# Patient Record
Sex: MALE | Race: WHITE | NOT HISPANIC OR LATINO
[De-identification: names, ages, dates, MRNs, and addresses within clinical notes are randomized per-mention and may not be internally consistent; named-entity substitution may affect disease eponyms.]

---

## 2017-01-12 ENCOUNTER — CHART COPY (OUTPATIENT)
Age: 82
End: 2017-01-12

## 2017-01-13 ENCOUNTER — APPOINTMENT (OUTPATIENT)
Dept: FAMILY MEDICINE | Facility: CLINIC | Age: 82
End: 2017-01-13

## 2017-01-13 VITALS — DIASTOLIC BLOOD PRESSURE: 70 MMHG | SYSTOLIC BLOOD PRESSURE: 112 MMHG

## 2017-01-14 LAB
ALBUMIN SERPL ELPH-MCNC: 4.6 G/DL
ALP BLD-CCNC: 69 U/L
ALT SERPL-CCNC: 20 U/L
ANION GAP SERPL CALC-SCNC: 14 MMOL/L
AST SERPL-CCNC: 22 U/L
BASOPHILS # BLD AUTO: 0.02 K/UL
BASOPHILS NFR BLD AUTO: 0.4 %
BILIRUB SERPL-MCNC: 0.7 MG/DL
BUN SERPL-MCNC: 22 MG/DL
CALCIUM SERPL-MCNC: 9.4 MG/DL
CHLORIDE SERPL-SCNC: 107 MMOL/L
CHOLEST SERPL-MCNC: 193 MG/DL
CHOLEST/HDLC SERPL: 2.8 RATIO
CO2 SERPL-SCNC: 22 MMOL/L
CREAT SERPL-MCNC: 1.05 MG/DL
EOSINOPHIL # BLD AUTO: 0.15 K/UL
EOSINOPHIL NFR BLD AUTO: 2.7 %
GLUCOSE SERPL-MCNC: 113 MG/DL
HBA1C MFR BLD HPLC: 6.2 %
HCT VFR BLD CALC: 39.3 %
HDLC SERPL-MCNC: 69 MG/DL
HGB BLD-MCNC: 12.6 G/DL
IMM GRANULOCYTES NFR BLD AUTO: 0.2 %
LDLC SERPL CALC-MCNC: 111 MG/DL
LYMPHOCYTES # BLD AUTO: 1.56 K/UL
LYMPHOCYTES NFR BLD AUTO: 28.4 %
MAN DIFF?: NORMAL
MCHC RBC-ENTMCNC: 29.4 PG
MCHC RBC-ENTMCNC: 32.1 GM/DL
MCV RBC AUTO: 91.6 FL
MONOCYTES # BLD AUTO: 0.48 K/UL
MONOCYTES NFR BLD AUTO: 8.7 %
NEUTROPHILS # BLD AUTO: 3.28 K/UL
NEUTROPHILS NFR BLD AUTO: 59.6 %
PLATELET # BLD AUTO: 234 K/UL
POTASSIUM SERPL-SCNC: 4.6 MMOL/L
PROT SERPL-MCNC: 7.6 G/DL
PSA SERPL-MCNC: 2.7 NG/ML
RBC # BLD: 4.29 M/UL
RBC # FLD: 14.5 %
SODIUM SERPL-SCNC: 143 MMOL/L
TRIGL SERPL-MCNC: 65 MG/DL
TSH SERPL-ACNC: 1.99 UIU/ML
WBC # FLD AUTO: 5.5 K/UL

## 2017-01-26 ENCOUNTER — MEDICATION RENEWAL (OUTPATIENT)
Age: 82
End: 2017-01-26

## 2017-03-07 ENCOUNTER — APPOINTMENT (OUTPATIENT)
Dept: FAMILY MEDICINE | Facility: CLINIC | Age: 82
End: 2017-03-07

## 2017-03-07 VITALS — SYSTOLIC BLOOD PRESSURE: 118 MMHG | DIASTOLIC BLOOD PRESSURE: 72 MMHG

## 2017-04-12 ENCOUNTER — MEDICATION RENEWAL (OUTPATIENT)
Age: 82
End: 2017-04-12

## 2017-05-18 ENCOUNTER — MEDICATION RENEWAL (OUTPATIENT)
Age: 82
End: 2017-05-18

## 2017-06-21 ENCOUNTER — MEDICATION RENEWAL (OUTPATIENT)
Age: 82
End: 2017-06-21

## 2017-06-22 ENCOUNTER — APPOINTMENT (OUTPATIENT)
Dept: FAMILY MEDICINE | Facility: CLINIC | Age: 82
End: 2017-06-22

## 2017-06-22 ENCOUNTER — RX RENEWAL (OUTPATIENT)
Age: 82
End: 2017-06-22

## 2017-06-22 VITALS
BODY MASS INDEX: 22.88 KG/M2 | HEIGHT: 69.5 IN | WEIGHT: 158 LBS | DIASTOLIC BLOOD PRESSURE: 66 MMHG | SYSTOLIC BLOOD PRESSURE: 110 MMHG

## 2017-06-23 LAB
ALBUMIN SERPL ELPH-MCNC: 4.5 G/DL
ALP BLD-CCNC: 65 U/L
ALT SERPL-CCNC: 14 U/L
ANION GAP SERPL CALC-SCNC: 11 MMOL/L
AST SERPL-CCNC: 17 U/L
BILIRUB SERPL-MCNC: 0.5 MG/DL
BUN SERPL-MCNC: 26 MG/DL
CALCIUM SERPL-MCNC: 9.3 MG/DL
CHLORIDE SERPL-SCNC: 104 MMOL/L
CO2 SERPL-SCNC: 26 MMOL/L
CREAT SERPL-MCNC: 1.13 MG/DL
GLUCOSE SERPL-MCNC: 141 MG/DL
POTASSIUM SERPL-SCNC: 4.4 MMOL/L
PROT SERPL-MCNC: 7.6 G/DL
SODIUM SERPL-SCNC: 141 MMOL/L

## 2017-08-12 ENCOUNTER — RX RENEWAL (OUTPATIENT)
Age: 82
End: 2017-08-12

## 2017-10-09 ENCOUNTER — RX RENEWAL (OUTPATIENT)
Age: 82
End: 2017-10-09

## 2017-11-18 ENCOUNTER — RX RENEWAL (OUTPATIENT)
Age: 82
End: 2017-11-18

## 2017-12-17 ENCOUNTER — RX RENEWAL (OUTPATIENT)
Age: 82
End: 2017-12-17

## 2018-01-16 ENCOUNTER — RX RENEWAL (OUTPATIENT)
Age: 83
End: 2018-01-16

## 2018-02-11 ENCOUNTER — RX RENEWAL (OUTPATIENT)
Age: 83
End: 2018-02-11

## 2018-02-13 ENCOUNTER — RX RENEWAL (OUTPATIENT)
Age: 83
End: 2018-02-13

## 2018-10-30 ENCOUNTER — APPOINTMENT (OUTPATIENT)
Dept: FAMILY MEDICINE | Facility: CLINIC | Age: 83
End: 2018-10-30
Payer: MEDICARE

## 2018-10-30 VITALS
WEIGHT: 180 LBS | SYSTOLIC BLOOD PRESSURE: 110 MMHG | DIASTOLIC BLOOD PRESSURE: 70 MMHG | BODY MASS INDEX: 25.77 KG/M2 | HEIGHT: 70 IN

## 2018-10-30 DIAGNOSIS — R53.81 OTHER MALAISE: ICD-10-CM

## 2018-10-30 PROCEDURE — G0008: CPT

## 2018-10-30 PROCEDURE — 99214 OFFICE O/P EST MOD 30 MIN: CPT | Mod: 25

## 2018-10-30 PROCEDURE — 90686 IIV4 VACC NO PRSV 0.5 ML IM: CPT

## 2018-11-20 ENCOUNTER — APPOINTMENT (OUTPATIENT)
Dept: FAMILY MEDICINE | Facility: CLINIC | Age: 83
End: 2018-11-20
Payer: MEDICARE

## 2018-11-20 VITALS
BODY MASS INDEX: 28.06 KG/M2 | WEIGHT: 196 LBS | DIASTOLIC BLOOD PRESSURE: 66 MMHG | SYSTOLIC BLOOD PRESSURE: 118 MMHG | HEIGHT: 70 IN

## 2018-11-20 PROCEDURE — 36415 COLL VENOUS BLD VENIPUNCTURE: CPT

## 2018-11-20 PROCEDURE — 99213 OFFICE O/P EST LOW 20 MIN: CPT | Mod: 25

## 2018-11-21 LAB
ALBUMIN SERPL ELPH-MCNC: 4.4 G/DL
ALP BLD-CCNC: 58 U/L
ALT SERPL-CCNC: 16 U/L
ANION GAP SERPL CALC-SCNC: 13 MMOL/L
AST SERPL-CCNC: 16 U/L
BASOPHILS # BLD AUTO: 0.02 K/UL
BASOPHILS NFR BLD AUTO: 0.4 %
BILIRUB SERPL-MCNC: 0.4 MG/DL
BUN SERPL-MCNC: 23 MG/DL
CALCIUM SERPL-MCNC: 9.4 MG/DL
CHLORIDE SERPL-SCNC: 104 MMOL/L
CHOLEST SERPL-MCNC: 169 MG/DL
CHOLEST/HDLC SERPL: 3.3 RATIO
CO2 SERPL-SCNC: 25 MMOL/L
CREAT SERPL-MCNC: 1.14 MG/DL
EOSINOPHIL # BLD AUTO: 0.21 K/UL
EOSINOPHIL NFR BLD AUTO: 4.3 %
GLUCOSE SERPL-MCNC: 150 MG/DL
HBA1C MFR BLD HPLC: 6.8 %
HCT VFR BLD CALC: 37.4 %
HDLC SERPL-MCNC: 51 MG/DL
HGB BLD-MCNC: 12.4 G/DL
IMM GRANULOCYTES NFR BLD AUTO: 0.2 %
LDLC SERPL CALC-MCNC: 99 MG/DL
LYMPHOCYTES # BLD AUTO: 1.68 K/UL
LYMPHOCYTES NFR BLD AUTO: 34.8 %
MAN DIFF?: NORMAL
MCHC RBC-ENTMCNC: 29.6 PG
MCHC RBC-ENTMCNC: 33.2 GM/DL
MCV RBC AUTO: 89.3 FL
MONOCYTES # BLD AUTO: 0.48 K/UL
MONOCYTES NFR BLD AUTO: 9.9 %
NEUTROPHILS # BLD AUTO: 2.43 K/UL
NEUTROPHILS NFR BLD AUTO: 50.4 %
PLATELET # BLD AUTO: 193 K/UL
POTASSIUM SERPL-SCNC: 4.4 MMOL/L
PROT SERPL-MCNC: 7 G/DL
RBC # BLD: 4.19 M/UL
RBC # FLD: 14.1 %
SODIUM SERPL-SCNC: 142 MMOL/L
TRIGL SERPL-MCNC: 97 MG/DL
TSH SERPL-ACNC: 2.19 UIU/ML
WBC # FLD AUTO: 4.83 K/UL

## 2018-11-27 ENCOUNTER — MEDICATION RENEWAL (OUTPATIENT)
Age: 83
End: 2018-11-27

## 2018-11-28 ENCOUNTER — MEDICATION RENEWAL (OUTPATIENT)
Age: 83
End: 2018-11-28

## 2018-11-29 RX ORDER — OMEGA-3-ACID ETHYL ESTERS CAPSULES 1 G/1
1 CAPSULE, LIQUID FILLED ORAL TWICE DAILY
Qty: 360 | Refills: 1 | Status: ACTIVE | COMMUNITY
Start: 2018-11-28

## 2018-12-12 ENCOUNTER — MEDICATION RENEWAL (OUTPATIENT)
Age: 83
End: 2018-12-12

## 2018-12-20 ENCOUNTER — RX RENEWAL (OUTPATIENT)
Age: 83
End: 2018-12-20

## 2019-03-06 ENCOUNTER — RX RENEWAL (OUTPATIENT)
Age: 84
End: 2019-03-06

## 2019-03-07 ENCOUNTER — RX RENEWAL (OUTPATIENT)
Age: 84
End: 2019-03-07

## 2019-03-19 ENCOUNTER — RX RENEWAL (OUTPATIENT)
Age: 84
End: 2019-03-19

## 2019-03-26 ENCOUNTER — LABORATORY RESULT (OUTPATIENT)
Age: 84
End: 2019-03-26

## 2019-03-26 ENCOUNTER — APPOINTMENT (OUTPATIENT)
Dept: FAMILY MEDICINE | Facility: CLINIC | Age: 84
End: 2019-03-26
Payer: MEDICARE

## 2019-03-26 VITALS
HEART RATE: 86 BPM | WEIGHT: 185 LBS | OXYGEN SATURATION: 98 % | HEIGHT: 70 IN | BODY MASS INDEX: 26.48 KG/M2 | SYSTOLIC BLOOD PRESSURE: 130 MMHG | DIASTOLIC BLOOD PRESSURE: 60 MMHG | RESPIRATION RATE: 16 BRPM

## 2019-03-26 DIAGNOSIS — R73.03 PREDIABETES.: ICD-10-CM

## 2019-03-26 PROCEDURE — 99214 OFFICE O/P EST MOD 30 MIN: CPT | Mod: 25

## 2019-03-26 PROCEDURE — 36415 COLL VENOUS BLD VENIPUNCTURE: CPT

## 2019-03-27 LAB
ALBUMIN SERPL ELPH-MCNC: 4.3 G/DL
ALP BLD-CCNC: 68 U/L
ALT SERPL-CCNC: 20 U/L
ANION GAP SERPL CALC-SCNC: 14 MMOL/L
AST SERPL-CCNC: 21 U/L
BASOPHILS # BLD AUTO: 0.03 K/UL
BASOPHILS NFR BLD AUTO: 0.5 %
BILIRUB SERPL-MCNC: 0.3 MG/DL
BUN SERPL-MCNC: 25 MG/DL
CALCIUM SERPL-MCNC: 9.3 MG/DL
CHLORIDE SERPL-SCNC: 101 MMOL/L
CO2 SERPL-SCNC: 24 MMOL/L
CREAT SERPL-MCNC: 1 MG/DL
EOSINOPHIL # BLD AUTO: 0.22 K/UL
EOSINOPHIL NFR BLD AUTO: 3.6 %
GLUCOSE SERPL-MCNC: 233 MG/DL
HCT VFR BLD CALC: 39.9 %
HGB BLD-MCNC: 12.9 G/DL
IMM GRANULOCYTES NFR BLD AUTO: 0.5 %
LYMPHOCYTES # BLD AUTO: 2.01 K/UL
LYMPHOCYTES NFR BLD AUTO: 32.5 %
MAN DIFF?: NORMAL
MCHC RBC-ENTMCNC: 29.3 PG
MCHC RBC-ENTMCNC: 32.3 GM/DL
MCV RBC AUTO: 90.5 FL
MONOCYTES # BLD AUTO: 0.53 K/UL
MONOCYTES NFR BLD AUTO: 8.6 %
NEUTROPHILS # BLD AUTO: 3.36 K/UL
NEUTROPHILS NFR BLD AUTO: 54.3 %
PLATELET # BLD AUTO: 206 K/UL
POTASSIUM SERPL-SCNC: 4.1 MMOL/L
PROT SERPL-MCNC: 7.4 G/DL
RBC # BLD: 4.41 M/UL
RBC # FLD: 13.5 %
SODIUM SERPL-SCNC: 139 MMOL/L
TSH SERPL-ACNC: 2.36 UIU/ML
WBC # FLD AUTO: 6.18 K/UL

## 2019-04-12 ENCOUNTER — RX RENEWAL (OUTPATIENT)
Age: 84
End: 2019-04-12

## 2019-05-28 ENCOUNTER — APPOINTMENT (OUTPATIENT)
Dept: FAMILY MEDICINE | Facility: CLINIC | Age: 84
End: 2019-05-28
Payer: MEDICARE

## 2019-05-28 VITALS
BODY MASS INDEX: 27.4 KG/M2 | OXYGEN SATURATION: 95 % | HEART RATE: 67 BPM | WEIGHT: 185 LBS | SYSTOLIC BLOOD PRESSURE: 100 MMHG | DIASTOLIC BLOOD PRESSURE: 60 MMHG | HEIGHT: 69 IN | RESPIRATION RATE: 15 BRPM

## 2019-05-28 PROCEDURE — 99214 OFFICE O/P EST MOD 30 MIN: CPT | Mod: 25

## 2019-05-28 PROCEDURE — 36415 COLL VENOUS BLD VENIPUNCTURE: CPT

## 2019-05-29 LAB
ESTIMATED AVERAGE GLUCOSE: 226 MG/DL
HBA1C MFR BLD HPLC: 9.5 %
TESTOST SERPL-MCNC: 186 NG/DL

## 2019-06-03 ENCOUNTER — RX RENEWAL (OUTPATIENT)
Age: 84
End: 2019-06-03

## 2019-07-11 ENCOUNTER — RX RENEWAL (OUTPATIENT)
Age: 84
End: 2019-07-11

## 2019-07-11 ENCOUNTER — MEDICATION RENEWAL (OUTPATIENT)
Age: 84
End: 2019-07-11

## 2019-08-08 ENCOUNTER — RX RENEWAL (OUTPATIENT)
Age: 84
End: 2019-08-08

## 2019-08-15 ENCOUNTER — RX RENEWAL (OUTPATIENT)
Age: 84
End: 2019-08-15

## 2019-09-24 ENCOUNTER — RX RENEWAL (OUTPATIENT)
Age: 84
End: 2019-09-24

## 2019-10-24 ENCOUNTER — APPOINTMENT (OUTPATIENT)
Dept: FAMILY MEDICINE | Facility: CLINIC | Age: 84
End: 2019-10-24

## 2019-10-28 ENCOUNTER — MEDICATION RENEWAL (OUTPATIENT)
Age: 84
End: 2019-10-28

## 2019-10-28 ENCOUNTER — APPOINTMENT (OUTPATIENT)
Dept: FAMILY MEDICINE | Facility: CLINIC | Age: 84
End: 2019-10-28

## 2019-10-29 ENCOUNTER — APPOINTMENT (OUTPATIENT)
Dept: FAMILY MEDICINE | Facility: CLINIC | Age: 84
End: 2019-10-29
Payer: MEDICARE

## 2019-10-29 PROCEDURE — 99214 OFFICE O/P EST MOD 30 MIN: CPT

## 2019-11-21 ENCOUNTER — RX RENEWAL (OUTPATIENT)
Age: 84
End: 2019-11-21

## 2020-01-27 ENCOUNTER — APPOINTMENT (OUTPATIENT)
Dept: FAMILY MEDICINE | Facility: CLINIC | Age: 85
End: 2020-01-27
Payer: MEDICARE

## 2020-01-27 VITALS
HEART RATE: 64 BPM | HEIGHT: 69 IN | DIASTOLIC BLOOD PRESSURE: 50 MMHG | SYSTOLIC BLOOD PRESSURE: 100 MMHG | TEMPERATURE: 97.4 F | OXYGEN SATURATION: 98 %

## 2020-01-27 PROCEDURE — 99214 OFFICE O/P EST MOD 30 MIN: CPT

## 2020-01-31 ENCOUNTER — RX RENEWAL (OUTPATIENT)
Age: 85
End: 2020-01-31

## 2020-02-23 ENCOUNTER — APPOINTMENT (OUTPATIENT)
Dept: FAMILY MEDICINE | Facility: CLINIC | Age: 85
End: 2020-02-23
Payer: MEDICARE

## 2020-02-23 VITALS
HEIGHT: 69 IN | BODY MASS INDEX: 26.66 KG/M2 | DIASTOLIC BLOOD PRESSURE: 64 MMHG | SYSTOLIC BLOOD PRESSURE: 110 MMHG | WEIGHT: 180 LBS

## 2020-02-23 PROCEDURE — 99213 OFFICE O/P EST LOW 20 MIN: CPT | Mod: 25

## 2020-02-23 PROCEDURE — 36415 COLL VENOUS BLD VENIPUNCTURE: CPT

## 2020-02-24 LAB
ALBUMIN SERPL ELPH-MCNC: 4.6 G/DL
ALP BLD-CCNC: 69 U/L
ALT SERPL-CCNC: 23 U/L
ANION GAP SERPL CALC-SCNC: 14 MMOL/L
AST SERPL-CCNC: 16 U/L
BASOPHILS # BLD AUTO: 0.03 K/UL
BASOPHILS NFR BLD AUTO: 0.4 %
BILIRUB SERPL-MCNC: 0.5 MG/DL
BUN SERPL-MCNC: 30 MG/DL
CALCIUM SERPL-MCNC: 10.1 MG/DL
CHLORIDE SERPL-SCNC: 100 MMOL/L
CHOLEST SERPL-MCNC: 188 MG/DL
CHOLEST/HDLC SERPL: 3.4 RATIO
CO2 SERPL-SCNC: 26 MMOL/L
CREAT SERPL-MCNC: 1.08 MG/DL
EOSINOPHIL # BLD AUTO: 0.14 K/UL
EOSINOPHIL NFR BLD AUTO: 1.8 %
ESTIMATED AVERAGE GLUCOSE: 171 MG/DL
GLUCOSE SERPL-MCNC: 206 MG/DL
HBA1C MFR BLD HPLC: 7.6 %
HCT VFR BLD CALC: 40.9 %
HDLC SERPL-MCNC: 55 MG/DL
HGB BLD-MCNC: 13.2 G/DL
IMM GRANULOCYTES NFR BLD AUTO: 0.4 %
LDLC SERPL CALC-MCNC: 114 MG/DL
LYMPHOCYTES # BLD AUTO: 2.22 K/UL
LYMPHOCYTES NFR BLD AUTO: 29 %
MAN DIFF?: NORMAL
MCHC RBC-ENTMCNC: 29.7 PG
MCHC RBC-ENTMCNC: 32.3 GM/DL
MCV RBC AUTO: 91.9 FL
MONOCYTES # BLD AUTO: 0.6 K/UL
MONOCYTES NFR BLD AUTO: 7.8 %
NEUTROPHILS # BLD AUTO: 4.63 K/UL
NEUTROPHILS NFR BLD AUTO: 60.6 %
PLATELET # BLD AUTO: 250 K/UL
POTASSIUM SERPL-SCNC: 4.3 MMOL/L
PROT SERPL-MCNC: 7.3 G/DL
RBC # BLD: 4.45 M/UL
RBC # FLD: 13.2 %
SODIUM SERPL-SCNC: 140 MMOL/L
TRIGL SERPL-MCNC: 89 MG/DL
TSH SERPL-ACNC: 1.19 UIU/ML
WBC # FLD AUTO: 7.65 K/UL

## 2020-03-16 ENCOUNTER — RX RENEWAL (OUTPATIENT)
Age: 85
End: 2020-03-16

## 2020-04-20 ENCOUNTER — RX RENEWAL (OUTPATIENT)
Age: 85
End: 2020-04-20

## 2020-04-24 ENCOUNTER — RX RENEWAL (OUTPATIENT)
Age: 85
End: 2020-04-24

## 2020-05-04 ENCOUNTER — RX RENEWAL (OUTPATIENT)
Age: 85
End: 2020-05-04

## 2020-06-04 ENCOUNTER — RX RENEWAL (OUTPATIENT)
Age: 85
End: 2020-06-04

## 2020-06-28 ENCOUNTER — APPOINTMENT (OUTPATIENT)
Dept: FAMILY MEDICINE | Facility: CLINIC | Age: 85
End: 2020-06-28

## 2020-07-20 ENCOUNTER — APPOINTMENT (OUTPATIENT)
Dept: FAMILY MEDICINE | Facility: CLINIC | Age: 85
End: 2020-07-20
Payer: MEDICARE

## 2020-07-20 VITALS
DIASTOLIC BLOOD PRESSURE: 80 MMHG | OXYGEN SATURATION: 98 % | RESPIRATION RATE: 15 BRPM | TEMPERATURE: 98.2 F | SYSTOLIC BLOOD PRESSURE: 100 MMHG | HEART RATE: 82 BPM | BODY MASS INDEX: 27.4 KG/M2 | WEIGHT: 185 LBS | HEIGHT: 69 IN

## 2020-07-20 DIAGNOSIS — Z00.00 ENCOUNTER FOR GENERAL ADULT MEDICAL EXAMINATION W/OUT ABNORMAL FINDINGS: ICD-10-CM

## 2020-07-20 LAB
25(OH)D3 SERPL-MCNC: 8.5 NG/ML
ALBUMIN SERPL ELPH-MCNC: 4.1 G/DL
ALP BLD-CCNC: 77 U/L
ALT SERPL-CCNC: 15 U/L
ANION GAP SERPL CALC-SCNC: 12 MMOL/L
APPEARANCE: CLEAR
AST SERPL-CCNC: 16 U/L
BACTERIA: NEGATIVE
BASOPHILS # BLD AUTO: 0.05 K/UL
BASOPHILS NFR BLD AUTO: 0.9 %
BILIRUB SERPL-MCNC: 0.4 MG/DL
BILIRUBIN URINE: NEGATIVE
BLOOD URINE: NEGATIVE
BUN SERPL-MCNC: 20 MG/DL
CALCIUM SERPL-MCNC: 9.4 MG/DL
CHLORIDE SERPL-SCNC: 104 MMOL/L
CHOLEST SERPL-MCNC: 157 MG/DL
CHOLEST/HDLC SERPL: 3.2 RATIO
CO2 SERPL-SCNC: 24 MMOL/L
COLOR: NORMAL
CREAT SERPL-MCNC: 1.07 MG/DL
EOSINOPHIL # BLD AUTO: 0.28 K/UL
EOSINOPHIL NFR BLD AUTO: 5 %
GLUCOSE QUALITATIVE U: NEGATIVE
GLUCOSE SERPL-MCNC: 162 MG/DL
HCT VFR BLD CALC: 38 %
HDLC SERPL-MCNC: 50 MG/DL
HGB BLD-MCNC: 12 G/DL
HYALINE CASTS: 3 /LPF
IMM GRANULOCYTES NFR BLD AUTO: 0.2 %
KETONES URINE: NORMAL
LDLC SERPL CALC-MCNC: 88 MG/DL
LEUKOCYTE ESTERASE URINE: NEGATIVE
LYMPHOCYTES # BLD AUTO: 1.94 K/UL
LYMPHOCYTES NFR BLD AUTO: 34.4 %
MAN DIFF?: NORMAL
MCHC RBC-ENTMCNC: 29.3 PG
MCHC RBC-ENTMCNC: 31.6 GM/DL
MCV RBC AUTO: 92.7 FL
MICROSCOPIC-UA: NORMAL
MONOCYTES # BLD AUTO: 0.44 K/UL
MONOCYTES NFR BLD AUTO: 7.8 %
NEUTROPHILS # BLD AUTO: 2.92 K/UL
NEUTROPHILS NFR BLD AUTO: 51.7 %
NITRITE URINE: NEGATIVE
PH URINE: 5
PLATELET # BLD AUTO: 243 K/UL
POTASSIUM SERPL-SCNC: 4.1 MMOL/L
PROT SERPL-MCNC: 7 G/DL
PROTEIN URINE: NORMAL
PSA SERPL-MCNC: 0.97 NG/ML
RBC # BLD: 4.1 M/UL
RBC # FLD: 13.5 %
RED BLOOD CELLS URINE: 0 /HPF
SODIUM SERPL-SCNC: 140 MMOL/L
SPECIFIC GRAVITY URINE: 1.02
SQUAMOUS EPITHELIAL CELLS: 1 /HPF
TRIGL SERPL-MCNC: 97 MG/DL
TSH SERPL-ACNC: 2.47 UIU/ML
UROBILINOGEN URINE: NORMAL
WBC # FLD AUTO: 5.64 K/UL
WHITE BLOOD CELLS URINE: 1 /HPF

## 2020-07-20 PROCEDURE — 36415 COLL VENOUS BLD VENIPUNCTURE: CPT

## 2020-07-20 PROCEDURE — G0439: CPT

## 2020-07-21 LAB
SARS-COV-2 IGG SERPL IA-ACNC: 3.97 AU/ML
SARS-COV-2 IGG SERPL QL IA: NEGATIVE

## 2020-07-22 LAB
ESTIMATED AVERAGE GLUCOSE: 177 MG/DL
HBA1C MFR BLD HPLC: 7.8 %

## 2020-09-14 RX ORDER — BLOOD-GLUCOSE METER
W/DEVICE EACH MISCELLANEOUS
Qty: 1 | Refills: 0 | Status: ACTIVE | COMMUNITY
Start: 2020-01-02 | End: 1900-01-01

## 2020-09-14 RX ORDER — LANCETS 33 GAUGE
EACH MISCELLANEOUS DAILY
Qty: 2 | Refills: 0 | Status: ACTIVE | COMMUNITY
Start: 2020-01-02 | End: 1900-01-01

## 2020-09-14 RX ORDER — BLOOD SUGAR DIAGNOSTIC
STRIP MISCELLANEOUS
Qty: 2 | Refills: 1 | Status: ACTIVE | COMMUNITY
Start: 2020-01-02 | End: 1900-01-01

## 2020-10-11 ENCOUNTER — RX RENEWAL (OUTPATIENT)
Age: 85
End: 2020-10-11

## 2020-11-12 ENCOUNTER — APPOINTMENT (OUTPATIENT)
Dept: FAMILY MEDICINE | Facility: CLINIC | Age: 85
End: 2020-11-12
Payer: MEDICARE

## 2020-11-12 VITALS
BODY MASS INDEX: 27.4 KG/M2 | SYSTOLIC BLOOD PRESSURE: 106 MMHG | HEIGHT: 69 IN | WEIGHT: 185 LBS | DIASTOLIC BLOOD PRESSURE: 64 MMHG

## 2020-11-12 DIAGNOSIS — M19.90 UNSPECIFIED OSTEOARTHRITIS, UNSPECIFIED SITE: ICD-10-CM

## 2020-11-12 DIAGNOSIS — Z23 ENCOUNTER FOR IMMUNIZATION: ICD-10-CM

## 2020-11-12 LAB
ALBUMIN SERPL ELPH-MCNC: 4.2 G/DL
ALP BLD-CCNC: 64 U/L
ALT SERPL-CCNC: 15 U/L
ANION GAP SERPL CALC-SCNC: 15 MMOL/L
AST SERPL-CCNC: 15 U/L
BASOPHILS # BLD AUTO: 0.04 K/UL
BASOPHILS NFR BLD AUTO: 0.6 %
BILIRUB SERPL-MCNC: 0.4 MG/DL
BUN SERPL-MCNC: 27 MG/DL
CALCIUM SERPL-MCNC: 9.7 MG/DL
CHLORIDE SERPL-SCNC: 105 MMOL/L
CHOLEST SERPL-MCNC: 134 MG/DL
CO2 SERPL-SCNC: 22 MMOL/L
CREAT SERPL-MCNC: 1.15 MG/DL
CREAT SPEC-SCNC: 111 MG/DL
EOSINOPHIL # BLD AUTO: 0.22 K/UL
EOSINOPHIL NFR BLD AUTO: 3.1 %
ESTIMATED AVERAGE GLUCOSE: 166 MG/DL
GLUCOSE SERPL-MCNC: 131 MG/DL
HBA1C MFR BLD HPLC: 7.4 %
HCT VFR BLD CALC: 37.8 %
HDLC SERPL-MCNC: 50 MG/DL
HGB BLD-MCNC: 11.9 G/DL
IMM GRANULOCYTES NFR BLD AUTO: 0.3 %
LDLC SERPL CALC-MCNC: 67 MG/DL
LYMPHOCYTES # BLD AUTO: 1.93 K/UL
LYMPHOCYTES NFR BLD AUTO: 27.5 %
MAN DIFF?: NORMAL
MCHC RBC-ENTMCNC: 28.4 PG
MCHC RBC-ENTMCNC: 31.5 GM/DL
MCV RBC AUTO: 90.2 FL
MICROALBUMIN 24H UR DL<=1MG/L-MCNC: 1.6 MG/DL
MICROALBUMIN/CREAT 24H UR-RTO: 14 MG/G
MONOCYTES # BLD AUTO: 0.67 K/UL
MONOCYTES NFR BLD AUTO: 9.5 %
NEUTROPHILS # BLD AUTO: 4.15 K/UL
NEUTROPHILS NFR BLD AUTO: 59 %
NONHDLC SERPL-MCNC: 84 MG/DL
PLATELET # BLD AUTO: 240 K/UL
POTASSIUM SERPL-SCNC: 4.4 MMOL/L
PROT SERPL-MCNC: 6.7 G/DL
RBC # BLD: 4.19 M/UL
RBC # FLD: 13.9 %
SODIUM SERPL-SCNC: 141 MMOL/L
TRIGL SERPL-MCNC: 82 MG/DL
TSH SERPL-ACNC: 1.07 UIU/ML
WBC # FLD AUTO: 7.03 K/UL

## 2020-11-12 PROCEDURE — 99072 ADDL SUPL MATRL&STAF TM PHE: CPT

## 2020-11-12 PROCEDURE — G0008: CPT

## 2020-11-12 PROCEDURE — 36415 COLL VENOUS BLD VENIPUNCTURE: CPT

## 2020-11-12 PROCEDURE — 99214 OFFICE O/P EST MOD 30 MIN: CPT | Mod: 25

## 2020-11-12 PROCEDURE — 90686 IIV4 VACC NO PRSV 0.5 ML IM: CPT

## 2021-03-08 ENCOUNTER — APPOINTMENT (OUTPATIENT)
Dept: FAMILY MEDICINE | Facility: CLINIC | Age: 86
End: 2021-03-08
Payer: MEDICARE

## 2021-03-08 VITALS
TEMPERATURE: 97.8 F | WEIGHT: 186 LBS | HEART RATE: 57 BPM | DIASTOLIC BLOOD PRESSURE: 70 MMHG | SYSTOLIC BLOOD PRESSURE: 110 MMHG | BODY MASS INDEX: 27.55 KG/M2 | HEIGHT: 69 IN | OXYGEN SATURATION: 99 %

## 2021-03-08 PROCEDURE — 99214 OFFICE O/P EST MOD 30 MIN: CPT | Mod: 25

## 2021-03-08 PROCEDURE — 99072 ADDL SUPL MATRL&STAF TM PHE: CPT

## 2021-03-08 PROCEDURE — 36415 COLL VENOUS BLD VENIPUNCTURE: CPT

## 2021-03-09 LAB
ALBUMIN SERPL ELPH-MCNC: 4.2 G/DL
ALP BLD-CCNC: 74 U/L
ALT SERPL-CCNC: 12 U/L
ANION GAP SERPL CALC-SCNC: 10 MMOL/L
AST SERPL-CCNC: 15 U/L
BASOPHILS # BLD AUTO: 0.04 K/UL
BASOPHILS NFR BLD AUTO: 0.6 %
BILIRUB SERPL-MCNC: 0.4 MG/DL
BUN SERPL-MCNC: 23 MG/DL
CALCIUM SERPL-MCNC: 9.9 MG/DL
CHLORIDE SERPL-SCNC: 104 MMOL/L
CHOLEST SERPL-MCNC: 142 MG/DL
CO2 SERPL-SCNC: 26 MMOL/L
CREAT SERPL-MCNC: 1.37 MG/DL
EOSINOPHIL # BLD AUTO: 0.25 K/UL
EOSINOPHIL NFR BLD AUTO: 4 %
ESTIMATED AVERAGE GLUCOSE: 140 MG/DL
GLUCOSE SERPL-MCNC: 161 MG/DL
HBA1C MFR BLD HPLC: 6.5 %
HCT VFR BLD CALC: 37.9 %
HDLC SERPL-MCNC: 41 MG/DL
HGB BLD-MCNC: 11.7 G/DL
IMM GRANULOCYTES NFR BLD AUTO: 0.3 %
LDLC SERPL CALC-MCNC: 72 MG/DL
LYMPHOCYTES # BLD AUTO: 1.72 K/UL
LYMPHOCYTES NFR BLD AUTO: 27.4 %
MAN DIFF?: NORMAL
MCHC RBC-ENTMCNC: 29.4 PG
MCHC RBC-ENTMCNC: 30.9 GM/DL
MCV RBC AUTO: 95.2 FL
MONOCYTES # BLD AUTO: 0.5 K/UL
MONOCYTES NFR BLD AUTO: 8 %
NEUTROPHILS # BLD AUTO: 3.75 K/UL
NEUTROPHILS NFR BLD AUTO: 59.7 %
NONHDLC SERPL-MCNC: 101 MG/DL
PLATELET # BLD AUTO: 235 K/UL
POTASSIUM SERPL-SCNC: 4.7 MMOL/L
PROT SERPL-MCNC: 7 G/DL
RBC # BLD: 3.98 M/UL
RBC # FLD: 14.1 %
SODIUM SERPL-SCNC: 141 MMOL/L
TRIGL SERPL-MCNC: 143 MG/DL
TSH SERPL-ACNC: 2.08 UIU/ML
WBC # FLD AUTO: 6.28 K/UL

## 2021-04-05 ENCOUNTER — RX RENEWAL (OUTPATIENT)
Age: 86
End: 2021-04-05

## 2021-05-03 ENCOUNTER — RX RENEWAL (OUTPATIENT)
Age: 86
End: 2021-05-03

## 2021-06-02 ENCOUNTER — RX RENEWAL (OUTPATIENT)
Age: 86
End: 2021-06-02

## 2021-07-01 ENCOUNTER — RX RENEWAL (OUTPATIENT)
Age: 86
End: 2021-07-01

## 2021-07-24 ENCOUNTER — NON-APPOINTMENT (OUTPATIENT)
Age: 86
End: 2021-07-24

## 2021-07-26 ENCOUNTER — APPOINTMENT (OUTPATIENT)
Dept: FAMILY MEDICINE | Facility: CLINIC | Age: 86
End: 2021-07-26
Payer: MEDICARE

## 2021-07-26 VITALS
BODY MASS INDEX: 27.55 KG/M2 | SYSTOLIC BLOOD PRESSURE: 115 MMHG | OXYGEN SATURATION: 97 % | WEIGHT: 186 LBS | HEART RATE: 74 BPM | DIASTOLIC BLOOD PRESSURE: 70 MMHG | HEIGHT: 69 IN | TEMPERATURE: 97.6 F

## 2021-07-26 DIAGNOSIS — N40.0 BENIGN PROSTATIC HYPERPLASIA WITHOUT LOWER URINARY TRACT SYMPMS: ICD-10-CM

## 2021-07-26 LAB
ALBUMIN SERPL ELPH-MCNC: 4.1 G/DL
ALP BLD-CCNC: 64 U/L
ALT SERPL-CCNC: 18 U/L
ANION GAP SERPL CALC-SCNC: 14 MMOL/L
AST SERPL-CCNC: 18 U/L
BASOPHILS # BLD AUTO: 0.04 K/UL
BASOPHILS NFR BLD AUTO: 0.7 %
BILIRUB SERPL-MCNC: 0.2 MG/DL
BUN SERPL-MCNC: 28 MG/DL
CALCIUM SERPL-MCNC: 9.2 MG/DL
CHLORIDE SERPL-SCNC: 106 MMOL/L
CHOLEST SERPL-MCNC: 145 MG/DL
CO2 SERPL-SCNC: 21 MMOL/L
CREAT SERPL-MCNC: 1.48 MG/DL
EOSINOPHIL # BLD AUTO: 0.34 K/UL
EOSINOPHIL NFR BLD AUTO: 5.6 %
ESTIMATED AVERAGE GLUCOSE: 143 MG/DL
GLUCOSE SERPL-MCNC: 104 MG/DL
HBA1C MFR BLD HPLC: 6.6 %
HCT VFR BLD CALC: 34.6 %
HDLC SERPL-MCNC: 43 MG/DL
HGB BLD-MCNC: 11.4 G/DL
IMM GRANULOCYTES NFR BLD AUTO: 0.5 %
LDLC SERPL CALC-MCNC: 83 MG/DL
LYMPHOCYTES # BLD AUTO: 2.09 K/UL
LYMPHOCYTES NFR BLD AUTO: 34.3 %
MAN DIFF?: NORMAL
MCHC RBC-ENTMCNC: 30.2 PG
MCHC RBC-ENTMCNC: 32.9 GM/DL
MCV RBC AUTO: 91.5 FL
MONOCYTES # BLD AUTO: 0.55 K/UL
MONOCYTES NFR BLD AUTO: 9 %
NEUTROPHILS # BLD AUTO: 3.05 K/UL
NEUTROPHILS NFR BLD AUTO: 49.9 %
NONHDLC SERPL-MCNC: 102 MG/DL
PLATELET # BLD AUTO: 250 K/UL
POTASSIUM SERPL-SCNC: 4.6 MMOL/L
PROT SERPL-MCNC: 7 G/DL
RBC # BLD: 3.78 M/UL
RBC # FLD: 14.4 %
SODIUM SERPL-SCNC: 141 MMOL/L
TRIGL SERPL-MCNC: 99 MG/DL
TSH SERPL-ACNC: 2.4 UIU/ML
WBC # FLD AUTO: 6.1 K/UL

## 2021-07-26 PROCEDURE — 99214 OFFICE O/P EST MOD 30 MIN: CPT | Mod: 25

## 2021-07-26 PROCEDURE — 36415 COLL VENOUS BLD VENIPUNCTURE: CPT

## 2021-07-26 PROCEDURE — 99072 ADDL SUPL MATRL&STAF TM PHE: CPT

## 2021-10-26 ENCOUNTER — RX RENEWAL (OUTPATIENT)
Age: 86
End: 2021-10-26

## 2021-11-04 ENCOUNTER — INPATIENT (INPATIENT)
Facility: HOSPITAL | Age: 86
LOS: 4 days | Discharge: HOME HEALTH | End: 2021-11-09
Attending: INTERNAL MEDICINE | Admitting: INTERNAL MEDICINE
Payer: MEDICARE

## 2021-11-04 VITALS
OXYGEN SATURATION: 100 % | RESPIRATION RATE: 18 BRPM | TEMPERATURE: 97 F | DIASTOLIC BLOOD PRESSURE: 65 MMHG | SYSTOLIC BLOOD PRESSURE: 157 MMHG | HEART RATE: 66 BPM

## 2021-11-04 PROCEDURE — 93010 ELECTROCARDIOGRAM REPORT: CPT

## 2021-11-04 PROCEDURE — 99285 EMERGENCY DEPT VISIT HI MDM: CPT | Mod: 25

## 2021-11-04 NOTE — ED ADULT TRIAGE NOTE - CHIEF COMPLAINT QUOTE
Downtime recovery: Patient c/o HA since yesterday, daughter states pt. had difficulty speaking earlier which has since resolved. Equal strength/sensation b/l. No arm drift/facial droop. KERRI regina in progress. No code stroke called. .

## 2021-11-04 NOTE — ED ADULT NURSE NOTE - NSIMPLEMENTINTERV_GEN_ALL_ED
Implemented All Fall with Harm Risk Interventions:  [place] to call system. Call bell, personal items and telephone within reach. Instruct patient to call for assistance. Room bathroom lighting operational. Non-slip footwear when patient is off stretcher. Physically safe environment: no spills, clutter or unnecessary equipment. Stretcher in lowest position, wheels locked, appropriate side rails in place. Provide visual cue, wrist band, yellow gown, etc. Monitor gait and stability. Monitor for mental status changes and reorient to person, place, and time. Review medications for side effects contributing to fall risk. Reinforce activity limits and safety measures with patient and family. Provide visual clues: red socks.

## 2021-11-04 NOTE — ED ADULT NURSE NOTE - OBJECTIVE STATEMENT
Pt received to room 24, per pt daughter pt had episode of expressive aphasia at 7pm followed by episode of vomiting. Code Stroke called, 18g placed in R arm, labs drawn. Pt received to room 24, per pt daughter pt had episode of expressive aphasia at 7pm followed by episode of vomiting.  Pt A&Ox3, hard of hearing, ambulatory with cane. Pt has no complaints at this time. Code Stroke called, 18g placed in R arm, labs drawn, sent. Ct scan in progress. Report given to JUHI Codrero and JUHI Jon. Pt safety maintained.

## 2021-11-05 DIAGNOSIS — Z98.890 OTHER SPECIFIED POSTPROCEDURAL STATES: Chronic | ICD-10-CM

## 2021-11-05 DIAGNOSIS — Z29.9 ENCOUNTER FOR PROPHYLACTIC MEASURES, UNSPECIFIED: ICD-10-CM

## 2021-11-05 DIAGNOSIS — E11.40 TYPE 2 DIABETES MELLITUS WITH DIABETIC NEUROPATHY, UNSPECIFIED: ICD-10-CM

## 2021-11-05 DIAGNOSIS — R47.01 APHASIA: ICD-10-CM

## 2021-11-05 DIAGNOSIS — N17.9 ACUTE KIDNEY FAILURE, UNSPECIFIED: ICD-10-CM

## 2021-11-05 DIAGNOSIS — D64.9 ANEMIA, UNSPECIFIED: ICD-10-CM

## 2021-11-05 DIAGNOSIS — H35.30 UNSPECIFIED MACULAR DEGENERATION: ICD-10-CM

## 2021-11-05 DIAGNOSIS — N40.0 BENIGN PROSTATIC HYPERPLASIA WITHOUT LOWER URINARY TRACT SYMPTOMS: ICD-10-CM

## 2021-11-05 DIAGNOSIS — E11.9 TYPE 2 DIABETES MELLITUS WITHOUT COMPLICATIONS: ICD-10-CM

## 2021-11-05 LAB
ALBUMIN SERPL ELPH-MCNC: 4.4 G/DL — SIGNIFICANT CHANGE UP (ref 3.3–5)
ALP SERPL-CCNC: 72 U/L — SIGNIFICANT CHANGE UP (ref 40–120)
ALT FLD-CCNC: 19 U/L — SIGNIFICANT CHANGE UP (ref 4–41)
ANION GAP SERPL CALC-SCNC: 12 MMOL/L — SIGNIFICANT CHANGE UP (ref 7–14)
APPEARANCE UR: CLEAR — SIGNIFICANT CHANGE UP
APTT BLD: 30.3 SEC — SIGNIFICANT CHANGE UP (ref 27–36.3)
AST SERPL-CCNC: 23 U/L — SIGNIFICANT CHANGE UP (ref 4–40)
BACTERIA # UR AUTO: NEGATIVE — SIGNIFICANT CHANGE UP
BASOPHILS # BLD AUTO: 0.03 K/UL — SIGNIFICANT CHANGE UP (ref 0–0.2)
BASOPHILS NFR BLD AUTO: 0.3 % — SIGNIFICANT CHANGE UP (ref 0–2)
BILIRUB SERPL-MCNC: 0.4 MG/DL — SIGNIFICANT CHANGE UP (ref 0.2–1.2)
BILIRUB UR-MCNC: NEGATIVE — SIGNIFICANT CHANGE UP
BLOOD GAS VENOUS COMPREHENSIVE RESULT: SIGNIFICANT CHANGE UP
BUN SERPL-MCNC: 29 MG/DL — HIGH (ref 7–23)
CALCIUM SERPL-MCNC: 9.9 MG/DL — SIGNIFICANT CHANGE UP (ref 8.4–10.5)
CHLORIDE SERPL-SCNC: 103 MMOL/L — SIGNIFICANT CHANGE UP (ref 98–107)
CO2 SERPL-SCNC: 24 MMOL/L — SIGNIFICANT CHANGE UP (ref 22–31)
COLOR SPEC: YELLOW — SIGNIFICANT CHANGE UP
CREAT SERPL-MCNC: 1.5 MG/DL — HIGH (ref 0.5–1.3)
DIFF PNL FLD: NEGATIVE — SIGNIFICANT CHANGE UP
EOSINOPHIL # BLD AUTO: 0.14 K/UL — SIGNIFICANT CHANGE UP (ref 0–0.5)
EOSINOPHIL NFR BLD AUTO: 1.5 % — SIGNIFICANT CHANGE UP (ref 0–6)
EPI CELLS # UR: 1 /HPF — SIGNIFICANT CHANGE UP (ref 0–5)
GLUCOSE BLDC GLUCOMTR-MCNC: 139 MG/DL — HIGH (ref 70–99)
GLUCOSE BLDC GLUCOMTR-MCNC: 146 MG/DL — HIGH (ref 70–99)
GLUCOSE BLDC GLUCOMTR-MCNC: 160 MG/DL — HIGH (ref 70–99)
GLUCOSE SERPL-MCNC: 192 MG/DL — HIGH (ref 70–99)
GLUCOSE UR QL: NEGATIVE — SIGNIFICANT CHANGE UP
HCT VFR BLD CALC: 36.1 % — LOW (ref 39–50)
HGB BLD-MCNC: 11.9 G/DL — LOW (ref 13–17)
HYALINE CASTS # UR AUTO: 0 /LPF — SIGNIFICANT CHANGE UP (ref 0–7)
IANC: 6.71 K/UL — SIGNIFICANT CHANGE UP (ref 1.5–8.5)
IMM GRANULOCYTES NFR BLD AUTO: 0.3 % — SIGNIFICANT CHANGE UP (ref 0–1.5)
INR BLD: 1.09 RATIO — SIGNIFICANT CHANGE UP (ref 0.88–1.16)
KETONES UR-MCNC: NEGATIVE — SIGNIFICANT CHANGE UP
LEUKOCYTE ESTERASE UR-ACNC: NEGATIVE — SIGNIFICANT CHANGE UP
LYMPHOCYTES # BLD AUTO: 1.86 K/UL — SIGNIFICANT CHANGE UP (ref 1–3.3)
LYMPHOCYTES # BLD AUTO: 19.6 % — SIGNIFICANT CHANGE UP (ref 13–44)
MCHC RBC-ENTMCNC: 29.1 PG — SIGNIFICANT CHANGE UP (ref 27–34)
MCHC RBC-ENTMCNC: 33 GM/DL — SIGNIFICANT CHANGE UP (ref 32–36)
MCV RBC AUTO: 88.3 FL — SIGNIFICANT CHANGE UP (ref 80–100)
MONOCYTES # BLD AUTO: 0.7 K/UL — SIGNIFICANT CHANGE UP (ref 0–0.9)
MONOCYTES NFR BLD AUTO: 7.4 % — SIGNIFICANT CHANGE UP (ref 2–14)
NEUTROPHILS # BLD AUTO: 6.71 K/UL — SIGNIFICANT CHANGE UP (ref 1.8–7.4)
NEUTROPHILS NFR BLD AUTO: 70.9 % — SIGNIFICANT CHANGE UP (ref 43–77)
NITRITE UR-MCNC: NEGATIVE — SIGNIFICANT CHANGE UP
NRBC # BLD: 0 /100 WBCS — SIGNIFICANT CHANGE UP
NRBC # FLD: 0 K/UL — SIGNIFICANT CHANGE UP
PH UR: 6 — SIGNIFICANT CHANGE UP (ref 5–8)
PLATELET # BLD AUTO: 257 K/UL — SIGNIFICANT CHANGE UP (ref 150–400)
POTASSIUM SERPL-MCNC: 5 MMOL/L — SIGNIFICANT CHANGE UP (ref 3.5–5.3)
POTASSIUM SERPL-SCNC: 5 MMOL/L — SIGNIFICANT CHANGE UP (ref 3.5–5.3)
PROT SERPL-MCNC: 7.7 G/DL — SIGNIFICANT CHANGE UP (ref 6–8.3)
PROT UR-MCNC: ABNORMAL
PROTHROM AB SERPL-ACNC: 12.4 SEC — SIGNIFICANT CHANGE UP (ref 10.6–13.6)
RBC # BLD: 4.09 M/UL — LOW (ref 4.2–5.8)
RBC # FLD: 13.5 % — SIGNIFICANT CHANGE UP (ref 10.3–14.5)
RBC CASTS # UR COMP ASSIST: 1 /HPF — SIGNIFICANT CHANGE UP (ref 0–4)
SARS-COV-2 RNA SPEC QL NAA+PROBE: SIGNIFICANT CHANGE UP
SODIUM SERPL-SCNC: 139 MMOL/L — SIGNIFICANT CHANGE UP (ref 135–145)
SP GR SPEC: >1.05 (ref 1–1.05)
TROPONIN T, HIGH SENSITIVITY RESULT: 35 NG/L — SIGNIFICANT CHANGE UP
UROBILINOGEN FLD QL: SIGNIFICANT CHANGE UP
WBC # BLD: 9.47 K/UL — SIGNIFICANT CHANGE UP (ref 3.8–10.5)
WBC # FLD AUTO: 9.47 K/UL — SIGNIFICANT CHANGE UP (ref 3.8–10.5)
WBC UR QL: 1 /HPF — SIGNIFICANT CHANGE UP (ref 0–5)

## 2021-11-05 PROCEDURE — 70450 CT HEAD/BRAIN W/O DYE: CPT | Mod: 26,59,MA

## 2021-11-05 PROCEDURE — 0042T: CPT

## 2021-11-05 PROCEDURE — 71045 X-RAY EXAM CHEST 1 VIEW: CPT | Mod: 26

## 2021-11-05 PROCEDURE — 99222 1ST HOSP IP/OBS MODERATE 55: CPT

## 2021-11-05 PROCEDURE — 70498 CT ANGIOGRAPHY NECK: CPT | Mod: 26,MA

## 2021-11-05 PROCEDURE — 70496 CT ANGIOGRAPHY HEAD: CPT | Mod: 26,MA

## 2021-11-05 PROCEDURE — 99223 1ST HOSP IP/OBS HIGH 75: CPT

## 2021-11-05 RX ORDER — GLUCAGON INJECTION, SOLUTION 0.5 MG/.1ML
1 INJECTION, SOLUTION SUBCUTANEOUS ONCE
Refills: 0 | Status: DISCONTINUED | OUTPATIENT
Start: 2021-11-05 | End: 2021-11-09

## 2021-11-05 RX ORDER — DEXTROSE 50 % IN WATER 50 %
25 SYRINGE (ML) INTRAVENOUS ONCE
Refills: 0 | Status: DISCONTINUED | OUTPATIENT
Start: 2021-11-05 | End: 2021-11-09

## 2021-11-05 RX ORDER — INFLUENZA VIRUS VACCINE 15; 15; 15; 15 UG/.5ML; UG/.5ML; UG/.5ML; UG/.5ML
0.7 SUSPENSION INTRAMUSCULAR ONCE
Refills: 0 | Status: DISCONTINUED | OUTPATIENT
Start: 2021-11-05 | End: 2021-11-09

## 2021-11-05 RX ORDER — DEXTROSE 50 % IN WATER 50 %
15 SYRINGE (ML) INTRAVENOUS ONCE
Refills: 0 | Status: DISCONTINUED | OUTPATIENT
Start: 2021-11-05 | End: 2021-11-09

## 2021-11-05 RX ORDER — GABAPENTIN 400 MG/1
600 CAPSULE ORAL
Refills: 0 | Status: DISCONTINUED | OUTPATIENT
Start: 2021-11-05 | End: 2021-11-05

## 2021-11-05 RX ORDER — CLOPIDOGREL BISULFATE 75 MG/1
75 TABLET, FILM COATED ORAL DAILY
Refills: 0 | Status: DISCONTINUED | OUTPATIENT
Start: 2021-11-06 | End: 2021-11-09

## 2021-11-05 RX ORDER — SODIUM CHLORIDE 9 MG/ML
1000 INJECTION, SOLUTION INTRAVENOUS ONCE
Refills: 0 | Status: COMPLETED | OUTPATIENT
Start: 2021-11-05 | End: 2021-11-05

## 2021-11-05 RX ORDER — LATANOPROST 0.05 MG/ML
1 SOLUTION/ DROPS OPHTHALMIC; TOPICAL
Qty: 0 | Refills: 0 | DISCHARGE

## 2021-11-05 RX ORDER — GABAPENTIN 400 MG/1
300 CAPSULE ORAL THREE TIMES A DAY
Refills: 0 | Status: DISCONTINUED | OUTPATIENT
Start: 2021-11-05 | End: 2021-11-09

## 2021-11-05 RX ORDER — METFORMIN HYDROCHLORIDE 850 MG/1
0 TABLET ORAL
Qty: 0 | Refills: 0 | DISCHARGE

## 2021-11-05 RX ORDER — ASPIRIN/CALCIUM CARB/MAGNESIUM 324 MG
324 TABLET ORAL ONCE
Refills: 0 | Status: COMPLETED | OUTPATIENT
Start: 2021-11-05 | End: 2021-11-05

## 2021-11-05 RX ORDER — ASPIRIN/CALCIUM CARB/MAGNESIUM 324 MG
81 TABLET ORAL DAILY
Refills: 0 | Status: DISCONTINUED | OUTPATIENT
Start: 2021-11-06 | End: 2021-11-09

## 2021-11-05 RX ORDER — DULOXETINE HYDROCHLORIDE 30 MG/1
30 CAPSULE, DELAYED RELEASE ORAL DAILY
Refills: 0 | Status: DISCONTINUED | OUTPATIENT
Start: 2021-11-05 | End: 2021-11-09

## 2021-11-05 RX ORDER — INSULIN LISPRO 100/ML
VIAL (ML) SUBCUTANEOUS AT BEDTIME
Refills: 0 | Status: DISCONTINUED | OUTPATIENT
Start: 2021-11-05 | End: 2021-11-09

## 2021-11-05 RX ORDER — INSULIN LISPRO 100/ML
VIAL (ML) SUBCUTANEOUS
Refills: 0 | Status: DISCONTINUED | OUTPATIENT
Start: 2021-11-05 | End: 2021-11-09

## 2021-11-05 RX ORDER — TAMSULOSIN HYDROCHLORIDE 0.4 MG/1
0 CAPSULE ORAL
Qty: 0 | Refills: 0 | DISCHARGE

## 2021-11-05 RX ORDER — DULOXETINE HYDROCHLORIDE 30 MG/1
0 CAPSULE, DELAYED RELEASE ORAL
Qty: 0 | Refills: 0 | DISCHARGE

## 2021-11-05 RX ORDER — DEXTROSE 50 % IN WATER 50 %
12.5 SYRINGE (ML) INTRAVENOUS ONCE
Refills: 0 | Status: DISCONTINUED | OUTPATIENT
Start: 2021-11-05 | End: 2021-11-09

## 2021-11-05 RX ORDER — TAMSULOSIN HYDROCHLORIDE 0.4 MG/1
0.4 CAPSULE ORAL AT BEDTIME
Refills: 0 | Status: DISCONTINUED | OUTPATIENT
Start: 2021-11-05 | End: 2021-11-09

## 2021-11-05 RX ORDER — FINASTERIDE 5 MG/1
0 TABLET, FILM COATED ORAL
Qty: 0 | Refills: 0 | DISCHARGE

## 2021-11-05 RX ORDER — LATANOPROST 0.05 MG/ML
0 SOLUTION/ DROPS OPHTHALMIC; TOPICAL
Qty: 0 | Refills: 0 | DISCHARGE

## 2021-11-05 RX ORDER — HEPARIN SODIUM 5000 [USP'U]/ML
5000 INJECTION INTRAVENOUS; SUBCUTANEOUS EVERY 12 HOURS
Refills: 0 | Status: DISCONTINUED | OUTPATIENT
Start: 2021-11-05 | End: 2021-11-09

## 2021-11-05 RX ORDER — SODIUM CHLORIDE 9 MG/ML
1000 INJECTION, SOLUTION INTRAVENOUS
Refills: 0 | Status: DISCONTINUED | OUTPATIENT
Start: 2021-11-05 | End: 2021-11-09

## 2021-11-05 RX ORDER — ATORVASTATIN CALCIUM 80 MG/1
80 TABLET, FILM COATED ORAL AT BEDTIME
Refills: 0 | Status: DISCONTINUED | OUTPATIENT
Start: 2021-11-05 | End: 2021-11-09

## 2021-11-05 RX ORDER — CLOPIDOGREL BISULFATE 75 MG/1
300 TABLET, FILM COATED ORAL ONCE
Refills: 0 | Status: COMPLETED | OUTPATIENT
Start: 2021-11-05 | End: 2021-11-05

## 2021-11-05 RX ORDER — LATANOPROST 0.05 MG/ML
1 SOLUTION/ DROPS OPHTHALMIC; TOPICAL AT BEDTIME
Refills: 0 | Status: DISCONTINUED | OUTPATIENT
Start: 2021-11-05 | End: 2021-11-09

## 2021-11-05 RX ORDER — FINASTERIDE 5 MG/1
5 TABLET, FILM COATED ORAL DAILY
Refills: 0 | Status: DISCONTINUED | OUTPATIENT
Start: 2021-11-05 | End: 2021-11-09

## 2021-11-05 RX ORDER — GABAPENTIN 400 MG/1
0 CAPSULE ORAL
Qty: 0 | Refills: 0 | DISCHARGE

## 2021-11-05 RX ORDER — TAMSULOSIN HYDROCHLORIDE 0.4 MG/1
1 CAPSULE ORAL
Qty: 0 | Refills: 0 | DISCHARGE

## 2021-11-05 RX ADMIN — GABAPENTIN 300 MILLIGRAM(S): 400 CAPSULE ORAL at 14:54

## 2021-11-05 RX ADMIN — DULOXETINE HYDROCHLORIDE 30 MILLIGRAM(S): 30 CAPSULE, DELAYED RELEASE ORAL at 12:42

## 2021-11-05 RX ADMIN — Medication 324 MILLIGRAM(S): at 04:16

## 2021-11-05 RX ADMIN — SODIUM CHLORIDE 1000 MILLILITER(S): 9 INJECTION, SOLUTION INTRAVENOUS at 01:00

## 2021-11-05 RX ADMIN — FINASTERIDE 5 MILLIGRAM(S): 5 TABLET, FILM COATED ORAL at 12:42

## 2021-11-05 RX ADMIN — HEPARIN SODIUM 5000 UNIT(S): 5000 INJECTION INTRAVENOUS; SUBCUTANEOUS at 18:02

## 2021-11-05 RX ADMIN — CLOPIDOGREL BISULFATE 300 MILLIGRAM(S): 75 TABLET, FILM COATED ORAL at 12:43

## 2021-11-05 NOTE — H&P ADULT - PROBLEM SELECTOR PLAN 8
DVT ppx: LMWH   Diet: DM pending dysphagia screen/ SLP eval   Code: Full code - d/w HCP (daughter) DVT ppx: HSQ  Diet: DM pending dysphagia screen/ SLP eval   Code: Full code - d/w HCP (daughter)

## 2021-11-05 NOTE — H&P ADULT - PROBLEM SELECTOR PLAN 1
Stroke Code called in the ED, neurology following   CT head: small vessel ischemic disease of the frontotemporal lobes with no territorial infarct.   CTA H/N: mild-moderate mid-basilar stenosis, no LVO  [ ] MRI brain pending   [ ] TTE with bubble   EP consulted for ILR  monitor on tele, TSH, A1C  started on ASA 81  Plavix 300 mg load, then 75 mg daily for 3 weeks per [facility] protocol  started on atorvastatin 80 mg Stroke Code called in the ED, neurology following   CT head: small vessel ischemic disease of the frontotemporal lobes with no territorial infarct.   CTA H/N: mild-moderate mid-basilar stenosis, no LVO  [ ] MRI brain pending   [ ] TTE with bubble   EP consulted for ILR  SLP/PT/OT consulted   monitor on tele, TSH, A1C  started on ASA 81  Plavix 300 mg load, then 75 mg daily for 3 weeks per [facility] protocol  started on atorvastatin 80 mg

## 2021-11-05 NOTE — ED PROVIDER NOTE - OBJECTIVE STATEMENT
88M w/ PMHx of HTN p/w expressive aphasia at 5PM.  Pt.'s son went to visit him and found that his speech was garbled.  He called his sister, who's an RN.  Daughter arrived at 5:30 and states pt. had expressive aphasia.  No motor deficits.  Daughter thought this was because he hadn't taken his neurontin so gave it to him and went home.  Son called back 30 mins later stating he was vomiting, after which pt. was taken to the ED.  Denies any f/c, sick contacts.  PT. is fully vaccinated for covid.  Sxs improved by the time pt. arrived to ED.  denies hx of MI, CVA, cardiac stents, stroke.

## 2021-11-05 NOTE — CONSULT NOTE ADULT - ASSESSMENT
Patient is a 88y old  Male former smoker, diabetes T2 with peripheral neuropathy and BPH who presented on 11/4 with expressive aphasia, nausea and vomiting and headache. He had similar symptoms 2 weeks earlier.  Patient has no complaints of chest pain, shortness of breath, palpitations or lightheadedness. No syncope. No history of a cardiac arrhythmia. Does not take anticoagulation.   Head CT: No acute intracranial hemorrhage, mass effect or evidence of an acute infarct. Extensive chronic small vessel ischemic changes in the frontoparietal white matter.  MRI pending.   EKG: Normal sinus rhythm 72 bpm. 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    Telemetry: Sinus rhythm. No evidence of AFib.     EP consulted for ILR  Spoke with the patient and his son about an ILR for prolonged monitoring for occult AFib as a cause of cardioembolic stroke.  Although they agree to the ILR if necessary, prefer to make the final decision after the MRI.   Will continue to follow and if agreeable, will likely be scheduled early next week

## 2021-11-05 NOTE — OCCUPATIONAL THERAPY INITIAL EVALUATION ADULT - DIAGNOSIS, OT EVAL
s/p nonsensical/garbled speech, HA, and vomiting; s/p small vessel ischemic disease of frontotemporal lobes and mild-moderate mid-basilar stenosis; decreased functional mobility, decreased ADL performance

## 2021-11-05 NOTE — ED PROVIDER NOTE - NS ED ROS FT
General: denies fever, chills, weight loss/weight gain.  HENT: denies nasal congestion, sore throat, rhinorrhea, ear pain.  Eyes: denies visual changes, blurred vision, eye discharge, eye redness.  Neck: denies neck pain, neck swelling.  CV: denies chest pain, palpitations.  Resp: denies difficulty breathing, cough.  Abdominal: denies nausea, vomiting, diarrhea, abdominal pain, blood in stool, dark stool.  MSK: denies muscle aches, bony pain, leg pain, leg swelling.  Neuro: expressive aphasia.  Skin: denies rashes, cuts, bruises.  Hematologic: denies unexplained bruises.

## 2021-11-05 NOTE — CONSULT NOTE ADULT - SUBJECTIVE AND OBJECTIVE BOX
Patient is a 88y old  Male former smoker, diabetes T2 with peripheral neuropathy and BPH who presented on 11/4 with expressive aphasia, nausea and vomiting and headache. He had similar symptoms 2 weeks earlier and his son noticed a change in his speech. His daughter, who is a nurse, was speaking with him and noted that the patient was using the wrong words to name things and not making sense. Patient cannot recollect having any change in his speech. Patient has no complaints of chest pain, shortness of breath, palpitations or lightheadedness. No syncope. No history of a cardiac arrhythmia. Does not take anticoagulation.   Head CT: No acute intracranial hemorrhage, mass effect or evidence of an acute infarct. Extensive chronic small vessel ischemic changes in the frontoparietal white matter.  MRI pending.   EKG: Normal sinus rhythm 72 bpm. 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    Telemetry: Sinus rhythm. No evidence of AFib.     EP consulted for ILR          PAST MEDICAL & SURGICAL HISTORY:  DM (diabetes mellitus)  Diabetic neuropathy  BPH (benign prostatic hyperplasia)  Macular degeneration  H/O laminectomy in his 60s        MEDICATIONS  (STANDING):  atorvastatin 80 milliGRAM(s) Oral at bedtime  dextrose 40% Gel 15 Gram(s) Oral once  dextrose 5%. 1000 milliLiter(s) (50 mL/Hr) IV Continuous <Continuous>  dextrose 5%. 1000 milliLiter(s) (100 mL/Hr) IV Continuous <Continuous>  dextrose 50% Injectable 25 Gram(s) IV Push once  dextrose 50% Injectable 12.5 Gram(s) IV Push once  dextrose 50% Injectable 25 Gram(s) IV Push once  DULoxetine 30 milliGRAM(s) Oral daily  finasteride 5 milliGRAM(s) Oral daily  gabapentin 300 milliGRAM(s) Oral three times a day  glucagon  Injectable 1 milliGRAM(s) IntraMuscular once  heparin   Injectable 5000 Unit(s) SubCutaneous every 12 hours  influenza  Vaccine (HIGH DOSE) 0.7 milliLiter(s) IntraMuscular once  insulin lispro (ADMELOG) corrective regimen sliding scale   SubCutaneous three times a day before meals  insulin lispro (ADMELOG) corrective regimen sliding scale   SubCutaneous at bedtime  latanoprost 0.005% Ophthalmic Solution 1 Drop(s) Both EYES at bedtime  tamsulosin 0.4 milliGRAM(s) Oral at bedtime    MEDICATIONS  (PRN):      FAMILY HISTORY:  FH: type 2 diabetes        SOCIAL HISTORY: Lives with his son    CIGARETTES: former smoker  DRUGS:  no  ALCOHOL: no    REVIEW OF SYSTEMS:    CONSTITUTIONAL: No fever, weight loss, chills, shakes, or fatigue  EYES: No eye pain or discharge chronic change in visual acuity  ENMT:  [place].  No tinnitus, vertigo; No sinus or throat pain  NECK: No pain or stiffness  BREASTS: No pain, masses, or nipple discharge  RESPIRATORY: No cough, wheezing, hemoptysis, or shortness of breath  CARDIOVASCULAR: No chest pain, dyspnea, palpitations, dizziness, syncope, paroxysmal nocturnal dyspnea, orthopnea, or arm or leg swelling  GASTROINTESTINAL: No abdominal  or epigastric pain,  hematemesis, diarrhea, constipation, melena or bright red blood.  Nausea and vomiting  GENITOURINARY: No dysuria, nocturia, hematuria, or urinary incontinence  NEUROLOGICAL: + headaches and slurred speech No limb weakness, loss of strength, numbness, or tremors  SKIN: No itching, burning, rashes, or lesions   LYMPH NODES: No enlarged glands  ENDOCRINE: No heat or cold intolerance, or hair loss  MUSCULOSKELETAL: No joint pain or swelling, muscle, back, or extremity pain  PSYCHIATRIC: No depression, anxiety, or difficulty sleeping  HEME/LYMPH: No easy bruising or bleeding gums  ALLERGY AND IMMUNOLOGIC: No hives or rash.      Vital Signs Last 24 Hrs  T(C): 36.7 (05 Nov 2021 16:03), Max: 37 (05 Nov 2021 01:21)  T(F): 98 (05 Nov 2021 16:03), Max: 98.6 (05 Nov 2021 01:21)  HR: 60 (05 Nov 2021 16:03) (57 - 68)  BP: 103/83 (05 Nov 2021 16:03) (103/83 - 157/65)  BP(mean): --  RR: 18 (05 Nov 2021 16:03) (17 - 18)  SpO2: 99% (05 Nov 2021 16:03) (99% - 100%)    PHYSICAL EXAM:    GENERAL: In no apparent distress, well nourished, and hydrated.  HEAD:  Atraumatic, Normocephalic  EYES: EOMI, PERRLA, conjunctiva and sclera clear  ENMT: No tonsillar erythema, exudates, or enlargement; Moist mucous membranes, Good dentition, No lesions  NECK: Supple and normal thyroid.  No JVD or carotid bruit.  Carotid pulse is 2+ bilaterally.  HEART: Regular rate and rhythm;  Low pitched systolic murmur without rub or gallop.  PULMONARY: Clear to auscultation and perfusion.  No rales, wheezing, or rhonchi bilaterally.  ABDOMEN: Soft, Nontender, Nondistended; Bowel sounds present  EXTREMITIES:  2+ Peripheral Pulses, No clubbing, cyanosis, or edema  LYMPH: No lymphadenopathy noted  NEUROLOGICAL: expressive aphasia No weakness          INTERPRETATION OF TELEMETRY: Normal sinus rhythm 60-70's    ECG: Sinus rhythm 72 bpm CHRISTOPHER 162ms  qrsd 134ms  QTc 450ms  RBBB/LAFB            LABS:                        11.9   9.47  )-----------( 257      ( 05 Nov 2021 00:17 )             36.1     11-05    139  |  103  |  29<H>  ----------------------------<  192<H>  5.0   |  24  |  1.50<H>    Ca    9.9      05 Nov 2021 00:17    TPro  7.7  /  Alb  4.4  /  TBili  0.4  /  DBili  x   /  AST  23  /  ALT  19  /  AlkPhos  72  11-05        PT/INR - ( 05 Nov 2021 00:17 )   PT: 12.4 sec;   INR: 1.09 ratio         PTT - ( 05 Nov 2021 00:17 )  PTT:30.3 sec  Urinalysis Basic - ( 05 Nov 2021 03:14 )    Color: Yellow / Appearance: Clear / SG: >1.050 / pH: x  Gluc: x / Ketone: Negative  / Bili: Negative / Urobili: <2 mg/dL   Blood: x / Protein: 30 mg/dL / Nitrite: Negative   Leuk Esterase: Negative / RBC: 1 /HPF / WBC 1 /HPF   Sq Epi: x / Non Sq Epi: 1 /HPF / Bacteria: Negative        RADIOLOGY & ADDITIONAL STUDIES:  PREVIOUS DIAGNOSTIC TESTING:    < from: CT Angio Head w/ IV Cont (11.05.21 @ 00:46) >  EXAM:  CT PERFUSION W MAPS IC      EXAM:  CT ANGIO NECK (W)AW IC      EXAM:  CT ANGIO BRAIN (W)AW IC        PROCEDURE DATE:  Nov 5 2021         INTERPRETATION:  CLINICAL INFORMATION:  Stroke Code , aphasia      TECHNIQUE:  During initial IV contrast administration, serial thin section CT images of the brain were obtained for CT perfusion. The raw data was sent to rapid ischemia view for post processing. Thereafter, a second bolus of IV contrast is administered to acquire a CT angiogram of the vertebral and carotid arterial vasculature from the aortic arch to the skull vertex. Images are reformatted in sagittal and coronal planes. Maximum intensity projection images are submitted. 150 cc of Omnipaque 350 are administered without event.    The study is correlated with the noncontrast CT of the head performed on the same day.    FINDINGS:    CT ANGIOGRAM:    There is a three-vessel aortic arch. There are atherosclerotic calcifications at the aortic arch and involving the proximal great vessels. The common carotid arteries are patent from the origins to the bifurcations. There is mild atherosclerotic disease at the carotid bifurcations. There is no stenosis of the cervical internal carotid arteries based on NASCET criteria. The cervical vertebral arteries are patent from the origins to the skull base. The vertebral arteries are codominant. There is no evidence of cervical carotid or vertebral artery dissection.    The skull base and intracranial carotid arteries are patent without high-grade stenosis. There is mild luminal narrowing of the cavernous and supraclinoid segments secondary to atherosclerotic calcification. The proximal MCAs and ACAs are patent without significant stenosis. The anterior communicating artery is visualized. Distal ESPERANZA and MCA branches are grossly symmetric.    The skull base and intradural vertebral arteries and basilar artery are patent without high-grade stenosis, however, there is luminal irregularity short segment mild to moderate stenosis of the mid basilar artery. There is a right-sided fetal PCA. The proximal PCAs are patent without significant stenosis. The superior cerebellar artery origins, bilateral AICAs, and bilateral PICA are identified.    There is no evidence of an intracranial arterial aneurysm or arteriovenous malformation on CTA.    Intracranial superficial and deep venous sinus system are grossly unremarkable.    The regional soft tissues of the neck are unremarkable. There is right apical pleural parenchymal scarring. There are multilevel degenerative changes of the spine.    CT PERFUSION:    Perfusion parameters are reported as follows:    CBF < 30%: 0 mL  TMax > 6s: 0 mL  Mismatch volume: 0 mL    < from: CT Angio Head w/ IV Cont (11.05.21 @ 00:46) >  Mismatch ratio: None.      IMPRESSION:    CTA NECK: No significant stenosis of the cervical carotid arteries based on NASCET criteria. Patent cervical vertebral arteries. No evidence of cervical carotid or vertebral artery dissection.  CTA HEAD: No high-grade stenosis or occlusion of the major proximal arterial branches.  Mild luminal irregularity with short segment mild to moderate stenosis of the mid basilar artery.  CT PERFUSION:  Perfusion imaging shows no evidence of a core infarct or tissue at risk.      OLI BOYKIN DO; Attending Radiologist  This document has been electronically signed. Nov 5 2021  1:01AM          ECHO  FINDINGS: pending                  
HPI: 88 year old right-handed M with a PMH of former tobacco use, T2DM (complicated by peripheral neuropathy), and BPH who presented on 11/4 due to expressive aphasia. LKN during the AM of 11/4. Patient was at home when his son noticed a change in his speech. His daughter, who is a nurse, was speaking with him and noted that the patient was using the wrong words to name things. His words were not making sense. Patient cannot recollect having any change in his speech. He denies having vertigo. Patient had a several episodes of emesis around 18:00. He denied having diplopia. Patient's daughter notes that the patient's speech gradually improved over the course of several hours and is now nearly normal. Patient's daughter states that no weakness or dysarthria was noted. Patient has an aide. Patient able to complete ADL's and uses walker to ambulate. No history of stroke. Does not take any blood thinners.    NIHSS: 0  MRS: 3    REVIEW OF SYSTEMS  A 10-system ROS was performed and is negative except for those items noted above and/or in the HPI.    PAST MEDICAL & SURGICAL HISTORY:    FAMILY HISTORY:    SOCIAL HISTORY:   T/E/D: former tobacco use, social alcohol use  Occupation:   Lives with: son    MEDICATIONS (HOME):  Home Medications:    MEDICATIONS  (STANDING):    MEDICATIONS  (PRN):    ALLERGIES/INTOLERANCES:  Allergies  No Known Allergies    Intolerances    VITALS & EXAMINATION:  Vital Signs Last 24 Hrs  T(C): 36.3 (04 Nov 2021 23:36), Max: 36.3 (04 Nov 2021 23:36)  T(F): 97.3 (04 Nov 2021 23:36), Max: 97.3 (04 Nov 2021 23:36)  HR: 66 (04 Nov 2021 23:36) (66 - 66)  BP: 157/65 (04 Nov 2021 23:36) (157/65 - 157/65)  BP(mean): --  RR: 18 (04 Nov 2021 23:36) (18 - 18)  SpO2: 100% (04 Nov 2021 23:36) (100% - 100%)    General: Obese, elderly Male, appears stated age, in no apparent distress including pain  Respiratory: breathing comfortably on room air    Neurological (>12):  MS: Awake, alert, oriented to person, month, age. Normal affect. Follows simple commands, has difficulty with crossed commands.    Language: Speech is clear, fluent with good repetition & comprehension (able to name objects: pinky, blue glove, hand). Rare possible semantic paraphasic errors.    CNs: CVF full. EOMI. V1-3 intact to LT b/l. No facial asymmetry b/l, Symmetric palate elevation in midline. Decreased hearing (to voice). Gag reflex deferred. Head turning & shoulder shrug intact b/l. Tongue midline, normal movements, no atrophy.    Fundoscopic: deferred    Motor: Normal muscle bulk, facilitatory paratonia. Possible subtle left pronator drift.  No motor drift in the extremities.     Sensation: Intact to LT b/l throughout.     Cortical: Extinction on DSS (neglect): deferred    Reflexes: deferred    Coordination: No dysmetria to FTN/HTS b/l.     Gait: deferred    LABORATORY:  CBC                       11.9   9.47  )-----------( 257      ( 05 Nov 2021 00:17 )             36.1     Chem 11-05    139  |  103  |  29<H>  ----------------------------<  192<H>  5.0   |  24  |  1.50<H>    Ca    9.9      05 Nov 2021 00:17    TPro  7.7  /  Alb  4.4  /  TBili  0.4  /  DBili  x   /  AST  23  /  ALT  19  /  AlkPhos  72  11-05    LFTs LIVER FUNCTIONS - ( 05 Nov 2021 00:17 )  Alb: 4.4 g/dL / Pro: 7.7 g/dL / ALK PHOS: 72 U/L / ALT: 19 U/L / AST: 23 U/L / GGT: x           Coagulopathy PT/INR - ( 05 Nov 2021 00:17 )   PT: 12.4 sec;   INR: 1.09 ratio         PTT - ( 05 Nov 2021 00:17 )  PTT:30.3 sec  Lipid Panel   A1c   Cardiac enzymes     U/A   CSF  Immunological  Other    STUDIES & IMAGING:  Studies (EKG, EEG, EMG, etc):     Radiology (XR, CT, MR, U/S, TTE/CECILIA):  < from: CT Perfusion w/ Maps w/ IV Cont (11.05.21 @ 00:46) >  IMPRESSION:    CTA NECK: No significant stenosis of the cervical carotid arteries based on NASCET criteria. Patent cervical vertebral arteries. No evidence of cervical carotid or vertebral artery dissection.    CTA HEAD: No high-grade stenosis or occlusion of the major proximal arterial branches.    Mild luminal irregularity with short segment mild to moderate stenosis of the mid basilar artery.    CT PERFUSION:  Perfusion imaging shows no evidence of a core infarct or tissue at risk.    < end of copied text >

## 2021-11-05 NOTE — H&P ADULT - HISTORY OF PRESENT ILLNESS
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 History obtained from daughter at bedside she states that patient was in his usual state of health yesterday and around 5PM last night had garbled speech and was complaining of headache so decided to take a nap. Patient's son woke the patient up and he was forming words however speech was nonsensical. Patient then had episode of vomiting around 1800 and they decided to come to the ED for further evaluation.     Stroke code was called in the ED. Initial CT showed only small vessel ischemic disease of the frontotemporal lobes with no territorial infact. CTA H/N w/ showed mild-moderate mid-basilar stenosis, no LVO. CTP negative. TPA was not given as patient was outside window.  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 History obtained from daughter at bedside she states that patient was in his usual state of health yesterday and around 5PM last night had garbled speech and was complaining of headache so decided to take a nap. Patient's son woke the patient up and he was forming words however speech was nonsensical. Patient then had episode of vomiting around 1800 and they decided to come to the ED for further evaluation.     Stroke code was called in the ED. Initial CT head showed only small vessel ischemic disease of the frontotemporal lobes with no territorial infarct. CTA H/N w/ showed mild-moderate mid-basilar stenosis, no LVO. CTP negative. TPA was not given as patient was outside window.

## 2021-11-05 NOTE — OCCUPATIONAL THERAPY INITIAL EVALUATION ADULT - PERTINENT HX OF CURRENT PROBLEM, REHAB EVAL
Patient is an 88 year old male with history of DM2 and neuropathy and BPH. Presents with nonsensical/garbled speech, HA, and vomitting. CT of head showed small vessel ischemic disease of the frontotemporal lobes and mild-moderate mid-basilar stenosis.

## 2021-11-05 NOTE — ED PROVIDER NOTE - CLINICAL SUMMARY MEDICAL DECISION MAKING FREE TEXT BOX
88M p/w expressive aphasia.  Exam notable for left pronator drift.  Code stroke initiated.  Will pursue infectious w/u.  Will obtain labs, viral panel, ua, CT/CTA head/neck, reassess, and dispo pending results.

## 2021-11-05 NOTE — ED ADULT NURSE REASSESSMENT NOTE - NS ED NURSE REASSESS COMMENT FT1
Pt received from CT, A&Ox2-3, respirations are even and unlabored, daughter at bedside, b/l upper and lower extremity strength noted, vitals as per flow sheet.

## 2021-11-05 NOTE — ED PROVIDER NOTE - ATTENDING CONTRIBUTION TO CARE
I performed a face-to-face evaluation of the patient and performed a history and physical examination along with the resident or ACP, and/or medical student above.  I agree with the history and physical examination as documented by the resident or ACP, and/or medical student above.  Blane:   87yo M w/ pmh as above, bib daughter for headache since ystdy and expressive aphasia since approx 5pm, code stroke called after pt evaluated in Room 24 and pt immediately rolled to CT scanner where case was discussed w/ neuro resident, ? L pronator drift and abnormal finger to nose test on initial exam. Labs, imaging, meds, TBA.

## 2021-11-05 NOTE — H&P ADULT - PROBLEM SELECTOR PROBLEM 5
Marsha states she has been having a lot of back pain. She stated the pain has gotten worse. She wanted to know if she could see Keyanna sometime soon.   BPH (benign prostatic hyperplasia)

## 2021-11-05 NOTE — H&P ADULT - PROBLEM SELECTOR PLAN 2
Unclear if ALISON vs CKD  Per HIE, SCr was 1.5 7/2021 and 1.1 last year   will continue to monitor and renally dose medications

## 2021-11-05 NOTE — OCCUPATIONAL THERAPY INITIAL EVALUATION ADULT - GENERAL OBSERVATIONS, REHAB EVAL
Patient received semisupine in bed in NAD, + tele, and was agreeable to participate in OT evaluation.

## 2021-11-05 NOTE — CONSULT NOTE ADULT - ATTENDING COMMENTS
Patient is a 88y old  Male former smoker, diabetes T2 with peripheral neuropathy and BPH who presented on 11/4 with expressive aphasia, nausea and vomiting and headache. He had similar symptoms 2 weeks earlier.  Patient has no complaints of chest pain, shortness of breath, palpitations or lightheadedness. No syncope. No history of a cardiac arrhythmia. Does not take anticoagulation.   Head CT: No acute intracranial hemorrhage, mass effect or evidence of an acute infarct. Extensive chronic small vessel ischemic changes in the frontoparietal white matter.  MRI pending.   EKG: Normal sinus rhythm 72 bpm. 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    Telemetry: Sinus rhythm. No evidence of AFib.     EP consulted for ILR  Spoke with the patient and his son about an ILR for prolonged monitoring for occult AFib as a cause of cardioembolic stroke.  Although they agree to the ILR if necessary, prefer to make the final decision after the MRI.   Will continue to follow and if agreeable, will likely be scheduled early next week
code stroke called in ED and neurology emergently assessed patient.   Briefly  88 year old right-handed M with former tobacco use, DM, and BPH who presented on 11/4 due to expressive aphasia. LKN during the AM of 11/4. Initial vital signs significant for BP of 157/65. Labs significant for hemoglobin of 11.9 and BUN/Cr of 29/1.50. Physical exam significant for possibly rare semantic paraphasic errors. CTH w/o showed no acute pathology. CTA H/N w/ showed mild-moderate mid-basilar stenosis, no LVO. CTP negative. Patient not a tPA candidate as he presented outside of the window. Patient not an endovascular candidate due to no LVO.     Impression: Transient expressive aphasia secondary to left hemispheric dysfunction possibly from minor acute ischemic stroke versus TIA  o/e ? paraphasic errors and ?R NLF   Plan:  Imaging:  MRI brain w/o contrast  TTE    Medications:  Aspirin 81 mg daily indefinitely  Plavix 300 mg load, then 75 mg daily for 3 weeks per [facility] protocol  Atorvastatin 80 mg QHS  IV hydration to maintain euvolemia    Labs:  Hemoglobin A1c, lipid panel    Other:  Permissive hypertension: BP not to exceed 220/120 for 24 hours, then gradual normotension  Prolonged cardiac monitoring with ILR or Zio patch  Telemetry monitoring  Neurochecks  Blood glucose not to exceed 180, avoid hypoglycemia  LDL goal<70  Speech therapy  PT/OT  Dysphagia screen  Outpatient stroke neurology follow up with Dr. Watson, [phone], [address]  spoke with Daughter RN at bedside in ED   Albert Watson MD  Vascular Neurology  Office: [phone]

## 2021-11-05 NOTE — STROKE CODE NOTE - MRS SCORE
(2) Slight disablitiy.  Able to look after own affairs without assistance, but unable to carry out all previous activities. (3) Moderate disability. Requires some help, but able to walk unassisted.

## 2021-11-05 NOTE — OCCUPATIONAL THERAPY INITIAL EVALUATION ADULT - ADDITIONAL COMMENTS
Patient has HHA available for assistance 7 days/week, 11 hours/day. Patient's son is available for assistance when HHA is not available.

## 2021-11-05 NOTE — H&P ADULT - NSHPLABSRESULTS_GEN_ALL_CORE
LABS:                        11.9   9.47  )-----------( 257      ( 05 Nov 2021 00:17 )             36.1     05 Nov 2021 00:17    139    |  103    |  29     ----------------------------<  192    5.0     |  24     |  1.50     Ca    9.9        05 Nov 2021 00:17    TPro  7.7    /  Alb  4.4    /  TBili  0.4    /  DBili  x      /  AST  23     /  ALT  19     /  AlkPhos  72     05 Nov 2021 00:17    PT/INR - ( 05 Nov 2021 00:17 )   PT: 12.4 sec;   INR: 1.09 ratio         PTT - ( 05 Nov 2021 00:17 )  PTT:30.3 sec    I reviewed the labs and imaging. I independently reviewed the EKG which showed no JULIO CÉSAR

## 2021-11-05 NOTE — OCCUPATIONAL THERAPY INITIAL EVALUATION ADULT - LIVES WITH, PROFILE
Patient lives in a private home with his son and grandchildren. House has 1 step to enter and patient remains on first floor./other relative

## 2021-11-05 NOTE — H&P ADULT - PROBLEM SELECTOR PLAN 3
Hgb is 11.9, unknown baseline   will send iron panel and vitamin levels   f/u outpatient for age appropriate cancer screening

## 2021-11-05 NOTE — STROKE CODE NOTE - NIH STROKE SCALE: 6A. MOTOR LEG, LEFT, QM
(0) No drift; leg holds 30 degree position for full 5 secs <<----- Click to add NO significant Past Surgical History

## 2021-11-05 NOTE — H&P ADULT - NSHPSOCIALHISTORY_GEN_ALL_CORE
Patient is a former smoker. Very occasional ETOH, no drug use. Lives in home with [facility].

## 2021-11-05 NOTE — H&P ADULT - PROBLEM SELECTOR PLAN 4
with macular degeneration and peripheral neuropathy   A1c pending   only on metformin at home   low dose ISS, FS ACHS   DM diet when clears dysphagia screening

## 2021-11-05 NOTE — CONSULT NOTE ADULT - ASSESSMENT
Assessment: 88 year old right-handed M with a PMH of former tobacco use, DM, and BPH who presented on 11/4 due to expressive aphasia. LKN during the AM of 11/4. Initial vital signs significant for BP of 157/65. Labs significant for hemoglobin of 11.9 and BUN/Cr of 29/1.50. Physical exam significant for possibly rare semantic paraphasic errors. CTH w/o showed no acute pathology. CTA H/N w/ showed mild-moderate mid-basilar stenosis, no LVO. CTP negative. Patient not a tPA candidate as he presented outside of the window. Patient not an endovascular candidate due to no LVO.     Impression: Transient expressive aphasia secondary to left hemispheric dysfunction possibly from minor acute ischemic stroke versus TIA    Plan:  Imaging:  MRI brain w/o contrast  TTE    Medications:  Aspirin 81 mg daily indefinitely  Plavix 300 mg load, then 75 mg daily for 3 weeks per [facility] protocol  Atorvastatin 80 mg QHS    Labs:  Hemoglobin A1c, lipid panel    Other:  Permissive hypertension: BP not to exceed 220/120 for 24 hours, then gradual normotension  Prolonged cardiac monitoring with ILR or Zio patch  Telemetry monitoring  Neurochecks  Blood glucose not to exceed 180, avoid hypoglycemia  Speech therapy  Dysphagia screen  Outpatient stroke neurology follow up with Dr. Watson, [phone], [address] [facility]    Case to be discussed with Dr. Watson  Case discussed with telestroke attending, Dr. Asif   Assessment: 88 year old right-handed M with a PMH of former tobacco use, DM, and BPH who presented on 11/4 due to expressive aphasia. LKN during the AM of 11/4. Initial vital signs significant for BP of 157/65. Labs significant for hemoglobin of 11.9 and BUN/Cr of 29/1.50. Physical exam significant for possibly rare semantic paraphasic errors. CTH w/o showed no acute pathology. CTA H/N w/ showed mild-moderate mid-basilar stenosis, no LVO. CTP negative. Patient not a tPA candidate as he presented outside of the window. Patient not an endovascular candidate due to no LVO.     Impression: Transient expressive aphasia secondary to left hemispheric dysfunction possibly from minor acute ischemic stroke versus TIA    Plan:  Imaging:  MRI brain w/o contrast  TTE    Medications:  Aspirin 81 mg daily indefinitely  Plavix 300 mg load, then 75 mg daily for 3 weeks per [facility] protocol  Atorvastatin 80 mg QHS  IV hydration to maintain euvolemia    Labs:  Hemoglobin A1c, lipid panel    Other:  Permissive hypertension: BP not to exceed 220/120 for 24 hours, then gradual normotension  Prolonged cardiac monitoring with ILR or Zio patch  Telemetry monitoring  Neurochecks  Blood glucose not to exceed 180, avoid hypoglycemia  LDL goal<70  Speech therapy  PT/OT  Dysphagia screen  Outpatient stroke neurology follow up with Dr. Watson, [phone], [address]    Case to be discussed with Dr. Watson  Case discussed with telestroke attending, Dr. Asif   Assessment: 88 year old right-handed M with a PMH of former tobacco use, DM, and BPH who presented on 11/4 due to expressive aphasia. LKN during the AM of 11/4. Initial vital signs significant for BP of 157/65. Labs significant for hemoglobin of 11.9 and BUN/Cr of 29/1.50. Physical exam significant for possibly rare semantic paraphasic errors. CTH w/o showed no acute pathology. CTA H/N w/ showed mild-moderate mid-basilar stenosis, no LVO. CTP negative. Patient not a tPA candidate as he presented outside of the window. Patient not an endovascular candidate due to no LVO.     Impression: Transient expressive aphasia secondary to left hemispheric dysfunction possibly from minor acute ischemic stroke versus TIA    Plan:  Imaging:  MRI brain w/o contrast  TTE    Medications:  Aspirin 81 mg daily indefinitely  Plavix 300 mg load, then 75 mg daily for 3 weeks per [facility] protocol  Atorvastatin 80 mg QHS  IV hydration to maintain euvolemia    Labs:  Hemoglobin A1c, lipid panel  r/o infection     Other:  Permissive hypertension: BP not to exceed 220/120 for 24 hours, then gradual normotension  Prolonged cardiac monitoring with ILR or Zio patch  Telemetry monitoring  Neurochecks  Blood glucose not to exceed 180, avoid hypoglycemia  LDL goal<70  Speech therapy  PT/OT  Dysphagia screen  Outpatient stroke neurology follow up with Dr. Watson, [phone], [address]    Case to be discussed with Dr. Watson  Case discussed with telestroke attending, Dr. Asif

## 2021-11-05 NOTE — H&P ADULT - NSICDXPASTMEDICALHX_GEN_ALL_CORE_FT
PAST MEDICAL HISTORY:  BPH (benign prostatic hyperplasia)     Diabetic neuropathy     DM (diabetes mellitus)     Macular degeneration

## 2021-11-05 NOTE — H&P ADULT - NSHPPHYSICALEXAM_GEN_ALL_CORE
PHYSICAL EXAM:  Vital Signs Last 24 Hrs  T(C): 36.9 (11-05-21 @ 07:46)  T(F): 98.4 (11-05-21 @ 07:46), Max: 98.6 (11-05-21 @ 01:21)  HR: 57 (11-05-21 @ 07:46) (57 - 68)  BP: 120/83 (11-05-21 @ 07:46)  BP(mean): --  RR: 17 (11-05-21 @ 07:46) (17 - 18)  SpO2: 100% (11-05-21 @ 07:46) (100% - 100%)  Wt(kg): --    Constitutional: elderly man in bed in NAD, awake and alert  EYES: PERRLA, normal sclera, EOMI  HEENT:  NCAT, nares clear, moist oral mucosa, normal dentition   Neck: Soft and supple, no thyromegaly   Respiratory: no respiratory distress, Breath sounds are clear bilaterally. No wheezing, rales or rhonchi  Cardiovascular: S1 and S2, regular rate and rhythm, no murmurs. peripheral pulses palpable 2+ x4  Gastrointestinal: Soft, nontender, nondistended. +BS  Extremities: No lower extremity edema, no calf tenderness, warm to touch  Neurological: 5/5 strength b/l upper and lower extremities.   Musculoskeletal: No c/c, no joint swelling.  Skin: No rashes, No erythema   Psych: Alert and Oriented x3, normal mood, normal affect

## 2021-11-05 NOTE — H&P ADULT - NSHPREVIEWOFSYSTEMS_GEN_ALL_CORE
CONSTITUTIONAL: No fevers or chills  EYES/ENT: No vision changes or eye pain  NECK: No neck pain or stiffness.  RESPIRATORY: No shortness of breath. No cough, wheezing.  CARDIOVASCULAR: No chest pain or palpitations  GASTROINTESTINAL: No abdominal pain. No nausea; + vomiting; No diarrhea or constipation.  GENITOURINARY: No dysuria or hematuria  NEUROLOGICAL: HPI; No numbness or weakness  ENDO: No polyuria or polydipsia.   SKIN: No itching, rashes.    All other review of systems is negative unless indicated above.

## 2021-11-05 NOTE — ED PROVIDER NOTE - PHYSICAL EXAMINATION
GENERAL: Patient awake alert NAD.  HEENT: NC/AT, Moist mucous membranes, PERRL, EOMI, no nystagmus.  LUNGS: CTAB, no wheezes or crackles.  CARDIAC: RRR, no m/r/g.  ABDOMEN: Soft, NT, ND, No rebound, guarding.  EXT: No edema. No calf tenderness..  MSK: No pain with movement, no deformities.  NEURO: A&Ox3. Moving all extremities. Motor strength 5/5 in all four extremities, sensation intact. Finger-to-nose wnl.  LEFT UE PRONATOR DRIFT present.  SKIN: Warm and dry. No rash.  PSYCH: Normal affect.

## 2021-11-05 NOTE — H&P ADULT - ASSESSMENT
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

## 2021-11-06 LAB
A1C WITH ESTIMATED AVERAGE GLUCOSE RESULT: 6.4 % — HIGH (ref 4–5.6)
ANION GAP SERPL CALC-SCNC: 12 MMOL/L — SIGNIFICANT CHANGE UP (ref 7–14)
BASOPHILS # BLD AUTO: 0.03 K/UL — SIGNIFICANT CHANGE UP (ref 0–0.2)
BASOPHILS NFR BLD AUTO: 0.5 % — SIGNIFICANT CHANGE UP (ref 0–2)
BUN SERPL-MCNC: 31 MG/DL — HIGH (ref 7–23)
CALCIUM SERPL-MCNC: 9.1 MG/DL — SIGNIFICANT CHANGE UP (ref 8.4–10.5)
CHLORIDE SERPL-SCNC: 103 MMOL/L — SIGNIFICANT CHANGE UP (ref 98–107)
CO2 SERPL-SCNC: 22 MMOL/L — SIGNIFICANT CHANGE UP (ref 22–31)
COVID-19 NUCLEOCAPSID GAM AB INTERP: NEGATIVE — SIGNIFICANT CHANGE UP
COVID-19 NUCLEOCAPSID TOTAL GAM ANTIBODY RESULT: 0.09 INDEX — SIGNIFICANT CHANGE UP
COVID-19 SPIKE DOMAIN AB INTERP: POSITIVE
COVID-19 SPIKE DOMAIN ANTIBODY RESULT: >250 U/ML — HIGH
CREAT SERPL-MCNC: 1.58 MG/DL — HIGH (ref 0.5–1.3)
CULTURE RESULTS: SIGNIFICANT CHANGE UP
EOSINOPHIL # BLD AUTO: 0.32 K/UL — SIGNIFICANT CHANGE UP (ref 0–0.5)
EOSINOPHIL NFR BLD AUTO: 5.1 % — SIGNIFICANT CHANGE UP (ref 0–6)
ESTIMATED AVERAGE GLUCOSE: 137 — SIGNIFICANT CHANGE UP
FERRITIN SERPL-MCNC: 389 NG/ML — SIGNIFICANT CHANGE UP (ref 30–400)
FOLATE SERPL-MCNC: 7.7 NG/ML — SIGNIFICANT CHANGE UP (ref 3.1–17.5)
GLUCOSE BLDC GLUCOMTR-MCNC: 119 MG/DL — HIGH (ref 70–99)
GLUCOSE BLDC GLUCOMTR-MCNC: 145 MG/DL — HIGH (ref 70–99)
GLUCOSE BLDC GLUCOMTR-MCNC: 199 MG/DL — HIGH (ref 70–99)
GLUCOSE BLDC GLUCOMTR-MCNC: 220 MG/DL — HIGH (ref 70–99)
GLUCOSE SERPL-MCNC: 143 MG/DL — HIGH (ref 70–99)
HCT VFR BLD CALC: 33.2 % — LOW (ref 39–50)
HGB BLD-MCNC: 10.9 G/DL — LOW (ref 13–17)
IANC: 3.17 K/UL — SIGNIFICANT CHANGE UP (ref 1.5–8.5)
IMM GRANULOCYTES NFR BLD AUTO: 0.3 % — SIGNIFICANT CHANGE UP (ref 0–1.5)
IRON SATN MFR SERPL: 22 % — SIGNIFICANT CHANGE UP (ref 14–50)
IRON SATN MFR SERPL: 54 UG/DL — SIGNIFICANT CHANGE UP (ref 45–165)
LYMPHOCYTES # BLD AUTO: 2.15 K/UL — SIGNIFICANT CHANGE UP (ref 1–3.3)
LYMPHOCYTES # BLD AUTO: 34.5 % — SIGNIFICANT CHANGE UP (ref 13–44)
MCHC RBC-ENTMCNC: 29 PG — SIGNIFICANT CHANGE UP (ref 27–34)
MCHC RBC-ENTMCNC: 32.8 GM/DL — SIGNIFICANT CHANGE UP (ref 32–36)
MCV RBC AUTO: 88.3 FL — SIGNIFICANT CHANGE UP (ref 80–100)
MONOCYTES # BLD AUTO: 0.55 K/UL — SIGNIFICANT CHANGE UP (ref 0–0.9)
MONOCYTES NFR BLD AUTO: 8.8 % — SIGNIFICANT CHANGE UP (ref 2–14)
NEUTROPHILS # BLD AUTO: 3.17 K/UL — SIGNIFICANT CHANGE UP (ref 1.8–7.4)
NEUTROPHILS NFR BLD AUTO: 50.8 % — SIGNIFICANT CHANGE UP (ref 43–77)
NRBC # BLD: 0 /100 WBCS — SIGNIFICANT CHANGE UP
NRBC # FLD: 0 K/UL — SIGNIFICANT CHANGE UP
PLATELET # BLD AUTO: 207 K/UL — SIGNIFICANT CHANGE UP (ref 150–400)
POTASSIUM SERPL-MCNC: 3.9 MMOL/L — SIGNIFICANT CHANGE UP (ref 3.5–5.3)
POTASSIUM SERPL-SCNC: 3.9 MMOL/L — SIGNIFICANT CHANGE UP (ref 3.5–5.3)
RBC # BLD: 3.76 M/UL — LOW (ref 4.2–5.8)
RBC # BLD: 3.76 M/UL — LOW (ref 4.2–5.8)
RBC # FLD: 13.4 % — SIGNIFICANT CHANGE UP (ref 10.3–14.5)
RETICS #: 42.1 K/UL — SIGNIFICANT CHANGE UP (ref 25–125)
RETICS/RBC NFR: 1.1 % — SIGNIFICANT CHANGE UP (ref 0.5–2.5)
SARS-COV-2 IGG+IGM SERPL QL IA: 0.09 INDEX — SIGNIFICANT CHANGE UP
SARS-COV-2 IGG+IGM SERPL QL IA: >250 U/ML — HIGH
SARS-COV-2 IGG+IGM SERPL QL IA: NEGATIVE — SIGNIFICANT CHANGE UP
SARS-COV-2 IGG+IGM SERPL QL IA: POSITIVE
SODIUM SERPL-SCNC: 137 MMOL/L — SIGNIFICANT CHANGE UP (ref 135–145)
SPECIMEN SOURCE: SIGNIFICANT CHANGE UP
TIBC SERPL-MCNC: 249 UG/DL — SIGNIFICANT CHANGE UP (ref 220–430)
TSH SERPL-MCNC: 1.37 UIU/ML — SIGNIFICANT CHANGE UP (ref 0.27–4.2)
UIBC SERPL-MCNC: 195 UG/DL — SIGNIFICANT CHANGE UP (ref 110–370)
VIT B12 SERPL-MCNC: 278 PG/ML — SIGNIFICANT CHANGE UP (ref 200–900)
WBC # BLD: 6.24 K/UL — SIGNIFICANT CHANGE UP (ref 3.8–10.5)
WBC # FLD AUTO: 6.24 K/UL — SIGNIFICANT CHANGE UP (ref 3.8–10.5)

## 2021-11-06 PROCEDURE — 99232 SBSQ HOSP IP/OBS MODERATE 35: CPT

## 2021-11-06 RX ADMIN — Medication 81 MILLIGRAM(S): at 12:13

## 2021-11-06 RX ADMIN — GABAPENTIN 300 MILLIGRAM(S): 400 CAPSULE ORAL at 01:27

## 2021-11-06 RX ADMIN — ATORVASTATIN CALCIUM 80 MILLIGRAM(S): 80 TABLET, FILM COATED ORAL at 01:27

## 2021-11-06 RX ADMIN — HEPARIN SODIUM 5000 UNIT(S): 5000 INJECTION INTRAVENOUS; SUBCUTANEOUS at 17:21

## 2021-11-06 RX ADMIN — FINASTERIDE 5 MILLIGRAM(S): 5 TABLET, FILM COATED ORAL at 12:13

## 2021-11-06 RX ADMIN — TAMSULOSIN HYDROCHLORIDE 0.4 MILLIGRAM(S): 0.4 CAPSULE ORAL at 21:29

## 2021-11-06 RX ADMIN — TAMSULOSIN HYDROCHLORIDE 0.4 MILLIGRAM(S): 0.4 CAPSULE ORAL at 01:26

## 2021-11-06 RX ADMIN — GABAPENTIN 300 MILLIGRAM(S): 400 CAPSULE ORAL at 13:49

## 2021-11-06 RX ADMIN — ATORVASTATIN CALCIUM 80 MILLIGRAM(S): 80 TABLET, FILM COATED ORAL at 21:29

## 2021-11-06 RX ADMIN — LATANOPROST 1 DROP(S): 0.05 SOLUTION/ DROPS OPHTHALMIC; TOPICAL at 21:29

## 2021-11-06 RX ADMIN — HEPARIN SODIUM 5000 UNIT(S): 5000 INJECTION INTRAVENOUS; SUBCUTANEOUS at 06:13

## 2021-11-06 RX ADMIN — GABAPENTIN 300 MILLIGRAM(S): 400 CAPSULE ORAL at 06:13

## 2021-11-06 RX ADMIN — CLOPIDOGREL BISULFATE 75 MILLIGRAM(S): 75 TABLET, FILM COATED ORAL at 12:13

## 2021-11-06 RX ADMIN — DULOXETINE HYDROCHLORIDE 30 MILLIGRAM(S): 30 CAPSULE, DELAYED RELEASE ORAL at 12:13

## 2021-11-06 RX ADMIN — Medication 2: at 12:13

## 2021-11-06 RX ADMIN — GABAPENTIN 300 MILLIGRAM(S): 400 CAPSULE ORAL at 21:29

## 2021-11-06 NOTE — PHYSICAL THERAPY INITIAL EVALUATION ADULT - PATIENT PROFILE REVIEW, REHAB EVAL
PT orders received: ambulate with assistance. Consult with RN Karime BLUNT, pt may participate in PT evaluation./yes

## 2021-11-06 NOTE — SWALLOW BEDSIDE ASSESSMENT ADULT - SWALLOW EVAL: DIAGNOSIS
1) Functional oral stage of swallow for regular solids, puree,  mildly thick fluids, and thin fluids marked by adequate acceptance, bolus manipulation, mastication, and coordination. Adequate bolus collection and timely anterior to posterior oral transit/transfer noted. Adequate oral clearance observed. 2) Functional pharyngeal stage of swallow for regular solids, puree, mildly thick fluids, and thin fluids marked by initiation of pharyngeal swallow trigger and hyolaryngeal excursion noted upon digital palpation. No overt signs/symptoms of penetration/aspiration noted.

## 2021-11-06 NOTE — PHYSICAL THERAPY INITIAL EVALUATION ADULT - ASSISTIVE DEVICE FOR TRANSFER: GAIT, REHAB EVAL
Pt ambulated 50 feet with single axis cane however unsteady. pt ambulated remaining 50 feet with rolling walker./rolling walker

## 2021-11-06 NOTE — PHYSICAL THERAPY INITIAL EVALUATION ADULT - PERTINENT HX OF CURRENT PROBLEM, REHAB EVAL
Patient is an 88 year old male admitted to [facility] with nonsensical speech.  Initial CT head showed only small vessel ischemic disease of the frontotemporal lobes with no territorial infarct. CTA H/N w/ showed mild-moderate mid-basilar stenosis. PMH significant for but not limited to DM2 with neuropathy on metformin, BPH.

## 2021-11-06 NOTE — PROGRESS NOTE ADULT - PROBLEM SELECTOR PLAN 1
Stroke Code called in the ED, neurology following   CT head: small vessel ischemic disease of the frontotemporal lobes with no territorial infarct.   CTA H/N: mild-moderate mid-basilar stenosis, no LVO  [ ] MRI brain pending   [ ] TTE with bubble   EP consulted for ILR  SLP/PT/OT consulted   monitor on tele, TSH, A1C  started on ASA 81  Plavix 300 mg load, then 75 mg daily for 3 weeks per [facility] protocol  started on atorvastatin 80 mg

## 2021-11-07 DIAGNOSIS — F03.90 UNSPECIFIED DEMENTIA, UNSPECIFIED SEVERITY, WITHOUT BEHAVIORAL DISTURBANCE, PSYCHOTIC DISTURBANCE, MOOD DISTURBANCE, AND ANXIETY: ICD-10-CM

## 2021-11-07 LAB
ANION GAP SERPL CALC-SCNC: 13 MMOL/L — SIGNIFICANT CHANGE UP (ref 7–14)
BASOPHILS # BLD AUTO: 0.03 K/UL — SIGNIFICANT CHANGE UP (ref 0–0.2)
BASOPHILS NFR BLD AUTO: 0.4 % — SIGNIFICANT CHANGE UP (ref 0–2)
BUN SERPL-MCNC: 26 MG/DL — HIGH (ref 7–23)
CALCIUM SERPL-MCNC: 9.2 MG/DL — SIGNIFICANT CHANGE UP (ref 8.4–10.5)
CHLORIDE SERPL-SCNC: 103 MMOL/L — SIGNIFICANT CHANGE UP (ref 98–107)
CO2 SERPL-SCNC: 21 MMOL/L — LOW (ref 22–31)
CREAT SERPL-MCNC: 1.66 MG/DL — HIGH (ref 0.5–1.3)
EOSINOPHIL # BLD AUTO: 0.35 K/UL — SIGNIFICANT CHANGE UP (ref 0–0.5)
EOSINOPHIL NFR BLD AUTO: 4.8 % — SIGNIFICANT CHANGE UP (ref 0–6)
ERYTHROCYTE [SEDIMENTATION RATE] IN BLOOD: 27 MM/HR — HIGH (ref 1–15)
GLUCOSE BLDC GLUCOMTR-MCNC: 150 MG/DL — HIGH (ref 70–99)
GLUCOSE BLDC GLUCOMTR-MCNC: 187 MG/DL — HIGH (ref 70–99)
GLUCOSE BLDC GLUCOMTR-MCNC: 233 MG/DL — HIGH (ref 70–99)
GLUCOSE BLDC GLUCOMTR-MCNC: 235 MG/DL — HIGH (ref 70–99)
GLUCOSE SERPL-MCNC: 147 MG/DL — HIGH (ref 70–99)
HCT VFR BLD CALC: 36 % — LOW (ref 39–50)
HGB BLD-MCNC: 11.5 G/DL — LOW (ref 13–17)
IANC: 3.91 K/UL — SIGNIFICANT CHANGE UP (ref 1.5–8.5)
IMM GRANULOCYTES NFR BLD AUTO: 0.4 % — SIGNIFICANT CHANGE UP (ref 0–1.5)
LYMPHOCYTES # BLD AUTO: 2.3 K/UL — SIGNIFICANT CHANGE UP (ref 1–3.3)
LYMPHOCYTES # BLD AUTO: 31.5 % — SIGNIFICANT CHANGE UP (ref 13–44)
MCHC RBC-ENTMCNC: 28.8 PG — SIGNIFICANT CHANGE UP (ref 27–34)
MCHC RBC-ENTMCNC: 31.9 GM/DL — LOW (ref 32–36)
MCV RBC AUTO: 90 FL — SIGNIFICANT CHANGE UP (ref 80–100)
MONOCYTES # BLD AUTO: 0.69 K/UL — SIGNIFICANT CHANGE UP (ref 0–0.9)
MONOCYTES NFR BLD AUTO: 9.4 % — SIGNIFICANT CHANGE UP (ref 2–14)
NEUTROPHILS # BLD AUTO: 3.91 K/UL — SIGNIFICANT CHANGE UP (ref 1.8–7.4)
NEUTROPHILS NFR BLD AUTO: 53.5 % — SIGNIFICANT CHANGE UP (ref 43–77)
NRBC # BLD: 0 /100 WBCS — SIGNIFICANT CHANGE UP
NRBC # FLD: 0 K/UL — SIGNIFICANT CHANGE UP
PLATELET # BLD AUTO: 233 K/UL — SIGNIFICANT CHANGE UP (ref 150–400)
POTASSIUM SERPL-MCNC: 3.8 MMOL/L — SIGNIFICANT CHANGE UP (ref 3.5–5.3)
POTASSIUM SERPL-SCNC: 3.8 MMOL/L — SIGNIFICANT CHANGE UP (ref 3.5–5.3)
RBC # BLD: 4 M/UL — LOW (ref 4.2–5.8)
RBC # FLD: 13.4 % — SIGNIFICANT CHANGE UP (ref 10.3–14.5)
SODIUM SERPL-SCNC: 137 MMOL/L — SIGNIFICANT CHANGE UP (ref 135–145)
TSH SERPL-MCNC: 0.98 UIU/ML — SIGNIFICANT CHANGE UP (ref 0.27–4.2)
VIT B12 SERPL-MCNC: 395 PG/ML — SIGNIFICANT CHANGE UP (ref 200–900)
WBC # BLD: 7.31 K/UL — SIGNIFICANT CHANGE UP (ref 3.8–10.5)
WBC # FLD AUTO: 7.31 K/UL — SIGNIFICANT CHANGE UP (ref 3.8–10.5)

## 2021-11-07 PROCEDURE — 99233 SBSQ HOSP IP/OBS HIGH 50: CPT

## 2021-11-07 RX ORDER — SODIUM CHLORIDE 9 MG/ML
1000 INJECTION, SOLUTION INTRAVENOUS
Refills: 0 | Status: DISCONTINUED | OUTPATIENT
Start: 2021-11-07 | End: 2021-11-09

## 2021-11-07 RX ADMIN — SODIUM CHLORIDE 75 MILLILITER(S): 9 INJECTION, SOLUTION INTRAVENOUS at 21:06

## 2021-11-07 RX ADMIN — GABAPENTIN 300 MILLIGRAM(S): 400 CAPSULE ORAL at 05:50

## 2021-11-07 RX ADMIN — HEPARIN SODIUM 5000 UNIT(S): 5000 INJECTION INTRAVENOUS; SUBCUTANEOUS at 17:54

## 2021-11-07 RX ADMIN — TAMSULOSIN HYDROCHLORIDE 0.4 MILLIGRAM(S): 0.4 CAPSULE ORAL at 21:06

## 2021-11-07 RX ADMIN — FINASTERIDE 5 MILLIGRAM(S): 5 TABLET, FILM COATED ORAL at 14:01

## 2021-11-07 RX ADMIN — ATORVASTATIN CALCIUM 80 MILLIGRAM(S): 80 TABLET, FILM COATED ORAL at 21:07

## 2021-11-07 RX ADMIN — SODIUM CHLORIDE 75 MILLILITER(S): 9 INJECTION, SOLUTION INTRAVENOUS at 12:05

## 2021-11-07 RX ADMIN — LATANOPROST 1 DROP(S): 0.05 SOLUTION/ DROPS OPHTHALMIC; TOPICAL at 21:07

## 2021-11-07 RX ADMIN — HEPARIN SODIUM 5000 UNIT(S): 5000 INJECTION INTRAVENOUS; SUBCUTANEOUS at 05:48

## 2021-11-07 RX ADMIN — CLOPIDOGREL BISULFATE 75 MILLIGRAM(S): 75 TABLET, FILM COATED ORAL at 14:02

## 2021-11-07 RX ADMIN — Medication 1: at 17:55

## 2021-11-07 RX ADMIN — Medication 81 MILLIGRAM(S): at 17:53

## 2021-11-07 RX ADMIN — GABAPENTIN 300 MILLIGRAM(S): 400 CAPSULE ORAL at 21:07

## 2021-11-07 RX ADMIN — GABAPENTIN 300 MILLIGRAM(S): 400 CAPSULE ORAL at 14:01

## 2021-11-07 RX ADMIN — Medication 2: at 12:06

## 2021-11-07 RX ADMIN — DULOXETINE HYDROCHLORIDE 30 MILLIGRAM(S): 30 CAPSULE, DELAYED RELEASE ORAL at 14:02

## 2021-11-07 NOTE — PROGRESS NOTE ADULT - PROBLEM SELECTOR PLAN 2
Unclear if ALISON vs CKD  Per HIE, SCr was 1.5 7/2021 and 1.1 last year   will continue to monitor and renally dose medications  worsening today - will give 1L LR

## 2021-11-08 LAB
ANION GAP SERPL CALC-SCNC: 11 MMOL/L — SIGNIFICANT CHANGE UP (ref 7–14)
BASOPHILS # BLD AUTO: 0.02 K/UL — SIGNIFICANT CHANGE UP (ref 0–0.2)
BASOPHILS NFR BLD AUTO: 0.3 % — SIGNIFICANT CHANGE UP (ref 0–2)
BUN SERPL-MCNC: 27 MG/DL — HIGH (ref 7–23)
CALCIUM SERPL-MCNC: 9 MG/DL — SIGNIFICANT CHANGE UP (ref 8.4–10.5)
CHLORIDE SERPL-SCNC: 107 MMOL/L — SIGNIFICANT CHANGE UP (ref 98–107)
CO2 SERPL-SCNC: 21 MMOL/L — LOW (ref 22–31)
CREAT SERPL-MCNC: 1.58 MG/DL — HIGH (ref 0.5–1.3)
EOSINOPHIL # BLD AUTO: 0.32 K/UL — SIGNIFICANT CHANGE UP (ref 0–0.5)
EOSINOPHIL NFR BLD AUTO: 5.4 % — SIGNIFICANT CHANGE UP (ref 0–6)
GLUCOSE BLDC GLUCOMTR-MCNC: 157 MG/DL — HIGH (ref 70–99)
GLUCOSE BLDC GLUCOMTR-MCNC: 162 MG/DL — HIGH (ref 70–99)
GLUCOSE BLDC GLUCOMTR-MCNC: 164 MG/DL — HIGH (ref 70–99)
GLUCOSE BLDC GLUCOMTR-MCNC: 201 MG/DL — HIGH (ref 70–99)
GLUCOSE BLDC GLUCOMTR-MCNC: 211 MG/DL — HIGH (ref 70–99)
GLUCOSE SERPL-MCNC: 180 MG/DL — HIGH (ref 70–99)
HCT VFR BLD CALC: 33.1 % — LOW (ref 39–50)
HGB BLD-MCNC: 10.8 G/DL — LOW (ref 13–17)
IANC: 2.96 K/UL — SIGNIFICANT CHANGE UP (ref 1.5–8.5)
IMM GRANULOCYTES NFR BLD AUTO: 0.3 % — SIGNIFICANT CHANGE UP (ref 0–1.5)
LYMPHOCYTES # BLD AUTO: 2.01 K/UL — SIGNIFICANT CHANGE UP (ref 1–3.3)
LYMPHOCYTES # BLD AUTO: 33.7 % — SIGNIFICANT CHANGE UP (ref 13–44)
MCHC RBC-ENTMCNC: 29.3 PG — SIGNIFICANT CHANGE UP (ref 27–34)
MCHC RBC-ENTMCNC: 32.6 GM/DL — SIGNIFICANT CHANGE UP (ref 32–36)
MCV RBC AUTO: 89.9 FL — SIGNIFICANT CHANGE UP (ref 80–100)
MONOCYTES # BLD AUTO: 0.63 K/UL — SIGNIFICANT CHANGE UP (ref 0–0.9)
MONOCYTES NFR BLD AUTO: 10.6 % — SIGNIFICANT CHANGE UP (ref 2–14)
NEUTROPHILS # BLD AUTO: 2.96 K/UL — SIGNIFICANT CHANGE UP (ref 1.8–7.4)
NEUTROPHILS NFR BLD AUTO: 49.7 % — SIGNIFICANT CHANGE UP (ref 43–77)
NRBC # BLD: 0 /100 WBCS — SIGNIFICANT CHANGE UP
NRBC # FLD: 0 K/UL — SIGNIFICANT CHANGE UP
PLATELET # BLD AUTO: 208 K/UL — SIGNIFICANT CHANGE UP (ref 150–400)
POTASSIUM SERPL-MCNC: 4.1 MMOL/L — SIGNIFICANT CHANGE UP (ref 3.5–5.3)
POTASSIUM SERPL-SCNC: 4.1 MMOL/L — SIGNIFICANT CHANGE UP (ref 3.5–5.3)
RBC # BLD: 3.68 M/UL — LOW (ref 4.2–5.8)
RBC # FLD: 13.2 % — SIGNIFICANT CHANGE UP (ref 10.3–14.5)
SODIUM SERPL-SCNC: 139 MMOL/L — SIGNIFICANT CHANGE UP (ref 135–145)
T PALLIDUM AB TITR SER: NEGATIVE — SIGNIFICANT CHANGE UP
WBC # BLD: 5.96 K/UL — SIGNIFICANT CHANGE UP (ref 3.8–10.5)
WBC # FLD AUTO: 5.96 K/UL — SIGNIFICANT CHANGE UP (ref 3.8–10.5)

## 2021-11-08 PROCEDURE — 99232 SBSQ HOSP IP/OBS MODERATE 35: CPT

## 2021-11-08 RX ADMIN — HEPARIN SODIUM 5000 UNIT(S): 5000 INJECTION INTRAVENOUS; SUBCUTANEOUS at 05:03

## 2021-11-08 RX ADMIN — Medication 1: at 12:43

## 2021-11-08 RX ADMIN — DULOXETINE HYDROCHLORIDE 30 MILLIGRAM(S): 30 CAPSULE, DELAYED RELEASE ORAL at 13:23

## 2021-11-08 RX ADMIN — FINASTERIDE 5 MILLIGRAM(S): 5 TABLET, FILM COATED ORAL at 13:24

## 2021-11-08 RX ADMIN — ATORVASTATIN CALCIUM 80 MILLIGRAM(S): 80 TABLET, FILM COATED ORAL at 21:37

## 2021-11-08 RX ADMIN — GABAPENTIN 300 MILLIGRAM(S): 400 CAPSULE ORAL at 05:03

## 2021-11-08 RX ADMIN — TAMSULOSIN HYDROCHLORIDE 0.4 MILLIGRAM(S): 0.4 CAPSULE ORAL at 21:37

## 2021-11-08 RX ADMIN — GABAPENTIN 300 MILLIGRAM(S): 400 CAPSULE ORAL at 21:37

## 2021-11-08 RX ADMIN — LATANOPROST 1 DROP(S): 0.05 SOLUTION/ DROPS OPHTHALMIC; TOPICAL at 21:37

## 2021-11-08 RX ADMIN — CLOPIDOGREL BISULFATE 75 MILLIGRAM(S): 75 TABLET, FILM COATED ORAL at 13:23

## 2021-11-08 RX ADMIN — GABAPENTIN 300 MILLIGRAM(S): 400 CAPSULE ORAL at 13:23

## 2021-11-08 RX ADMIN — HEPARIN SODIUM 5000 UNIT(S): 5000 INJECTION INTRAVENOUS; SUBCUTANEOUS at 18:02

## 2021-11-08 RX ADMIN — Medication 81 MILLIGRAM(S): at 13:23

## 2021-11-08 RX ADMIN — Medication 1: at 08:00

## 2021-11-08 RX ADMIN — Medication 1: at 18:02

## 2021-11-08 NOTE — PROGRESS NOTE ADULT - PROBLEM SELECTOR PLAN 8
DVT ppx: HSQ    Code: Full code - d/w HCP (daughter)    Communication  -Spoke w/ dtr. Updated on plan of care. All questions/concerns addressed. Pt has old nerve stimulator implant. It is not active. Pt has underwent MRI previously with device in place.

## 2021-11-08 NOTE — PROGRESS NOTE ADULT - PROBLEM SELECTOR PLAN 2
Reviewed Allscripts - July 2021 was elevated to 1.4-1.5 range. Reviewed PMD note -- no comment re: CKD.  -Based on stability of labs since then - would likely indicate CKD stage 3.  -Avoid nephrotoxins, Monitor labs. Renally dose meds. Reviewed Allscripts - July 2021 was elevated to 1.4-1.5 range. Reviewed PMD note -- no comment re: CKD.  -Based on stability of labs since then - would likely indicate CKD stage 3.  -Would check PVR to r/o retention issues.  -Avoid nephrotoxins, Monitor labs. Renally dose meds.

## 2021-11-08 NOTE — PROGRESS NOTE ADULT - PROBLEM SELECTOR PLAN 1
Stroke Code called in the ED, neurology following   CT head: small vessel ischemic disease of the frontotemporal lobes with no territorial infarct.   CTA H/N: mild-moderate mid-basilar stenosis, no LVO  [ ] MRI brain pending   [ ] TTE with bubble   EP consulted for ILR - decision to be made after MRI is done.   -Cont ASA  -Plavix 300 mg loaded, continue plavix 75 mg daily for 3 weeks per [facility] protocol  -Cont atorvastatin 80 mg

## 2021-11-09 ENCOUNTER — TRANSCRIPTION ENCOUNTER (OUTPATIENT)
Age: 86
End: 2021-11-09

## 2021-11-09 VITALS
RESPIRATION RATE: 18 BRPM | HEART RATE: 82 BPM | OXYGEN SATURATION: 98 % | SYSTOLIC BLOOD PRESSURE: 122 MMHG | TEMPERATURE: 98 F | DIASTOLIC BLOOD PRESSURE: 61 MMHG

## 2021-11-09 PROBLEM — E11.9 TYPE 2 DIABETES MELLITUS WITHOUT COMPLICATIONS: Chronic | Status: ACTIVE | Noted: 2021-11-05

## 2021-11-09 PROBLEM — E11.40 TYPE 2 DIABETES MELLITUS WITH DIABETIC NEUROPATHY, UNSPECIFIED: Chronic | Status: ACTIVE | Noted: 2021-11-05

## 2021-11-09 PROBLEM — H35.30 UNSPECIFIED MACULAR DEGENERATION: Chronic | Status: ACTIVE | Noted: 2021-11-05

## 2021-11-09 PROBLEM — N40.0 BENIGN PROSTATIC HYPERPLASIA WITHOUT LOWER URINARY TRACT SYMPTOMS: Chronic | Status: ACTIVE | Noted: 2021-11-05

## 2021-11-09 LAB
ANION GAP SERPL CALC-SCNC: 11 MMOL/L — SIGNIFICANT CHANGE UP (ref 7–14)
BUN SERPL-MCNC: 28 MG/DL — HIGH (ref 7–23)
CALCIUM SERPL-MCNC: 9 MG/DL — SIGNIFICANT CHANGE UP (ref 8.4–10.5)
CHLORIDE SERPL-SCNC: 106 MMOL/L — SIGNIFICANT CHANGE UP (ref 98–107)
CO2 SERPL-SCNC: 20 MMOL/L — LOW (ref 22–31)
CREAT SERPL-MCNC: 1.55 MG/DL — HIGH (ref 0.5–1.3)
GLUCOSE BLDC GLUCOMTR-MCNC: 195 MG/DL — HIGH (ref 70–99)
GLUCOSE BLDC GLUCOMTR-MCNC: 198 MG/DL — HIGH (ref 70–99)
GLUCOSE SERPL-MCNC: 188 MG/DL — HIGH (ref 70–99)
HCT VFR BLD CALC: 35.2 % — LOW (ref 39–50)
HGB BLD-MCNC: 11.3 G/DL — LOW (ref 13–17)
MAGNESIUM SERPL-MCNC: 1.7 MG/DL — SIGNIFICANT CHANGE UP (ref 1.6–2.6)
MCHC RBC-ENTMCNC: 28.8 PG — SIGNIFICANT CHANGE UP (ref 27–34)
MCHC RBC-ENTMCNC: 32.1 GM/DL — SIGNIFICANT CHANGE UP (ref 32–36)
MCV RBC AUTO: 89.8 FL — SIGNIFICANT CHANGE UP (ref 80–100)
NRBC # BLD: 0 /100 WBCS — SIGNIFICANT CHANGE UP
NRBC # FLD: 0 K/UL — SIGNIFICANT CHANGE UP
PHOSPHATE SERPL-MCNC: 3 MG/DL — SIGNIFICANT CHANGE UP (ref 2.5–4.5)
PLATELET # BLD AUTO: 206 K/UL — SIGNIFICANT CHANGE UP (ref 150–400)
POTASSIUM SERPL-MCNC: 4.3 MMOL/L — SIGNIFICANT CHANGE UP (ref 3.5–5.3)
POTASSIUM SERPL-SCNC: 4.3 MMOL/L — SIGNIFICANT CHANGE UP (ref 3.5–5.3)
RBC # BLD: 3.92 M/UL — LOW (ref 4.2–5.8)
RBC # FLD: 13.4 % — SIGNIFICANT CHANGE UP (ref 10.3–14.5)
SODIUM SERPL-SCNC: 137 MMOL/L — SIGNIFICANT CHANGE UP (ref 135–145)
WBC # BLD: 6.42 K/UL — SIGNIFICANT CHANGE UP (ref 3.8–10.5)
WBC # FLD AUTO: 6.42 K/UL — SIGNIFICANT CHANGE UP (ref 3.8–10.5)

## 2021-11-09 PROCEDURE — 99239 HOSP IP/OBS DSCHRG MGMT >30: CPT

## 2021-11-09 PROCEDURE — 99233 SBSQ HOSP IP/OBS HIGH 50: CPT

## 2021-11-09 PROCEDURE — 93306 TTE W/DOPPLER COMPLETE: CPT | Mod: 26

## 2021-11-09 PROCEDURE — 33285 INSJ SUBQ CAR RHYTHM MNTR: CPT

## 2021-11-09 PROCEDURE — 70450 CT HEAD/BRAIN W/O DYE: CPT | Mod: 26

## 2021-11-09 RX ORDER — CLOPIDOGREL BISULFATE 75 MG/1
1 TABLET, FILM COATED ORAL
Qty: 30 | Refills: 0
Start: 2021-11-09 | End: 2021-12-08

## 2021-11-09 RX ORDER — ATORVASTATIN CALCIUM 80 MG/1
1 TABLET, FILM COATED ORAL
Qty: 30 | Refills: 0
Start: 2021-11-09 | End: 2021-12-08

## 2021-11-09 RX ORDER — ASPIRIN/CALCIUM CARB/MAGNESIUM 324 MG
1 TABLET ORAL
Qty: 30 | Refills: 0
Start: 2021-11-09 | End: 2021-12-08

## 2021-11-09 RX ORDER — CLOPIDOGREL BISULFATE 75 MG/1
1 TABLET, FILM COATED ORAL
Qty: 17 | Refills: 0
Start: 2021-11-09 | End: 2021-11-25

## 2021-11-09 RX ADMIN — DULOXETINE HYDROCHLORIDE 30 MILLIGRAM(S): 30 CAPSULE, DELAYED RELEASE ORAL at 14:58

## 2021-11-09 RX ADMIN — GABAPENTIN 300 MILLIGRAM(S): 400 CAPSULE ORAL at 14:57

## 2021-11-09 RX ADMIN — Medication 81 MILLIGRAM(S): at 14:57

## 2021-11-09 RX ADMIN — GABAPENTIN 300 MILLIGRAM(S): 400 CAPSULE ORAL at 06:32

## 2021-11-09 RX ADMIN — FINASTERIDE 5 MILLIGRAM(S): 5 TABLET, FILM COATED ORAL at 15:06

## 2021-11-09 RX ADMIN — Medication 1: at 12:16

## 2021-11-09 RX ADMIN — Medication 1: at 08:04

## 2021-11-09 RX ADMIN — CLOPIDOGREL BISULFATE 75 MILLIGRAM(S): 75 TABLET, FILM COATED ORAL at 14:58

## 2021-11-09 NOTE — PROGRESS NOTE ADULT - PROBLEM SELECTOR PLAN 2
Reviewed Allscripts - July 2021 was elevated to 1.4-1.5 range. Reviewed PMD note -- no comment re: CKD.  -Based on stability of labs since then - would likely indicate CKD stage 3.  -PVR 95cc - not in retention.  -Avoid nephrotoxins, Monitor labs. Renally dose meds.  -Further follow up as OP.

## 2021-11-09 NOTE — PROGRESS NOTE ADULT - PROBLEM SELECTOR PLAN 1
Stroke Code called in the ED, neurology following   CT head: small vessel ischemic disease of the frontotemporal lobes with no territorial infarct.   CTA H/N: mild-moderate mid-basilar stenosis, no LVO  MRI unable to be done due to concern w/ compatibility with spine stimulator. Per dtr - pt had MRIs before w/ device in place. As discussed w/ neuro - can repeat CTH. MRI brain can be pursued as OP basis.  [ ] TTE with bubble   -ILR placement 11/9  -Cont ASA  -Plavix 300 mg loaded, continue plavix 75 mg daily for 3 weeks per [facility] protocol  -Cont atorvastatin 80 mg Stroke Code called in the ED, neurology following   CT head: small vessel ischemic disease of the frontotemporal lobes with no territorial infarct.   CTA H/N: mild-moderate mid-basilar stenosis, no LVO  MRI unable to be done due to concern w/ compatibility with spine stimulator. Per dtr - pt had MRIs before w/ device in place. As discussed w/ neuro - can repeat CTH (results reviewed - no acute findings). MRI brain can be pursued as OP basis.  [ ] TTE with bubble   -ILR placement 11/9  -Cont ASA  -Plavix 300 mg loaded, continue plavix 75 mg daily for 3 weeks per [facility] protocol  -Cont atorvastatin 80 mg

## 2021-11-09 NOTE — PROGRESS NOTE ADULT - PROBLEM SELECTOR PLAN 3
Hb is stable and within his baseline based on Allscripts trend.   -Likely anemia of chronic disease.  -F/u outpatient for age appropriate cancer screening
Hgb is 11.9, unknown baseline   will send iron panel and vitamin levels - ACD  f/u outpatient for age appropriate cancer screening
Hgb is 11.9, unknown baseline   will send iron panel and vitamin levels   f/u outpatient for age appropriate cancer screening
Hb is stable and within his baseline based on Allscripts trend.   -Likely anemia of chronic disease.  -F/u outpatient for age appropriate cancer screening

## 2021-11-09 NOTE — DISCHARGE NOTE PROVIDER - HOSPITAL COURSE
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 Concerning for acute CVA. Admitted for further evaluation.     Aphasia.   Stroke Code called in the ED, neurology following   CT head: small vessel ischemic disease of the frontotemporal lobes with no territorial infarct.   CTA H/N: mild-moderate mid-basilar stenosis, no LVO  MRI unable to be done due to concern w/ compatibility with spine stimulator. Per dtr - pt had MRIs before w/ device in place. As discussed w/ neuro - can repeat CTH. MRI brain can be pursued as OP basis.  ILR placement 11/9  Continue ASA  Plavix 300 mg loaded, continue plavix 75 mg daily for 3 weeks per CHANCE protocol  Continue atorvastatin 80 mg.  11/9: EP: loop placement      ALISON (acute kidney injury).   Continue blood pressure, cholesterol and diabetic medications. Goal of hemoglobin A1C (HgbA1C) < 7%.  Avoid nephrotoxic drugs such as nonsteroidal anti-inflammatory agents (NSAIDs).   Please follow up with your nephrologist to monitor your kidney function, continue with low protein and potassium diet.     Normocytic anemia:   Hemoglobin is stable and within his baseline  Likely anemia of chronic disease  Follow up outpatient for age appropriate cancer screening.    DM (diabetes mellitus).   with macular degeneration and peripheral neuropathy   A1c 6.4  Only on metformin at home   Monitor finger sticks pre-meal and bedtime, low salt, fat and carbohydrate diet, minimize glucose intake.  Exercise daily for at least 30 minutes and weight loss.  Follow up with primary care physician and endocrinologist for routine Hemoglobin A1C checks and management.  Follow up with your ophthalmologist for routine yearly vision exams.    BPH (benign prostatic hyperplasia).   Continue with home tamsulosin and finasteride.    Diabetic neuropathy:   Continue with home duloxetine  Dose reduced gabapentin to 300 TID per SCr.    Macular degeneration:   Family to bring in eye drops, unsure which ones he takes   Continue with latanoprost.    Other dementia:   As per discussion with daughter, patient increased forgetfulness in the last 3 months.   TSH/RPR negative. No reversible causes identified currently.   Requesting Rx for MRI in hand before DC. Reached out to neuro office to make request.   Neurology Appointiment: 11/15  at 4:15PM  [address] [facility] [address].    11/9: Case discussed with Dr. Mendoza. Patient is stable for discharge. Medications reviewed and send to agreed upon pharmacy. 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 Concerning for acute CVA. Admitted for further evaluation.     Aphasia.   Stroke Code called in the ED, neurology following   CT head: small vessel ischemic disease of the frontotemporal lobes with no territorial infarct.   CTA H/N: mild-moderate mid-basilar stenosis, no LVO  MRI unable to be done due to concern w/ compatibility with spine stimulator. Per dtr - pt had MRIs before w/ device in place. As discussed w/ neuro - can repeat CTH. MRI brain can be pursued as OP basis.  ILR placement 11/9  Continue ASA  Plavix 300 mg loaded, continue plavix 75 mg daily for 3 weeks per CHANCE protocol  Continue atorvastatin 80 mg.  11/9: EP: Loop placement    CT HEAD: ________________________  TTE: _____________    ALISON (acute kidney injury).   Continue blood pressure, cholesterol and diabetic medications. Goal of hemoglobin A1C (HgbA1C) < 7%.  Avoid nephrotoxic drugs such as nonsteroidal anti-inflammatory agents (NSAIDs).   Please follow up with your nephrologist to monitor your kidney function, continue with low protein and potassium diet.     Normocytic anemia:   Hemoglobin is stable and within his baseline  Likely anemia of chronic disease  Follow up outpatient for age appropriate cancer screening.    DM (diabetes mellitus).   with macular degeneration and peripheral neuropathy   A1c 6.4  Only on metformin at home   Monitor finger sticks pre-meal and bedtime, low salt, fat and carbohydrate diet, minimize glucose intake.  Exercise daily for at least 30 minutes and weight loss.  Follow up with primary care physician and endocrinologist for routine Hemoglobin A1C checks and management.  Follow up with your ophthalmologist for routine yearly vision exams.    BPH (benign prostatic hyperplasia).   Continue with home tamsulosin and finasteride.    Diabetic neuropathy:   Continue with home duloxetine  Dose reduced gabapentin to 300 TID per SCr.    Macular degeneration:   Family to bring in eye drops, unsure which ones he takes   Continue with latanoprost.    Other dementia:   As per discussion with daughter, patient increased forgetfulness in the last 3 months.   TSH/RPR negative. No reversible causes identified currently.   Requesting Rx for MRI in hand before DC. Reached out to neuro office to make request.   Neurology Appointiment: 11/15  at 4:15PM  [address] [facility] [place] [place] [address].    11/9: Case discussed with Dr. Mnedoza. Patient is stable for discharge. Medications reviewed and send to agreed upon pharmacy. 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 Concerning for acute CVA. Admitted for further evaluation.     Aphasia.   Stroke Code called in the ED, neurology following   CT head: small vessel ischemic disease of the frontotemporal lobes with no territorial infarct.   CTA H/N: mild-moderate mid-basilar stenosis, no LVO  MRI unable to be done due to concern w/ compatibility with spine stimulator. Per dtr - pt had MRIs before w/ device in place. As discussed w/ neuro - can repeat CTH. MRI brain can be pursued as OP basis.  ILR placement 11/9  Continue ASA  Plavix 300 mg loaded, continue plavix 75 mg daily for 3 weeks per CHANCE protocol  Continue atorvastatin 80 mg.  11/9: EP: Loop placement    CT HEAD:   1. No evidence of acute intracranial abnormality. No evidence of acute  infarction, hemorrhage, or mass.  2. Atrophy and microvascular disease.    ALISON (acute kidney injury).   Continue blood pressure, cholesterol and diabetic medications. Goal of hemoglobin A1C (HgbA1C) < 7%.  Avoid nephrotoxic drugs such as nonsteroidal anti-inflammatory agents (NSAIDs).   Please follow up with your nephrologist to monitor your kidney function, continue with low protein and potassium diet.     Normocytic anemia:   Hemoglobin is stable and within his baseline  Likely anemia of chronic disease  Follow up outpatient for age appropriate cancer screening.    DM (diabetes mellitus).   with macular degeneration and peripheral neuropathy   A1c 6.4  Only on metformin at home   Monitor finger sticks pre-meal and bedtime, low salt, fat and carbohydrate diet, minimize glucose intake.  Exercise daily for at least 30 minutes and weight loss.  Follow up with primary care physician and endocrinologist for routine Hemoglobin A1C checks and management.  Follow up with your ophthalmologist for routine yearly vision exams.    BPH (benign prostatic hyperplasia).   Continue with home tamsulosin and finasteride.    Diabetic neuropathy:   Continue with home duloxetine  Dose reduced gabapentin to 300 TID per SCr.    Macular degeneration:   Family to bring in eye drops, unsure which ones he takes   Continue with latanoprost.    Other dementia:   As per discussion with daughter, patient increased forgetfulness in the last 3 months.   TSH/RPR negative. No reversible causes identified currently.   Requesting Rx for MRI in hand before DC. Reached out to neuro office to make request.   Neurology Appointiment: 11/15  at 4:15PM  [address] [facility] [place] [address].    11/9: Case discussed with Dr. Mendoza. Patient is stable for discharge. Medications reviewed and send to agreed upon pharmacy.

## 2021-11-09 NOTE — DISCHARGE NOTE PROVIDER - CARE PROVIDER_API CALL
Albert Watson)  Neurology; Vascular Neurology  [address] [facility] [address]  [place] [address]  Phone: [phone]  Fax: [phone]  Scheduled Appointment: 11/15/2021    Dank Everett)  Cardiac Electrophysiology; Cardiovascular Disease; Internal Medicine  [address] [place] [address], [address]  [place] [address]  Phone: [phone]  Fax: [phone]  Follow Up Time:     ADAMARIS LEVI  Internal Medicine  [address] [place] [address]  Phone: ([phone]  Fax: ()-  Follow Up Time:

## 2021-11-09 NOTE — DISCHARGE NOTE PROVIDER - NSDCMRMEDTOKEN_GEN_ALL_CORE_FT
DULOXETINE HCL DR 30 MG CAP: 1 each orally once a day  FINASTERIDE 5 MG TABLET: 1 tab(s) orally once a day  GABAPENTIN 300 MG CAPSULE: 2 cap(s) orally 2 times a day  LATANOPROST 0.005% EYE DROPS: 1  to each affected eye once a day (at bedtime)  METFORMIN HCL 1,000 MG TABLET: 1 tab(s) orally 2 times a day  PREDNISOLONE AC 1% EYE DROP:   TAMSULOSIN HCL 0.4 MG CAPSULE: 1 cap(s) orally once a day (at bedtime)   aspirin 81 mg oral tablet, chewable: 1 tab(s) orally once a day  atorvastatin 80 mg oral tablet: 1 tab(s) orally once a day (at bedtime)  clopidogrel 75 mg oral tablet: 1 tab(s) orally once a day  DULOXETINE HCL DR 30 MG CAP: 1 each orally once a day  FINASTERIDE 5 MG TABLET: 1 tab(s) orally once a day  GABAPENTIN 300 MG CAPSULE: 2 cap(s) orally 2 times a day  LATANOPROST 0.005% EYE DROPS: 1  to each affected eye once a day (at bedtime)  METFORMIN HCL 1,000 MG TABLET: 1 tab(s) orally 2 times a day  PREDNISOLONE AC 1% EYE DROP:   TAMSULOSIN HCL 0.4 MG CAPSULE: 1 cap(s) orally once a day (at bedtime)

## 2021-11-09 NOTE — DISCHARGE NOTE NURSING/CASE MANAGEMENT/SOCIAL WORK - NSSCNAMETXT_GEN_ALL_CORE
1. VNSNY HA for soc as scheduled by agency for RN and PT; 2. VNS CHOICE for reinstatement of aide as per prior to admission, as scheduled by agency

## 2021-11-09 NOTE — DISCHARGE NOTE PROVIDER - NSFOLLOWUPCLINICS_GEN_ALL_ED_FT
[facility] - Primary Care  Primary Care  [address] [place] [address]  Phone: [phone]  Fax:     [facility]  Nephrology  [address] [place] [address], [address] [place] [address]  Phone: [phone]  Fax:

## 2021-11-09 NOTE — DISCHARGE NOTE NURSING/CASE MANAGEMENT/SOCIAL WORK - PATIENT PORTAL LINK FT
You can access the FollowMyHealth Patient Portal offered by [facility] by registering at the following website: [email or web address]. By joining [employer]’s FollowMyHealth portal, you will also be able to view your health information using other applications (apps) compatible with our system.

## 2021-11-09 NOTE — PROGRESS NOTE ADULT - PROBLEM SELECTOR PLAN 6
c/w home duloxetine  dose reduced gabapentin to 300 TID per SCr

## 2021-11-09 NOTE — CHART NOTE - NSCHARTNOTEFT_GEN_A_CORE
Telemetry reviewed:  SR with SB in 50's at times.  Denies palpitations.  Patient is awaiting for MRI of brain.  Consented for implantable loop recorder for PAF detection.
ELECTROPHYSIOLOGY      Patient seen earlier today. He is now s/p placement of an ILR. Tolerated the procedure well. No complications.   Vital signs stable.  Telemetry: NSR.  NO evidence of atrial fibrillation.   Device site without evidence of bleeding, ecchymosis or hematoma. .   Post procedure teaching done with the patient and his son who is at the bedside.  I also called patient's daughter Regina and went over the post-op instructions with her.  Written instructions and contact information provided.   Patient given a home monitor with verbal and written instructions.   He has a follow-up appointment in the device clinic on Monday 11/29/21 at 1:00pm  4th floor Oncology Building  [phone].   Patient going for echocardiogram, then to be discharged to home.

## 2021-11-09 NOTE — PROGRESS NOTE ADULT - ASSESSMENT
88 year old right-handed M with former tobacco use, DM, and BPH who presented on 11/4 due to expressive aphasia. LKN during the AM of 11/4. Initial vital signs significant for BP of 157/65. Labs significant for hemoglobin of 11.9 and BUN/Cr of 29/1.50. Physical exam significant for possibly rare semantic paraphasic errors.   CTH w/o showed no acute pathology.   CTA H/N w/ showed mild-moderate mid-basilar stenosis, no LVO.   CTP negative.   B12 395 low normal   A1c 6.4    Impression: Transient expressive aphasia secondary to left hemispheric dysfunction possibly from minor acute ischemic stroke versus TIA  o/e ? paraphasic errors and ?R NLF   Plan:  Imaging:  MRI brain w/o contrast cancelled. has stimulator. daughter wants outpatient. consider repeat CTH as alternative   TTE    Medications:  Aspirin 81 mg daily indefinitely  plavix 75 mg daily for 3 weeks per CHANCE protocol  Atorvastatin 80 mg QHS  IV hydration to maintain euvolemia  b12 supplement     Labs:  lipid panel    Other:  Prolonged cardiac monitoring with ILR or Zio patch  Telemetry monitoring  Neurochecks  Blood glucose not to exceed 180, avoid hypoglycemia  LDL goal<70  Speech therapy  PT/OT  Dysphagia screen  Outpatient stroke neurology follow up with Dr. Watson, [phone], [address] Watson MD  Vascular Neurology  
88 year old right-handed M with former tobacco use, DM, and BPH who presented on 11/4 due to expressive aphasia. LKN during the AM of 11/4. Initial vital signs significant for BP of 157/65. Labs significant for hemoglobin of 11.9 and BUN/Cr of 29/1.50. Physical exam significant for possibly rare semantic paraphasic errors.   CTH w/o showed no acute pathology.   CTA H/N w/ showed mild-moderate mid-basilar stenosis, no LVO.   CTP negative.   B12 395 low normal   A1c 6.4    Impression: Transient expressive aphasia secondary to left hemispheric dysfunction possibly from minor acute ischemic stroke versus TIA  o/e ? paraphasic errors and ?R NLF   Plan:  Imaging:  MRI brain w/o contrast pendign   TTE    Medications:  Aspirin 81 mg daily indefinitely  plavix 75 mg daily for 3 weeks per CHANCE protocol  Atorvastatin 80 mg QHS  IV hydration to maintain euvolemia    Labs:  lipid panel    Other:  Prolonged cardiac monitoring with ILR or Zio patch  Telemetry monitoring  Neurochecks  Blood glucose not to exceed 180, avoid hypoglycemia  LDL goal<70  Speech therapy  PT/OT  Dysphagia screen  Outpatient stroke neurology follow up with Dr. Watson, [phone], [address] Watson MD  Vascular Neurology  
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 Concerning for acute CVA. Admitted for further evaluation. 
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 Concerning for acute CVA. Admitted for further evaluation.

## 2021-11-09 NOTE — PROGRESS NOTE ADULT - PROBLEM SELECTOR PLAN 8
DVT ppx: HSQ    Code: Full code - d/w HCP (daughter)    Communication  -Spoke w/ dtr. Updated on plan of care. Requesting Rx for MRI in hand before DC. Reached out to neuro office to make request. Otherwise, appt for neurology follow made - 11/15 ---4:15PM  [address] [facility] [place] [address] DVT ppx: HSQ    Code: Full code - d/w HCP (daughter)    Communication  -Spoke w/ dtr. Updated on plan of care. Requesting Rx for MRI in hand before DC. Reached out to neuro office to make request. Otherwise, appt for neurology follow made - 11/15 ---4:15PM  [address] [facility] [address].    -40 min spent preparing DC, counseling pt/daughter, and coordination of care.

## 2021-11-09 NOTE — PROGRESS NOTE ADULT - PROBLEM SELECTOR PROBLEM 2
ALISON (acute kidney injury)

## 2021-11-09 NOTE — PROGRESS NOTE ADULT - PROBLEM SELECTOR PLAN 7
family to bring in eye drops, unsure which ones he takes   c/w latanoprost

## 2021-11-09 NOTE — SWALLOW BEDSIDE ASSESSMENT ADULT - COMMENTS
Per progress note 11/9/21, patient is a "88 year old right-handed M with former tobacco use, DM, and BPH who presented on 11/4 due to expressive aphasia. LKN during the AM of 11/4. Initial vital signs significant for BP of 157/65. Labs significant for hemoglobin of 11.9 and BUN/Cr of 29/1.50. Physical exam significant for possibly rare semantic paraphasic errors. "    Patient is known to this department as patient was seen for an initial assessment on 11/6/21 (please see full report for details), at which time regular solids with thin liquids was recommended.    Consult received and chart reviewed. Patient attempted to be seen this afternoon for a reassessment of the swallow function, however, per RN patient is off unit for a CT scan. This department to follow up as schedule permits.
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 History obtained from daughter at bedside she states that patient was in his usual state of health yesterday and around 5PM last night had garbled speech and was complaining of headache so decided to take a nap. Patient's son woke the patient up and he was forming words however speech was nonsensical. Patient then had episode of vomiting around 1800 and they decided to come to the ED for further evaluation. Stroke code was called in the ED. Initial CT head showed only small vessel ischemic disease of the frontotemporal lobes with no territorial infarct. CTA H/N w/ showed mild-moderate mid-basilar stenosis, no LVO. CTP negative."    WBC is WFL. CXR 11/5/21 revealed "Clear lungs."    Pt seen at bedside, awake/alert and oriented, during clinical swallow evaluation this AM. Pt able to identify name and location, however pt noted with confusion and some difficulty following directions. Pt verbalized responses to questions and spoke in complete sentences. Pt denied feeding/swallowing concerns, and was cooperative and accepted all PO trials.

## 2021-11-09 NOTE — DISCHARGE NOTE PROVIDER - PROVIDER TOKENS
PROVIDER:[TOKEN:[[id number]],SCHEDULEDAPPT:[11/15/2021]],PROVIDER:[TOKEN:[[id number]]],PROVIDER:[TOKEN:[[id number]]]

## 2021-11-09 NOTE — PROGRESS NOTE ADULT - PROBLEM SELECTOR PLAN 4
with macular degeneration and peripheral neuropathy   A1c 6.4  only on metformin at home   low dose ISS, FS ACHS   DM diet when clears dysphagia screening  daughter brought food , patient eating no clinical signs of aspiration
with macular degeneration and peripheral neuropathy   A1c pending   only on metformin at home   low dose ISS, FS ACHS   DM diet when clears dysphagia screening
with macular degeneration and peripheral neuropathy   A1c 6.4  only on metformin at home   low dose ISS, FS ACHS
with macular degeneration and peripheral neuropathy   A1c 6.4  only on metformin at home   low dose ISS, FS ACHS  -Resume home meds on DC.

## 2021-11-09 NOTE — PROGRESS NOTE ADULT - PROBLEM SELECTOR PLAN 9
d/w daughter in detail , patient increased forgetfulness in the last 3 brenda r/o MCI/Dementia   Will check for reversible causes od dementia  MME if daugher agrees , she is in denial as witnessed mother having Dementia
d/w daughter in detail , patient increased forgetfulness in the last 3 brenda r/o MCI/Dementia   TSH/RPR neg. B12/folate ok. No reversible causes identified currently.   Further follow up with PMD as OP.
d/w daughter in detail , patient increased forgetfulness in the last 3 brenda r/o MCI/Dementia   TSH/RPR neg. B12/folate ok. No reversible causes identified currently.   Further follow up with PMD as OP.

## 2021-11-09 NOTE — DISCHARGE NOTE PROVIDER - NSDCCPCAREPLAN_GEN_ALL_CORE_FT
PRINCIPAL DISCHARGE DIAGNOSIS  Diagnosis: Expressive aphasia  Assessment and Plan of Treatment: Neurology Appointiment: 11/15  at 4:15PM  [address] [facility] [address].      SECONDARY DISCHARGE DIAGNOSES  Diagnosis: Diabetic neuropathy  Assessment and Plan of Treatment: Continue with home duloxetine  Dose reduced gabapentin to 300 TID    Diagnosis: Normocytic anemia  Assessment and Plan of Treatment: Follow up outpatient for age appropriate cancer screening    Diagnosis: ALISON (acute kidney injury)  Assessment and Plan of Treatment: Continue blood pressure, cholesterol and diabetic medications. Goal of hemoglobin A1C (HgbA1C) < 7%.  Avoid nephrotoxic drugs such as nonsteroidal anti-inflammatory agents (NSAIDs).   Please follow up with your nephrologist to monitor your kidney function, continue with low protein and potassium diet.      Diagnosis: BPH (benign prostatic hyperplasia)  Assessment and Plan of Treatment: Continue with home tamsulosin and finasteride.    Diagnosis: Macular degeneration  Assessment and Plan of Treatment: Continue with latanoprost    Diagnosis: DM (diabetes mellitus)  Assessment and Plan of Treatment: Monitor finger sticks pre-meal and bedtime, low salt, fat and carbohydrate diet, minimize glucose intake.  Exercise daily for at least 30 minutes and weight loss.  Follow up with primary care physician and endocrinologist for routine Hemoglobin A1C checks and management.  Follow up with your ophthalmologist for routine yearly vision exams.

## 2021-11-09 NOTE — PROGRESS NOTE ADULT - PROBLEM SELECTOR PLAN 5
c/w home tamsulosin and finasteride

## 2021-11-09 NOTE — PROGRESS NOTE ADULT - SUBJECTIVE AND OBJECTIVE BOX
Neurology Progress Note    S: Patient seen and examined. No new events overnight. patient denied CP, SOB, HA or pain. awaiting MRI     Medication:  MEDICATIONS  (STANDING):  aspirin  chewable 81 milliGRAM(s) Oral daily  atorvastatin 80 milliGRAM(s) Oral at bedtime  clopidogrel Tablet 75 milliGRAM(s) Oral daily  dextrose 40% Gel 15 Gram(s) Oral once  dextrose 5%. 1000 milliLiter(s) (50 mL/Hr) IV Continuous <Continuous>  dextrose 5%. 1000 milliLiter(s) (100 mL/Hr) IV Continuous <Continuous>  dextrose 50% Injectable 25 Gram(s) IV Push once  dextrose 50% Injectable 12.5 Gram(s) IV Push once  dextrose 50% Injectable 25 Gram(s) IV Push once  DULoxetine 30 milliGRAM(s) Oral daily  finasteride 5 milliGRAM(s) Oral daily  gabapentin 300 milliGRAM(s) Oral three times a day  glucagon  Injectable 1 milliGRAM(s) IntraMuscular once  heparin   Injectable 5000 Unit(s) SubCutaneous every 12 hours  influenza  Vaccine (HIGH DOSE) 0.7 milliLiter(s) IntraMuscular once  insulin lispro (ADMELOG) corrective regimen sliding scale   SubCutaneous three times a day before meals  insulin lispro (ADMELOG) corrective regimen sliding scale   SubCutaneous at bedtime  lactated ringers. 1000 milliLiter(s) (75 mL/Hr) IV Continuous <Continuous>  latanoprost 0.005% Ophthalmic Solution 1 Drop(s) Both EYES at bedtime  tamsulosin 0.4 milliGRAM(s) Oral at bedtime    MEDICATIONS  (PRN):        Vitals:  Vital Signs Last 24 Hrs  T(C): 36.3 (11-09-21 @ 06:35), Max: 36.8 (11-08-21 @ 21:31)  T(F): 97.4 (11-09-21 @ 06:35), Max: 98.2 (11-08-21 @ 21:31)  HR: 64 (11-09-21 @ 06:35) (58 - 70)  BP: 131/65 (11-09-21 @ 06:35) (104/53 - 138/51)  BP(mean): --  RR: 17 (11-09-21 @ 06:35) (17 - 18)  SpO2: 99% (11-09-21 @ 06:35) (99% - 100%)      General Exam:   General Appearance: Appropriately dressed and in no acute distress       Head: Normocephalic, atraumatic and no dysmorphic features  Ear, Nose, and Throat: Moist mucous membranes  CVS: S1S2+  Resp: No SOB, no wheeze or rhonchi  Abd: soft NTND  Extremities: No edema, no cyanosis  Skin: No bruises, no rashes     Neurological Exam:  MS: Awake, alert, oriented to person, month, age. Normal affect. Follows simple commands, has difficulty with crossed commands.    Language: Speech is clear, fluent with good repetition & comprehension (able to name objects: pinky, blue glove, hand). Rare possible semantic paraphasic errors.    CNs: CVF full. EOMI. V1-3 intact to LT b/l. No facial asymmetry b/l, Symmetric palate elevation in midline. Decreased hearing (to voice). Gag reflex deferred. Head turning & shoulder shrug intact b/l. Tongue midline, normal movements, no atrophy.    Fundoscopic: deferred    Motor: Normal muscle bulk, facilitatory paratonia. Possible subtle left pronator drift.  No motor drift in the extremities.     Sensation: Intact to LT b/l throughout.     Cortical: Extinction on DSS (neglect): deferred    Reflexes: deferred    Coordination: No dysmetria to FTN/HTS b/l.     Gait: deferred    I personally reviewed the below data/images/labs:    CBC Full  -  ( 09 Nov 2021 07:33 )  WBC Count : 6.42 K/uL  RBC Count : 3.92 M/uL  Hemoglobin : 11.3 g/dL  Hematocrit : 35.2 %  Platelet Count - Automated : 206 K/uL  Mean Cell Volume : 89.8 fL  Mean Cell Hemoglobin : 28.8 pg  Mean Cell Hemoglobin Concentration : 32.1 gm/dL  Auto Neutrophil # : x  Auto Lymphocyte # : x  Auto Monocyte # : x  Auto Eosinophil # : x  Auto Basophil # : x  Auto Neutrophil % : x  Auto Lymphocyte % : x  Auto Monocyte % : x  Auto Eosinophil % : x  Auto Basophil % : x      11-09    137  |  106  |  28<H>  ----------------------------<  188<H>  4.3   |  20<L>  |  1.55<H>    Ca    9.0      09 Nov 2021 07:33  Phos  3.0     11-09  Mg     1.70     11-09      CT Perfusion w/ Maps w/ IV Cont (11.05.21 @ 00:46) >  IMPRESSION:    CTA NECK: No significant stenosis of the cervical carotid arteries based on NASCET criteria. Patent cervical vertebral arteries. No evidence of cervical carotid or vertebral artery dissection.    CTA HEAD: No high-grade stenosis or occlusion of the major proximal arterial branches.    Mild luminal irregularity with short segment mild to moderate stenosis of the mid basilar artery.    CT PERFUSION:  Perfusion imaging shows no evidence of a core infarct or tissue at risk.    < end of copied text >          
Neurology Progress Note    S: Patient seen and examined. No new events overnight. patient denied CP, SOB, HA or pain. peed in bed overnight     Medication:  aspirin  chewable 81 milliGRAM(s) Oral daily  atorvastatin 80 milliGRAM(s) Oral at bedtime  clopidogrel Tablet 75 milliGRAM(s) Oral daily  dextrose 40% Gel 15 Gram(s) Oral once  dextrose 5%. 1000 milliLiter(s) IV Continuous <Continuous>  dextrose 5%. 1000 milliLiter(s) IV Continuous <Continuous>  dextrose 50% Injectable 25 Gram(s) IV Push once  dextrose 50% Injectable 12.5 Gram(s) IV Push once  dextrose 50% Injectable 25 Gram(s) IV Push once  DULoxetine 30 milliGRAM(s) Oral daily  finasteride 5 milliGRAM(s) Oral daily  gabapentin 300 milliGRAM(s) Oral three times a day  glucagon  Injectable 1 milliGRAM(s) IntraMuscular once  heparin   Injectable 5000 Unit(s) SubCutaneous every 12 hours  influenza  Vaccine (HIGH DOSE) 0.7 milliLiter(s) IntraMuscular once  insulin lispro (ADMELOG) corrective regimen sliding scale   SubCutaneous three times a day before meals  insulin lispro (ADMELOG) corrective regimen sliding scale   SubCutaneous at bedtime  lactated ringers. 1000 milliLiter(s) IV Continuous <Continuous>  latanoprost 0.005% Ophthalmic Solution 1 Drop(s) Both EYES at bedtime  tamsulosin 0.4 milliGRAM(s) Oral at bedtime      Vitals:  Vital Signs Last 24 Hrs  T(C): 36.3 (08 Nov 2021 05:03), Max: 36.8 (07 Nov 2021 21:03)  T(F): 97.3 (08 Nov 2021 05:03), Max: 98.2 (07 Nov 2021 21:03)  HR: 60 (08 Nov 2021 05:03) (60 - 64)  BP: 116/60 (08 Nov 2021 05:03) (116/60 - 152/86)  BP(mean): --  RR: 18 (08 Nov 2021 05:03) (17 - 18)  SpO2: 100% (08 Nov 2021 05:03) (97% - 100%)    General Exam:   General Appearance: Appropriately dressed and in no acute distress       Head: Normocephalic, atraumatic and no dysmorphic features  Ear, Nose, and Throat: Moist mucous membranes  CVS: S1S2+  Resp: No SOB, no wheeze or rhonchi  Abd: soft NTND  Extremities: No edema, no cyanosis  Skin: No bruises, no rashes     Neurological Exam:  MS: Awake, alert, oriented to person, month, age. Normal affect. Follows simple commands, has difficulty with crossed commands.    Language: Speech is clear, fluent with good repetition & comprehension (able to name objects: pinky, blue glove, hand). Rare possible semantic paraphasic errors.    CNs: CVF full. EOMI. V1-3 intact to LT b/l. No facial asymmetry b/l, Symmetric palate elevation in midline. Decreased hearing (to voice). Gag reflex deferred. Head turning & shoulder shrug intact b/l. Tongue midline, normal movements, no atrophy.    Fundoscopic: deferred    Motor: Normal muscle bulk, facilitatory paratonia. Possible subtle left pronator drift.  No motor drift in the extremities.     Sensation: Intact to LT b/l throughout.     Cortical: Extinction on DSS (neglect): deferred    Reflexes: deferred    Coordination: No dysmetria to FTN/HTS b/l.     Gait: deferred    I personally reviewed the below data/images/labs:      CBC Full  -  ( 08 Nov 2021 06:01 )  WBC Count : 5.96 K/uL  RBC Count : 3.68 M/uL  Hemoglobin : 10.8 g/dL  Hematocrit : 33.1 %  Platelet Count - Automated : 208 K/uL  Mean Cell Volume : 89.9 fL  Mean Cell Hemoglobin : 29.3 pg  Mean Cell Hemoglobin Concentration : 32.6 gm/dL  Auto Neutrophil # : 2.96 K/uL  Auto Lymphocyte # : 2.01 K/uL  Auto Monocyte # : 0.63 K/uL  Auto Eosinophil # : 0.32 K/uL  Auto Basophil # : 0.02 K/uL  Auto Neutrophil % : 49.7 %  Auto Lymphocyte % : 33.7 %  Auto Monocyte % : 10.6 %  Auto Eosinophil % : 5.4 %  Auto Basophil % : 0.3 %    11-08    139  |  107  |  27<H>  ----------------------------<  180<H>  4.1   |  21<L>  |  1.58<H>    Ca    9.0      08 Nov 2021 06:01      CT Perfusion w/ Maps w/ IV Cont (11.05.21 @ 00:46) >  IMPRESSION:    CTA NECK: No significant stenosis of the cervical carotid arteries based on NASCET criteria. Patent cervical vertebral arteries. No evidence of cervical carotid or vertebral artery dissection.    CTA HEAD: No high-grade stenosis or occlusion of the major proximal arterial branches.    Mild luminal irregularity with short segment mild to moderate stenosis of the mid basilar artery.    CT PERFUSION:  Perfusion imaging shows no evidence of a core infarct or tissue at risk.    < end of copied text >          
[facility] Division of Hospital Medicine  Rishi WHITTAKER (Kent) MD Reji  Pager [phone]    SUBJECTIVE:  Chief complaint: Aphasia.    The patient was seen and evaluated at bedside. States he feels fine. Speech has improved, though he seems to still have some word difficulty finding. Denies any f/c, NV, SOb, CP. Tolerating PO intake.       ROS: All systems negative except as noted.      Vital Signs Last 24 Hrs  T(C): 36.3 (08 Nov 2021 12:03), Max: 36.8 (07 Nov 2021 21:03)  T(F): 97.4 (08 Nov 2021 12:03), Max: 98.2 (07 Nov 2021 21:03)  HR: 58 (08 Nov 2021 12:03) (58 - 64)  BP: 104/53 (08 Nov 2021 12:03) (104/53 - 152/86)  BP(mean): --  RR: 18 (08 Nov 2021 12:03) (17 - 18)  SpO2: 100% (08 Nov 2021 12:03) (97% - 100%)      PHYSICAL EXAM:  Gen- In bed, comfortable, NAD  Eyes- EOMI, PERRLA, nonicteric.  EMNT- Fair dentition. MMM. No tonsilar exudates. No posterior pharynx erythema.  Neck- Supple. No masses. No tracheal deviation.  Resp- CTAB, good effort. No r/r/w. No accessory muscle use.  CVS- RRR, S1S2, no g/r/m. No LE edema.  GI- Soft abd, NT, ND, +BSx4. No HSM.  MSK- No C/C. ROM intact. No crepitus.  Neuro- CN II-XII intact. Speech fluent/face symmetric. Sensation intact.  Skin- No rashes/ulcers. Warm/moist.  Psych- AAOx3. Appropriate mood/affect.      MEDICATION:  MEDICATIONS  (STANDING):  aspirin  chewable 81 milliGRAM(s) Oral daily  atorvastatin 80 milliGRAM(s) Oral at bedtime  clopidogrel Tablet 75 milliGRAM(s) Oral daily  dextrose 40% Gel 15 Gram(s) Oral once  dextrose 5%. 1000 milliLiter(s) (50 mL/Hr) IV Continuous <Continuous>  dextrose 5%. 1000 milliLiter(s) (100 mL/Hr) IV Continuous <Continuous>  dextrose 50% Injectable 25 Gram(s) IV Push once  dextrose 50% Injectable 12.5 Gram(s) IV Push once  dextrose 50% Injectable 25 Gram(s) IV Push once  DULoxetine 30 milliGRAM(s) Oral daily  finasteride 5 milliGRAM(s) Oral daily  gabapentin 300 milliGRAM(s) Oral three times a day  glucagon  Injectable 1 milliGRAM(s) IntraMuscular once  heparin   Injectable 5000 Unit(s) SubCutaneous every 12 hours  influenza  Vaccine (HIGH DOSE) 0.7 milliLiter(s) IntraMuscular once  insulin lispro (ADMELOG) corrective regimen sliding scale   SubCutaneous three times a day before meals  insulin lispro (ADMELOG) corrective regimen sliding scale   SubCutaneous at bedtime  lactated ringers. 1000 milliLiter(s) (75 mL/Hr) IV Continuous <Continuous>  latanoprost 0.005% Ophthalmic Solution 1 Drop(s) Both EYES at bedtime  tamsulosin 0.4 milliGRAM(s) Oral at bedtime    MEDICATIONS  (PRN):            LABORATORY:                          10.8   5.96  )-----------( 208      ( 08 Nov 2021 06:01 )             33.1     11-08    139  |  107  |  27<H>  ----------------------------<  180<H>  4.1   |  21<L>  |  1.58<H>    Ca    9.0      08 Nov 2021 06:01                COVID-19 PCR: Stacey (05 Nov 2021 03:17)              
[facility] Division of Hospital Medicine  Pat Diehl MD  Pager (M-F, 8A-5P): [phone]      Patient is a 88y old  Male who presents with a chief complaint of aphasia (05 Nov 2021 21:10)      SUBJECTIVE / OVERNIGHT EVENTS: pending MRI /ILR   ADDITIONAL REVIEW OF SYSTEMS:    MEDICATIONS  (STANDING):  aspirin  chewable 81 milliGRAM(s) Oral daily  atorvastatin 80 milliGRAM(s) Oral at bedtime  clopidogrel Tablet 75 milliGRAM(s) Oral daily  dextrose 40% Gel 15 Gram(s) Oral once  dextrose 5%. 1000 milliLiter(s) (50 mL/Hr) IV Continuous <Continuous>  dextrose 5%. 1000 milliLiter(s) (100 mL/Hr) IV Continuous <Continuous>  dextrose 50% Injectable 25 Gram(s) IV Push once  dextrose 50% Injectable 12.5 Gram(s) IV Push once  dextrose 50% Injectable 25 Gram(s) IV Push once  DULoxetine 30 milliGRAM(s) Oral daily  finasteride 5 milliGRAM(s) Oral daily  gabapentin 300 milliGRAM(s) Oral three times a day  glucagon  Injectable 1 milliGRAM(s) IntraMuscular once  heparin   Injectable 5000 Unit(s) SubCutaneous every 12 hours  influenza  Vaccine (HIGH DOSE) 0.7 milliLiter(s) IntraMuscular once  insulin lispro (ADMELOG) corrective regimen sliding scale   SubCutaneous three times a day before meals  insulin lispro (ADMELOG) corrective regimen sliding scale   SubCutaneous at bedtime  latanoprost 0.005% Ophthalmic Solution 1 Drop(s) Both EYES at bedtime  tamsulosin 0.4 milliGRAM(s) Oral at bedtime    MEDICATIONS  (PRN):      CAPILLARY BLOOD GLUCOSE      POCT Blood Glucose.: 119 mg/dL (06 Nov 2021 17:06)  POCT Blood Glucose.: 220 mg/dL (06 Nov 2021 12:10)  POCT Blood Glucose.: 145 mg/dL (06 Nov 2021 08:21)  POCT Blood Glucose.: 160 mg/dL (05 Nov 2021 21:39)    I&O's Summary    06 Nov 2021 08:01  -  06 Nov 2021 18:26  --------------------------------------------------------  IN: 480 mL / OUT: 0 mL / NET: 480 mL        PHYSICAL EXAM:  Vital Signs Last 24 Hrs  T(C): 36.2 (06 Nov 2021 13:19), Max: 36.7 (06 Nov 2021 06:12)  T(F): 97.1 (06 Nov 2021 13:19), Max: 98 (06 Nov 2021 06:12)  HR: 56 (06 Nov 2021 13:19) (55 - 60)  BP: 100/60 (06 Nov 2021 13:19) (100/60 - 114/66)  BP(mean): --  RR: 18 (06 Nov 2021 13:19) (17 - 18)  SpO2: 100% (06 Nov 2021 13:19) (99% - 100%)  CONSTITUTIONAL: NAD, well-developed, well-groomed  EYES: EOMI; conjunctiva and sclera clear  NECK: Supple, no palpable masses; no thyromegaly  RESPIRATORY: Normal respiratory effort; lungs are clear to auscultation bilaterally  CARDIOVASCULAR: Regular rate and rhythm, normal S1 and S2,  No lower extremity edema; Peripheral pulses are 2+ bilaterally  ABDOMEN: Nontender to palpation, normoactive bowel sounds, no rebound/guarding;   MUSKULOSKELETAL:  no clubbing or cyanosis of digits; no joint swelling or tenderness to palpation  PSYCH: A+O to person, place, and time; affect appropriate  NEUROLOGY: aphasia ; no gross sensory deficits, moves all extremities , walks with cane   SKIN: No rashes; no palpable lesions    LABS:                        10.9   6.24  )-----------( 207      ( 06 Nov 2021 08:09 )             33.2     11-06    137  |  103  |  31<H>  ----------------------------<  143<H>  3.9   |  22  |  1.58<H>    Ca    9.1      06 Nov 2021 08:09    TPro  7.7  /  Alb  4.4  /  TBili  0.4  /  DBili  x   /  AST  23  /  ALT  19  /  AlkPhos  72  11-05    PT/INR - ( 05 Nov 2021 00:17 )   PT: 12.4 sec;   INR: 1.09 ratio         PTT - ( 05 Nov 2021 00:17 )  PTT:30.3 sec      Urinalysis Basic - ( 05 Nov 2021 03:14 )    Color: Yellow / Appearance: Clear / SG: >1.050 / pH: x  Gluc: x / Ketone: Negative  / Bili: Negative / Urobili: <2 mg/dL   Blood: x / Protein: 30 mg/dL / Nitrite: Negative   Leuk Esterase: Negative / RBC: 1 /HPF / WBC 1 /HPF   Sq Epi: x / Non Sq Epi: 1 /HPF / Bacteria: Negative        Culture - Urine (collected 05 Nov 2021 02:40)  Source: Clean Catch Clean Catch (Midstream)  Final Report (06 Nov 2021 09:53):    <10,000 CFU/mL Normal Urogenital Nini        RADIOLOGY & ADDITIONAL TESTS:  Results Reviewed.  Imaging Personally Reviewed.    COORDINATION OF CARE:  Care Discussed with ACP  
[facility] Division of Hospital Medicine  Pat Diehl MD  Pager (M-F, 0A-5P): [phone]      Patient is a 88y old  Male who presents with a chief complaint of aphasia (06 Nov 2021 18:26)      SUBJECTIVE / OVERNIGHT EVENTS: patient is speaking in full sentences   no weakness on exam  per daughter more forgetful in the last 3 months   ADDITIONAL REVIEW OF SYSTEMS:    MEDICATIONS  (STANDING):  aspirin  chewable 81 milliGRAM(s) Oral daily  atorvastatin 80 milliGRAM(s) Oral at bedtime  clopidogrel Tablet 75 milliGRAM(s) Oral daily  dextrose 40% Gel 15 Gram(s) Oral once  dextrose 5%. 1000 milliLiter(s) (50 mL/Hr) IV Continuous <Continuous>  dextrose 5%. 1000 milliLiter(s) (100 mL/Hr) IV Continuous <Continuous>  dextrose 50% Injectable 25 Gram(s) IV Push once  dextrose 50% Injectable 12.5 Gram(s) IV Push once  dextrose 50% Injectable 25 Gram(s) IV Push once  DULoxetine 30 milliGRAM(s) Oral daily  finasteride 5 milliGRAM(s) Oral daily  gabapentin 300 milliGRAM(s) Oral three times a day  glucagon  Injectable 1 milliGRAM(s) IntraMuscular once  heparin   Injectable 5000 Unit(s) SubCutaneous every 12 hours  influenza  Vaccine (HIGH DOSE) 0.7 milliLiter(s) IntraMuscular once  insulin lispro (ADMELOG) corrective regimen sliding scale   SubCutaneous three times a day before meals  insulin lispro (ADMELOG) corrective regimen sliding scale   SubCutaneous at bedtime  lactated ringers. 1000 milliLiter(s) (75 mL/Hr) IV Continuous <Continuous>  latanoprost 0.005% Ophthalmic Solution 1 Drop(s) Both EYES at bedtime  tamsulosin 0.4 milliGRAM(s) Oral at bedtime    MEDICATIONS  (PRN):      CAPILLARY BLOOD GLUCOSE      POCT Blood Glucose.: 235 mg/dL (07 Nov 2021 11:33)  POCT Blood Glucose.: 150 mg/dL (07 Nov 2021 07:31)  POCT Blood Glucose.: 199 mg/dL (06 Nov 2021 20:54)  POCT Blood Glucose.: 119 mg/dL (06 Nov 2021 17:06)    I&O's Summary    06 Nov 2021 08:01  -  07 Nov 2021 07:00  --------------------------------------------------------  IN: 1220 mL / OUT: 900 mL / NET: 320 mL    07 Nov 2021 07:01  -  07 Nov 2021 16:56  --------------------------------------------------------  IN: 600 mL / OUT: 800 mL / NET: -200 mL        PHYSICAL EXAM:  Vital Signs Last 24 Hrs  T(C): 36.3 (07 Nov 2021 11:58), Max: 36.7 (06 Nov 2021 21:21)  T(F): 97.3 (07 Nov 2021 11:58), Max: 98.1 (06 Nov 2021 21:21)  HR: 61 (07 Nov 2021 11:58) (59 - 61)  BP: 136/57 (07 Nov 2021 11:58) (100/61 - 136/57)  BP(mean): --  RR: 18 (07 Nov 2021 11:58) (17 - 18)  SpO2: 100% (07 Nov 2021 05:47) (100% - 100%)  CONSTITUTIONAL: NAD, well-developed, well-groomed  EYES: EOMI; conjunctiva and sclera clear  ENMT: Moist oral mucosa, no pharyngeal injection or exudates; normal dentition  NECK: Supple, no palpable masses; no thyromegaly  RESPIRATORY: Normal respiratory effort; lungs are clear to auscultation bilaterally  CARDIOVASCULAR: Regular rate and rhythm, normal S1 and S2, No lower extremity edema;   ABDOMEN: Nontender to palpation, normoactive bowel sounds, no rebound/guarding;   MUSKULOSKELETAL:  no clubbing or cyanosis of digits; no joint swelling or tenderness to palpation  PSYCH: A+O to person, place, and time; affect appropriate  NEUROLOGY: CN 2-12 are intact and symmetric; no gross sensory deficits;   SKIN: No rashes; no palpable lesions    LABS:                        11.5   7.31  )-----------( 233      ( 07 Nov 2021 07:14 )             36.0     11-07    137  |  103  |  26<H>  ----------------------------<  147<H>  3.8   |  21<L>  |  1.66<H>    Ca    9.2      07 Nov 2021 07:14                Culture - Urine (collected 05 Nov 2021 02:40)  Source: Clean Catch Clean Catch (Midstream)  Final Report (06 Nov 2021 09:53):    <10,000 CFU/mL Normal Urogenital Nini        RADIOLOGY & ADDITIONAL TESTS:  Results Reviewed.   Imaging Personally Reviewed.  Telemetry-SR ,    COORDINATION OF CARE:  Care Discussed with ACP, daughter at bedside 32 min 
[facility] Division of Hospital Medicine  Rishi WHITTAKER (Kent) MD Reji  Pager [phone]    SUBJECTIVE:  Chief complaint: Aphasia.    The patient was seen and evaluated at bedside. No complaints. Denies any SOB/CP/NV. Speech improved.       ROS: All systems negative except as noted.    Vital Signs Last 24 Hrs  T(C): 36.4 (09 Nov 2021 12:03), Max: 36.8 (08 Nov 2021 21:31)  T(F): 97.5 (09 Nov 2021 12:03), Max: 98.2 (08 Nov 2021 21:31)  HR: 64 (09 Nov 2021 12:03) (64 - 70)  BP: 125/60 (09 Nov 2021 12:03) (125/60 - 138/51)  BP(mean): --  RR: 18 (09 Nov 2021 12:03) (17 - 18)  SpO2: 100% (09 Nov 2021 12:03) (99% - 100%)      PHYSICAL EXAM:  Gen- In bed, comfortable, NAD  Eyes- EOMI, PERRLA, nonicteric.  EMNT- Fair dentition. MMM. No tonsilar exudates. No posterior pharynx erythema.  Neck- Supple. No masses. No tracheal deviation.  Resp- CTAB, good effort. No r/r/w. No accessory muscle use.  CVS- RRR, S1S2, no g/r/m. No LE edema.  GI- Soft abd, NT, ND, +BSx4. No HSM.  MSK- No C/C. ROM intact. No crepitus.  Neuro- CN II-XII intact. Speech fluent/face symmetric. Sensation intact.  Skin- No rashes/ulcers. Warm/moist.  Psych- AAOx3. Appropriate mood/affect.      MEDICATION:  MEDICATIONS  (STANDING):  aspirin  chewable 81 milliGRAM(s) Oral daily  atorvastatin 80 milliGRAM(s) Oral at bedtime  clopidogrel Tablet 75 milliGRAM(s) Oral daily  dextrose 40% Gel 15 Gram(s) Oral once  dextrose 5%. 1000 milliLiter(s) (50 mL/Hr) IV Continuous <Continuous>  dextrose 5%. 1000 milliLiter(s) (100 mL/Hr) IV Continuous <Continuous>  dextrose 50% Injectable 25 Gram(s) IV Push once  dextrose 50% Injectable 12.5 Gram(s) IV Push once  dextrose 50% Injectable 25 Gram(s) IV Push once  DULoxetine 30 milliGRAM(s) Oral daily  finasteride 5 milliGRAM(s) Oral daily  gabapentin 300 milliGRAM(s) Oral three times a day  glucagon  Injectable 1 milliGRAM(s) IntraMuscular once  heparin   Injectable 5000 Unit(s) SubCutaneous every 12 hours  influenza  Vaccine (HIGH DOSE) 0.7 milliLiter(s) IntraMuscular once  insulin lispro (ADMELOG) corrective regimen sliding scale   SubCutaneous three times a day before meals  insulin lispro (ADMELOG) corrective regimen sliding scale   SubCutaneous at bedtime  lactated ringers. 1000 milliLiter(s) (75 mL/Hr) IV Continuous <Continuous>  latanoprost 0.005% Ophthalmic Solution 1 Drop(s) Both EYES at bedtime  tamsulosin 0.4 milliGRAM(s) Oral at bedtime    MEDICATIONS  (PRN):            LABORATORY:                          10.8   5.96  )-----------( 208      ( 08 Nov 2021 06:01 )             33.1     11-08    139  |  107  |  27<H>  ----------------------------<  180<H>  4.1   |  21<L>  |  1.58<H>    Ca    9.0      08 Nov 2021 06:01                COVID-19 PCR: Stacey (05 Nov 2021 03:17)

## 2021-11-10 ENCOUNTER — RX RENEWAL (OUTPATIENT)
Age: 86
End: 2021-11-10

## 2021-11-15 ENCOUNTER — NON-APPOINTMENT (OUTPATIENT)
Age: 86
End: 2021-11-15

## 2021-11-29 ENCOUNTER — APPOINTMENT (OUTPATIENT)
Dept: ELECTROPHYSIOLOGY | Facility: CLINIC | Age: 86
End: 2021-11-29

## 2021-12-14 ENCOUNTER — INPATIENT (INPATIENT)
Facility: HOSPITAL | Age: 86
LOS: 1 days | Discharge: HOME | End: 2021-12-16
Attending: STUDENT IN AN ORGANIZED HEALTH CARE EDUCATION/TRAINING PROGRAM | Admitting: STUDENT IN AN ORGANIZED HEALTH CARE EDUCATION/TRAINING PROGRAM
Payer: MEDICARE

## 2021-12-14 VITALS
OXYGEN SATURATION: 100 % | RESPIRATION RATE: 16 BRPM | TEMPERATURE: 98 F | HEIGHT: 69 IN | SYSTOLIC BLOOD PRESSURE: 116 MMHG | HEART RATE: 55 BPM | DIASTOLIC BLOOD PRESSURE: 51 MMHG | WEIGHT: 164.91 LBS

## 2021-12-14 DIAGNOSIS — R77.8 OTHER SPECIFIED ABNORMALITIES OF PLASMA PROTEINS: ICD-10-CM

## 2021-12-14 DIAGNOSIS — N40.0 BENIGN PROSTATIC HYPERPLASIA WITHOUT LOWER URINARY TRACT SYMPTOMS: ICD-10-CM

## 2021-12-14 DIAGNOSIS — E78.5 HYPERLIPIDEMIA, UNSPECIFIED: ICD-10-CM

## 2021-12-14 DIAGNOSIS — Z86.73 PERSONAL HISTORY OF TRANSIENT ISCHEMIC ATTACK (TIA), AND CEREBRAL INFARCTION WITHOUT RESIDUAL DEFICITS: ICD-10-CM

## 2021-12-14 DIAGNOSIS — E11.42 TYPE 2 DIABETES MELLITUS WITH DIABETIC POLYNEUROPATHY: ICD-10-CM

## 2021-12-14 DIAGNOSIS — N18.30 CHRONIC KIDNEY DISEASE, STAGE 3 UNSPECIFIED: ICD-10-CM

## 2021-12-14 DIAGNOSIS — R79.89 OTHER SPECIFIED ABNORMAL FINDINGS OF BLOOD CHEMISTRY: ICD-10-CM

## 2021-12-14 DIAGNOSIS — R09.89 OTHER SPECIFIED SYMPTOMS AND SIGNS INVOLVING THE CIRCULATORY AND RESPIRATORY SYSTEMS: ICD-10-CM

## 2021-12-14 DIAGNOSIS — R07.9 CHEST PAIN, UNSPECIFIED: ICD-10-CM

## 2021-12-14 DIAGNOSIS — Z98.890 OTHER SPECIFIED POSTPROCEDURAL STATES: Chronic | ICD-10-CM

## 2021-12-14 LAB
ALBUMIN SERPL ELPH-MCNC: 3 G/DL — LOW (ref 3.3–5)
ALP SERPL-CCNC: 77 U/L — SIGNIFICANT CHANGE UP (ref 40–120)
ALT FLD-CCNC: 25 U/L — SIGNIFICANT CHANGE UP (ref 12–78)
ANION GAP SERPL CALC-SCNC: 5 MMOL/L — SIGNIFICANT CHANGE UP (ref 5–17)
APTT BLD: 28.8 SEC — SIGNIFICANT CHANGE UP (ref 27.5–35.5)
AST SERPL-CCNC: 13 U/L — LOW (ref 15–37)
BASOPHILS # BLD AUTO: 0.03 K/UL — SIGNIFICANT CHANGE UP (ref 0–0.2)
BASOPHILS NFR BLD AUTO: 0.4 % — SIGNIFICANT CHANGE UP (ref 0–2)
BILIRUB SERPL-MCNC: 0.3 MG/DL — SIGNIFICANT CHANGE UP (ref 0.2–1.2)
BUN SERPL-MCNC: 34 MG/DL — HIGH (ref 7–23)
CALCIUM SERPL-MCNC: 8.9 MG/DL — SIGNIFICANT CHANGE UP (ref 8.5–10.1)
CHLORIDE SERPL-SCNC: 114 MMOL/L — HIGH (ref 96–108)
CK MB BLD-MCNC: 3.7 % — HIGH (ref 0–3.5)
CK MB CFR SERPL CALC: 1.9 NG/ML — SIGNIFICANT CHANGE UP (ref 0.5–3.6)
CK MB CFR SERPL CALC: 2.5 NG/ML — SIGNIFICANT CHANGE UP (ref 0.5–3.6)
CK SERPL-CCNC: 52 U/L — SIGNIFICANT CHANGE UP (ref 26–308)
CO2 SERPL-SCNC: 23 MMOL/L — SIGNIFICANT CHANGE UP (ref 22–31)
CREAT SERPL-MCNC: 1.63 MG/DL — HIGH (ref 0.5–1.3)
D DIMER BLD IA.RAPID-MCNC: 758 NG/ML DDU — HIGH
EOSINOPHIL # BLD AUTO: 0.3 K/UL — SIGNIFICANT CHANGE UP (ref 0–0.5)
EOSINOPHIL NFR BLD AUTO: 4.4 % — SIGNIFICANT CHANGE UP (ref 0–6)
FLUAV AG NPH QL: SIGNIFICANT CHANGE UP
FLUBV AG NPH QL: SIGNIFICANT CHANGE UP
GLUCOSE BLDC GLUCOMTR-MCNC: 151 MG/DL — HIGH (ref 70–99)
GLUCOSE BLDC GLUCOMTR-MCNC: 97 MG/DL — SIGNIFICANT CHANGE UP (ref 70–99)
GLUCOSE SERPL-MCNC: 145 MG/DL — HIGH (ref 70–99)
HCT VFR BLD CALC: 31 % — LOW (ref 39–50)
HGB BLD-MCNC: 10 G/DL — LOW (ref 13–17)
IMM GRANULOCYTES NFR BLD AUTO: 0.3 % — SIGNIFICANT CHANGE UP (ref 0–1.5)
INR BLD: 1.09 RATIO — SIGNIFICANT CHANGE UP (ref 0.88–1.16)
LYMPHOCYTES # BLD AUTO: 1.58 K/UL — SIGNIFICANT CHANGE UP (ref 1–3.3)
LYMPHOCYTES # BLD AUTO: 23.2 % — SIGNIFICANT CHANGE UP (ref 13–44)
MCHC RBC-ENTMCNC: 29 PG — SIGNIFICANT CHANGE UP (ref 27–34)
MCHC RBC-ENTMCNC: 32.3 GM/DL — SIGNIFICANT CHANGE UP (ref 32–36)
MCV RBC AUTO: 89.9 FL — SIGNIFICANT CHANGE UP (ref 80–100)
MONOCYTES # BLD AUTO: 0.59 K/UL — SIGNIFICANT CHANGE UP (ref 0–0.9)
MONOCYTES NFR BLD AUTO: 8.7 % — SIGNIFICANT CHANGE UP (ref 2–14)
NEUTROPHILS # BLD AUTO: 4.3 K/UL — SIGNIFICANT CHANGE UP (ref 1.8–7.4)
NEUTROPHILS NFR BLD AUTO: 63 % — SIGNIFICANT CHANGE UP (ref 43–77)
NRBC # BLD: 0 /100 WBCS — SIGNIFICANT CHANGE UP (ref 0–0)
PLATELET # BLD AUTO: 217 K/UL — SIGNIFICANT CHANGE UP (ref 150–400)
POTASSIUM SERPL-MCNC: 5 MMOL/L — SIGNIFICANT CHANGE UP (ref 3.5–5.3)
POTASSIUM SERPL-SCNC: 5 MMOL/L — SIGNIFICANT CHANGE UP (ref 3.5–5.3)
PROT SERPL-MCNC: 7.1 GM/DL — SIGNIFICANT CHANGE UP (ref 6–8.3)
PROTHROM AB SERPL-ACNC: 12.6 SEC — SIGNIFICANT CHANGE UP (ref 10.6–13.6)
RBC # BLD: 3.45 M/UL — LOW (ref 4.2–5.8)
RBC # FLD: 14.1 % — SIGNIFICANT CHANGE UP (ref 10.3–14.5)
SARS-COV-2 RNA SPEC QL NAA+PROBE: SIGNIFICANT CHANGE UP
SODIUM SERPL-SCNC: 142 MMOL/L — SIGNIFICANT CHANGE UP (ref 135–145)
TROPONIN I, HIGH SENSITIVITY RESULT: 9 NG/L — SIGNIFICANT CHANGE UP
TROPONIN I, HIGH SENSITIVITY RESULT: 9.3 NG/L — SIGNIFICANT CHANGE UP
TROPONIN I, HIGH SENSITIVITY RESULT: 9.3 NG/L — SIGNIFICANT CHANGE UP
WBC # BLD: 6.82 K/UL — SIGNIFICANT CHANGE UP (ref 3.8–10.5)
WBC # FLD AUTO: 6.82 K/UL — SIGNIFICANT CHANGE UP (ref 3.8–10.5)

## 2021-12-14 PROCEDURE — 93010 ELECTROCARDIOGRAM REPORT: CPT

## 2021-12-14 PROCEDURE — 99285 EMERGENCY DEPT VISIT HI MDM: CPT

## 2021-12-14 PROCEDURE — 99222 1ST HOSP IP/OBS MODERATE 55: CPT

## 2021-12-14 PROCEDURE — 78582 LUNG VENTILAT&PERFUS IMAGING: CPT | Mod: 26

## 2021-12-14 RX ORDER — DORZOLAMIDE HYDROCHLORIDE TIMOLOL MALEATE 20; 5 MG/ML; MG/ML
0 SOLUTION/ DROPS OPHTHALMIC
Qty: 0 | Refills: 0 | DISCHARGE

## 2021-12-14 RX ORDER — LANOLIN ALCOHOL/MO/W.PET/CERES
3 CREAM (GRAM) TOPICAL AT BEDTIME
Refills: 0 | Status: DISCONTINUED | OUTPATIENT
Start: 2021-12-14 | End: 2021-12-16

## 2021-12-14 RX ORDER — DEXTROSE 50 % IN WATER 50 %
12.5 SYRINGE (ML) INTRAVENOUS ONCE
Refills: 0 | Status: DISCONTINUED | OUTPATIENT
Start: 2021-12-14 | End: 2021-12-16

## 2021-12-14 RX ORDER — PREDNISOLONE SODIUM PHOSPHATE 1 %
0 DROPS OPHTHALMIC (EYE)
Qty: 0 | Refills: 0 | DISCHARGE

## 2021-12-14 RX ORDER — ATORVASTATIN CALCIUM 80 MG/1
80 TABLET, FILM COATED ORAL AT BEDTIME
Refills: 0 | Status: DISCONTINUED | OUTPATIENT
Start: 2021-12-14 | End: 2021-12-16

## 2021-12-14 RX ORDER — INSULIN LISPRO 100/ML
VIAL (ML) SUBCUTANEOUS AT BEDTIME
Refills: 0 | Status: DISCONTINUED | OUTPATIENT
Start: 2021-12-14 | End: 2021-12-16

## 2021-12-14 RX ORDER — OXYCODONE HYDROCHLORIDE 5 MG/1
5 TABLET ORAL EVERY 8 HOURS
Refills: 0 | Status: DISCONTINUED | OUTPATIENT
Start: 2021-12-14 | End: 2021-12-16

## 2021-12-14 RX ORDER — DULOXETINE HYDROCHLORIDE 30 MG/1
30 CAPSULE, DELAYED RELEASE ORAL DAILY
Refills: 0 | Status: DISCONTINUED | OUTPATIENT
Start: 2021-12-14 | End: 2021-12-16

## 2021-12-14 RX ORDER — ASPIRIN/CALCIUM CARB/MAGNESIUM 324 MG
81 TABLET ORAL DAILY
Refills: 0 | Status: DISCONTINUED | OUTPATIENT
Start: 2021-12-14 | End: 2021-12-16

## 2021-12-14 RX ORDER — INFLUENZA VIRUS VACCINE 15; 15; 15; 15 UG/.5ML; UG/.5ML; UG/.5ML; UG/.5ML
0.7 SUSPENSION INTRAMUSCULAR ONCE
Refills: 0 | Status: DISCONTINUED | OUTPATIENT
Start: 2021-12-14 | End: 2021-12-16

## 2021-12-14 RX ORDER — TAMSULOSIN HYDROCHLORIDE 0.4 MG/1
0.4 CAPSULE ORAL AT BEDTIME
Refills: 0 | Status: DISCONTINUED | OUTPATIENT
Start: 2021-12-14 | End: 2021-12-16

## 2021-12-14 RX ORDER — CLOPIDOGREL BISULFATE 75 MG/1
75 TABLET, FILM COATED ORAL DAILY
Refills: 0 | Status: DISCONTINUED | OUTPATIENT
Start: 2021-12-14 | End: 2021-12-16

## 2021-12-14 RX ORDER — ACETAMINOPHEN 500 MG
650 TABLET ORAL ONCE
Refills: 0 | Status: COMPLETED | OUTPATIENT
Start: 2021-12-14 | End: 2021-12-14

## 2021-12-14 RX ORDER — ENOXAPARIN SODIUM 100 MG/ML
40 INJECTION SUBCUTANEOUS DAILY
Refills: 0 | Status: DISCONTINUED | OUTPATIENT
Start: 2021-12-14 | End: 2021-12-16

## 2021-12-14 RX ORDER — SODIUM CHLORIDE 9 MG/ML
1000 INJECTION, SOLUTION INTRAVENOUS
Refills: 0 | Status: DISCONTINUED | OUTPATIENT
Start: 2021-12-14 | End: 2021-12-16

## 2021-12-14 RX ORDER — LATANOPROST 0.05 MG/ML
1 SOLUTION/ DROPS OPHTHALMIC; TOPICAL AT BEDTIME
Refills: 0 | Status: DISCONTINUED | OUTPATIENT
Start: 2021-12-14 | End: 2021-12-16

## 2021-12-14 RX ORDER — GLUCAGON INJECTION, SOLUTION 0.5 MG/.1ML
1 INJECTION, SOLUTION SUBCUTANEOUS ONCE
Refills: 0 | Status: DISCONTINUED | OUTPATIENT
Start: 2021-12-14 | End: 2021-12-16

## 2021-12-14 RX ORDER — SODIUM CHLORIDE 9 MG/ML
1000 INJECTION INTRAMUSCULAR; INTRAVENOUS; SUBCUTANEOUS ONCE
Refills: 0 | Status: COMPLETED | OUTPATIENT
Start: 2021-12-14 | End: 2021-12-14

## 2021-12-14 RX ORDER — DEXTROSE 50 % IN WATER 50 %
25 SYRINGE (ML) INTRAVENOUS ONCE
Refills: 0 | Status: DISCONTINUED | OUTPATIENT
Start: 2021-12-14 | End: 2021-12-16

## 2021-12-14 RX ORDER — DEXTROSE 50 % IN WATER 50 %
15 SYRINGE (ML) INTRAVENOUS ONCE
Refills: 0 | Status: DISCONTINUED | OUTPATIENT
Start: 2021-12-14 | End: 2021-12-16

## 2021-12-14 RX ORDER — INSULIN LISPRO 100/ML
VIAL (ML) SUBCUTANEOUS
Refills: 0 | Status: DISCONTINUED | OUTPATIENT
Start: 2021-12-14 | End: 2021-12-16

## 2021-12-14 RX ORDER — GABAPENTIN 400 MG/1
600 CAPSULE ORAL
Refills: 0 | Status: DISCONTINUED | OUTPATIENT
Start: 2021-12-14 | End: 2021-12-16

## 2021-12-14 RX ORDER — FINASTERIDE 5 MG/1
5 TABLET, FILM COATED ORAL DAILY
Refills: 0 | Status: DISCONTINUED | OUTPATIENT
Start: 2021-12-14 | End: 2021-12-16

## 2021-12-14 RX ORDER — DORZOLAMIDE HYDROCHLORIDE TIMOLOL MALEATE 20; 5 MG/ML; MG/ML
1 SOLUTION/ DROPS OPHTHALMIC
Refills: 0 | Status: DISCONTINUED | OUTPATIENT
Start: 2021-12-14 | End: 2021-12-16

## 2021-12-14 RX ORDER — ACETAMINOPHEN 500 MG
650 TABLET ORAL EVERY 6 HOURS
Refills: 0 | Status: DISCONTINUED | OUTPATIENT
Start: 2021-12-14 | End: 2021-12-16

## 2021-12-14 RX ADMIN — ENOXAPARIN SODIUM 40 MILLIGRAM(S): 100 INJECTION SUBCUTANEOUS at 18:43

## 2021-12-14 RX ADMIN — Medication 81 MILLIGRAM(S): at 18:41

## 2021-12-14 RX ADMIN — FINASTERIDE 5 MILLIGRAM(S): 5 TABLET, FILM COATED ORAL at 18:42

## 2021-12-14 RX ADMIN — DORZOLAMIDE HYDROCHLORIDE TIMOLOL MALEATE 1 DROP(S): 20; 5 SOLUTION/ DROPS OPHTHALMIC at 18:43

## 2021-12-14 RX ADMIN — TAMSULOSIN HYDROCHLORIDE 0.4 MILLIGRAM(S): 0.4 CAPSULE ORAL at 21:14

## 2021-12-14 RX ADMIN — LATANOPROST 1 DROP(S): 0.05 SOLUTION/ DROPS OPHTHALMIC; TOPICAL at 21:33

## 2021-12-14 RX ADMIN — GABAPENTIN 600 MILLIGRAM(S): 400 CAPSULE ORAL at 18:42

## 2021-12-14 RX ADMIN — Medication 650 MILLIGRAM(S): at 11:36

## 2021-12-14 RX ADMIN — ATORVASTATIN CALCIUM 80 MILLIGRAM(S): 80 TABLET, FILM COATED ORAL at 21:33

## 2021-12-14 RX ADMIN — SODIUM CHLORIDE 500 MILLILITER(S): 9 INJECTION INTRAMUSCULAR; INTRAVENOUS; SUBCUTANEOUS at 12:33

## 2021-12-14 RX ADMIN — DULOXETINE HYDROCHLORIDE 30 MILLIGRAM(S): 30 CAPSULE, DELAYED RELEASE ORAL at 18:42

## 2021-12-14 RX ADMIN — CLOPIDOGREL BISULFATE 75 MILLIGRAM(S): 75 TABLET, FILM COATED ORAL at 18:41

## 2021-12-14 NOTE — H&P ADULT - HISTORY OF PRESENT ILLNESS
88 years old male with h/o HLD, DM2, CKD 3, BPH, recent admission to [facility] with CVA ( s/p loop recorder implant, on Aspirin, plavix) present to ED with complain of right sided chest pain. Patient reported multiple episode of right sided chest pain, "sharp/aching pain), which started around today 5AM woke him up from sleep, 5-8/10 intensity, started with lower right chest, radiate to mid right chest region. No palpitation, diaphoresis or LOC. No chest pain with exertion. He has a near fall event recently but denied any chest wall trauma. No fever, cough.   Hemodynamically stable, afebrile and sat well at RA. No leukocytosis, Hb 10, Plt 217, ddimer 758, K 5, Cr 1.63, glucose 145, hsTnT 9.3. CXR image reviewed, no focal consolidation. 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 Patient reported multiple episode of right sided chest pain, "sharp/aching pain), which started around today 5AM woke him up from sleep, 5-8/10 intensity, started with lower right chest, radiate to mid right chest region. No palpitation, diaphoresis or LOC. No chest pain with exertion. He has a near fall event recently but denied any chest wall trauma. No fever, cough.   Hemodynamically stable, afebrile and sat well at RA. No leukocytosis, Hb 10, Plt 217, ddimer 758, K 5, Cr 1.63, glucose 145, hsTnT 9.3. CXR image reviewed, no focal consolidation. EKG with sinus diane, RBBB, LAFB, QTc 407 ( unchanged from EKG on Nov/2021)    SH: no toxic habits  FH: mother/father-DM

## 2021-12-14 NOTE — ED PROVIDER NOTE - NS ED ROS FT
Constitutional: (-) Fever, (-) Chills  Skin: (-) Rashes, (-) Wounds  Eyes: (-) Visual changes, (-) Discharge, (-) Redness  Ears: (-) Hearing loss, (-)Tinnitus, (-) Ear pain  Nose: (-) Nasal congestion, (-) Runny nose  Mouth/Throat: (-) Sore throat  CV: (+) Chest pain, (-) Palpitations, (-) Diaphoresis, (-) Extremity Swelling, (-) Syncope  Resp: (-) Cough, (-) Shortness of breath, (-) Dyspnea on Exertion, (-) Wheezing  GI: (-) Abdominal pain, (-) Nausea, (-) Vomiting, (-) Diarrhea  : (-) Dysuria  MSK: (-) Myalgias, (-) Back pain, (-) Calf pain  Neuro: (-) Loss of consciousness, (-) Headache

## 2021-12-14 NOTE — ED PROVIDER NOTE - OBJECTIVE STATEMENT
88y Male with PMHx of DM, carotid stenosis, recent CVA 11/2021 on plavix with loop recorder placed presents to the ER for chest pain. Patient reports intermittent episodes of right sided sharp chest pain that radiates up right side of chest and last a few seconds. Reports multiple episodes since this morning. Reports shortness of breath when pain comes on. Denies feeling this in the past. Denies fever, chills, headache, lightheadedness, dizziness, URI symptoms, palpitations, abdominal pain, n/v/d, urinary complaints, calf tenderness. Daughter states she gave him 162mg Aspirin prior to arrival. States patient has an ataxic gait at baseline. Reports fall 3 days ago, states he was holding onto the door knob, someone opened the door and he held on causing his legs to cross and go onto the floor. Did not hit head or lose consciousness. Denies pain or complaints in regards to event.

## 2021-12-14 NOTE — H&P ADULT - NSHPPHYSICALEXAM_GEN_ALL_CORE
CONSTITUTIONAL: Well developed, well nourished, alert and cooperative, no acute distress. BP-113/51 , HR- 56 , RR-17  EYES: PERRL, EOMI, no scleral icterus  ENT: Mucosa moist, tongue normal.   NECK: Neck supple, trachea midline, non-tender, no masses or thyromegaly.  CARDIAC: Normal S1 and S2. Regular rate and rhythms. No murmurs. No Pedal edema. Peripheral pulses intact. Chest wall tenderness with certain position/movement  LUNGS: Clear to auscultation, equal air entry both lungs. No rales, rhonchi, wheezing. Normal respiratory effort.   ABDOMEN: Soft, nondistended, nontender. No guarding or rebound tenderness. No hepatomegaly or splenomegaly. Bowel sound normal.   MUSCULOSKELETAL: Normocephalic, atraumatic. Spine normal without deformity or tenderness. No clubbing or cyanosis. No significant deformity or joint abnormality  NEUROLOGICAL: No gross motor or sensory deficits  SKIN: no lesions or eruptions. Normal turgor  PSYCHIATRIC: A&O x 3, appropriate mood and affect

## 2021-12-14 NOTE — H&P ADULT - ASSESSMENT
88 years old male with h/o HLD, DM2, CKD 3, BPH, [facility] ( s/p loop recorder implant, on Aspirin, plavix) present to ED with complain of right sided chest pain. Patient reported multiple episode of right sided chest pain  Hemodynamically stable, afebrile and sat well at RA. No leukocytosis, Hb 10, Plt 217, ddimer 758, K 5, Cr 1.63, glucose 145, hsTnT 9.3. CXR image reviewed, no focal consolidation. EKG with NSR, RBBB, LAFB, QTc 407 ( unchanged from EKG on Nov/2021)  Recent ECHO in Nov/2021-Normal left ventricular internal dimensions and wall thicknesses. Agitated saline injection demonstrates no obvious evidence of a patent foramen ovale    Admitted with chest pain 88 years old male with h/o HLD, DM2, CKD 3, BPH, [facility] ( s/p loop recorder implant, on Aspirin, plavix) present to ED with complain of right sided chest pain. Patient reported multiple episode of right sided chest pain  Hemodynamically stable, afebrile and sat well at RA. No leukocytosis, Hb 10, Plt 217, ddimer 758, K 5, Cr 1.63, glucose 145, hsTnT 9.3. CXR image reviewed, no focal consolidation. EKG with sinus diane, RBBB, LAFB, QTc 407 ( unchanged from EKG on Nov/2021)  Recent ECHO in Nov/2021-Normal left ventricular internal dimensions and wall thicknesses. Agitated saline injection demonstrates no obvious evidence of a patent foramen ovale    Admitted with chest pain

## 2021-12-14 NOTE — PATIENT PROFILE ADULT - PACKS YRS CALCULATION
Sinusitis (Antibiotic Treatment)    The sinuses are air-filled spaces within the bones of the face. They connect to the inside of the nose. Sinusitis is an inflammation of the tissue that lines the sinuses. Sinusitis can occur during a cold.  It can also · Do not use nasal rinses or irrigation during an acute sinus infection, unless your healthcare provider tells you to. Rinsing may spread the infection to other areas in your sinuses.   · Use acetaminophen or ibuprofen to control pain, unless another pain m 0

## 2021-12-14 NOTE — PATIENT PROFILE ADULT - FALL HARM RISK - HARM RISK INTERVENTIONS
Assistance with ambulation/Assistance OOB with selected safe patient handling equipment/Communicate Risk of Fall with Harm to all staff/Discuss with provider need for PT consult/Monitor gait and stability/Provide patient with walking aids - walker, cane, crutches/Reinforce activity limits and safety measures with patient and family/Tailored Fall Risk Interventions/Use of alarms - bed, chair and/or voice tab/Visual Cue: Yellow wristband and red socks/Bed in lowest position, wheels locked, appropriate side rails in place/Call bell, personal items and telephone in reach/Instruct patient to call for assistance before getting out of bed or chair/Non-slip footwear when patient is out of bed/[place] to call system/Physically safe environment - no spills, clutter or unnecessary equipment/Purposeful Proactive Rounding/Room/bathroom lighting operational, light cord in reach

## 2021-12-14 NOTE — ED PROVIDER NOTE - CLINICAL SUMMARY MEDICAL DECISION MAKING FREE TEXT BOX
88y Male with PMHx of DM, carotid stenosis, recent CVA 11/2021 on plavix with loop recorder presents to the ER for chest pain. Multiple episodes of intermittent episodes of right sided sharp chest pain that radiates up right side of chest and last a few seconds since this morning associated with SOB. Reports shortness of breath when pain comes on. Denies feeling this in the past. Denies fever, chills, headache, lightheadedness, dizziness, URI symptoms, palpitations, abdominal pain, n/v/d, urinary complaints, calf tenderness. Given 162 ASA my daughter. Vital signs stable, exam unremarkable. Concern for ACS vs PE - will get labs, EKG, CXR, reassess. Plan for cardiac observation.

## 2021-12-14 NOTE — H&P ADULT - PROBLEM SELECTOR PLAN 4
Recent admission to [facility] with CVA-aphasia  Speech significantly improved  On aspirin, plavix, statin

## 2021-12-14 NOTE — H&P ADULT - PROBLEM SELECTOR PLAN 1
present with intermittent right sided chest pain, started today 5AM, woke patient up from sleep  Denied any fall or trauma. No diaphoresis, SOB, palpitation, LOC  hsTnT 9.3, EKG with NSR, RBBB, LAFB, QTc 407 ( unchanged from EKG on Nov/2021)  Have chest pain with certain movement  Ribs X ray to evaluate for any chest wall pathology  Serial trop/CKMB, telemetry monitoring- high HEART score present with intermittent right sided chest pain, started today 5AM, woke patient up from sleep  Denied any fall or trauma. No diaphoresis, SOB, palpitation, LOC  hsTnT 9.3, EKG with NSR, RBBB, LAFB, QTc 407 ( unchanged from EKG on Nov/2021)  Have chest pain with certain movement  Rt ribs X ray to evaluate for any chest wall pathology  Serial trop/CKMB, telemetry monitoring- high HEART score  Already on aspirin, plavix, statin for h/o CVA  low BP and diane- unable to introduce BB  ECHO in Nov/2021-Normal left ventricular internal dimensions and wall thicknesses. Agitated saline injection demonstrates no obvious evidence of a patent foramen ovale.  Tylenol prn for mild to mod pain, oxycodone IR 5mg prn for severe pain

## 2021-12-14 NOTE — ED PROVIDER NOTE - ATTENDING CONTRIBUTION TO CARE
Patient evaluated and seen with CHESTER Knott agree with above history and physical - pt examined and seen by me personally - findings as seen: Pt with intermittent R sided chest pain significant Heart score of 6 will need to have cardiac obs placement at minimum. Otherwise in ED Troponin level evaluated initially, pt otherwise well appearing, D-Dimer ordered to r/o PE due to recent hospitalization. Clinically well appearing not in distress. Patient evaluated and seen with CHESTER Knott agree with above history and physical - pt examined and seen by me personally - findings as seen: Pt with intermittent R sided chest pain significant Heart score of 6 will need chest pain admission as pt still having intermittent CP. Otherwise in ED Troponin level evaluated initially, negative, pt otherwise well appearing, D-Dimer ordered to noted elevation will order VQ for r/o PE due to recent hospitalization. Clinically well appearing not in distress at time of admission.

## 2021-12-14 NOTE — ED PROVIDER NOTE - NSICDXPASTMEDICALHX_GEN_ALL_CORE_FT
PAST MEDICAL HISTORY:  BPH (benign prostatic hyperplasia)     CVA (cerebrovascular accident)     Diabetic neuropathy     DM (diabetes mellitus)     Macular degeneration

## 2021-12-14 NOTE — ED ADULT NURSE NOTE - ED STAT RN HANDOFF DETAILS
Report given to Andria REYNAGA on [address]. Pt stable on the monitor, no signs and symptoms of acute distress noted at this time. /58 HR 59. As per family, pt has historically low BP. Safety measures in place.

## 2021-12-14 NOTE — H&P ADULT - PROBLEM SELECTOR PLAN 2
ddimer 758  Though normal for corrected age value, will need to R/O PE given chest pain  V/Q scan given CKD 3

## 2021-12-14 NOTE — ED ADULT NURSE NOTE - OBJECTIVE STATEMENT
PT is A&Ox4, c/o pain on the right side of chest radiates upwards, sharp, started this AM, mild lightheadedness, pain on the right leg when stretched possibly related to fall. Never had this type of pain before. As per pt, Hx of fall 2 days ago in the bathroom, denies hitting head, no LOC. Pt on plavix. Denies sob, palpitations, stomachache, HA, n,v. No hx of blood clots of surgeries. H/O CVA  few weeks ago, DM  . Slurred speech noted, started after stroke as per pt family.

## 2021-12-15 ENCOUNTER — APPOINTMENT (OUTPATIENT)
Dept: ELECTROPHYSIOLOGY | Facility: CLINIC | Age: 86
End: 2021-12-15
Payer: MEDICARE

## 2021-12-15 ENCOUNTER — NON-APPOINTMENT (OUTPATIENT)
Age: 86
End: 2021-12-15

## 2021-12-15 ENCOUNTER — TRANSCRIPTION ENCOUNTER (OUTPATIENT)
Age: 86
End: 2021-12-15

## 2021-12-15 LAB
ALBUMIN SERPL ELPH-MCNC: 2.8 G/DL — LOW (ref 3.3–5)
ALP SERPL-CCNC: 64 U/L — SIGNIFICANT CHANGE UP (ref 40–120)
ALT FLD-CCNC: 20 U/L — SIGNIFICANT CHANGE UP (ref 12–78)
ANION GAP SERPL CALC-SCNC: 5 MMOL/L — SIGNIFICANT CHANGE UP (ref 5–17)
AST SERPL-CCNC: 12 U/L — LOW (ref 15–37)
BILIRUB SERPL-MCNC: 0.3 MG/DL — SIGNIFICANT CHANGE UP (ref 0.2–1.2)
BUN SERPL-MCNC: 32 MG/DL — HIGH (ref 7–23)
CALCIUM SERPL-MCNC: 8.5 MG/DL — SIGNIFICANT CHANGE UP (ref 8.5–10.1)
CHLORIDE SERPL-SCNC: 111 MMOL/L — HIGH (ref 96–108)
CO2 SERPL-SCNC: 23 MMOL/L — SIGNIFICANT CHANGE UP (ref 22–31)
CREAT SERPL-MCNC: 1.36 MG/DL — HIGH (ref 0.5–1.3)
GLUCOSE BLDC GLUCOMTR-MCNC: 129 MG/DL — HIGH (ref 70–99)
GLUCOSE BLDC GLUCOMTR-MCNC: 162 MG/DL — HIGH (ref 70–99)
GLUCOSE BLDC GLUCOMTR-MCNC: 177 MG/DL — HIGH (ref 70–99)
GLUCOSE BLDC GLUCOMTR-MCNC: 178 MG/DL — HIGH (ref 70–99)
GLUCOSE SERPL-MCNC: 119 MG/DL — HIGH (ref 70–99)
HCT VFR BLD CALC: 29 % — LOW (ref 39–50)
HGB BLD-MCNC: 9.3 G/DL — LOW (ref 13–17)
MAGNESIUM SERPL-MCNC: 1.8 MG/DL — SIGNIFICANT CHANGE UP (ref 1.6–2.6)
MCHC RBC-ENTMCNC: 28.4 PG — SIGNIFICANT CHANGE UP (ref 27–34)
MCHC RBC-ENTMCNC: 32.1 GM/DL — SIGNIFICANT CHANGE UP (ref 32–36)
MCV RBC AUTO: 88.7 FL — SIGNIFICANT CHANGE UP (ref 80–100)
NRBC # BLD: 0 /100 WBCS — SIGNIFICANT CHANGE UP (ref 0–0)
PHOSPHATE SERPL-MCNC: 3 MG/DL — SIGNIFICANT CHANGE UP (ref 2.5–4.5)
PLATELET # BLD AUTO: 200 K/UL — SIGNIFICANT CHANGE UP (ref 150–400)
POTASSIUM SERPL-MCNC: 4.1 MMOL/L — SIGNIFICANT CHANGE UP (ref 3.5–5.3)
POTASSIUM SERPL-SCNC: 4.1 MMOL/L — SIGNIFICANT CHANGE UP (ref 3.5–5.3)
PROT SERPL-MCNC: 6.5 GM/DL — SIGNIFICANT CHANGE UP (ref 6–8.3)
RBC # BLD: 3.27 M/UL — LOW (ref 4.2–5.8)
RBC # FLD: 13.8 % — SIGNIFICANT CHANGE UP (ref 10.3–14.5)
SODIUM SERPL-SCNC: 139 MMOL/L — SIGNIFICANT CHANGE UP (ref 135–145)
WBC # BLD: 5.94 K/UL — SIGNIFICANT CHANGE UP (ref 3.8–10.5)
WBC # FLD AUTO: 5.94 K/UL — SIGNIFICANT CHANGE UP (ref 3.8–10.5)

## 2021-12-15 PROCEDURE — G2066: CPT

## 2021-12-15 PROCEDURE — 93298 REM INTERROG DEV EVAL SCRMS: CPT

## 2021-12-15 PROCEDURE — 99232 SBSQ HOSP IP/OBS MODERATE 35: CPT

## 2021-12-15 PROCEDURE — 71101 X-RAY EXAM UNILAT RIBS/CHEST: CPT | Mod: 26,RT

## 2021-12-15 RX ORDER — TAMSULOSIN HYDROCHLORIDE 0.4 MG/1
1 CAPSULE ORAL
Qty: 0 | Refills: 0 | DISCHARGE

## 2021-12-15 RX ORDER — TAMSULOSIN HYDROCHLORIDE 0.4 MG/1
1 CAPSULE ORAL
Qty: 0 | Refills: 0 | DISCHARGE
Start: 2021-12-15

## 2021-12-15 RX ORDER — DULOXETINE HYDROCHLORIDE 30 MG/1
1 CAPSULE, DELAYED RELEASE ORAL
Qty: 0 | Refills: 0 | DISCHARGE

## 2021-12-15 RX ORDER — DORZOLAMIDE HYDROCHLORIDE TIMOLOL MALEATE 20; 5 MG/ML; MG/ML
1 SOLUTION/ DROPS OPHTHALMIC
Qty: 0 | Refills: 0 | DISCHARGE

## 2021-12-15 RX ORDER — DORZOLAMIDE HYDROCHLORIDE TIMOLOL MALEATE 20; 5 MG/ML; MG/ML
1 SOLUTION/ DROPS OPHTHALMIC
Qty: 0 | Refills: 0 | DISCHARGE
Start: 2021-12-15

## 2021-12-15 RX ORDER — GABAPENTIN 400 MG/1
2 CAPSULE ORAL
Qty: 0 | Refills: 0 | DISCHARGE

## 2021-12-15 RX ORDER — ATORVASTATIN CALCIUM 80 MG/1
1 TABLET, FILM COATED ORAL
Qty: 0 | Refills: 0 | DISCHARGE
Start: 2021-12-15

## 2021-12-15 RX ORDER — FINASTERIDE 5 MG/1
1 TABLET, FILM COATED ORAL
Qty: 0 | Refills: 0 | DISCHARGE

## 2021-12-15 RX ORDER — ASPIRIN/CALCIUM CARB/MAGNESIUM 324 MG
1 TABLET ORAL
Qty: 0 | Refills: 0 | DISCHARGE
Start: 2021-12-15

## 2021-12-15 RX ORDER — FINASTERIDE 5 MG/1
1 TABLET, FILM COATED ORAL
Qty: 0 | Refills: 0 | DISCHARGE
Start: 2021-12-15

## 2021-12-15 RX ORDER — ACETAMINOPHEN 500 MG
2 TABLET ORAL
Qty: 0 | Refills: 0 | DISCHARGE
Start: 2021-12-15

## 2021-12-15 RX ORDER — DULOXETINE HYDROCHLORIDE 30 MG/1
1 CAPSULE, DELAYED RELEASE ORAL
Qty: 0 | Refills: 0 | DISCHARGE
Start: 2021-12-15

## 2021-12-15 RX ORDER — GABAPENTIN 400 MG/1
2 CAPSULE ORAL
Qty: 0 | Refills: 0 | DISCHARGE
Start: 2021-12-15

## 2021-12-15 RX ORDER — CLOPIDOGREL BISULFATE 75 MG/1
1 TABLET, FILM COATED ORAL
Qty: 0 | Refills: 0 | DISCHARGE
Start: 2021-12-15

## 2021-12-15 RX ADMIN — Medication 1: at 11:45

## 2021-12-15 RX ADMIN — GABAPENTIN 600 MILLIGRAM(S): 400 CAPSULE ORAL at 05:10

## 2021-12-15 RX ADMIN — FINASTERIDE 5 MILLIGRAM(S): 5 TABLET, FILM COATED ORAL at 11:45

## 2021-12-15 RX ADMIN — GABAPENTIN 600 MILLIGRAM(S): 400 CAPSULE ORAL at 17:11

## 2021-12-15 RX ADMIN — ENOXAPARIN SODIUM 40 MILLIGRAM(S): 100 INJECTION SUBCUTANEOUS at 11:44

## 2021-12-15 RX ADMIN — DULOXETINE HYDROCHLORIDE 30 MILLIGRAM(S): 30 CAPSULE, DELAYED RELEASE ORAL at 11:44

## 2021-12-15 RX ADMIN — DORZOLAMIDE HYDROCHLORIDE TIMOLOL MALEATE 1 DROP(S): 20; 5 SOLUTION/ DROPS OPHTHALMIC at 17:39

## 2021-12-15 RX ADMIN — DORZOLAMIDE HYDROCHLORIDE TIMOLOL MALEATE 1 DROP(S): 20; 5 SOLUTION/ DROPS OPHTHALMIC at 05:10

## 2021-12-15 RX ADMIN — TAMSULOSIN HYDROCHLORIDE 0.4 MILLIGRAM(S): 0.4 CAPSULE ORAL at 22:01

## 2021-12-15 RX ADMIN — LATANOPROST 1 DROP(S): 0.05 SOLUTION/ DROPS OPHTHALMIC; TOPICAL at 23:43

## 2021-12-15 RX ADMIN — ATORVASTATIN CALCIUM 80 MILLIGRAM(S): 80 TABLET, FILM COATED ORAL at 22:01

## 2021-12-15 RX ADMIN — Medication 81 MILLIGRAM(S): at 12:55

## 2021-12-15 RX ADMIN — CLOPIDOGREL BISULFATE 75 MILLIGRAM(S): 75 TABLET, FILM COATED ORAL at 11:45

## 2021-12-15 RX ADMIN — Medication 1: at 17:05

## 2021-12-15 NOTE — PHYSICAL THERAPY INITIAL EVALUATION ADULT - CRITERIA FOR SKILLED THERAPEUTIC INTERVENTIONS
home with home PT/impairments found/functional limitations in following categories/risk reduction/prevention/rehab potential/therapy frequency/predicted duration of therapy intervention/anticipated discharge recommendation

## 2021-12-15 NOTE — PHYSICAL THERAPY INITIAL EVALUATION ADULT - BED MOBILITY TRAINING, PT EVAL
To be able to perform bed mobility including supine to sit, sit to supine Independently in order to facility safe transfers by 2 weeks

## 2021-12-15 NOTE — PROGRESS NOTE ADULT - SUBJECTIVE AND OBJECTIVE BOX
INTERVAL HPI/OVERNIGHT EVENTS: Pt seen and examined at bedside. Denies any complaints. No events overnight.    88y  Vital Signs Last 24 Hrs  T(C): 36.4 (15 Dec 2021 11:09), Max: 36.6 (14 Dec 2021 23:38)  T(F): 97.6 (15 Dec 2021 11:09), Max: 97.9 (14 Dec 2021 23:38)  HR: 62 (15 Dec 2021 11:09) (55 - 63)  BP: 120/69 (15 Dec 2021 11:09) (107/58 - 138/69)  BP(mean): --  RR: 18 (15 Dec 2021 11:09) (16 - 18)  SpO2: 96% (15 Dec 2021 11:09) (96% - 100%)  I&O's Summary    14 Dec 2021 07:01  -  15 Dec 2021 07:00  --------------------------------------------------------  IN: 280 mL / OUT: 0 mL / NET: 280 mL      MEDICATIONS  (STANDING):  aspirin  chewable 81 milliGRAM(s) Oral daily  atorvastatin 80 milliGRAM(s) Oral at bedtime  clopidogrel Tablet 75 milliGRAM(s) Oral daily  dextrose 40% Gel 15 Gram(s) Oral once  dextrose 5%. 1000 milliLiter(s) (50 mL/Hr) IV Continuous <Continuous>  dextrose 5%. 1000 milliLiter(s) (100 mL/Hr) IV Continuous <Continuous>  dextrose 50% Injectable 25 Gram(s) IV Push once  dextrose 50% Injectable 12.5 Gram(s) IV Push once  dextrose 50% Injectable 25 Gram(s) IV Push once  dorzolamide 2%/timolol 0.5% Ophthalmic Solution 1 Drop(s) Both EYES two times a day  DULoxetine 30 milliGRAM(s) Oral daily  enoxaparin Injectable 40 milliGRAM(s) SubCutaneous daily  finasteride 5 milliGRAM(s) Oral daily  gabapentin 600 milliGRAM(s) Oral two times a day  glucagon  Injectable 1 milliGRAM(s) IntraMuscular once  influenza  Vaccine (HIGH DOSE) 0.7 milliLiter(s) IntraMuscular once  insulin lispro (ADMELOG) corrective regimen sliding scale   SubCutaneous three times a day before meals  insulin lispro (ADMELOG) corrective regimen sliding scale   SubCutaneous at bedtime  latanoprost 0.005% Ophthalmic Solution 1 Drop(s) Both EYES at bedtime  tamsulosin 0.4 milliGRAM(s) Oral at bedtime    MEDICATIONS  (PRN):  acetaminophen     Tablet .. 650 milliGRAM(s) Oral every 6 hours PRN Temp greater or equal to 38C (100.4F), Mild Pain (1 - 3), Moderate Pain (4 - 6)  melatonin 3 milliGRAM(s) Oral at bedtime PRN Insomnia  oxyCODONE    IR 5 milliGRAM(s) Oral every 8 hours PRN Severe Pain (7 - 10)    LABS:    trop                        9.3    5.94  )-----------( 200      ( 15 Dec 2021 06:49 )             29.0     12-15    139  |  111<H>  |  32<H>  ----------------------------<  119<H>  4.1   |  23  |  1.36<H>    Ca    8.5      15 Dec 2021 06:49  Phos  3.0     12-15  Mg     1.8     12-15    TPro  6.5  /  Alb  2.8<L>  /  TBili  0.3  /  DBili  x   /  AST  12<L>  /  ALT  20  /  AlkPhos  64  12-15    PT/INR - ( 14 Dec 2021 11:38 )   PT: 12.6 sec;   INR: 1.09 ratio         PTT - ( 14 Dec 2021 11:38 )  PTT:28.8 sec    CAPILLARY BLOOD GLUCOSE      POCT Blood Glucose.: 178 mg/dL (15 Dec 2021 11:34)  POCT Blood Glucose.: 129 mg/dL (15 Dec 2021 07:43)  POCT Blood Glucose.: 151 mg/dL (14 Dec 2021 21:06)  POCT Blood Glucose.: 97 mg/dL (14 Dec 2021 16:33)          REVIEW OF SYSTEMS:  CONSTITUTIONAL: No fever, weight loss, or fatigue  RESPIRATORY: No cough, wheezing, chills or hemoptysis; No shortness of breath  CARDIOVASCULAR: No chest pain, palpitations, dizziness, or leg swelling  GASTROINTESTINAL: No abdominal or epigastric pain. No nausea, vomiting, or hematemesis; No diarrhea or constipation. No melena or hematochezia.  GENITOURINARY: No dysuria, frequency, hematuria, or incontinence  NEUROLOGICAL: No headaches, memory loss, loss of strength, numbness, or tremors  MUSCULOSKELETAL: No joint pain or swelling; No muscle, back, or extremity pain      RADIOLOGY & ADDITIONAL TESTS:    Imaging Personally Reviewed:  [ ] YES  [ ] NO    Consultant(s) Notes Reviewed:  [x ] YES  [ ] NO    PHYSICAL EXAM:  GENERAL: NAD  NERVOUS SYSTEM:  Alert & Oriented X3, moves all extremities.  CHEST/LUNG: Clear to auscultation b/l.  HEART: Regular rate and rhythm; No murmurs, rubs, or gallops  ABDOMEN: Soft, Nontender, Nondistended; Bowel sounds present  EXTREMITIES:  trace edema      A & P:        Care Discussed with Consultants/Other Providers [ x] YES  [ ] NO

## 2021-12-15 NOTE — DISCHARGE NOTE PROVIDER - HOSPITAL COURSE
88 years old male with h/o HLD, DM2, CKD 3, BPH, recent admission to [facility] with CVA ( s/p loop recorder implant, on Aspirin, plavix) present to ED with complain of right sided chest pain. Patient reported multiple episode of right sided chest pain  1. Atypical Chest pain  resolved now. Non reproducible  Denies any trauma or fall  V/Q scan low probability for PE.  EKG RBBB, NSR  ECHO in Nov/2021-Normal left ventricular internal dimensions and wall thicknesses. Agitated saline injection demonstrates no obvious evidence of a patent foramen ovale.  Trop neg. CXR with mild small RLL infiltrate, no acute fractures.     2. Elevated D dimer  Normal for corrected age value  V/Q scan low probability for PE.    3. HLD  c/w statin    4. H/O CVA  Recent admission to [facility] with CVA- aphasia  speech improved  c/w ASA, Plavix, statin.    5. CKD  Cr stable   Monitor Renal function  Avoid nephrotoxic agents.    6. BPH  c/w Flomax    7. DM  Monitor FS  c/w ISS   c/w gabapentin for neuropathy.    8. DVT PPX  c/w lovenox SQ.   88 years old male with h/o HLD, DM2, CKD 3, BPH, recent admission to [facility] with CVA ( s/p loop recorder implant, on Aspirin, plavix) present to ED with complain of right sided chest pain, that resolved now. V/Q scan- Low probability for PE. ECHO Normal Left ventricular internal dimensions and wall thickness. CXR with mild small RLL infiltrate, No acute fractures. Patient has no symptoms. continue to take antibiotics as prescribed. I discussed the plan of care in detail with both the patient and his daughter at the bedside. Patient is stable to be discharged home with PT.

## 2021-12-15 NOTE — PHYSICAL THERAPY INITIAL EVALUATION ADULT - GENERAL OBSERVATIONS, REHAB EVAL
Chart reviewed, ecnountered on supine, AxOx3, cooperative, + cardiac monitor, c/o discomfort on L knee.

## 2021-12-15 NOTE — PHYSICAL THERAPY INITIAL EVALUATION ADULT - ADDITIONAL COMMENTS
as per patient, he lives with Son and Granddaughters in  with one platform step, where a walker can fit, inside no more steps, pt has been using a walker, owns a cane and rollator. HHA 10-11 hours for 7 days a week.

## 2021-12-15 NOTE — DISCHARGE NOTE PROVIDER - NSDCCPCAREPLAN_GEN_ALL_CORE_FT
PRINCIPAL DISCHARGE DIAGNOSIS  Diagnosis: Chest pain  Assessment and Plan of Treatment: resolved. VQ scan negative for PE. troponin negative. CXR without significant changes. Please follow up with your primary care physician in 1 week.      SECONDARY DISCHARGE DIAGNOSES  Diagnosis: Stage 3 chronic kidney disease  Assessment and Plan of Treatment: Cr 1.3    Diagnosis: Type 2 diabetes mellitus with peripheral neuropathy  Assessment and Plan of Treatment: continue medications    Diagnosis: History of CVA (cerebrovascular accident)  Assessment and Plan of Treatment: continue medications    Diagnosis: Hyperlipidemia, unspecified  Assessment and Plan of Treatment: continue medications     PRINCIPAL DISCHARGE DIAGNOSIS  Diagnosis: Chest pain  Assessment and Plan of Treatment: resolved. VQ scan negative for PE. troponin negative. CXR without significant changes. Please follow up with your primary care physician in 1 week.      SECONDARY DISCHARGE DIAGNOSES  Diagnosis: Hyperlipidemia, unspecified  Assessment and Plan of Treatment: continue medications    Diagnosis: Stage 3 chronic kidney disease  Assessment and Plan of Treatment: stable    Diagnosis: History of CVA (cerebrovascular accident)  Assessment and Plan of Treatment: continue medications    Diagnosis: Type 2 diabetes mellitus with peripheral neuropathy  Assessment and Plan of Treatment: continue medications    Diagnosis: Community acquired pneumonia  Assessment and Plan of Treatment: continue to take antibiotics as prescribed. and follow up with PMD in 1 week.

## 2021-12-15 NOTE — PHYSICAL THERAPY INITIAL EVALUATION ADULT - GAIT TRAINING, PT EVAL
To be able to perform ambulation independently using cane for 300 feet, using proper technique using AD, with proper posture and functional distance at home in 2 weeks.

## 2021-12-15 NOTE — DISCHARGE NOTE PROVIDER - NSDCMRMEDTOKEN_GEN_ALL_CORE_FT
acetaminophen 325 mg oral tablet: 2 tab(s) orally every 6 hours, As needed, Temp greater or equal to 38C (100.4F), Mild Pain (1 - 3), Moderate Pain (4 - 6)  aspirin 81 mg oral tablet, chewable: 1 tab(s) orally once a day  atorvastatin 80 mg oral tablet: 1 tab(s) orally once a day (at bedtime)  clopidogrel 75 mg oral tablet: 1 tab(s) orally once a day  dorzolamide-timolol 2%-0.5% preservative-free ophthalmic solution: 1 drop(s) to each affected eye 2 times a day  DULoxetine 30 mg oral delayed release capsule: 1 cap(s) orally once a day  finasteride 5 mg oral tablet: 1 tab(s) orally once a day  gabapentin 300 mg oral capsule: 2 cap(s) orally 2 times a day  LATANOPROST 0.005% EYE DROPS: 1  to each affected eye once a day (at bedtime)  METFORMIN HCL 1,000 MG TABLET: 1 tab(s) orally 2 times a day  PREDNISOLONE AC 1% EYE DROP: to each affected eye 3 times a day  tamsulosin 0.4 mg oral capsule: 1 cap(s) orally once a day (at bedtime)   aspirin 81 mg oral tablet, chewable: 1 tab(s) orally once a day  atorvastatin 80 mg oral tablet: 1 tab(s) orally once a day (at bedtime)  Azithromycin 5 Day Dose Pack 250 mg oral tablet: Please follow instructions.  cefpodoxime 200 mg oral tablet: 1 tab(s) orally 2 times a day   clopidogrel 75 mg oral tablet: 1 tab(s) orally once a day  dorzolamide-timolol 2%-0.5% preservative-free ophthalmic solution: 1 drop(s) to each affected eye 2 times a day  DULoxetine 30 mg oral delayed release capsule: 1 cap(s) orally once a day  finasteride 5 mg oral tablet: 1 tab(s) orally once a day  gabapentin 300 mg oral capsule: 2 cap(s) orally 2 times a day  LATANOPROST 0.005% EYE DROPS: 1  to each affected eye once a day (at bedtime)  METFORMIN HCL 1,000 MG TABLET: 1 tab(s) orally 2 times a day  PREDNISOLONE AC 1% EYE DROP: to each affected eye 3 times a day  tamsulosin 0.4 mg oral capsule: 1 cap(s) orally once a day (at bedtime)

## 2021-12-15 NOTE — PHYSICAL THERAPY INITIAL EVALUATION ADULT - DID THE PATIENT HAVE SURGERY?
This is the hx of ASIM a 89 y/o male patient who was admitted to [facility] due to complications of chest pain affecting medical condition and with subsequent affection on functional mobility./n/a

## 2021-12-15 NOTE — PROGRESS NOTE ADULT - ASSESSMENT
88 years old male with h/o HLD, DM2, CKD 3, BPH, recent admission to [facility] with CVA ( s/p loop recorder implant, on Aspirin, plavix) present to ED with complain of right sided chest pain. Patient reported multiple episode of right sided chest pain  1. Atypical Chest pain  resolved now. Non reproducible  Denies any trauma or fall  V/Q scan low probability for PE.  EKG RBBB, NSR  ECHO in Nov/2021-Normal left ventricular internal dimensions and wall thicknesses. Agitated saline injection demonstrates no obvious evidence of a patent foramen ovale.  Trop neg  f/u X ray    2. Elevated D dimer  Normal for correwcted age value  V/Q scan low probability for PE.    3. HLD  c/w statin    4. H/O CVA  Recent admission to [facility] with CVA- aphasia  speech improved  c/w ASA, Plavix, statin.    5. CKD  Cr stable   Monitor Renal function  Avoid nephrotoxic agents.    6. BPH  c/w Flomax    7. DM  Monitor FS  c/w ISS   c/w gabapentin for neuropathy.    8. DVT PPX  c/w lovenox SQ.

## 2021-12-15 NOTE — PHYSICAL THERAPY INITIAL EVALUATION ADULT - PERTINENT HX OF CURRENT PROBLEM, REHAB EVAL
This is the hx of ASIM a 87 y/o male patient who [place] [facility] [place] complications of chest pain affecting medical condition and with subsequent affection on functional mobility. CT Brain 12/14/21: (-) acute intracranial hemorrhage. X ribs 12/14/21: (-) Fx.

## 2021-12-16 ENCOUNTER — TRANSCRIPTION ENCOUNTER (OUTPATIENT)
Age: 86
End: 2021-12-16

## 2021-12-16 VITALS
TEMPERATURE: 97 F | SYSTOLIC BLOOD PRESSURE: 127 MMHG | OXYGEN SATURATION: 98 % | RESPIRATION RATE: 18 BRPM | HEART RATE: 54 BPM | DIASTOLIC BLOOD PRESSURE: 65 MMHG

## 2021-12-16 LAB
FLUAV AG NPH QL: SIGNIFICANT CHANGE UP
FLUBV AG NPH QL: SIGNIFICANT CHANGE UP
GLUCOSE BLDC GLUCOMTR-MCNC: 128 MG/DL — HIGH (ref 70–99)
GLUCOSE BLDC GLUCOMTR-MCNC: 141 MG/DL — HIGH (ref 70–99)
GLUCOSE BLDC GLUCOMTR-MCNC: 182 MG/DL — HIGH (ref 70–99)
SARS-COV-2 RNA SPEC QL NAA+PROBE: SIGNIFICANT CHANGE UP

## 2021-12-16 PROCEDURE — 99239 HOSP IP/OBS DSCHRG MGMT >30: CPT

## 2021-12-16 RX ORDER — CEFPODOXIME PROXETIL 100 MG
1 TABLET ORAL
Qty: 10 | Refills: 0
Start: 2021-12-16 | End: 2021-12-20

## 2021-12-16 RX ORDER — AZITHROMYCIN 500 MG/1
1 TABLET, FILM COATED ORAL
Qty: 5 | Refills: 0
Start: 2021-12-16 | End: 2021-12-20

## 2021-12-16 RX ADMIN — FINASTERIDE 5 MILLIGRAM(S): 5 TABLET, FILM COATED ORAL at 14:02

## 2021-12-16 RX ADMIN — DORZOLAMIDE HYDROCHLORIDE TIMOLOL MALEATE 1 DROP(S): 20; 5 SOLUTION/ DROPS OPHTHALMIC at 05:07

## 2021-12-16 RX ADMIN — ENOXAPARIN SODIUM 40 MILLIGRAM(S): 100 INJECTION SUBCUTANEOUS at 11:51

## 2021-12-16 RX ADMIN — Medication 1: at 11:50

## 2021-12-16 RX ADMIN — GABAPENTIN 600 MILLIGRAM(S): 400 CAPSULE ORAL at 05:08

## 2021-12-16 RX ADMIN — CLOPIDOGREL BISULFATE 75 MILLIGRAM(S): 75 TABLET, FILM COATED ORAL at 11:51

## 2021-12-16 RX ADMIN — Medication 81 MILLIGRAM(S): at 11:50

## 2021-12-16 RX ADMIN — DULOXETINE HYDROCHLORIDE 30 MILLIGRAM(S): 30 CAPSULE, DELAYED RELEASE ORAL at 11:52

## 2021-12-16 NOTE — DISCHARGE NOTE NURSING/CASE MANAGEMENT/SOCIAL WORK - PATIENT PORTAL LINK FT
You can access the FollowMyHealth Patient Portal offered by [facility] by registering at the following website: [email or web address]. By joining [employer]’s FollowMyHealth portal, you will also be able to view your health information using other applications (apps) compatible with our system.

## 2021-12-16 NOTE — DISCHARGE NOTE NURSING/CASE MANAGEMENT/SOCIAL WORK - NSDCPEFALRISK_GEN_ALL_CORE
For information on Fall & Injury Prevention, visit: [email or web address] OR  [email or web address] OR  https://www.cdc.gov/steadi/patient.html

## 2021-12-16 NOTE — CHART NOTE - NSCHARTNOTEFT_GEN_A_CORE
88 y o M with PMH of HLD, DM, CKD, BPH, CVA presented to the hospital for Right sided chest pain, that resolved. CXR showed small developing RLL infiltrate. Pt denies any SOB, cough, Fever. WBC Normal. Will discharge the patient on cefpodoxime and azithromycin. Discussed with the patient to go to the ER or contact his PMD if it worsens. Discussed the plan of care with daughter at the bedside. Both in agreement.

## 2021-12-17 ENCOUNTER — RX RENEWAL (OUTPATIENT)
Age: 86
End: 2021-12-17

## 2021-12-20 ENCOUNTER — APPOINTMENT (OUTPATIENT)
Dept: FAMILY MEDICINE | Facility: CLINIC | Age: 86
End: 2021-12-20
Payer: MEDICARE

## 2021-12-20 VITALS
HEART RATE: 67 BPM | WEIGHT: 185 LBS | SYSTOLIC BLOOD PRESSURE: 120 MMHG | TEMPERATURE: 98.1 F | DIASTOLIC BLOOD PRESSURE: 78 MMHG | BODY MASS INDEX: 27.32 KG/M2 | OXYGEN SATURATION: 98 %

## 2021-12-20 DIAGNOSIS — Z86.73 PERSONAL HISTORY OF TRANSIENT ISCHEMIC ATTACK (TIA), AND CEREBRAL INFARCTION W/OUT RESIDUAL DEFICITS: ICD-10-CM

## 2021-12-20 DIAGNOSIS — J18.9 PNEUMONIA, UNSPECIFIED ORGANISM: ICD-10-CM

## 2021-12-20 PROCEDURE — 99495 TRANSJ CARE MGMT MOD F2F 14D: CPT

## 2021-12-21 ENCOUNTER — NON-APPOINTMENT (OUTPATIENT)
Age: 86
End: 2021-12-21

## 2021-12-22 DIAGNOSIS — H35.30 UNSPECIFIED MACULAR DEGENERATION: ICD-10-CM

## 2021-12-22 DIAGNOSIS — J18.9 PNEUMONIA, UNSPECIFIED ORGANISM: ICD-10-CM

## 2021-12-22 DIAGNOSIS — Z88.0 ALLERGY STATUS TO PENICILLIN: ICD-10-CM

## 2021-12-22 DIAGNOSIS — Z79.84 LONG TERM (CURRENT) USE OF ORAL HYPOGLYCEMIC DRUGS: ICD-10-CM

## 2021-12-22 DIAGNOSIS — R07.89 OTHER CHEST PAIN: ICD-10-CM

## 2021-12-22 DIAGNOSIS — E11.42 TYPE 2 DIABETES MELLITUS WITH DIABETIC POLYNEUROPATHY: ICD-10-CM

## 2021-12-22 DIAGNOSIS — N40.0 BENIGN PROSTATIC HYPERPLASIA WITHOUT LOWER URINARY TRACT SYMPTOMS: ICD-10-CM

## 2021-12-22 DIAGNOSIS — N18.30 CHRONIC KIDNEY DISEASE, STAGE 3 UNSPECIFIED: ICD-10-CM

## 2021-12-22 DIAGNOSIS — E11.22 TYPE 2 DIABETES MELLITUS WITH DIABETIC CHRONIC KIDNEY DISEASE: ICD-10-CM

## 2021-12-22 DIAGNOSIS — Z86.73 PERSONAL HISTORY OF TRANSIENT ISCHEMIC ATTACK (TIA), AND CEREBRAL INFARCTION WITHOUT RESIDUAL DEFICITS: ICD-10-CM

## 2021-12-22 DIAGNOSIS — I45.2 BIFASCICULAR BLOCK: ICD-10-CM

## 2021-12-22 DIAGNOSIS — R79.1 ABNORMAL COAGULATION PROFILE: ICD-10-CM

## 2021-12-22 DIAGNOSIS — Z79.02 LONG TERM (CURRENT) USE OF ANTITHROMBOTICS/ANTIPLATELETS: ICD-10-CM

## 2021-12-22 DIAGNOSIS — I65.29 OCCLUSION AND STENOSIS OF UNSPECIFIED CAROTID ARTERY: ICD-10-CM

## 2022-01-19 ENCOUNTER — APPOINTMENT (OUTPATIENT)
Dept: ELECTROPHYSIOLOGY | Facility: CLINIC | Age: 87
End: 2022-01-19
Payer: MEDICARE

## 2022-01-19 ENCOUNTER — NON-APPOINTMENT (OUTPATIENT)
Age: 87
End: 2022-01-19

## 2022-01-19 PROCEDURE — G2066: CPT

## 2022-01-19 PROCEDURE — 93298 REM INTERROG DEV EVAL SCRMS: CPT

## 2022-01-26 ENCOUNTER — NON-APPOINTMENT (OUTPATIENT)
Age: 87
End: 2022-01-26

## 2022-01-26 ENCOUNTER — APPOINTMENT (OUTPATIENT)
Dept: FAMILY MEDICINE | Facility: CLINIC | Age: 87
End: 2022-01-26
Payer: MEDICARE

## 2022-01-26 VITALS
HEART RATE: 68 BPM | HEIGHT: 69 IN | OXYGEN SATURATION: 98 % | BODY MASS INDEX: 27.7 KG/M2 | SYSTOLIC BLOOD PRESSURE: 110 MMHG | DIASTOLIC BLOOD PRESSURE: 52 MMHG | WEIGHT: 187 LBS

## 2022-01-26 DIAGNOSIS — Z01.818 ENCOUNTER FOR OTHER PREPROCEDURAL EXAMINATION: ICD-10-CM

## 2022-01-26 PROCEDURE — 99214 OFFICE O/P EST MOD 30 MIN: CPT | Mod: 25

## 2022-01-26 PROCEDURE — 93000 ELECTROCARDIOGRAM COMPLETE: CPT

## 2022-01-26 PROCEDURE — 36415 COLL VENOUS BLD VENIPUNCTURE: CPT

## 2022-01-26 RX ORDER — KETOROLAC TROMETHAMINE 10 MG/1
10 TABLET, FILM COATED ORAL 3 TIMES DAILY
Qty: 30 | Refills: 0 | Status: DISCONTINUED | COMMUNITY
Start: 2019-05-29 | End: 2022-01-26

## 2022-01-26 RX ORDER — LIDOCAINE 5% 700 MG/1
5 PATCH TOPICAL
Qty: 1 | Refills: 2 | Status: DISCONTINUED | COMMUNITY
Start: 2019-05-28 | End: 2022-01-26

## 2022-01-26 RX ORDER — AZITHROMYCIN 250 MG/1
250 TABLET, FILM COATED ORAL
Qty: 1 | Refills: 0 | Status: DISCONTINUED | COMMUNITY
Start: 2021-12-20 | End: 2022-01-26

## 2022-01-27 LAB
ANION GAP SERPL CALC-SCNC: 12 MMOL/L
BASOPHILS # BLD AUTO: 0.04 K/UL
BASOPHILS NFR BLD AUTO: 0.6 %
BUN SERPL-MCNC: 37 MG/DL
CALCIUM SERPL-MCNC: 9.8 MG/DL
CHLORIDE SERPL-SCNC: 109 MMOL/L
CO2 SERPL-SCNC: 22 MMOL/L
CREAT SERPL-MCNC: 1.74 MG/DL
EOSINOPHIL # BLD AUTO: 0.39 K/UL
EOSINOPHIL NFR BLD AUTO: 5.8 %
GLUCOSE SERPL-MCNC: 132 MG/DL
HCT VFR BLD CALC: 33.8 %
HGB BLD-MCNC: 10.5 G/DL
IMM GRANULOCYTES NFR BLD AUTO: 0.3 %
LYMPHOCYTES # BLD AUTO: 1.79 K/UL
LYMPHOCYTES NFR BLD AUTO: 26.6 %
MAN DIFF?: NORMAL
MCHC RBC-ENTMCNC: 29.2 PG
MCHC RBC-ENTMCNC: 31.1 GM/DL
MCV RBC AUTO: 94.2 FL
MONOCYTES # BLD AUTO: 0.53 K/UL
MONOCYTES NFR BLD AUTO: 7.9 %
NEUTROPHILS # BLD AUTO: 3.95 K/UL
NEUTROPHILS NFR BLD AUTO: 58.8 %
PLATELET # BLD AUTO: 225 K/UL
POTASSIUM SERPL-SCNC: 5 MMOL/L
RBC # BLD: 3.59 M/UL
RBC # FLD: 13.6 %
SODIUM SERPL-SCNC: 143 MMOL/L
WBC # FLD AUTO: 6.72 K/UL

## 2022-01-28 NOTE — REVIEW OF SYSTEMS
[Negative] : Genitourinary [Headache] : no headache [Dizziness] : no dizziness [FreeTextEntry4] : some congestion

## 2022-01-28 NOTE — HISTORY OF PRESENT ILLNESS
[Chronic Kidney Disease] : chronic kidney disease [Diabetes] : diabetes [No Adverse Anesthesia Reaction] : no adverse anesthesia reaction in self or family member [(Patient denies any chest pain, claudication, dyspnea on exertion, orthopnea, palpitations or syncope)] : Patient denies any chest pain, claudication, dyspnea on exertion, orthopnea, palpitations or syncope [Asthma] : no asthma [COPD] : no COPD [Smoker] : not a smoker [Anti-Platelet Agents: _____] : Anti-Platelet Agents: [unfilled] [FreeTextEntry1] : Ahmed Shunt for Glaucoma [FreeTextEntry2] : 01/31/22 [FreeTextEntry3] : Dr. Angie Rodríguez [FreeTextEntry4] : Taking Vitamin D, Vitamin C\par \par Here with son, . \par Got initial first 2 COVID series.  \par \par Been home from hospital 1 month.  Had 2 recent hospitalizations, one in November and one in December.   Had loop recorder placed in November.    \par Patient reports feeling well today. \par Medications and allergies reviewed.\par \par  [FreeTextEntry7] : ILR\par Loop Recorder [Family Member] : family member

## 2022-01-28 NOTE — ASSESSMENT
[As per surgery] : as per surgery [High Risk Surgery - Intraperitoneal, Intrathoracic or Supringuinal Vascular Procedures] : High Risk Surgery - Intraperitoneal, Intrathoracic or Supringuinal Vascular Procedures - No (0) [Ischemic Heart Disease] : Ischemic Heart Disease - No (0) [Congestive Heart Failure] : Congestive Heart Failure - No (0) [Prior Cerebrovascular Accident or TIA] : Prior Cerebrovascular Accident or TIA - Yes (1) [Creatinine >= 2mg/dL (1 Point)] : Creatinine >= 2mg/dL - No (0) [Insulin-dependent Diabetic (1 Point)] : Insulin-dependent Diabetic - No (0) [1] : 1 , RCRI Class: II, Risk of Post-Op Cardiac Complications: 6.0%, 95% CI for Risk Estimate: 4.9% - 7.4% [Patient Optimized for Surgery] : Patient optimized for surgery [FreeTextEntry4] : Labs reviewed.  Creatinine elevated; spoke with daughter advised to hydrate. \par \par EKG no change from hospital; stable. \par Patient was counseled to stop any OTC Advil/Aleve and any supplements 7 days prior to surgery and was advised to have nothing by mouth from 11 pm the night prior to surgery.\par \par Spoke with surgeon Dr. Rodríguez regarding labs.  Procedure expected to last ~45 minutes.  Okay to proceed.\par Patient will follow-up with us after procedure.

## 2022-01-28 NOTE — PHYSICAL EXAM
[Normal Oropharynx] : the oropharynx was normal [No Edema] : there was no peripheral edema [No Extremity Clubbing/Cyanosis] : no extremity clubbing/cyanosis [Normal] : soft, non-tender, non-distended, no masses palpated, no HSM and normal bowel sounds [de-identified] : hard of hearing [de-identified] : poor historian

## 2022-01-28 NOTE — PLAN
[FreeTextEntry1] : Labs reviewed.  Creatinine elevated; spoke with daughter advised to hydrate. \par \par EKG no change from hospital; stable. \par Patient was counseled to stop any OTC Advil/Aleve and any supplements 7 days prior to surgery and was advised to have nothing by mouth from 11 pm the night prior to surgery.\par \par Spoke with surgeon Dr. Rodríguez regarding labs.  Procedure expected to last ~45 minutes.  Okay to proceed.\par Patient will follow-up with us after procedure.

## 2022-02-16 NOTE — ED PROVIDER NOTE - ABNORMAL RHYTHM
Pulse Delay (In Milliseconds): 0 Fluence Units: J/cm2 Pulse Duration (In Milliseconds): 3 Total Pulses: 1 Fluence: 25 Post-Care Instructions: I reviewed with the patient in detail post-care instructions. Patient should stay away from the sun and wear sun protection until treated areas are fully healed. Total Pulses: 6 Consent: Written consent obtained, risks reviewed including but not limited to crusting, scabbing, blistering, scarring, darker or lighter pigmentary change, bruising, and/or incomplete response. Fluence: 22 Anesthesia Type: 1% lidocaine with epinephrine Location: upper cutaneous lip Depth: deep Detail Level: Zone Treated Area: small area Skin Type (Optional): II Fluence: 15 Number Of Passes: 2 Location: right cheek Total Pulses: 4 sinus bradycardia

## 2022-02-23 ENCOUNTER — NON-APPOINTMENT (OUTPATIENT)
Age: 87
End: 2022-02-23

## 2022-02-23 ENCOUNTER — APPOINTMENT (OUTPATIENT)
Dept: ELECTROPHYSIOLOGY | Facility: CLINIC | Age: 87
End: 2022-02-23
Payer: MEDICARE

## 2022-02-23 PROCEDURE — G2066: CPT

## 2022-02-23 PROCEDURE — 93298 REM INTERROG DEV EVAL SCRMS: CPT

## 2022-02-28 ENCOUNTER — RX RENEWAL (OUTPATIENT)
Age: 87
End: 2022-02-28

## 2022-03-11 ENCOUNTER — RX RENEWAL (OUTPATIENT)
Age: 87
End: 2022-03-11

## 2022-03-29 ENCOUNTER — RX RENEWAL (OUTPATIENT)
Age: 87
End: 2022-03-29

## 2022-03-30 ENCOUNTER — NON-APPOINTMENT (OUTPATIENT)
Age: 87
End: 2022-03-30

## 2022-03-30 ENCOUNTER — APPOINTMENT (OUTPATIENT)
Dept: ELECTROPHYSIOLOGY | Facility: CLINIC | Age: 87
End: 2022-03-30
Payer: MEDICARE

## 2022-03-30 PROCEDURE — 93298 REM INTERROG DEV EVAL SCRMS: CPT

## 2022-03-30 PROCEDURE — G2066: CPT

## 2022-04-25 ENCOUNTER — INPATIENT (INPATIENT)
Facility: HOSPITAL | Age: 87
LOS: 3 days | Discharge: HOME | End: 2022-04-29
Attending: GENERAL ACUTE CARE HOSPITAL | Admitting: GENERAL ACUTE CARE HOSPITAL
Payer: MEDICARE

## 2022-04-25 ENCOUNTER — NON-APPOINTMENT (OUTPATIENT)
Age: 87
End: 2022-04-25

## 2022-04-25 ENCOUNTER — APPOINTMENT (OUTPATIENT)
Dept: FAMILY MEDICINE | Facility: CLINIC | Age: 87
End: 2022-04-25

## 2022-04-25 VITALS
WEIGHT: 179.9 LBS | HEART RATE: 96 BPM | HEIGHT: 69 IN | RESPIRATION RATE: 22 BRPM | OXYGEN SATURATION: 95 % | TEMPERATURE: 99 F | DIASTOLIC BLOOD PRESSURE: 65 MMHG | SYSTOLIC BLOOD PRESSURE: 92 MMHG

## 2022-04-25 DIAGNOSIS — Z98.890 OTHER SPECIFIED POSTPROCEDURAL STATES: Chronic | ICD-10-CM

## 2022-04-25 LAB
ALBUMIN SERPL ELPH-MCNC: 3.3 G/DL — SIGNIFICANT CHANGE UP (ref 3.3–5)
ALP SERPL-CCNC: 87 U/L — SIGNIFICANT CHANGE UP (ref 40–120)
ALT FLD-CCNC: 22 U/L — SIGNIFICANT CHANGE UP (ref 12–78)
ANION GAP SERPL CALC-SCNC: 9 MMOL/L — SIGNIFICANT CHANGE UP (ref 5–17)
APTT BLD: 27 SEC — LOW (ref 27.5–35.5)
AST SERPL-CCNC: 12 U/L — LOW (ref 15–37)
BASOPHILS # BLD AUTO: 0.02 K/UL — SIGNIFICANT CHANGE UP (ref 0–0.2)
BASOPHILS NFR BLD AUTO: 0.4 % — SIGNIFICANT CHANGE UP (ref 0–2)
BILIRUB SERPL-MCNC: 0.5 MG/DL — SIGNIFICANT CHANGE UP (ref 0.2–1.2)
BUN SERPL-MCNC: 38 MG/DL — HIGH (ref 7–23)
CALCIUM SERPL-MCNC: 9.1 MG/DL — SIGNIFICANT CHANGE UP (ref 8.5–10.1)
CHLORIDE SERPL-SCNC: 104 MMOL/L — SIGNIFICANT CHANGE UP (ref 96–108)
CO2 SERPL-SCNC: 23 MMOL/L — SIGNIFICANT CHANGE UP (ref 22–31)
CREAT SERPL-MCNC: 1.96 MG/DL — HIGH (ref 0.5–1.3)
D DIMER BLD IA.RAPID-MCNC: 619 NG/ML DDU — HIGH
EGFR: 32 ML/MIN/1.73M2 — LOW
EOSINOPHIL # BLD AUTO: 0.33 K/UL — SIGNIFICANT CHANGE UP (ref 0–0.5)
EOSINOPHIL NFR BLD AUTO: 6 % — SIGNIFICANT CHANGE UP (ref 0–6)
FLUAV AG NPH QL: SIGNIFICANT CHANGE UP
FLUBV AG NPH QL: SIGNIFICANT CHANGE UP
GLUCOSE BLDC GLUCOMTR-MCNC: 239 MG/DL — HIGH (ref 70–99)
GLUCOSE SERPL-MCNC: 166 MG/DL — HIGH (ref 70–99)
HCT VFR BLD CALC: 30.3 % — LOW (ref 39–50)
HGB BLD-MCNC: 10 G/DL — LOW (ref 13–17)
IMM GRANULOCYTES NFR BLD AUTO: 0.5 % — SIGNIFICANT CHANGE UP (ref 0–1.5)
INR BLD: 1.1 RATIO — SIGNIFICANT CHANGE UP (ref 0.88–1.16)
LYMPHOCYTES # BLD AUTO: 0.77 K/UL — LOW (ref 1–3.3)
LYMPHOCYTES # BLD AUTO: 14.1 % — SIGNIFICANT CHANGE UP (ref 13–44)
MCHC RBC-ENTMCNC: 29.6 PG — SIGNIFICANT CHANGE UP (ref 27–34)
MCHC RBC-ENTMCNC: 33 G/DL — SIGNIFICANT CHANGE UP (ref 32–36)
MCV RBC AUTO: 89.6 FL — SIGNIFICANT CHANGE UP (ref 80–100)
MONOCYTES # BLD AUTO: 0.78 K/UL — SIGNIFICANT CHANGE UP (ref 0–0.9)
MONOCYTES NFR BLD AUTO: 14.3 % — HIGH (ref 2–14)
NEUTROPHILS # BLD AUTO: 3.53 K/UL — SIGNIFICANT CHANGE UP (ref 1.8–7.4)
NEUTROPHILS NFR BLD AUTO: 64.7 % — SIGNIFICANT CHANGE UP (ref 43–77)
NRBC # BLD: 0 /100 WBCS — SIGNIFICANT CHANGE UP (ref 0–0)
NT-PROBNP SERPL-SCNC: 262 PG/ML — SIGNIFICANT CHANGE UP (ref 0–450)
PLATELET # BLD AUTO: 205 K/UL — SIGNIFICANT CHANGE UP (ref 150–400)
POTASSIUM SERPL-MCNC: 4.5 MMOL/L — SIGNIFICANT CHANGE UP (ref 3.5–5.3)
POTASSIUM SERPL-SCNC: 4.5 MMOL/L — SIGNIFICANT CHANGE UP (ref 3.5–5.3)
PROT SERPL-MCNC: 7.7 GM/DL — SIGNIFICANT CHANGE UP (ref 6–8.3)
PROTHROM AB SERPL-ACNC: 13.2 SEC — SIGNIFICANT CHANGE UP (ref 10.5–13.4)
RBC # BLD: 3.38 M/UL — LOW (ref 4.2–5.8)
RBC # FLD: 13.7 % — SIGNIFICANT CHANGE UP (ref 10.3–14.5)
SARS-COV-2 RNA SPEC QL NAA+PROBE: DETECTED
SODIUM SERPL-SCNC: 136 MMOL/L — SIGNIFICANT CHANGE UP (ref 135–145)
TROPONIN I, HIGH SENSITIVITY RESULT: 6.3 NG/L — SIGNIFICANT CHANGE UP
WBC # BLD: 5.46 K/UL — SIGNIFICANT CHANGE UP (ref 3.8–10.5)
WBC # FLD AUTO: 5.46 K/UL — SIGNIFICANT CHANGE UP (ref 3.8–10.5)

## 2022-04-25 PROCEDURE — 71045 X-RAY EXAM CHEST 1 VIEW: CPT | Mod: 26

## 2022-04-25 PROCEDURE — 99285 EMERGENCY DEPT VISIT HI MDM: CPT

## 2022-04-25 PROCEDURE — 99223 1ST HOSP IP/OBS HIGH 75: CPT

## 2022-04-25 PROCEDURE — 93010 ELECTROCARDIOGRAM REPORT: CPT

## 2022-04-25 RX ORDER — DEXTROSE 50 % IN WATER 50 %
25 SYRINGE (ML) INTRAVENOUS ONCE
Refills: 0 | Status: DISCONTINUED | OUTPATIENT
Start: 2022-04-25 | End: 2022-04-29

## 2022-04-25 RX ORDER — ASPIRIN/CALCIUM CARB/MAGNESIUM 324 MG
81 TABLET ORAL DAILY
Refills: 0 | Status: DISCONTINUED | OUTPATIENT
Start: 2022-04-25 | End: 2022-04-28

## 2022-04-25 RX ORDER — DORZOLAMIDE HYDROCHLORIDE TIMOLOL MALEATE 20; 5 MG/ML; MG/ML
1 SOLUTION/ DROPS OPHTHALMIC
Refills: 0 | Status: DISCONTINUED | OUTPATIENT
Start: 2022-04-25 | End: 2022-04-29

## 2022-04-25 RX ORDER — DEXAMETHASONE 0.5 MG/5ML
6 ELIXIR ORAL ONCE
Refills: 0 | Status: COMPLETED | OUTPATIENT
Start: 2022-04-25 | End: 2022-04-25

## 2022-04-25 RX ORDER — DEXTROSE 50 % IN WATER 50 %
12.5 SYRINGE (ML) INTRAVENOUS ONCE
Refills: 0 | Status: DISCONTINUED | OUTPATIENT
Start: 2022-04-25 | End: 2022-04-29

## 2022-04-25 RX ORDER — SODIUM CHLORIDE 9 MG/ML
1000 INJECTION, SOLUTION INTRAVENOUS
Refills: 0 | Status: DISCONTINUED | OUTPATIENT
Start: 2022-04-25 | End: 2022-04-29

## 2022-04-25 RX ORDER — CLOPIDOGREL BISULFATE 75 MG/1
75 TABLET, FILM COATED ORAL DAILY
Refills: 0 | Status: DISCONTINUED | OUTPATIENT
Start: 2022-04-25 | End: 2022-04-25

## 2022-04-25 RX ORDER — ENOXAPARIN SODIUM 100 MG/ML
40 INJECTION SUBCUTANEOUS EVERY 12 HOURS
Refills: 0 | Status: DISCONTINUED | OUTPATIENT
Start: 2022-04-25 | End: 2022-04-28

## 2022-04-25 RX ORDER — DEXAMETHASONE 0.5 MG/5ML
6 ELIXIR ORAL DAILY
Refills: 0 | Status: DISCONTINUED | OUTPATIENT
Start: 2022-04-26 | End: 2022-04-26

## 2022-04-25 RX ORDER — PREDNISOLONE SODIUM PHOSPHATE 1 %
0 DROPS OPHTHALMIC (EYE)
Qty: 0 | Refills: 0 | DISCHARGE

## 2022-04-25 RX ORDER — INSULIN LISPRO 100/ML
VIAL (ML) SUBCUTANEOUS
Refills: 0 | Status: DISCONTINUED | OUTPATIENT
Start: 2022-04-25 | End: 2022-04-29

## 2022-04-25 RX ORDER — ATORVASTATIN CALCIUM 80 MG/1
80 TABLET, FILM COATED ORAL AT BEDTIME
Refills: 0 | Status: DISCONTINUED | OUTPATIENT
Start: 2022-04-25 | End: 2022-04-29

## 2022-04-25 RX ORDER — LATANOPROST 0.05 MG/ML
1 SOLUTION/ DROPS OPHTHALMIC; TOPICAL AT BEDTIME
Refills: 0 | Status: DISCONTINUED | OUTPATIENT
Start: 2022-04-25 | End: 2022-04-29

## 2022-04-25 RX ORDER — ASPIRIN/CALCIUM CARB/MAGNESIUM 324 MG
324 TABLET ORAL ONCE
Refills: 0 | Status: COMPLETED | OUTPATIENT
Start: 2022-04-25 | End: 2022-04-25

## 2022-04-25 RX ORDER — GLUCAGON INJECTION, SOLUTION 0.5 MG/.1ML
1 INJECTION, SOLUTION SUBCUTANEOUS ONCE
Refills: 0 | Status: DISCONTINUED | OUTPATIENT
Start: 2022-04-25 | End: 2022-04-29

## 2022-04-25 RX ORDER — ACETAMINOPHEN 500 MG
650 TABLET ORAL EVERY 6 HOURS
Refills: 0 | Status: DISCONTINUED | OUTPATIENT
Start: 2022-04-25 | End: 2022-04-27

## 2022-04-25 RX ORDER — DEXTROSE 50 % IN WATER 50 %
15 SYRINGE (ML) INTRAVENOUS ONCE
Refills: 0 | Status: DISCONTINUED | OUTPATIENT
Start: 2022-04-25 | End: 2022-04-29

## 2022-04-25 RX ORDER — FUROSEMIDE 40 MG
20 TABLET ORAL ONCE
Refills: 0 | Status: COMPLETED | OUTPATIENT
Start: 2022-04-25 | End: 2022-04-25

## 2022-04-25 RX ORDER — FINASTERIDE 5 MG/1
5 TABLET, FILM COATED ORAL DAILY
Refills: 0 | Status: DISCONTINUED | OUTPATIENT
Start: 2022-04-25 | End: 2022-04-29

## 2022-04-25 RX ORDER — INSULIN LISPRO 100/ML
VIAL (ML) SUBCUTANEOUS AT BEDTIME
Refills: 0 | Status: DISCONTINUED | OUTPATIENT
Start: 2022-04-25 | End: 2022-04-27

## 2022-04-25 RX ORDER — DULOXETINE HYDROCHLORIDE 30 MG/1
30 CAPSULE, DELAYED RELEASE ORAL DAILY
Refills: 0 | Status: DISCONTINUED | OUTPATIENT
Start: 2022-04-25 | End: 2022-04-29

## 2022-04-25 RX ORDER — TAMSULOSIN HYDROCHLORIDE 0.4 MG/1
0.4 CAPSULE ORAL AT BEDTIME
Refills: 0 | Status: DISCONTINUED | OUTPATIENT
Start: 2022-04-25 | End: 2022-04-29

## 2022-04-25 RX ADMIN — Medication 20 MILLIGRAM(S): at 16:55

## 2022-04-25 RX ADMIN — Medication 324 MILLIGRAM(S): at 16:55

## 2022-04-25 RX ADMIN — Medication 6 MILLIGRAM(S): at 18:40

## 2022-04-25 NOTE — H&P ADULT - HISTORY OF PRESENT ILLNESS
Patient is poor historian likely due to underlying dementia, though delirium not withstanding.    88M with PMHx of T2DM, CKD3, BPH, CVA November 2021 suspect at [facility]) and dementia (also documented & suspected at [facility]) presenting with one day history of SOB and weakness for one day. Patient is poor historian and family unavailable for collateral. Patient only tells me he is here because he needs to be in the hospital. Apparently had SOB with exertion and chest discomfort, though mostly self-limiting. In the ED on ambulation patient desaturates to 89%. Initially given Lasix due to possible cardiac etiology (though TTE in past year grossly unremarkable). Patient eventually found to have COVID-19 and given Decadron. Patient appears to have received Pfizer COVID-19 vaccine x2, but booster status unknown. CXR performed in the ED showing basilar interstitial prominence. Patient without complaints right now.

## 2022-04-25 NOTE — ED ADULT NURSE NOTE - OBJECTIVE STATEMENT
Pt sent to ED by his pmd c/o SOB and weakness since yesterday a/w intermittent CP. Pt denies palpitations, N/V/D, fever/chills. dizziness, HA, blurry vision, numbness/tingling. Cardiac and spo2 monitoring in place. Spo2 94% on room air.

## 2022-04-25 NOTE — ED PROVIDER NOTE - NS ED MD TWO NIGHTS YN
Hypothyroidism, unspecified type Hypothyroidism, unspecified type Yes Hypothyroidism, unspecified type

## 2022-04-25 NOTE — ED PROVIDER NOTE - PHYSICAL EXAMINATION
General: Awake, alert and oriented. Well developed, hydrated and nourished. Appears stated age.   Skin: Skin in warm, dry and intact without rashes or lesions. Appropriate color for ethnicity  HENMT: head normocephalic and atraumatic; bilateral external ears without swelling. no nasal discharge. moist oral mucosa. supple neck, trachea midline  EYES: Conjunctiva clear. nonicteric sclera. EOM intact, Eyelids are normal in appearance without swelling or lesions.  Cardiac: well perfused, s1, s2, rrr  Respiratory: breathing comfortably on room air. crackles at left base.   Abdominal: nondistended  MSK: Neck and back are without deformity, visible external skin changes, or signs of trauma. Curvature of the cervical, thoracic, and lumbar spine are within normal limits. no external signs of trauma. no apparent deficits in ROM of any extremity  Neurological: The patient is awake, alert and oriented to person, place, and time with normal speech. CN 2-12 grossly intact. no apparent deficits. Memory is normal and thought process is intact. No gait abnormalities are appreciated.   Psychiatric: Appropriate mood and affect. Good judgement and insight. No visual or auditory hallucinations.

## 2022-04-25 NOTE — ED PROVIDER NOTE - CLINICAL SUMMARY MEDICAL DECISION MAKING FREE TEXT BOX
bifascicular block present on prior EKGs, recent VQ study low probability PE, recent echo (december) bifascicular block present on prior EKGs, recent VQ study low probability PE, recent echo in november without remarkable findings. patient with left sided crackles indicative of likely pulmonary edema. d-dimer elevated, considering ALISON and recent low probability V/Q will not obtain CT angio. will amdinsiter lasix and admit for continued treatment and echocardiogram

## 2022-04-25 NOTE — H&P ADULT - NSHPPHYSICALEXAM_GEN_ALL_CORE
T(C): 36.5 (04-25-22 @ 15:59), Max: 37 (04-25-22 @ 13:51)  HR: 80 (04-25-22 @ 18:17) (80 - 105)  BP: 98/41 (04-25-22 @ 18:30) (92/65 - 120/52)  RR: 18 (04-25-22 @ 15:59) (18 - 22)  SpO2: 89% (04-25-22 @ 16:27) (89% - 95%)    GEN: male in NAD, appears comfortable, no diaphoresis  EYES: No scleral injection, PERRL, EOMI  ENTM: neck supple & symmetric without tracheal deviation, moist membranes, no gross hearing impairment, thyroid gland not enlarged  CV: +S1/S2, no m/r/g, no abdominal bruit, no LE edema  RESP: breathing comfortably, no respiratory accessory muscle use, +crackles at right base  GI: normoactive BS, soft, NTND, no rebounding/guarding, no palpable masses  LYMPHATICS: no LAD or tenderness to palpation  NEURO: AOx3, no focal deficits, CNII-XII grossly intact  PSYCH: No SI/HI/AVH, appropriate affect, appropriate insight/judgment   SKIN: no petechiae, ecchymosis or maculopapular rash noted

## 2022-04-25 NOTE — PATIENT PROFILE ADULT - FALL HARM RISK - HARM RISK INTERVENTIONS
Assistance with ambulation/Assistance OOB with selected safe patient handling equipment/Communicate Risk of Fall with Harm to all staff/Discuss with provider need for PT consult/Monitor gait and stability/Provide patient with walking aids - walker, cane, crutches/Reinforce activity limits and safety measures with patient and family/Tailored Fall Risk Interventions/Use of alarms - bed, chair and/or voice tab/Visual Cue: Yellow wristband and red socks/Bed in lowest position, wheels locked, appropriate side rails in place/Call bell, personal items and telephone in reach/Instruct patient to call for assistance before getting out of bed or chair/Non-slip footwear when patient is out of bed/[place] to call system/Physically safe environment - no spills, clutter or unnecessary equipment/Purposeful Proactive Rounding/Room/bathroom lighting operational, light cord in reach

## 2022-04-25 NOTE — ED ADULT NURSE NOTE - NSIMPLEMENTINTERV_GEN_ALL_ED
Implemented All Universal Safety Interventions:  [place] to call system. Call bell, personal items and telephone within reach. Instruct patient to call for assistance. Room bathroom lighting operational. Non-slip footwear when patient is off stretcher. Physically safe environment: no spills, clutter or unnecessary equipment. Stretcher in lowest position, wheels locked, appropriate side rails in place.

## 2022-04-25 NOTE — H&P ADULT - ASSESSMENT
88M with PMHx of T2DM, CKD3, BPH, CVA November 2021 suspect at [facility]) and dementia (also documented & suspected at [facility]) presenting with one day history of SOB and weakness for one day. Patient found to be COVID-19 positive. Patient with acute hypoxic respiratory failure secondary to COVID-19.     #AHRF 2/2 to COVID-19  -Start Decadron 6 mg daily for up to 10 days  -Given DDimer >2 ULN in non-critical patient and given CrCl 29 will do Lovenox 40 mg BID  -Send procalcitonin  -Symptomatic treatment  -Oxygen to maintain saturation  -Given borderline renal function will defer Remdesivir for now    #ALISON-on-CKD3  -Etiology not known, will monitor and renally dose medications for now, if does not improve will send UA and urine electrolytes    #BPH  -Continue with Flomax & Finasteride    #T2DM  -Hold home Metformin  -FS TID AC & insulin sliding scale    #Prior CVA  -Continue with high dose statin and baby aspirin    PT consult

## 2022-04-25 NOTE — ED PROVIDER NOTE - OBJECTIVE STATEMENT
88m pmhx HLD, DM2, CKD 3, BPH presenting with sob and weakness since yesterday. had chest pain at home this morning. pain is now resolved. no leg swelling or pain.

## 2022-04-26 LAB
A1C WITH ESTIMATED AVERAGE GLUCOSE RESULT: 7.6 % — HIGH (ref 4–5.6)
ALBUMIN SERPL ELPH-MCNC: 3.2 G/DL — LOW (ref 3.3–5)
ALP SERPL-CCNC: 81 U/L — SIGNIFICANT CHANGE UP (ref 40–120)
ALT FLD-CCNC: 20 U/L — SIGNIFICANT CHANGE UP (ref 12–78)
ANION GAP SERPL CALC-SCNC: 8 MMOL/L — SIGNIFICANT CHANGE UP (ref 5–17)
AST SERPL-CCNC: 11 U/L — LOW (ref 15–37)
BILIRUB SERPL-MCNC: 0.4 MG/DL — SIGNIFICANT CHANGE UP (ref 0.2–1.2)
BUN SERPL-MCNC: 47 MG/DL — HIGH (ref 7–23)
CALCIUM SERPL-MCNC: 9.2 MG/DL — SIGNIFICANT CHANGE UP (ref 8.5–10.1)
CHLORIDE SERPL-SCNC: 102 MMOL/L — SIGNIFICANT CHANGE UP (ref 96–108)
CO2 SERPL-SCNC: 23 MMOL/L — SIGNIFICANT CHANGE UP (ref 22–31)
CREAT SERPL-MCNC: 2.11 MG/DL — HIGH (ref 0.5–1.3)
EGFR: 30 ML/MIN/1.73M2 — LOW
ESTIMATED AVERAGE GLUCOSE: 171 MG/DL — HIGH (ref 68–114)
GLUCOSE BLDC GLUCOMTR-MCNC: 290 MG/DL — HIGH (ref 70–99)
GLUCOSE BLDC GLUCOMTR-MCNC: 304 MG/DL — HIGH (ref 70–99)
GLUCOSE BLDC GLUCOMTR-MCNC: 323 MG/DL — HIGH (ref 70–99)
GLUCOSE BLDC GLUCOMTR-MCNC: 349 MG/DL — HIGH (ref 70–99)
GLUCOSE SERPL-MCNC: 297 MG/DL — HIGH (ref 70–99)
HCT VFR BLD CALC: 29.4 % — LOW (ref 39–50)
HGB BLD-MCNC: 9.7 G/DL — LOW (ref 13–17)
MCHC RBC-ENTMCNC: 29 PG — SIGNIFICANT CHANGE UP (ref 27–34)
MCHC RBC-ENTMCNC: 33 G/DL — SIGNIFICANT CHANGE UP (ref 32–36)
MCV RBC AUTO: 88 FL — SIGNIFICANT CHANGE UP (ref 80–100)
NRBC # BLD: 0 /100 WBCS — SIGNIFICANT CHANGE UP (ref 0–0)
PLATELET # BLD AUTO: 223 K/UL — SIGNIFICANT CHANGE UP (ref 150–400)
POTASSIUM SERPL-MCNC: 4.1 MMOL/L — SIGNIFICANT CHANGE UP (ref 3.5–5.3)
POTASSIUM SERPL-SCNC: 4.1 MMOL/L — SIGNIFICANT CHANGE UP (ref 3.5–5.3)
PROCALCITONIN SERPL-MCNC: 0.11 NG/ML — HIGH (ref 0.02–0.1)
PROT SERPL-MCNC: 7.6 GM/DL — SIGNIFICANT CHANGE UP (ref 6–8.3)
RBC # BLD: 3.34 M/UL — LOW (ref 4.2–5.8)
RBC # FLD: 13.5 % — SIGNIFICANT CHANGE UP (ref 10.3–14.5)
SODIUM SERPL-SCNC: 133 MMOL/L — LOW (ref 135–145)
WBC # BLD: 4.24 K/UL — SIGNIFICANT CHANGE UP (ref 3.8–10.5)
WBC # FLD AUTO: 4.24 K/UL — SIGNIFICANT CHANGE UP (ref 3.8–10.5)

## 2022-04-26 PROCEDURE — 99222 1ST HOSP IP/OBS MODERATE 55: CPT

## 2022-04-26 PROCEDURE — 99497 ADVNCD CARE PLAN 30 MIN: CPT | Mod: 25

## 2022-04-26 PROCEDURE — 99233 SBSQ HOSP IP/OBS HIGH 50: CPT

## 2022-04-26 RX ORDER — INSULIN LISPRO 100/ML
2 VIAL (ML) SUBCUTANEOUS
Refills: 0 | Status: DISCONTINUED | OUTPATIENT
Start: 2022-04-26 | End: 2022-04-27

## 2022-04-26 RX ORDER — INSULIN GLARGINE 100 [IU]/ML
7 INJECTION, SOLUTION SUBCUTANEOUS AT BEDTIME
Refills: 0 | Status: DISCONTINUED | OUTPATIENT
Start: 2022-04-26 | End: 2022-04-29

## 2022-04-26 RX ORDER — SODIUM CHLORIDE 9 MG/ML
1000 INJECTION INTRAMUSCULAR; INTRAVENOUS; SUBCUTANEOUS
Refills: 0 | Status: DISCONTINUED | OUTPATIENT
Start: 2022-04-26 | End: 2022-04-27

## 2022-04-26 RX ADMIN — Medication 3: at 16:41

## 2022-04-26 RX ADMIN — DULOXETINE HYDROCHLORIDE 30 MILLIGRAM(S): 30 CAPSULE, DELAYED RELEASE ORAL at 11:23

## 2022-04-26 RX ADMIN — ENOXAPARIN SODIUM 40 MILLIGRAM(S): 100 INJECTION SUBCUTANEOUS at 06:10

## 2022-04-26 RX ADMIN — TAMSULOSIN HYDROCHLORIDE 0.4 MILLIGRAM(S): 0.4 CAPSULE ORAL at 22:25

## 2022-04-26 RX ADMIN — FINASTERIDE 5 MILLIGRAM(S): 5 TABLET, FILM COATED ORAL at 11:22

## 2022-04-26 RX ADMIN — Medication 4: at 08:11

## 2022-04-26 RX ADMIN — Medication 2 UNIT(S): at 16:41

## 2022-04-26 RX ADMIN — ATORVASTATIN CALCIUM 80 MILLIGRAM(S): 80 TABLET, FILM COATED ORAL at 00:07

## 2022-04-26 RX ADMIN — Medication 2 UNIT(S): at 11:24

## 2022-04-26 RX ADMIN — Medication 81 MILLIGRAM(S): at 11:23

## 2022-04-26 RX ADMIN — SODIUM CHLORIDE 75 MILLILITER(S): 9 INJECTION INTRAMUSCULAR; INTRAVENOUS; SUBCUTANEOUS at 11:20

## 2022-04-26 RX ADMIN — DORZOLAMIDE HYDROCHLORIDE TIMOLOL MALEATE 1 DROP(S): 20; 5 SOLUTION/ DROPS OPHTHALMIC at 16:40

## 2022-04-26 RX ADMIN — Medication 6 MILLIGRAM(S): at 06:10

## 2022-04-26 RX ADMIN — INSULIN GLARGINE 7 UNIT(S): 100 INJECTION, SOLUTION SUBCUTANEOUS at 22:26

## 2022-04-26 RX ADMIN — DORZOLAMIDE HYDROCHLORIDE TIMOLOL MALEATE 1 DROP(S): 20; 5 SOLUTION/ DROPS OPHTHALMIC at 06:10

## 2022-04-26 RX ADMIN — LATANOPROST 1 DROP(S): 0.05 SOLUTION/ DROPS OPHTHALMIC; TOPICAL at 22:26

## 2022-04-26 RX ADMIN — ENOXAPARIN SODIUM 40 MILLIGRAM(S): 100 INJECTION SUBCUTANEOUS at 16:39

## 2022-04-26 RX ADMIN — Medication 4: at 11:23

## 2022-04-26 RX ADMIN — LATANOPROST 1 DROP(S): 0.05 SOLUTION/ DROPS OPHTHALMIC; TOPICAL at 00:13

## 2022-04-26 RX ADMIN — TAMSULOSIN HYDROCHLORIDE 0.4 MILLIGRAM(S): 0.4 CAPSULE ORAL at 00:06

## 2022-04-26 RX ADMIN — ATORVASTATIN CALCIUM 80 MILLIGRAM(S): 80 TABLET, FILM COATED ORAL at 22:25

## 2022-04-26 NOTE — CONSULT NOTE ADULT - ASSESSMENT
covid  chest discomfort   initially thought to be hypoxic but has been on room air saturating well       #COVID  Monitor clinically.  Monitor Oxygenation  O2 supplementation only if needed and meets criteria   (not needed now) Remdesivir - up to 5 days depending on clinical course despite borderline eGFR (discussed R:B with daughter Regina and patient and are in agreement )   Monitor CBC, CMP daily  Ferritin, CRP, and D dimer q 48 hrs.  IV Dexamethasone 6mg q24hrs x10 days if hypoxia.  Anticoagulation per protocol.  Treatment options are limited-this as well as limitations of data discussed with pt.  Monitor for any bacterial superinfection/complications.  This tx plan was formulated utilizing my clinical judgement, currently available local/regional anecdotal information, organizational treatment recommendations with COVID-19 specific considerations given rapidly changing information available.     #Chest discomfort  low suspicion for ACS and likely related to covid  monitor for recurrent episodes   troponin 6.3     Discussed with Dr. Syl Harris, DO  Infectious Disease Attending  Reachable via Microsoft Teams or ID office: [phone]  After 5pm/weekends please call [phone] for all inquiries and new consults

## 2022-04-26 NOTE — CONSULT NOTE ADULT - SUBJECTIVE AND OBJECTIVE BOX
EREN BARRAGAN  MRN-[id number]        Patient is a 88y old  Male who presents with a chief complaint of SOB/Weakness (25 Apr 2022 19:08)      HPI:  Patient is poor historian likely due to underlying dementia, though delirium not withstanding.    88M with PMHx of T2DM, CKD3, BPH, CVA November 2021 suspect at [facility]) and dementia (also documented & suspected at [facility]) presenting with one day history of SOB and weakness for one day. Patient is poor historian and family unavailable for collateral. Patient only tells me he is here because he needs to be in the hospital. Apparently had SOB with exertion and chest discomfort, though mostly self-limiting. In the ED on ambulation patient desaturates to 89%. Initially given Lasix due to possible cardiac etiology (though TTE in past year grossly unremarkable). Patient eventually found to have COVID-19 and given Decadron. Patient appears to have received Pfizer COVID-19 vaccine x2, but booster status unknown. CXR performed in the ED showing basilar interstitial prominence. Patient without complaints right now. (25 Apr 2022 19:08)      ID consulted for workup and antibiotic management     PAST MEDICAL & SURGICAL HISTORY:  DM (diabetes mellitus)    Diabetic neuropathy    BPH (benign prostatic hyperplasia)    Macular degeneration    CVA (cerebrovascular accident)    H/O laminectomy  in his 60s        Allergies  penicillins (Unknown)        ANTIMICROBIALS:      MEDICATIONS  (STANDING):        OTHER MEDS: MEDICATIONS  (STANDING):  acetaminophen     Tablet .. 650 every 6 hours PRN  aspirin  chewable 81 daily  atorvastatin 80 at bedtime  dextrose 50% Injectable 25 once  dextrose 50% Injectable 12.5 once  dextrose 50% Injectable 25 once  dextrose Oral Gel 15 once PRN  DULoxetine 30 daily  enoxaparin Injectable 40 every 12 hours  finasteride 5 daily  glucagon  Injectable 1 once  insulin glargine Injectable (LANTUS) 7 at bedtime  insulin lispro (ADMELOG) corrective regimen sliding scale  three times a day before meals  insulin lispro (ADMELOG) corrective regimen sliding scale  at bedtime  insulin lispro Injectable (ADMELOG) 2 three times a day before meals  tamsulosin 0.4 at bedtime      SOCIAL HISTORY:       FAMILY HISTORY:  FH: type 2 diabetes        REVIEW OF SYSTEMS  [  ] ROS unobtainable because:    [ X ] All other systems negative except as noted below:	    Constitutional:  [ ] fever [ ] chills  [ ] weight loss  [ ] weakness  Skin:  [ ] rash [ ] phlebitis	  Eyes: [ ] icterus [ ] pain  [ ] discharge	  ENMT: [ ] sore throat  [ ] thrush [ ] ulcers [ ] exudates  Respiratory: [ ] dyspnea [ ] hemoptysis [ X] cough [ ] sputum	  Cardiovascular:  [X ] chest pain [ ] palpitations [ ] edema	  Gastrointestinal:  [ ] nausea [ ] vomiting [ ] diarrhea [ ] constipation [ ] pain	  Genitourinary:  [ ] dysuria [ ] frequency [ ] hematuria [ ] discharge [ ] flank pain  [ ] incontinence  Musculoskeletal:  [ ] myalgias [ ] arthralgias [ ] arthritis  [ ] back pain  Neurological:  [ ] headache [ ] seizures  [ ] confusion/altered mental status  Psychiatric:  [ ] anxiety [ ] depression	  Hematology/Lymphatics:  [ ] lymphadenopathy  Endocrine:  [ ] adrenal [ ] thyroid  Allergic/Immunologic:	 [ ] transplant [ ] seasonal    Vital Signs Last 24 Hrs  T(F): 98.1 (04-26-22 @ 16:57), Max: 98.6 (04-25-22 @ 13:51)    Vital Signs Last 24 Hrs  HR: 76 (04-26-22 @ 16:57) (64 - 76)  BP: 125/67 (04-26-22 @ 16:57) (110/67 - 132/77)  RR: 18 (04-26-22 @ 16:57)  SpO2: 96% (04-26-22 @ 16:57) (96% - 99%)  Wt(kg): --    PHYSICAL EXAM:  Constitutional: non-toxic, no distress  HEAD/EYES: anicteric, no conjunctival injection  ENT:  supple, no thrush  Cardiovascular:   normal S1, S2, no murmur, no edema  Respiratory:  clear BS bilaterally, no wheezes, no rales  GI:  soft, non-tender, normal bowel sounds  :  no jaramillo, no CVA tenderness  Musculoskeletal:  no synovitis, normal ROM  Neurologic: awake and alert, normal strength, no focal findings  Skin:  no rash, no erythema, no phlebitis  Heme/Onc: no lymphadenopathy   Psychiatric:  awake, alert, appropriate mood          WBC Count: 4.24 K/uL (04-26 @ 07:38)  WBC Count: 5.46 K/uL (04-25 @ 15:12)      Auto Neutrophil %: 64.7 % (04-25-22 @ 15:12)  Auto Neutrophil #: 3.53 K/uL (04-25-22 @ 15:12)                            9.7    4.24  )-----------( 223      ( 26 Apr 2022 07:38 )             29.4       04-26    133<L>  |  102  |  47<H>  ----------------------------<  297<H>  4.1   |  23  |  2.11<H>    Ca    9.2      26 Apr 2022 07:38    TPro  7.6  /  Alb  3.2<L>  /  TBili  0.4  /  DBili  x   /  AST  11<L>  /  ALT  20  /  AlkPhos  81  04-26      Creatinine Trend: 2.11<--, 1.96<--            MICROBIOLOGY:  D-Dimer Assay, Quantitative: 619 (04-25)    Procalcitonin, Serum: 0.11 (04-26-22 @ 10:32)    SARS-CoV-2 Result: Detected (04-25-22 @ 15:12)      RADIOLOGY:  < from: Xray Chest 1 View- PORTABLE-Urgent (04.25.22 @ 15:04) >    IMPRESSION:    Basilar interstitial prominence is similar to prior likely chronic. No   definite acute infiltrate. No pleural effusion or pneumothorax. The heart   not accurately evaluated on this projection. Loop recorder and pain   stimulator leads reidentified.    (I personally reviewed)

## 2022-04-26 NOTE — PHYSICAL THERAPY INITIAL EVALUATION ADULT - GROSSLY INTACT, SENSORY
History     Chief Complaint   Patient presents with     Loss of Consciousness     fell hit nose, fell down 2 stairs     HPI  Favio Mccracken is a 44 year old female who is otherwise healthy, presenting to the emergency department via EMS.  Initial discussion was with EMS, and subsequently with discussion with the patient for further history information.  Patient does have history of previous episodes of vertigo.  She works during the overnight hours.  Patient last had something to eat around 4 AM, 17 hours prior to ED arrival.  She had gone to sleep, and subsequently awoke from her normal sleeping pattern around 6 PM.  She was washing dishes, and subsequently began feeling episodes of dizziness, with lightheadedness.  Patient went to go sit on the stairs, where patient was seated on the second stair, and next thing she recalls she was waking up having fallen and hitting her head.  She was experiencing dizziness prior to this episode.  No thunderclap headache, or severe headache.  Patient did vomit on one occasion after the syncopal episode.  Her son had witnessed this, and reported perhaps a very minimal twitch/shaking which had occurred shortly after the syncopal episode.  No difficulty breathing have been noted.  Patient has no prior seizure history.  No daily medication use.  No drug or alcohol use.  No ill contacts.  Has had prior episodes of feeling lightheaded, and dizzy, which typically resolves with sitting down, however it did not resolve on this occasion.  Patient with no current complaints of severe headache.  Has mild amounts of pain of the nasal bridge.  Has continued feelings of tiredness, dizziness, and nausea.  No preceeding chest pains or palpitations.      Allergies:  No Known Allergies    Problem List:    There are no active problems to display for this patient.       Past Medical History:    No past medical history on file.    Past Surgical History:    No past surgical history on file.    Family  "History:    No family history on file.    Social History:  Marital Status:    Social History     Tobacco Use     Smoking status: Not on file   Substance Use Topics     Alcohol use: Not on file     Drug use: Not on file        Medications:         meclizine (ANTIVERT) 12.5 MG tablet          Review of Systems   Constitutional: Negative for fever.   Respiratory: Negative for shortness of breath.    Cardiovascular: Negative for chest pain.   Gastrointestinal: Negative for abdominal pain.   Neurological: Positive for dizziness and syncope.   All other systems reviewed and are negative.      Physical Exam   BP: 115/70  Pulse: 85  Temp: 98.2  F (36.8  C)  Resp: 18  Height: 157.5 cm (5' 2\")  Weight: 69.4 kg (153 lb)  SpO2: 98 %      Physical Exam  /59   Pulse 90   Temp 98.2  F (36.8  C) (Oral)   Resp 24   Ht 1.575 m (5' 2\")   Wt 69.4 kg (153 lb)   LMP 02/03/2021   SpO2 100%   Breastfeeding Unknown   BMI 27.98 kg/m    General: alert, interactive, in mild distress, laying in bed with eyes closed.   Head: Perhaps minimal swelling is noted of the forehead.  Nose: no rhinorrhea or epistaxis.  Does have bruising evident of the superior aspect of the nasal bridge  Ears: no external auditory canal discharge or bleeding.    Eyes: Sclera nonicteric. Conjunctiva noninjected. PERRL, EOMI  Mouth: no tonsillar erythema, edema, or exudate  Neck: supple, no palp LAD  Lungs: CTAB  CV: RRR, S1/S2; peripheral pulses palpable and symmetric  Abdomen: soft, nt, nd, no guarding or rebound. Positive bowel sounds  Extremities: no cyanosis or edema  Skin: no rash or diaphoresis  Neuro:  strength 5/5 in UE and LEs bilaterally, sensation intact to light touch in UE and LEs bilaterally;       ED Course        Procedures          EKG, reviewed by myself shows sinus rhythm.  Rate 91 bpm.  Normal intervals.  Flattened T waves laterally.  No acute ischemic appearing changes.    Critical Care time:  none               Results for orders " placed or performed during the hospital encounter of 02/10/21 (from the past 24 hour(s))   CBC with platelets differential   Result Value Ref Range    WBC 8.4 4.0 - 11.0 10e9/L    RBC Count 5.15 3.8 - 5.2 10e12/L    Hemoglobin 13.6 11.7 - 15.7 g/dL    Hematocrit 42.6 35.0 - 47.0 %    MCV 83 78 - 100 fl    MCH 26.4 (L) 26.5 - 33.0 pg    MCHC 31.9 31.5 - 36.5 g/dL    RDW 12.7 10.0 - 15.0 %    Platelet Count 309 150 - 450 10e9/L    Diff Method Automated Method     % Neutrophils 79.6 %    % Lymphocytes 17.3 %    % Monocytes 1.7 %    % Eosinophils 0.5 %    % Basophils 0.4 %    % Immature Granulocytes 0.5 %    Nucleated RBCs 0 0 /100    Absolute Neutrophil 6.7 1.6 - 8.3 10e9/L    Absolute Lymphocytes 1.5 0.8 - 5.3 10e9/L    Absolute Monocytes 0.1 0.0 - 1.3 10e9/L    Absolute Eosinophils 0.0 0.0 - 0.7 10e9/L    Absolute Basophils 0.0 0.0 - 0.2 10e9/L    Abs Immature Granulocytes 0.0 0 - 0.4 10e9/L    Absolute Nucleated RBC 0.0    Basic metabolic panel   Result Value Ref Range    Sodium 140 133 - 144 mmol/L    Potassium 3.9 3.4 - 5.3 mmol/L    Chloride 109 94 - 109 mmol/L    Carbon Dioxide 23 20 - 32 mmol/L    Anion Gap 8 3 - 14 mmol/L    Glucose 157 (H) 70 - 99 mg/dL    Urea Nitrogen 18 7 - 30 mg/dL    Creatinine 0.63 0.52 - 1.04 mg/dL    GFR Estimate >90 >60 mL/min/[1.73_m2]    GFR Estimate If Black >90 >60 mL/min/[1.73_m2]    Calcium 8.8 8.5 - 10.1 mg/dL   Troponin I   Result Value Ref Range    Troponin I ES <0.015 0.000 - 0.045 ug/L   CT Head w/o Contrast    Narrative    EXAM: CT HEAD W/O CONTRAST  LOCATION: [facility]  DATE/TIME: 2/10/2021 10:24 PM    INDICATION: Dizziness, non-specific  COMPARISON: None.  TECHNIQUE: Routine CT Head without IV contrast. Multiplanar reformats. Dose reduction techniques were used.    FINDINGS:  INTRACRANIAL CONTENTS: No intracranial hemorrhage, extraaxial collection, or mass effect.  No CT evidence of acute infarct. Normal parenchymal attenuation. Normal ventricles and  sulci.     VISUALIZED ORBITS/SINUSES/MASTOIDS: No intraorbital abnormality. No paranasal sinus mucosal disease. Small incidental osteoma in the right ethmoid air cells. No middle ear or mastoid effusion.    BONES/SOFT TISSUES: No acute abnormality.      Impression    IMPRESSION:  1.  Normal head CT.   CTA Head Neck with Contrast    Narrative    EXAM: CTA  HEAD NECK WITH CONTRAST  LOCATION: [facility]  DATE/TIME: 2/10/2021 10:24 PM    INDICATION: Dizziness, persistent/recurrent, cardiac or vascular cause suspected.  COMPARISON: CT head dated 02/10/2021.  CONTRAST: 70mL Isovue-370  TECHNIQUE: Head and neck CT angiogram with IV contrast. Axial helical CT images of the head and neck vessels obtained during the arterial phase of intravenous contrast administration. Axial 2D reconstructed images and multiplanar 3D MIP reconstructed   images of the head and neck vessels were performed by the technologist. Dose reduction techniques were used. All stenosis measurements made according to NASCET criteria unless otherwise specified.    FINDINGS:     HEAD CTA:  ANTERIOR CIRCULATION: No stenosis/proximal large vessel occlusion, aneurysm, or high flow vascular malformation. Standard [place] anatomy.    POSTERIOR CIRCULATION: No stenosis/proximal large vessel occlusion, aneurysm, or high flow vascular malformation. Dominant left and smaller right vertebral artery contribute to a normal basilar artery.     DURAL VENOUS SINUSES: Expected enhancement of the major dural venous sinuses.    NECK CTA:  RIGHT CAROTID: No measurable stenosis or dissection.    LEFT CAROTID: No measurable stenosis or dissection.    VERTEBRAL ARTERIES: No focal stenosis or dissection. Balanced vertebral arteries.    AORTIC ARCH: Classic aortic arch anatomy with no significant stenosis at the origin of the great vessels.    NONVASCULAR STRUCTURES: Unremarkable.      Impression    IMPRESSION:     HEAD CTA:   1.  No significant  stenosis/proximal large vessel occlusion, aneurysm or high flow vascular malformation.    NECK CTA:  1.  No significant stenosis, dissection or occlusion.       Medications   ondansetron (ZOFRAN) injection 4 mg (4 mg Intravenous Given 2/10/21 2153)   0.9% sodium chloride BOLUS (0 mLs Intravenous Stopped 2/10/21 2346)     Followed by   sodium chloride 0.9% infusion (has no administration in time range)   iopamidol (ISOVUE-370) solution 70 mL (70 mLs Intravenous Given 2/10/21 2225)   sodium chloride 0.9 % bag 500mL for CT scan flush use (100 mLs As instructed Given 2/10/21 2225)   prochlorperazine (COMPAZINE) injection 10 mg (10 mg Intravenous Given 2/10/21 2248)       Assessments & Plan (with Medical Decision Making)  44 year old female, otherwise healthy, presenting the emergency department after patient had syncopal episode a short time prior to arrival.  Patient was seated at the top of 2 stairs, and subsequently fell down into stairs, hitting her head.  Did have episodes of vomiting afterwards.  There was reports of slight shaking behavior which occurred shortly thereafter, however no other specific reports of seizure type activity, with no reports of difficulty breathing, skin coloration changes, prolonged tonic, clonic type activity, or other concerns I would be more concerning for seizure type activity.    Patient has had previous episodes of dizziness/vertigo, with syncope.  Given her ongoing nausea, with slight head fullness sensation, decision made for CT scan of the head, in addition to blood work to rule out other intracranial cause of patient's syncopal episode.  CT scan results including CT angiogram results are reviewed.  No evidence of acute intracranial abnormality.  Patient subsequently able to ambulate around the emergency department without any difficulty.  Likely vertigo, with potential for dehydration also a contributing factor.  Patient will be discharged home with meclizine to be taken as  needed.  Encouraged to follow-up in clinic.  Do not feel that this represents seizure episode.     I have reviewed the nursing notes.    I have reviewed the findings, diagnosis, plan and need for follow up with the patient.       Discharge Medication List as of 2/10/2021 11:47 PM      START taking these medications    Details   meclizine (ANTIVERT) 12.5 MG tablet Take 1 tablet (12.5 mg) by mouth 4 times daily as needed for dizziness, Disp-30 tablet, R-1, Local Print             Final diagnoses:   Syncope, unspecified syncope type   Dizziness       2/10/2021   [facility] EMERGENCY DEPT     Chad, Richard Moscoso MD  02/11/21 0018     Grossly Intact

## 2022-04-26 NOTE — GOALS OF CARE CONVERSATION - ADVANCED CARE PLANNING - CONVERSATION DETAILS
discussed about current medical condition.   discussed about goals of care  discussed about DNR/DNI.   Daughter stated patient might be DNR but patient who has capacity wants to be full code.

## 2022-04-27 LAB
A1C WITH ESTIMATED AVERAGE GLUCOSE RESULT: 7.9 % — HIGH (ref 4–5.6)
ANION GAP SERPL CALC-SCNC: 9 MMOL/L — SIGNIFICANT CHANGE UP (ref 5–17)
BUN SERPL-MCNC: 47 MG/DL — HIGH (ref 7–23)
CALCIUM SERPL-MCNC: 8.7 MG/DL — SIGNIFICANT CHANGE UP (ref 8.5–10.1)
CHLORIDE SERPL-SCNC: 105 MMOL/L — SIGNIFICANT CHANGE UP (ref 96–108)
CO2 SERPL-SCNC: 22 MMOL/L — SIGNIFICANT CHANGE UP (ref 22–31)
CREAT SERPL-MCNC: 1.88 MG/DL — HIGH (ref 0.5–1.3)
EGFR: 34 ML/MIN/1.73M2 — LOW
ESTIMATED AVERAGE GLUCOSE: 180 MG/DL — HIGH (ref 68–114)
GLUCOSE BLDC GLUCOMTR-MCNC: 209 MG/DL — HIGH (ref 70–99)
GLUCOSE BLDC GLUCOMTR-MCNC: 214 MG/DL — HIGH (ref 70–99)
GLUCOSE BLDC GLUCOMTR-MCNC: 233 MG/DL — HIGH (ref 70–99)
GLUCOSE BLDC GLUCOMTR-MCNC: 322 MG/DL — HIGH (ref 70–99)
GLUCOSE SERPL-MCNC: 237 MG/DL — HIGH (ref 70–99)
HCT VFR BLD CALC: 27.9 % — LOW (ref 39–50)
HGB BLD-MCNC: 9.3 G/DL — LOW (ref 13–17)
MCHC RBC-ENTMCNC: 29.4 PG — SIGNIFICANT CHANGE UP (ref 27–34)
MCHC RBC-ENTMCNC: 33.3 G/DL — SIGNIFICANT CHANGE UP (ref 32–36)
MCV RBC AUTO: 88.3 FL — SIGNIFICANT CHANGE UP (ref 80–100)
NRBC # BLD: 0 /100 WBCS — SIGNIFICANT CHANGE UP (ref 0–0)
PLATELET # BLD AUTO: 218 K/UL — SIGNIFICANT CHANGE UP (ref 150–400)
POTASSIUM SERPL-MCNC: 3.9 MMOL/L — SIGNIFICANT CHANGE UP (ref 3.5–5.3)
POTASSIUM SERPL-SCNC: 3.9 MMOL/L — SIGNIFICANT CHANGE UP (ref 3.5–5.3)
RBC # BLD: 3.16 M/UL — LOW (ref 4.2–5.8)
RBC # FLD: 13.4 % — SIGNIFICANT CHANGE UP (ref 10.3–14.5)
SODIUM SERPL-SCNC: 136 MMOL/L — SIGNIFICANT CHANGE UP (ref 135–145)
WBC # BLD: 8.64 K/UL — SIGNIFICANT CHANGE UP (ref 3.8–10.5)
WBC # FLD AUTO: 8.64 K/UL — SIGNIFICANT CHANGE UP (ref 3.8–10.5)

## 2022-04-27 PROCEDURE — 99232 SBSQ HOSP IP/OBS MODERATE 35: CPT

## 2022-04-27 PROCEDURE — 71045 X-RAY EXAM CHEST 1 VIEW: CPT | Mod: 26

## 2022-04-27 PROCEDURE — 76775 US EXAM ABDO BACK WALL LIM: CPT | Mod: 26

## 2022-04-27 PROCEDURE — 99233 SBSQ HOSP IP/OBS HIGH 50: CPT

## 2022-04-27 RX ORDER — ACETAMINOPHEN 500 MG
650 TABLET ORAL EVERY 6 HOURS
Refills: 0 | Status: DISCONTINUED | OUTPATIENT
Start: 2022-04-27 | End: 2022-04-29

## 2022-04-27 RX ORDER — SENNA PLUS 8.6 MG/1
2 TABLET ORAL AT BEDTIME
Refills: 0 | Status: DISCONTINUED | OUTPATIENT
Start: 2022-04-27 | End: 2022-04-29

## 2022-04-27 RX ORDER — INSULIN LISPRO 100/ML
4 VIAL (ML) SUBCUTANEOUS
Refills: 0 | Status: DISCONTINUED | OUTPATIENT
Start: 2022-04-27 | End: 2022-04-29

## 2022-04-27 RX ORDER — SODIUM CHLORIDE 9 MG/ML
1000 INJECTION INTRAMUSCULAR; INTRAVENOUS; SUBCUTANEOUS
Refills: 0 | Status: DISCONTINUED | OUTPATIENT
Start: 2022-04-27 | End: 2022-04-28

## 2022-04-27 RX ADMIN — FINASTERIDE 5 MILLIGRAM(S): 5 TABLET, FILM COATED ORAL at 12:00

## 2022-04-27 RX ADMIN — Medication 4: at 12:00

## 2022-04-27 RX ADMIN — Medication 81 MILLIGRAM(S): at 12:55

## 2022-04-27 RX ADMIN — SODIUM CHLORIDE 45 MILLILITER(S): 9 INJECTION INTRAMUSCULAR; INTRAVENOUS; SUBCUTANEOUS at 22:32

## 2022-04-27 RX ADMIN — DORZOLAMIDE HYDROCHLORIDE TIMOLOL MALEATE 1 DROP(S): 20; 5 SOLUTION/ DROPS OPHTHALMIC at 17:22

## 2022-04-27 RX ADMIN — TAMSULOSIN HYDROCHLORIDE 0.4 MILLIGRAM(S): 0.4 CAPSULE ORAL at 22:31

## 2022-04-27 RX ADMIN — Medication 2: at 08:53

## 2022-04-27 RX ADMIN — ENOXAPARIN SODIUM 40 MILLIGRAM(S): 100 INJECTION SUBCUTANEOUS at 05:48

## 2022-04-27 RX ADMIN — SODIUM CHLORIDE 75 MILLILITER(S): 9 INJECTION INTRAMUSCULAR; INTRAVENOUS; SUBCUTANEOUS at 05:47

## 2022-04-27 RX ADMIN — LATANOPROST 1 DROP(S): 0.05 SOLUTION/ DROPS OPHTHALMIC; TOPICAL at 22:31

## 2022-04-27 RX ADMIN — INSULIN GLARGINE 7 UNIT(S): 100 INJECTION, SOLUTION SUBCUTANEOUS at 22:32

## 2022-04-27 RX ADMIN — Medication 2 UNIT(S): at 12:01

## 2022-04-27 RX ADMIN — ATORVASTATIN CALCIUM 80 MILLIGRAM(S): 80 TABLET, FILM COATED ORAL at 22:31

## 2022-04-27 RX ADMIN — DULOXETINE HYDROCHLORIDE 30 MILLIGRAM(S): 30 CAPSULE, DELAYED RELEASE ORAL at 12:00

## 2022-04-27 RX ADMIN — ENOXAPARIN SODIUM 40 MILLIGRAM(S): 100 INJECTION SUBCUTANEOUS at 17:22

## 2022-04-27 RX ADMIN — Medication 4 UNIT(S): at 16:50

## 2022-04-27 RX ADMIN — SODIUM CHLORIDE 45 MILLILITER(S): 9 INJECTION INTRAMUSCULAR; INTRAVENOUS; SUBCUTANEOUS at 19:31

## 2022-04-27 RX ADMIN — Medication 2 UNIT(S): at 08:55

## 2022-04-27 RX ADMIN — DORZOLAMIDE HYDROCHLORIDE TIMOLOL MALEATE 1 DROP(S): 20; 5 SOLUTION/ DROPS OPHTHALMIC at 05:47

## 2022-04-27 RX ADMIN — Medication 2: at 16:49

## 2022-04-27 NOTE — DIETITIAN INITIAL EVALUATION ADULT - OTHER INFO
Unable to conduct face-to-face interview c pt due to isolation precautions being maintained for COVID; also pt Ruby. Spoke c daughter via phone (Regina Watson [phone]). Pt lives c brother & has HHA assistance x 12 hrs/day; also has supportive son & daughter nearby; aide does cooking; aware of DM diet recommendations; daughter reports pt compliant c diet & takes Metformin as prescribed; pt has been on steroid which may be contributing to elevated A1c.  Wt has been stable & appetite is usually good.  No reports of any N/V/C/D or chew/swallowing difficulty.

## 2022-04-27 NOTE — DIETITIAN INITIAL EVALUATION ADULT - OTHER CALCULATIONS
Ht (cm):  175.3  Wt (kg):  82.8 (4/27)  BMI:  26.9  IBW:  72.7 kg  %IBW: 114%  UBW: stable  %UBW: 100%

## 2022-04-27 NOTE — DIETITIAN INITIAL EVALUATION ADULT - PERTINENT LABORATORY DATA
04-27    136  |  105  |  47<H>  ----------------------------<  237<H>  3.9   |  22  |  1.88<H>    Ca    8.7      27 Apr 2022 07:57    TPro  7.6  /  Alb  3.2<L>  /  TBili  0.4  /  DBili  x   /  AST  11<L>  /  ALT  20  /  AlkPhos  81  04-26  POCT Blood Glucose.: 322 mg/dL (04-27-22 @ 11:11); 04-26 POCT: 304, 323, 290, 349  A1C with Estimated Average Glucose Result: 7.9 %, 180 (04-27-22)  A1C with Estimated Average Glucose Result: 7.6 %, 171 (04-26-22)  A1C with Estimated Average Glucose Result: 6.4 % (11-06-21 @ 08:09)

## 2022-04-27 NOTE — DIETITIAN INITIAL EVALUATION ADULT - REASON
Unable to view pt or conduct nutrition-focused physical exam due to isolation cautions being maintained

## 2022-04-28 ENCOUNTER — TRANSCRIPTION ENCOUNTER (OUTPATIENT)
Age: 87
End: 2022-04-28

## 2022-04-28 LAB
ANION GAP SERPL CALC-SCNC: 6 MMOL/L — SIGNIFICANT CHANGE UP (ref 5–17)
BUN SERPL-MCNC: 37 MG/DL — HIGH (ref 7–23)
CALCIUM SERPL-MCNC: 8.5 MG/DL — SIGNIFICANT CHANGE UP (ref 8.5–10.1)
CHLORIDE SERPL-SCNC: 105 MMOL/L — SIGNIFICANT CHANGE UP (ref 96–108)
CO2 SERPL-SCNC: 25 MMOL/L — SIGNIFICANT CHANGE UP (ref 22–31)
CREAT SERPL-MCNC: 1.81 MG/DL — HIGH (ref 0.5–1.3)
D DIMER BLD IA.RAPID-MCNC: 660 NG/ML DDU — HIGH
EGFR: 36 ML/MIN/1.73M2 — LOW
FERRITIN SERPL-MCNC: 612 NG/ML — HIGH (ref 30–400)
FOLATE SERPL-MCNC: 10.3 NG/ML — SIGNIFICANT CHANGE UP
GLUCOSE BLDC GLUCOMTR-MCNC: 112 MG/DL — HIGH (ref 70–99)
GLUCOSE BLDC GLUCOMTR-MCNC: 158 MG/DL — HIGH (ref 70–99)
GLUCOSE BLDC GLUCOMTR-MCNC: 203 MG/DL — HIGH (ref 70–99)
GLUCOSE BLDC GLUCOMTR-MCNC: 209 MG/DL — HIGH (ref 70–99)
GLUCOSE SERPL-MCNC: 196 MG/DL — HIGH (ref 70–99)
HCT VFR BLD CALC: 29.8 % — LOW (ref 39–50)
HGB BLD-MCNC: 9.8 G/DL — LOW (ref 13–17)
IRON SATN MFR SERPL: 19 UG/DL — LOW (ref 45–165)
IRON SATN MFR SERPL: 8 % — LOW (ref 16–55)
MAGNESIUM SERPL-MCNC: 1.7 MG/DL — SIGNIFICANT CHANGE UP (ref 1.6–2.6)
MCHC RBC-ENTMCNC: 29.2 PG — SIGNIFICANT CHANGE UP (ref 27–34)
MCHC RBC-ENTMCNC: 32.9 G/DL — SIGNIFICANT CHANGE UP (ref 32–36)
MCV RBC AUTO: 88.7 FL — SIGNIFICANT CHANGE UP (ref 80–100)
NRBC # BLD: 0 /100 WBCS — SIGNIFICANT CHANGE UP (ref 0–0)
PHOSPHATE SERPL-MCNC: 2.8 MG/DL — SIGNIFICANT CHANGE UP (ref 2.5–4.5)
PLATELET # BLD AUTO: 229 K/UL — SIGNIFICANT CHANGE UP (ref 150–400)
POTASSIUM SERPL-MCNC: 3.9 MMOL/L — SIGNIFICANT CHANGE UP (ref 3.5–5.3)
POTASSIUM SERPL-SCNC: 3.9 MMOL/L — SIGNIFICANT CHANGE UP (ref 3.5–5.3)
RBC # BLD: 3.36 M/UL — LOW (ref 4.2–5.8)
RBC # FLD: 13.3 % — SIGNIFICANT CHANGE UP (ref 10.3–14.5)
SODIUM SERPL-SCNC: 136 MMOL/L — SIGNIFICANT CHANGE UP (ref 135–145)
TIBC SERPL-MCNC: 238 UG/DL — SIGNIFICANT CHANGE UP (ref 220–430)
UIBC SERPL-MCNC: 219 UG/DL — SIGNIFICANT CHANGE UP (ref 110–370)
VIT B12 SERPL-MCNC: 270 PG/ML — SIGNIFICANT CHANGE UP (ref 232–1245)
WBC # BLD: 5.92 K/UL — SIGNIFICANT CHANGE UP (ref 3.8–10.5)
WBC # FLD AUTO: 5.92 K/UL — SIGNIFICANT CHANGE UP (ref 3.8–10.5)

## 2022-04-28 PROCEDURE — 78582 LUNG VENTILAT&PERFUS IMAGING: CPT | Mod: 26

## 2022-04-28 PROCEDURE — 99233 SBSQ HOSP IP/OBS HIGH 50: CPT

## 2022-04-28 PROCEDURE — 99232 SBSQ HOSP IP/OBS MODERATE 35: CPT

## 2022-04-28 RX ORDER — APIXABAN 2.5 MG/1
10 TABLET, FILM COATED ORAL EVERY 12 HOURS
Refills: 0 | Status: DISCONTINUED | OUTPATIENT
Start: 2022-04-28 | End: 2022-04-29

## 2022-04-28 RX ORDER — APIXABAN 2.5 MG/1
1 TABLET, FILM COATED ORAL
Qty: 60 | Refills: 0
Start: 2022-04-28 | End: 2022-05-27

## 2022-04-28 RX ORDER — APIXABAN 2.5 MG/1
2 TABLET, FILM COATED ORAL
Qty: 28 | Refills: 0
Start: 2022-04-28 | End: 2022-05-04

## 2022-04-28 RX ORDER — ENOXAPARIN SODIUM 100 MG/ML
40 INJECTION SUBCUTANEOUS ONCE
Refills: 0 | Status: COMPLETED | OUTPATIENT
Start: 2022-04-28 | End: 2022-04-28

## 2022-04-28 RX ORDER — ENOXAPARIN SODIUM 100 MG/ML
80 INJECTION SUBCUTANEOUS EVERY 12 HOURS
Refills: 0 | Status: DISCONTINUED | OUTPATIENT
Start: 2022-04-28 | End: 2022-04-28

## 2022-04-28 RX ADMIN — Medication 2: at 12:19

## 2022-04-28 RX ADMIN — INSULIN GLARGINE 7 UNIT(S): 100 INJECTION, SOLUTION SUBCUTANEOUS at 21:37

## 2022-04-28 RX ADMIN — TAMSULOSIN HYDROCHLORIDE 0.4 MILLIGRAM(S): 0.4 CAPSULE ORAL at 21:36

## 2022-04-28 RX ADMIN — DULOXETINE HYDROCHLORIDE 30 MILLIGRAM(S): 30 CAPSULE, DELAYED RELEASE ORAL at 12:17

## 2022-04-28 RX ADMIN — Medication 4 UNIT(S): at 17:16

## 2022-04-28 RX ADMIN — Medication 4 UNIT(S): at 08:21

## 2022-04-28 RX ADMIN — ATORVASTATIN CALCIUM 80 MILLIGRAM(S): 80 TABLET, FILM COATED ORAL at 21:36

## 2022-04-28 RX ADMIN — LATANOPROST 1 DROP(S): 0.05 SOLUTION/ DROPS OPHTHALMIC; TOPICAL at 21:36

## 2022-04-28 RX ADMIN — DORZOLAMIDE HYDROCHLORIDE TIMOLOL MALEATE 1 DROP(S): 20; 5 SOLUTION/ DROPS OPHTHALMIC at 17:16

## 2022-04-28 RX ADMIN — Medication 2: at 08:09

## 2022-04-28 RX ADMIN — ENOXAPARIN SODIUM 40 MILLIGRAM(S): 100 INJECTION SUBCUTANEOUS at 12:17

## 2022-04-28 RX ADMIN — DORZOLAMIDE HYDROCHLORIDE TIMOLOL MALEATE 1 DROP(S): 20; 5 SOLUTION/ DROPS OPHTHALMIC at 05:41

## 2022-04-28 RX ADMIN — FINASTERIDE 5 MILLIGRAM(S): 5 TABLET, FILM COATED ORAL at 14:45

## 2022-04-28 RX ADMIN — Medication 81 MILLIGRAM(S): at 12:18

## 2022-04-28 RX ADMIN — ENOXAPARIN SODIUM 40 MILLIGRAM(S): 100 INJECTION SUBCUTANEOUS at 05:41

## 2022-04-28 RX ADMIN — Medication 4 UNIT(S): at 12:18

## 2022-04-28 RX ADMIN — SENNA PLUS 2 TABLET(S): 8.6 TABLET ORAL at 21:36

## 2022-04-28 RX ADMIN — SODIUM CHLORIDE 45 MILLILITER(S): 9 INJECTION INTRAMUSCULAR; INTRAVENOUS; SUBCUTANEOUS at 14:45

## 2022-04-28 NOTE — DISCHARGE NOTE PROVIDER - HOSPITAL COURSE
8M with PMHx of T2DM, CKD3, BPH, CVA November 2021 suspect at [facility]) and dementia (also documented & suspected at [facility]) presenting with one day history of SOB and weakness for one day. Patient found to be COVID-19 positive. Patient with acute hypoxic respiratory failure secondary to COVID-19.       #acute hypoxic respiratory failure POA 2/2 to COVID-19 infection.   elevated d-dimer   -No Hypoxia now. no indication for remdesivir and decadron   cont isolation   denies CP/SOB/palpitations   ECHO 2021: pEF, grade 1 DD  EKG: NSR, PAC  Trop neg x1    V/Q scan  likely positive will treat for PE present on admission       #ALISON-on-CKD3  baseline Cr appears to be 1.3-1.5  -worse creatinine likely from iv lasix received in the ER   hydration while in house    renal US done   avoid nephrotoxic meds, dose all meds per eGFR   monitor Cr as outpatient     #BPH  -Continue with Flomax & Finasteride    #T2DM  -hyperglycemia d/t steroids   A1c 7.9%   continue with lantus, ISS  increased lispro to 4u ac   FS goal 140-180     #H/o CVA  -Continue with statin hold asa with patient on Eliquis     #Normocytic anemia   no e/o bleeding or bruising   -follow anemia labs     #Presumed Dementia   supportive care     #Glaucoma --c/w eye drops       PT: home , patient + for COVID and HHA will bridgette vailble for discharge home 4/29        daughter would like him to come home where he lives with his brother and has 12 hour a day HHA.    8M with PMHx of T2DM, CKD3, BPH, CVA November 2021 suspect at [facility]) and dementia (also documented & suspected at [facility]) presenting with one day history of SOB and weakness for one day. Patient found to be COVID-19 positive. Patient with acute hypoxic respiratory failure secondary to COVID-19.     vitals stable.   GENERAL: Elderly and on room air  NAD, not using accessory muscles, no increased WOB  CHEST/LUNG: Clear to ausculation bilaterally, no wheezing, no crackles   HEART: Regular rate and rhythm; + SM  ABDOMEN: Soft, Nontender, Nondistended; Bowel sounds present  EXTREMITIES:  Moving all four extremities spontaneously, No calf tenderness or edema b/l   NERVOUS SYSTEM:  Grossly non focal.  Psychiatry: AAO x 2     DISCHARGE DIAGNOSES:   #acute hypoxic respiratory failure POA 2/2 to COVID-19 infection.   elevated d-dimer   -No Hypoxia now. no indication for remdesivir and decadron   cont isolation   denies CP/SOB/palpitations   ECHO 2021: pEF, grade 1 DD  EKG: NSR, PAC  Trop neg x1    V/Q scan  likely positive will treat for PE present on admission , PATIENT WAS STARTED ON ELIQUIS , will need AC for 3 months   CARE PLAN AND FINDINGS DISCUSSED WITH DAUGHTER DENISE (WHO IS AN RN) , all questions and concerns addressed   education provided on fall precautions , especially since he is a high risk for bleed given falls in the past , daughter said she is aware and understands. she would like him to continue AC and will monitor patient closely with HHA and family       #ALISON-on-CKD3  baseline Cr appears to be 1.3-1.5  -worse creatinine likely from iv lasix received in the ER   renal US: No evidence of hydronephrosis. Enlarged prostate gland with 76 cc post void residual in the bladder  Cr stable , outpatient monitoring     #BPH  -Continue with Flomax & Finasteride  urinating without difficulty     #T2DM  -hyperglycemia d/t steroids   A1c 7.9%   resume home meds       #H/o CVA  -Continue with statin , off ASA   d/c plavix as it was only supposed to be take for 3 weeks 11/2021 when he was admitted for TIA     #Normocytic anemia , possibly combination of AOCD and NIRAV.   no e/o bleeding or bruising   H/H stable   discussed with daughter, she will give him OTC ferrous sulfate and PMD follow-up     Low Vit B12 --per daughter , she has been giving him PO supplementation but doesn't   seem to work.  Rx for SQ vit B12 inj for 1st week provided, daughter is RN and able to do the inj at home. She will take pt to PMD for subsequent vit B12 inj. and level monitoring     #Presumed Dementia   supportive care     #Macular degeneration --c/w eye drops            daughter would like him to come home where he lives with his brother and has 12 hour a day HHA.     Discharge time : 40 min     RETURN PARAMETERS DISCUSSED WITH PATIENT, PATIENT EXPRESSED UNDERSTANDING AND IS AGREEABLE. DISCUSSED WITH PATIENT ON REFRAINING FROM DRIVING UNTIL FOLLOW-UP/ CLEARED BY PMD. PATIENT EXPRESSED UNDERSTANDING.

## 2022-04-28 NOTE — PROGRESS NOTE ADULT - SUBJECTIVE AND OBJECTIVE BOX
88M with PMHx of T2DM, CKD3, BPH, CVA November 2021 suspect at [facility]) and dementia (also documented & suspected at [facility]) presenting with one day history of SOB and weakness for one day. Patient is poor historian and family unavailable for collateral. Patient only tells me he is here because he needs to be in the hospital. Apparently had SOB with exertion and chest discomfort, though mostly self-limiting. In the ED on ambulation patient desaturates to 89%. Initially given Lasix due to possible cardiac etiology (though TTE in past year grossly unremarkable). Patient eventually found to have COVID-19 and given Decadron. Patient appears to have received Pfizer COVID-19 vaccine x2, but booster status unknown. CXR performed in the ED showing basilar interstitial prominence. Patient without complaints right now.      CHIEF COMPLAINT:   Patient seen and examined bedside.   no overnight events   remains on room air       Denies fever, chills, N/V, dizziness, HA,  CP, palpitations, SOB, abdominal pain, dysuria, diarrhea, constipation.     PHYSICAL EXAM:  Vital Signs Last 24 Hrs  T(C): 36.8 (28 Apr 2022 16:45), Max: 37.2 (28 Apr 2022 05:11)  T(F): 98.2 (28 Apr 2022 16:45), Max: 99 (28 Apr 2022 05:11)  HR: 70 (28 Apr 2022 16:45) (62 - 71)  BP: 154/75 (28 Apr 2022 16:45) (128/69 - 165/61)  BP(mean): --  RR: 18 (28 Apr 2022 16:45) (18 - 18)  SpO2: 95% (28 Apr 2022 16:45) (95% - 97%)    GENERAL: Elderly and on room air  NAD, not using accessory muscles, no increased WOB  CHEST/LUNG: Clear to ausculation bilaterally, no wheezing, no crackles   HEART: Regular rate and rhythm; + SM  ABDOMEN: Soft, Nontender, Nondistended; Bowel sounds present  EXTREMITIES:  Moving all four extremities spontaneously, No calf tenderness or edema b/l   NERVOUS SYSTEM:  Grossly non focal.  Psychiatry: AAO x 2     OBJECTIVE DATA:                           9.8    5.92  )-----------( 229      ( 28 Apr 2022 06:30 )             29.8     04-28    136  |  105  |  37<H>  ----------------------------<  196<H>  3.9   |  25  |  1.81<H>    Ca    8.5      28 Apr 2022 06:30  Phos  2.8     04-28  Mg     1.7     04-28            Interval Radiology studies: reviewed by me    < from: Xray Chest 1 View- PORTABLE-Urgent (04.25.22 @ 15:04) >  Basilar interstitial prominence is similar to prior likely chronic. No   definite acute infiltrate. No pleural effusion or pneumothorax. The heart   not accurately evaluated on this projection. Loop recorder and pain   stimulator leads reidentified.    < end of copied text >      < from: NM Pulmonary Ventilation/Perfusion Scan (04.28.22 @ 11:06) >    INTERPRETATION:  RADIOPHARMACEUTICAL: 99mTc-MAA       DOSE: 3 mCi IV    CLINICAL INFORMATION: 88 year old male with hypoxia, Covid positive,   referred to evaluate for pulmonary embolus.    TECHNIQUE: Perfusion images of the lungs were obtained following   administration of Tc-99m-MAA. Images were obtained in the anterior,   posterior, both lateral, and all 4 oblique projections. This study was   interpreted in conjunction with a chest radiograph of 4/27/2022.    COMPARISON: Previous lung V/Q scan 12/14/2021    FINDINGS: The study demonstrates segmental perfusion defect in the   superior segment of the right lower lobe, new since the previous study.   There is heterogenous tracer distribution in both lungs in the remainder   of the lungs.    IMPRESSION: Abnormal perfusion scan. Indeterminate probability of   pulmonary embolus.      CHESTER Tobar was informed of the above findings by Dr. Nath via   telephone on 4/28/2022 at 11:05 AM.    < end of copied text >  MEDICATIONS  (STANDING):  aspirin  chewable 81 milliGRAM(s) Oral daily  atorvastatin 80 milliGRAM(s) Oral at bedtime  dextrose 5%. 1000 milliLiter(s) (50 mL/Hr) IV Continuous <Continuous>  dextrose 5%. 1000 milliLiter(s) (100 mL/Hr) IV Continuous <Continuous>  dextrose 50% Injectable 25 Gram(s) IV Push once  dextrose 50% Injectable 12.5 Gram(s) IV Push once  dextrose 50% Injectable 25 Gram(s) IV Push once  dorzolamide 2%/timolol 0.5% Ophthalmic Solution 1 Drop(s) Both EYES two times a day  DULoxetine 30 milliGRAM(s) Oral daily  enoxaparin Injectable 40 milliGRAM(s) SubCutaneous every 12 hours  finasteride 5 milliGRAM(s) Oral daily  glucagon  Injectable 1 milliGRAM(s) IntraMuscular once  insulin glargine Injectable (LANTUS) 7 Unit(s) SubCutaneous at bedtime  insulin lispro (ADMELOG) corrective regimen sliding scale   SubCutaneous three times a day before meals  insulin lispro (ADMELOG) corrective regimen sliding scale   SubCutaneous at bedtime  insulin lispro Injectable (ADMELOG) 2 Unit(s) SubCutaneous three times a day before meals  latanoprost 0.005% Ophthalmic Solution 1 Drop(s) Both EYES at bedtime  sodium chloride 0.9%. 1000 milliLiter(s) (75 mL/Hr) IV Continuous <Continuous>  tamsulosin 0.4 milliGRAM(s) Oral at bedtime    MEDICATIONS  (PRN):  acetaminophen     Tablet .. 650 milliGRAM(s) Oral every 6 hours PRN Temp greater or equal to 38C (100.4F), Mild Pain (1 - 3), Moderate Pain (4 - 6), Severe Pain (7 - 10)  dextrose Oral Gel 15 Gram(s) Oral once PRN Blood Glucose LESS THAN 70 milliGRAM(s)/deciliter      Consultant(s) Notes Reveiwed [x ] Yes     Care Discussed with [x ] Consultants  [x ] Patient  [ ] Family  [x ] /   [x ] Other; RN  
88M with PMHx of T2DM, CKD3, BPH, CVA November 2021 suspect at [facility]) and dementia (also documented & suspected at [facility]) presenting with one day history of SOB and weakness for one day. Patient is poor historian and family unavailable for collateral. Patient only tells me he is here because he needs to be in the hospital. Apparently had SOB with exertion and chest discomfort, though mostly self-limiting. In the ED on ambulation patient desaturates to 89%. Initially given Lasix due to possible cardiac etiology (though TTE in past year grossly unremarkable). Patient eventually found to have COVID-19 and given Decadron. Patient appears to have received Pfizer COVID-19 vaccine x2, but booster status unknown. CXR performed in the ED showing basilar interstitial prominence. Patient without complaints right now.      CHIEF COMPLAINT:   Patient seen and examined bedside.   no overnight events   remains on room air   + nonproductive cough     Denies fever, chills, N/V, dizziness, HA,  CP, palpitations, SOB, abdominal pain, dysuria, diarrhea, constipation.     PHYSICAL EXAM:  Vital Signs Last 24 Hrs  T(C): 36.5 (27 Apr 2022 11:00), Max: 36.9 (26 Apr 2022 23:27)  T(F): 97.7 (27 Apr 2022 11:00), Max: 98.4 (26 Apr 2022 23:27)  HR: 71 (27 Apr 2022 11:00) (63 - 76)  BP: 115/56 (27 Apr 2022 11:00) (112/58 - 125/67)  BP(mean): --  RR: 17 (27 Apr 2022 11:00) (17 - 18)  SpO2: 97% (27 Apr 2022 11:00) (94% - 98%)    GENERAL: Elderly and on room air  NAD, not using accessory muscles, no increased WOB  CHEST/LUNG: Clear to ausculation bilaterally, no wheezing, no crackles   HEART: Regular rate and rhythm; + SM  ABDOMEN: Soft, Nontender, Nondistended; Bowel sounds present  EXTREMITIES:  Moving all four extremities spontaneously, No calf tenderness or edema b/l   NERVOUS SYSTEM:  Grossly non focal.  Psychiatry: AAO x 2     OBJECTIVE DATA:     LABS:                        9.3    8.64  )-----------( 218      ( 27 Apr 2022 10:15 )             27.9   04-27    136  |  105  |  47<H>  ----------------------------<  237<H>  3.9   |  22  |  1.88<H>    Ca    8.7      27 Apr 2022 07:57    TPro  7.6  /  Alb  3.2<L>  /  TBili  0.4  /  DBili  x   /  AST  11<L>  /  ALT  20  /  AlkPhos  81  04-26      Interval Radiology studies: reviewed by me    < from: Xray Chest 1 View- PORTABLE-Urgent (04.25.22 @ 15:04) >  Basilar interstitial prominence is similar to prior likely chronic. No   definite acute infiltrate. No pleural effusion or pneumothorax. The heart   not accurately evaluated on this projection. Loop recorder and pain   stimulator leads reidentified.    < end of copied text >      MEDICATIONS  (STANDING):  aspirin  chewable 81 milliGRAM(s) Oral daily  atorvastatin 80 milliGRAM(s) Oral at bedtime  dextrose 5%. 1000 milliLiter(s) (50 mL/Hr) IV Continuous <Continuous>  dextrose 5%. 1000 milliLiter(s) (100 mL/Hr) IV Continuous <Continuous>  dextrose 50% Injectable 25 Gram(s) IV Push once  dextrose 50% Injectable 12.5 Gram(s) IV Push once  dextrose 50% Injectable 25 Gram(s) IV Push once  dorzolamide 2%/timolol 0.5% Ophthalmic Solution 1 Drop(s) Both EYES two times a day  DULoxetine 30 milliGRAM(s) Oral daily  enoxaparin Injectable 40 milliGRAM(s) SubCutaneous every 12 hours  finasteride 5 milliGRAM(s) Oral daily  glucagon  Injectable 1 milliGRAM(s) IntraMuscular once  insulin glargine Injectable (LANTUS) 7 Unit(s) SubCutaneous at bedtime  insulin lispro (ADMELOG) corrective regimen sliding scale   SubCutaneous three times a day before meals  insulin lispro (ADMELOG) corrective regimen sliding scale   SubCutaneous at bedtime  insulin lispro Injectable (ADMELOG) 2 Unit(s) SubCutaneous three times a day before meals  latanoprost 0.005% Ophthalmic Solution 1 Drop(s) Both EYES at bedtime  sodium chloride 0.9%. 1000 milliLiter(s) (75 mL/Hr) IV Continuous <Continuous>  tamsulosin 0.4 milliGRAM(s) Oral at bedtime    MEDICATIONS  (PRN):  acetaminophen     Tablet .. 650 milliGRAM(s) Oral every 6 hours PRN Temp greater or equal to 38C (100.4F), Mild Pain (1 - 3), Moderate Pain (4 - 6), Severe Pain (7 - 10)  dextrose Oral Gel 15 Gram(s) Oral once PRN Blood Glucose LESS THAN 70 milliGRAM(s)/deciliter      Consultant(s) Notes Reveiwed [x ] Yes     Care Discussed with [x ] Consultants  [x ] Patient  [ ] Family  [x ] /   [x ] Other; RN  
EREN BARRAGAN  MRN-[id number]      Follow Up:  covid     Interval History: patient seen and examined. he has no complaints, he had oxygen on but unclear why. He did not know why. When taken off and monitored for 30 minutes his oxygen saturation remained >95%    ROS:    [ ] Unobtainable because:  [X ] All other systems negative except as noted    Constitutional: no fever, no chills  Head: no trauma  Eyes: no vision changes, no eye pain  ENT:  no sore throat, no rhinorrhea  Cardiovascular:  no chest pain, no palpitation  Respiratory:  no SOB, no cough  GI:  no abd pain, no vomiting, no diarrhea  urinary: no dysuria, no hematuria, no flank pain  musculoskeletal:  no joint pain, no joint swelling  skin:  no rash  neurology:  no headache, no seizure, no change in mental status  psych: no anxiety, no depression         Allergies  penicillins (Unknown)        ANTIMICROBIALS:      OTHER MEDS:  acetaminophen     Tablet .. 650 milliGRAM(s) Oral every 6 hours PRN  aspirin  chewable 81 milliGRAM(s) Oral daily  atorvastatin 80 milliGRAM(s) Oral at bedtime  dextrose 5%. 1000 milliLiter(s) IV Continuous <Continuous>  dextrose 5%. 1000 milliLiter(s) IV Continuous <Continuous>  dextrose 50% Injectable 25 Gram(s) IV Push once  dextrose 50% Injectable 12.5 Gram(s) IV Push once  dextrose 50% Injectable 25 Gram(s) IV Push once  dextrose Oral Gel 15 Gram(s) Oral once PRN  dorzolamide 2%/timolol 0.5% Ophthalmic Solution 1 Drop(s) Both EYES two times a day  DULoxetine 30 milliGRAM(s) Oral daily  enoxaparin Injectable 40 milliGRAM(s) SubCutaneous every 12 hours  finasteride 5 milliGRAM(s) Oral daily  glucagon  Injectable 1 milliGRAM(s) IntraMuscular once  insulin glargine Injectable (LANTUS) 7 Unit(s) SubCutaneous at bedtime  insulin lispro (ADMELOG) corrective regimen sliding scale   SubCutaneous three times a day before meals  insulin lispro Injectable (ADMELOG) 4 Unit(s) SubCutaneous three times a day before meals  latanoprost 0.005% Ophthalmic Solution 1 Drop(s) Both EYES at bedtime  sodium chloride 0.9%. 1000 milliLiter(s) IV Continuous <Continuous>  tamsulosin 0.4 milliGRAM(s) Oral at bedtime      Physical Exam:  Vital Signs Last 24 Hrs  T(C): 36.5 (27 Apr 2022 11:00), Max: 36.9 (26 Apr 2022 23:27)  T(F): 97.7 (27 Apr 2022 11:00), Max: 98.4 (26 Apr 2022 23:27)  HR: 71 (27 Apr 2022 11:00) (63 - 76)  BP: 115/56 (27 Apr 2022 11:00) (112/58 - 125/67)  BP(mean): --  RR: 17 (27 Apr 2022 11:00) (17 - 18)  SpO2: 97% (27 Apr 2022 11:00) (94% - 98%)    General:    NAD,  non toxic  Head: atraumatic, normocephalic  Eye: normal sclera and conjunctiva  ENT:    no oral lesions, neck supple  Cardio:     regular S1, S2,  no murmur  Respiratory:    clear b/l,    no wheezing  abd:     soft,   BS +,   no tenderness  :   no CVAT,  no suprapubic tenderness,   no  jaramillo  Musculoskeletal:   no joint swelling,   no edema  vascular: no central lines, +PIV   Skin:    no rash  Neurologic:     no focal deficit  psych: normal affect    WBC Count: 8.64 K/uL (04-27 @ 10:15)  WBC Count: 4.24 K/uL (04-26 @ 07:38)  WBC Count: 5.46 K/uL (04-25 @ 15:12)                            9.3    8.64  )-----------( 218      ( 27 Apr 2022 10:15 )             27.9       04-27    136  |  105  |  47<H>  ----------------------------<  237<H>  3.9   |  22  |  1.88<H>    Ca    8.7      27 Apr 2022 07:57    TPro  7.6  /  Alb  3.2<L>  /  TBili  0.4  /  DBili  x   /  AST  11<L>  /  ALT  20  /  AlkPhos  81  04-26          Creatinine Trend: 1.88<--, 2.11<--, 1.96<--      MICROBIOLOGY:    D-Dimer Assay, Quantitative: 619 (04-25)    Procalcitonin, Serum: 0.11 (04-26-22 @ 10:32)    SARS-CoV-2 Result: Detected (04-25-22 @ 15:12)        RADIOLOGY:  < from: US Renal (04.27.22 @ 15:00) >  IMPRESSION:  No evidence of hydronephrosis.  Enlarged prostate gland with 76 cc post void residual in the bladder      
CHIEF COMPLAINT: Follow up of acute hypoxic resp failure with COVID-19 infection  no fever  no sob at rest now  no chest pain   no diarrhea  no active gross bleeding  + cough       PHYSICAL EXAM:    GENERAL: Elderly and on room air   CHEST/LUNG: Clear to ausculation bilaterally, no wheezing, no crackles   HEART: Regular rate and rhythm; + SM  ABDOMEN: Soft, Nontender, Nondistended; Bowel sounds present  EXTREMITIES:  Moving all four extremities spontaneously, No clubbing, cyanosis, or edema  NERVOUS SYSTEM:  Grossly non focal.  Psychiatry: AAO x 2. + dementia       OBJECTIVE DATA:   Vital Signs Last 24 Hrs  T(C): 36.7 (2022 11:00), Max: 37 (2022 13:51)  T(F): 98 (2022 11:00), Max: 98.6 (2022 13:51)  HR: 71 (2022 11:00) (64 - 105)  BP: 132/73 (2022 11:00) (92/65 - 132/77)  BP(mean): --  RR: 18 (2022 11:00) (15 - 22)  SpO2: 99% (2022 11:00) (89% - 99%)           Daily Height in cm: 175.26 (2022 13:51)    Daily Weight in k (2022 04:47)  LABS:                        9.7    4.24  )-----------( 223      ( 2022 07:38 )             29.4             04-26    133<L>  |  102  |  47<H>  ----------------------------<  297<H>  4.1   |  23  |  2.11<H>    Ca    9.2      2022 07:38    TPro  7.6  /  Alb  3.2<L>  /  TBili  0.4  /  DBili  x   /  AST  11<L>  /  ALT  20  /  AlkPhos  81  04-26              PT/INR - ( 2022 15:12 )   PT: 13.2 sec;   INR: 1.10 ratio         PTT - ( 2022 15:12 )  PTT:27.0 sec          CAPILLARY BLOOD GLUCOSE      POCT Blood Glucose.: 323 mg/dL (2022 11:15)         Interval Radiology studies: reviewed by me    < from: Xray Chest 1 View- PORTABLE-Urgent (22 @ 15:04) >  Basilar interstitial prominence is similar to prior likely chronic. No   definite acute infiltrate. No pleural effusion or pneumothorax. The heart   not accurately evaluated on this projection. Loop recorder and pain   stimulator leads reidentified.    < end of copied text >    tele reviewed by me showed no a fib     MEDICATIONS  (STANDING):  aspirin  chewable 81 milliGRAM(s) Oral daily  atorvastatin 80 milliGRAM(s) Oral at bedtime  dextrose 5%. 1000 milliLiter(s) (50 mL/Hr) IV Continuous <Continuous>  dextrose 5%. 1000 milliLiter(s) (100 mL/Hr) IV Continuous <Continuous>  dextrose 50% Injectable 25 Gram(s) IV Push once  dextrose 50% Injectable 12.5 Gram(s) IV Push once  dextrose 50% Injectable 25 Gram(s) IV Push once  dorzolamide 2%/timolol 0.5% Ophthalmic Solution 1 Drop(s) Both EYES two times a day  DULoxetine 30 milliGRAM(s) Oral daily  enoxaparin Injectable 40 milliGRAM(s) SubCutaneous every 12 hours  finasteride 5 milliGRAM(s) Oral daily  glucagon  Injectable 1 milliGRAM(s) IntraMuscular once  insulin glargine Injectable (LANTUS) 7 Unit(s) SubCutaneous at bedtime  insulin lispro (ADMELOG) corrective regimen sliding scale   SubCutaneous three times a day before meals  insulin lispro (ADMELOG) corrective regimen sliding scale   SubCutaneous at bedtime  insulin lispro Injectable (ADMELOG) 2 Unit(s) SubCutaneous three times a day before meals  latanoprost 0.005% Ophthalmic Solution 1 Drop(s) Both EYES at bedtime  sodium chloride 0.9%. 1000 milliLiter(s) (75 mL/Hr) IV Continuous <Continuous>  tamsulosin 0.4 milliGRAM(s) Oral at bedtime    MEDICATIONS  (PRN):  acetaminophen     Tablet .. 650 milliGRAM(s) Oral every 6 hours PRN Temp greater or equal to 38C (100.4F), Mild Pain (1 - 3), Moderate Pain (4 - 6), Severe Pain (7 - 10)  dextrose Oral Gel 15 Gram(s) Oral once PRN Blood Glucose LESS THAN 70 milliGRAM(s)/deciliter      
Patient is a 88y old  Male who presents with a chief complaint of SOB/Weakness (28 Apr 2022 20:25)    Being followed by ID for COVID    Interval history:  No other acute events, doing well, off oxygen awaiting 5 days of positive before can have HHA in his home       ROS:  No cough, SOB, CP  No N/V/D  No abd pain  No urinary complaints  No HA  No joint or limb pain  No other complaints      Antimicrobials:        other medications reviewed    Vital Signs Last 24 Hrs  T(C): 36.8 (04-29-22 @ 00:12), Max: 37.2 (04-28-22 @ 05:11)  T(F): 98.3 (04-29-22 @ 00:12), Max: 99 (04-28-22 @ 05:11)  HR: 77 (04-29-22 @ 00:12) (62 - 77)  BP: 150/79 (04-29-22 @ 00:12) (136/80 - 165/61)  BP(mean): --  RR: 18 (04-29-22 @ 00:12) (18 - 18)  SpO2: 97% (04-29-22 @ 00:12) (95% - 97%)    Physical Exam:    Constitutional well preserved,comfortable,pleasant    HEENT ,No pallor or icterus    No oral exudate or erythema    Neck supple no JVD or LN    Chest Good AE, CTA B/L    CVS RRR S1 S2 WNL No murmur or rub or gallop    Abd soft BS normal No tenderness no masses    Ext No cyanosis clubbing or edema    IV site no erythema tenderness or discharge    Joints no swelling     CNS AAO X 3 non-focal    Lab Data:                          9.8    5.92  )-----------( 229      ( 28 Apr 2022 06:30 )             29.8       04-28    136  |  105  |  37<H>  ----------------------------<  196<H>  3.9   |  25  |  1.81<H>    Ca    8.5      28 Apr 2022 06:30  Phos  2.8     04-28  Mg     1.7     04-28

## 2022-04-28 NOTE — DISCHARGE NOTE PROVIDER - NSDCFUADDAPPT_GEN_ALL_CORE_FT
It is important to see your primary physician as well as other necessary consultants within the next week to perform a comprehensive medical review.  Call their offices for an appointment as soon as you leave the hospital.  If you do not have a primary physician or cant reach him/her, contact the [facility] Physician Referral Service at [phone].  Your medical issues appear to be stable at this time, but if your symptoms recur or worsen, contact your physicians and/or return to the hospital if necessary.  If you encounter any issues or questions with your medication, call your physicians before stopping the medication.

## 2022-04-28 NOTE — PROGRESS NOTE ADULT - ASSESSMENT
88M with PMHx of T2DM, CKD3, BPH, CVA November 2021 suspect at [facility]) and dementia (also documented & suspected at [facility]) presenting with one day history of SOB and weakness for one day. Patient found to be COVID-19 positive. Patient with acute hypoxic respiratory failure secondary to COVID-19.     #AHRF 2/2 to COVID-19 infection.   -No HYpoxia now. no indication for remdesivir and decadron at this time and also patient has Elevated creatinine.   cont isolation and DVT ppx.     #ALISON-on-CKD3  -worse creatinine likely from iv lasix received in the ER. give gentle hydration. Follow BUN and creatinine tomorrow. avoid nephrotoxic agents.     #BPH  -Continue with Flomax & Finasteride    #T2DM. uncontrolled because of decadron and metformin being on hold.   -hyperglycemia.   start latnus and pre-meal insulin. cont correctional insulin. cont to follow finger sticks.     #Prior CVA  -Continue with high dose statin and baby aspirin    Anemia. trend. No active gross bleeding.     PT consult pending.     I called and discussed his condition with his daughter (HCP) Regina who is a nurse. I discussed with her that patient should not be metformin because of CKD and that his glucose should be liberal at home.   I updated her that he is asymptomatic from COVID at this time.  PT eval pending but daughter would like him to come home where he lives with his brother and has 12 hour a day HHA.     
covid  chest discomfort   initially thought to be hypoxic but has been on room air saturating well   ALISON on CKD     #COVID  Monitor clinically.  Monitor Oxygenation  O2 supplementation only if needed and meets criteria->currently doesnt need   (not needed now) Remdesivir - up to 5 days depending on clinical course despite borderline eGFR (discussed R:B with daughter Regina and patient and are in agreement )   Monitor CBC, CMP daily  Ferritin, CRP, and D dimer q 48 hrs.  IV Dexamethasone 6mg q24hrs x10 days only if hypoxia.  Anticoagulation per protocol.  Treatment options are limited-this as well as limitations of data discussed with pt.  Monitor for any bacterial superinfection/complications.  This tx plan was formulated utilizing my clinical judgement, currently available local/regional anecdotal information, organizational treatment recommendations with COVID-19 specific considerations given rapidly changing information available.     #Chest discomfort  low suspicion for ACS and likely related to covid  monitor for recurrent episodes   troponin 6.3     #ALISON on CKD  creatinine was 1.3-1.6 last admission, now went up to 2.1 and now 1.88.   Continue to monitor   avoid nephrotoxic agents   gentle hydration per medicine    Discussed with hospitalist     Ramiro Harris, DO  Infectious Disease Attending  Reachable via Microsoft Teams or ID office: [phone]  After 5pm/weekends please call [phone] for all inquiries and new consults   
88M with PMHx of T2DM, CKD3, BPH, CVA November 2021 suspect at [facility]) and dementia (also documented & suspected at [facility]) presenting with one day history of SOB and weakness for one day. Patient found to be COVID-19 positive. Patient with acute hypoxic respiratory failure secondary to COVID-19.     Assessment and Plan:     #acute hypoxic respiratory failure POA 2/2 to COVID-19 infection.   elevated d-dimer   -No Hypoxia now. no indication for remdesivir and decadron at this time   cont isolation and DVT ppx.   denies CP/SOB/palpitations   ECHO 2021: pEF, grade 1 DD  EKG: NSR, PAC  Trop neg x1   --obtain V/Q scan  likely positive will treat for PE present on admission       #ALISON-on-CKD3  baseline Cr appears to be 1.3-1.5  -worse creatinine likely from iv lasix received in the ER.   -c/w gentle hydration   -obtain bladder scan, renal US   avoid nephrotoxic meds, dose all meds per eGFR   monitor Cr     #BPH  -Continue with Flomax & Finasteride    #T2DM  -hyperglycemia d/t steroids   A1c 7.9%   continue with lantus, ISS  increased lispro to 4u ac   FS goal 140-180     #H/o CVA  -Continue with statin hold asa with patient on eliquis   #Normocytic anemia   no e/o bleeding or bruising   -follow anemia labs     #Presumed Dementia   supportive care     #Glaucoma --c/w eye drops     Preventative meaures   eliquis   fall precautions   PT: home , patient + for COVID and HHA will bridgette vailble for discharge home 4/29     per my colleague,  daughter would like him to come home where he lives with his brother and has 12 hour a day HHA.     
88M with PMHx of T2DM, CKD3, BPH, CVA November 2021 suspect at [facility]) and dementia (also documented & suspected at [facility]) presenting with one day history of SOB and weakness for one day. Patient found to be COVID-19 positive. Patient with acute hypoxic respiratory failure secondary to COVID-19.     Assessment and Plan:     #acute hypoxic respiratory failure POA 2/2 to COVID-19 infection.   elevated d-dimer   -No Hypoxia now. no indication for remdesivir and decadron at this time   cont isolation and DVT ppx.   denies CP/SOB/palpitations   ECHO 2021: pEF, grade 1 DD  EKG: NSR, PAC  Trop neg x1   --obtain V/Q scan       #ALISON-on-CKD3  baseline Cr appears to be 1.3-1.5  -worse creatinine likely from iv lasix received in the ER.   -c/w gentle hydration   -obtain bladder scan, renal US   avoid nephrotoxic meds, dose all meds per eGFR   monitor Cr     #BPH  -Continue with Flomax & Finasteride    #T2DM  -hyperglycemia d/t steroids   A1c 7.9%   continue with lantus, ISS  increased lispro to 4u ac   FS goal 140-180     #H/o CVA  -Continue with statin and ASA 81mg daily     #Normocytic anemia   no e/o bleeding or bruising   -follow anemia labs     #Presumed Dementia   supportive care     #Glaucoma --c/w eye drops     Preventative meaures   -lovenox SQ-dvt ppx  fall precautions   PT: home , patient + for COVID and HHA won't be resumed until at least 5 days isolation     per my colleague,  daughter would like him to come home where he lives with his brother and has 12 hour a day HHA.     
covid  chest discomfort   initially thought to be hypoxic but has been on room air saturating well   ALISON on CKD     #COVID  Monitor clinically.  Monitor Oxygenation  O2 supplementation only if needed and meets criteria   (not needed now) Remdesivir - up to 5 days depending on clinical course despite borderline eGFR (discussed R:B with daughter Regina and patient and are in agreement )   Monitor CBC, CMP daily  Ferritin, CRP, and D dimer q 48 hrs.  IV Dexamethasone 6mg q24hrs x10 days if hypoxia.  Anticoagulation per protocol.  Treatment options are limited-this as well as limitations of data discussed with pt.  Monitor for any bacterial superinfection/complications.  This tx plan was formulated utilizing my clinical judgement, currently available local/regional anecdotal information, organizational treatment recommendations with COVID-19 specific considerations given rapidly changing information available.     #Chest discomfort  low suspicion for ACS and likely related to covid  monitor for recurrent episodes   troponin 6.3     #ALISON on CKD  creatinine was 1.3-1.6 last admission, now went up to 2.1 and now 1.88.   Continue to monitor   avoid nephrotoxic agents   gentle hydration per medicine    Discussed with hospitalist     Ramiro Harris, DO  Infectious Disease Attending  Reachable via Microsoft Teams or ID office: [phone]  After 5pm/weekends please call [phone] for all inquiries and new consults

## 2022-04-28 NOTE — DISCHARGE NOTE PROVIDER - NSDCCPCAREPLAN_GEN_ALL_CORE_FT
PRINCIPAL DISCHARGE DIAGNOSIS  Diagnosis: Acute respiratory failure with hypoxia  Assessment and Plan of Treatment:       SECONDARY DISCHARGE DIAGNOSES  Diagnosis: 2019 novel coronavirus disease (COVID-19)  Assessment and Plan of Treatment:     Diagnosis: Acute pulmonary embolism  Assessment and Plan of Treatment: present on admission

## 2022-04-28 NOTE — DISCHARGE NOTE PROVIDER - NSDCFUSCHEDAPPT_GEN_ALL_CORE_FT
[facility] Physician [facility]  Cardio Electro [phone]  Scheduled Appointment: 05/04/2022

## 2022-04-28 NOTE — DISCHARGE NOTE PROVIDER - NSDCMRMEDTOKEN_GEN_ALL_CORE_FT
atorvastatin 80 mg oral tablet: 1 tab(s) orally once a day (at bedtime)  dorzolamide-timolol 2%-0.5% preservative-free ophthalmic solution: 1 drop(s) to each affected eye 2 times a day  DULoxetine 30 mg oral delayed release capsule: 1 cap(s) orally once a day  Eliquis Starter Pack for Treatment of DVT and PE 5 mg oral tablet: 2 tab(s) orally every 12 hours  finasteride 5 mg oral tablet: 1 tab(s) orally once a day  gabapentin 300 mg oral capsule: 2 cap(s) orally 2 times a day  LATANOPROST 0.005% EYE DROPS: 1  to each affected eye once a day (at bedtime)  METFORMIN HCL 1,000 MG TABLET: 1 tab(s) orally 2 times a day  tamsulosin 0.4 mg oral capsule: 1 cap(s) orally once a day (at bedtime)   atorvastatin 80 mg oral tablet: 1 tab(s) orally once a day (at bedtime)  cyanocobalamin 1000 mcg oral tablet: 1 tab(s) orally once a day  dorzolamide-timolol 2%-0.5% preservative-free ophthalmic solution: 1 drop(s) to each affected eye 2 times a day  DULoxetine 30 mg oral delayed release capsule: 1 cap(s) orally once a day  Eliquis Starter Pack for Treatment of DVT and PE 5 mg oral tablet: 2 tab(s) orally every 12 hours  finasteride 5 mg oral tablet: 1 tab(s) orally once a day  gabapentin 300 mg oral capsule: 2 cap(s) orally 2 times a day  LATANOPROST 0.005% EYE DROPS: 1  to each affected eye once a day (at bedtime)  METFORMIN HCL 1,000 MG TABLET: 1 tab(s) orally 2 times a day  tamsulosin 0.4 mg oral capsule: 1 cap(s) orally once a day (at bedtime)   atorvastatin 80 mg oral tablet: 1 tab(s) orally once a day (at bedtime)  cyanocobalamin 1000 mcg oral tablet: 1 tab(s) orally once a day  cyanocobalamin 1000 mcg/mL injectable solution: 1000 microgram(s) intramuscularly 3 times a week   dorzolamide-timolol 2%-0.5% preservative-free ophthalmic solution: 1 drop(s) to each affected eye 2 times a day  DULoxetine 30 mg oral delayed release capsule: 1 cap(s) orally once a day  Eliquis Starter Pack for Treatment of DVT and PE 5 mg oral tablet: 2 tab(s) orally every 12 hours  finasteride 5 mg oral tablet: 1 tab(s) orally once a day  gabapentin 300 mg oral capsule: 2 cap(s) orally 2 times a day  LATANOPROST 0.005% EYE DROPS: 1  to each affected eye once a day (at bedtime)  METFORMIN HCL 1,000 MG TABLET: 1 tab(s) orally 2 times a day  tamsulosin 0.4 mg oral capsule: 1 cap(s) orally once a day (at bedtime)

## 2022-04-29 ENCOUNTER — TRANSCRIPTION ENCOUNTER (OUTPATIENT)
Age: 87
End: 2022-04-29

## 2022-04-29 VITALS
TEMPERATURE: 98 F | HEART RATE: 89 BPM | SYSTOLIC BLOOD PRESSURE: 103 MMHG | RESPIRATION RATE: 18 BRPM | DIASTOLIC BLOOD PRESSURE: 66 MMHG | OXYGEN SATURATION: 94 %

## 2022-04-29 LAB — GLUCOSE BLDC GLUCOMTR-MCNC: 204 MG/DL — HIGH (ref 70–99)

## 2022-04-29 PROCEDURE — 99239 HOSP IP/OBS DSCHRG MGMT >30: CPT

## 2022-04-29 RX ORDER — PREGABALIN 225 MG/1
1 CAPSULE ORAL
Qty: 30 | Refills: 0
Start: 2022-04-29 | End: 2022-05-28

## 2022-04-29 RX ORDER — PREGABALIN 225 MG/1
1000 CAPSULE ORAL
Qty: 3 | Refills: 0
Start: 2022-04-29 | End: 2022-05-01

## 2022-04-29 RX ORDER — PREGABALIN 225 MG/1
1000 CAPSULE ORAL DAILY
Refills: 0 | Status: DISCONTINUED | OUTPATIENT
Start: 2022-04-29 | End: 2022-04-29

## 2022-04-29 RX ADMIN — DORZOLAMIDE HYDROCHLORIDE TIMOLOL MALEATE 1 DROP(S): 20; 5 SOLUTION/ DROPS OPHTHALMIC at 05:40

## 2022-04-29 RX ADMIN — Medication 4 UNIT(S): at 08:19

## 2022-04-29 RX ADMIN — Medication 2: at 08:19

## 2022-04-29 RX ADMIN — APIXABAN 10 MILLIGRAM(S): 2.5 TABLET, FILM COATED ORAL at 05:41

## 2022-04-29 NOTE — DISCHARGE NOTE NURSING/CASE MANAGEMENT/SOCIAL WORK - PATIENT PORTAL LINK FT
You can access the FollowMyHealth Patient Portal offered by [facility] by registering at the following website: [email or web address]. By joining [facility]’s FollowMyHealth portal, you will also be able to view your health information using other applications (apps) compatible with our system.

## 2022-04-29 NOTE — DISCHARGE NOTE NURSING/CASE MANAGEMENT/SOCIAL WORK - NSDCPEFALRISK_GEN_ALL_CORE
For information on Fall & Injury Prevention, visit: [email or web address] OR  [email or web address] OR  https://www.cdc.gov/steadi/patient.html

## 2022-04-30 ENCOUNTER — TRANSCRIPTION ENCOUNTER (OUTPATIENT)
Age: 87
End: 2022-04-30

## 2022-05-04 ENCOUNTER — NON-APPOINTMENT (OUTPATIENT)
Age: 87
End: 2022-05-04

## 2022-05-04 ENCOUNTER — APPOINTMENT (OUTPATIENT)
Dept: ELECTROPHYSIOLOGY | Facility: CLINIC | Age: 87
End: 2022-05-04
Payer: MEDICARE

## 2022-05-04 PROCEDURE — G2066: CPT

## 2022-05-04 PROCEDURE — 93298 REM INTERROG DEV EVAL SCRMS: CPT

## 2022-05-09 ENCOUNTER — NON-APPOINTMENT (OUTPATIENT)
Age: 87
End: 2022-05-09

## 2022-05-09 DIAGNOSIS — N18.30 CHRONIC KIDNEY DISEASE, STAGE 3 UNSPECIFIED: ICD-10-CM

## 2022-05-09 DIAGNOSIS — D50.9 IRON DEFICIENCY ANEMIA, UNSPECIFIED: ICD-10-CM

## 2022-05-09 DIAGNOSIS — J96.01 ACUTE RESPIRATORY FAILURE WITH HYPOXIA: ICD-10-CM

## 2022-05-09 DIAGNOSIS — Z86.73 PERSONAL HISTORY OF TRANSIENT ISCHEMIC ATTACK (TIA), AND CEREBRAL INFARCTION WITHOUT RESIDUAL DEFICITS: ICD-10-CM

## 2022-05-09 DIAGNOSIS — E11.65 TYPE 2 DIABETES MELLITUS WITH HYPERGLYCEMIA: ICD-10-CM

## 2022-05-09 DIAGNOSIS — U07.1 COVID-19: ICD-10-CM

## 2022-05-09 DIAGNOSIS — F03.90 UNSPECIFIED DEMENTIA WITHOUT BEHAVIORAL DISTURBANCE: ICD-10-CM

## 2022-05-09 DIAGNOSIS — E11.22 TYPE 2 DIABETES MELLITUS WITH DIABETIC CHRONIC KIDNEY DISEASE: ICD-10-CM

## 2022-05-09 DIAGNOSIS — D63.8 ANEMIA IN OTHER CHRONIC DISEASES CLASSIFIED ELSEWHERE: ICD-10-CM

## 2022-05-09 DIAGNOSIS — H35.30 UNSPECIFIED MACULAR DEGENERATION: ICD-10-CM

## 2022-05-09 DIAGNOSIS — N40.0 BENIGN PROSTATIC HYPERPLASIA WITHOUT LOWER URINARY TRACT SYMPTOMS: ICD-10-CM

## 2022-05-09 DIAGNOSIS — D63.1 ANEMIA IN CHRONIC KIDNEY DISEASE: ICD-10-CM

## 2022-05-09 DIAGNOSIS — N17.9 ACUTE KIDNEY FAILURE, UNSPECIFIED: ICD-10-CM

## 2022-05-09 DIAGNOSIS — Z79.84 LONG TERM (CURRENT) USE OF ORAL HYPOGLYCEMIC DRUGS: ICD-10-CM

## 2022-05-09 DIAGNOSIS — I26.99 OTHER PULMONARY EMBOLISM WITHOUT ACUTE COR PULMONALE: ICD-10-CM

## 2022-05-09 DIAGNOSIS — H40.9 UNSPECIFIED GLAUCOMA: ICD-10-CM

## 2022-05-09 DIAGNOSIS — E11.40 TYPE 2 DIABETES MELLITUS WITH DIABETIC NEUROPATHY, UNSPECIFIED: ICD-10-CM

## 2022-05-11 ENCOUNTER — RX RENEWAL (OUTPATIENT)
Age: 87
End: 2022-05-11

## 2022-05-19 ENCOUNTER — APPOINTMENT (OUTPATIENT)
Dept: FAMILY MEDICINE | Facility: CLINIC | Age: 87
End: 2022-05-19
Payer: MEDICARE

## 2022-05-19 PROCEDURE — 99213 OFFICE O/P EST LOW 20 MIN: CPT | Mod: 95

## 2022-05-27 ENCOUNTER — EMERGENCY (EMERGENCY)
Facility: HOSPITAL | Age: 87
LOS: 0 days | Discharge: HOME | End: 2022-05-28
Attending: STUDENT IN AN ORGANIZED HEALTH CARE EDUCATION/TRAINING PROGRAM
Payer: MEDICARE

## 2022-05-27 VITALS
RESPIRATION RATE: 18 BRPM | OXYGEN SATURATION: 98 % | DIASTOLIC BLOOD PRESSURE: 75 MMHG | HEIGHT: 69 IN | TEMPERATURE: 98 F | SYSTOLIC BLOOD PRESSURE: 137 MMHG | WEIGHT: 184.97 LBS | HEART RATE: 103 BPM

## 2022-05-27 DIAGNOSIS — K59.00 CONSTIPATION, UNSPECIFIED: ICD-10-CM

## 2022-05-27 DIAGNOSIS — Z86.16 PERSONAL HISTORY OF COVID-19: ICD-10-CM

## 2022-05-27 DIAGNOSIS — E11.22 TYPE 2 DIABETES MELLITUS WITH DIABETIC CHRONIC KIDNEY DISEASE: ICD-10-CM

## 2022-05-27 DIAGNOSIS — J96.01 ACUTE RESPIRATORY FAILURE WITH HYPOXIA: ICD-10-CM

## 2022-05-27 DIAGNOSIS — N40.0 BENIGN PROSTATIC HYPERPLASIA WITHOUT LOWER URINARY TRACT SYMPTOMS: ICD-10-CM

## 2022-05-27 DIAGNOSIS — K62.89 OTHER SPECIFIED DISEASES OF ANUS AND RECTUM: ICD-10-CM

## 2022-05-27 DIAGNOSIS — F03.90 UNSPECIFIED DEMENTIA WITHOUT BEHAVIORAL DISTURBANCE: ICD-10-CM

## 2022-05-27 DIAGNOSIS — R33.9 RETENTION OF URINE, UNSPECIFIED: ICD-10-CM

## 2022-05-27 DIAGNOSIS — R25.1 TREMOR, UNSPECIFIED: ICD-10-CM

## 2022-05-27 DIAGNOSIS — Z79.01 LONG TERM (CURRENT) USE OF ANTICOAGULANTS: ICD-10-CM

## 2022-05-27 DIAGNOSIS — Z20.822 CONTACT WITH AND (SUSPECTED) EXPOSURE TO COVID-19: ICD-10-CM

## 2022-05-27 DIAGNOSIS — Z88.0 ALLERGY STATUS TO PENICILLIN: ICD-10-CM

## 2022-05-27 DIAGNOSIS — Z79.84 LONG TERM (CURRENT) USE OF ORAL HYPOGLYCEMIC DRUGS: ICD-10-CM

## 2022-05-27 DIAGNOSIS — I26.99 OTHER PULMONARY EMBOLISM WITHOUT ACUTE COR PULMONALE: ICD-10-CM

## 2022-05-27 DIAGNOSIS — Z98.890 OTHER SPECIFIED POSTPROCEDURAL STATES: Chronic | ICD-10-CM

## 2022-05-27 DIAGNOSIS — E11.40 TYPE 2 DIABETES MELLITUS WITH DIABETIC NEUROPATHY, UNSPECIFIED: ICD-10-CM

## 2022-05-27 DIAGNOSIS — N18.30 CHRONIC KIDNEY DISEASE, STAGE 3 UNSPECIFIED: ICD-10-CM

## 2022-05-27 DIAGNOSIS — Z86.73 PERSONAL HISTORY OF TRANSIENT ISCHEMIC ATTACK (TIA), AND CEREBRAL INFARCTION WITHOUT RESIDUAL DEFICITS: ICD-10-CM

## 2022-05-27 DIAGNOSIS — R00.0 TACHYCARDIA, UNSPECIFIED: ICD-10-CM

## 2022-05-27 LAB
ALBUMIN SERPL ELPH-MCNC: 3.1 G/DL — LOW (ref 3.3–5)
ALP SERPL-CCNC: 79 U/L — SIGNIFICANT CHANGE UP (ref 40–120)
ALT FLD-CCNC: 32 U/L — SIGNIFICANT CHANGE UP (ref 12–78)
ANION GAP SERPL CALC-SCNC: 9 MMOL/L — SIGNIFICANT CHANGE UP (ref 5–17)
APPEARANCE UR: CLEAR — SIGNIFICANT CHANGE UP
AST SERPL-CCNC: 16 U/L — SIGNIFICANT CHANGE UP (ref 15–37)
BASOPHILS # BLD AUTO: 0.03 K/UL — SIGNIFICANT CHANGE UP (ref 0–0.2)
BASOPHILS NFR BLD AUTO: 0.3 % — SIGNIFICANT CHANGE UP (ref 0–2)
BILIRUB SERPL-MCNC: 0.3 MG/DL — SIGNIFICANT CHANGE UP (ref 0.2–1.2)
BILIRUB UR-MCNC: NEGATIVE — SIGNIFICANT CHANGE UP
BUN SERPL-MCNC: 32 MG/DL — HIGH (ref 7–23)
CALCIUM SERPL-MCNC: 9.1 MG/DL — SIGNIFICANT CHANGE UP (ref 8.5–10.1)
CHLORIDE SERPL-SCNC: 106 MMOL/L — SIGNIFICANT CHANGE UP (ref 96–108)
CO2 SERPL-SCNC: 25 MMOL/L — SIGNIFICANT CHANGE UP (ref 22–31)
COLOR SPEC: YELLOW — SIGNIFICANT CHANGE UP
CREAT SERPL-MCNC: 1.78 MG/DL — HIGH (ref 0.5–1.3)
DIFF PNL FLD: NEGATIVE — SIGNIFICANT CHANGE UP
EGFR: 36 ML/MIN/1.73M2 — LOW
EOSINOPHIL # BLD AUTO: 0.26 K/UL — SIGNIFICANT CHANGE UP (ref 0–0.5)
EOSINOPHIL NFR BLD AUTO: 2.9 % — SIGNIFICANT CHANGE UP (ref 0–6)
FLUAV AG NPH QL: SIGNIFICANT CHANGE UP
FLUBV AG NPH QL: SIGNIFICANT CHANGE UP
GLUCOSE SERPL-MCNC: 168 MG/DL — HIGH (ref 70–99)
GLUCOSE UR QL: NEGATIVE MG/DL — SIGNIFICANT CHANGE UP
HCT VFR BLD CALC: 27.9 % — LOW (ref 39–50)
HGB BLD-MCNC: 9.2 G/DL — LOW (ref 13–17)
IMM GRANULOCYTES NFR BLD AUTO: 0.3 % — SIGNIFICANT CHANGE UP (ref 0–1.5)
KETONES UR-MCNC: NEGATIVE — SIGNIFICANT CHANGE UP
LEUKOCYTE ESTERASE UR-ACNC: NEGATIVE — SIGNIFICANT CHANGE UP
LIDOCAIN IGE QN: 236 U/L — SIGNIFICANT CHANGE UP (ref 73–393)
LYMPHOCYTES # BLD AUTO: 1.58 K/UL — SIGNIFICANT CHANGE UP (ref 1–3.3)
LYMPHOCYTES # BLD AUTO: 17.5 % — SIGNIFICANT CHANGE UP (ref 13–44)
MCHC RBC-ENTMCNC: 29.3 PG — SIGNIFICANT CHANGE UP (ref 27–34)
MCHC RBC-ENTMCNC: 33 G/DL — SIGNIFICANT CHANGE UP (ref 32–36)
MCV RBC AUTO: 88.9 FL — SIGNIFICANT CHANGE UP (ref 80–100)
MONOCYTES # BLD AUTO: 0.78 K/UL — SIGNIFICANT CHANGE UP (ref 0–0.9)
MONOCYTES NFR BLD AUTO: 8.7 % — SIGNIFICANT CHANGE UP (ref 2–14)
NEUTROPHILS # BLD AUTO: 6.33 K/UL — SIGNIFICANT CHANGE UP (ref 1.8–7.4)
NEUTROPHILS NFR BLD AUTO: 70.3 % — SIGNIFICANT CHANGE UP (ref 43–77)
NITRITE UR-MCNC: NEGATIVE — SIGNIFICANT CHANGE UP
NRBC # BLD: 0 /100 WBCS — SIGNIFICANT CHANGE UP (ref 0–0)
PH UR: 6.5 — SIGNIFICANT CHANGE UP (ref 5–8)
PLATELET # BLD AUTO: 271 K/UL — SIGNIFICANT CHANGE UP (ref 150–400)
POTASSIUM SERPL-MCNC: 4.1 MMOL/L — SIGNIFICANT CHANGE UP (ref 3.5–5.3)
POTASSIUM SERPL-SCNC: 4.1 MMOL/L — SIGNIFICANT CHANGE UP (ref 3.5–5.3)
PROT SERPL-MCNC: 7.5 GM/DL — SIGNIFICANT CHANGE UP (ref 6–8.3)
PROT UR-MCNC: NEGATIVE MG/DL — SIGNIFICANT CHANGE UP
RBC # BLD: 3.14 M/UL — LOW (ref 4.2–5.8)
RBC # FLD: 14 % — SIGNIFICANT CHANGE UP (ref 10.3–14.5)
SARS-COV-2 RNA SPEC QL NAA+PROBE: SIGNIFICANT CHANGE UP
SODIUM SERPL-SCNC: 140 MMOL/L — SIGNIFICANT CHANGE UP (ref 135–145)
SP GR SPEC: 1.01 — SIGNIFICANT CHANGE UP (ref 1.01–1.02)
UROBILINOGEN FLD QL: NEGATIVE MG/DL — SIGNIFICANT CHANGE UP
WBC # BLD: 9.01 K/UL — SIGNIFICANT CHANGE UP (ref 3.8–10.5)
WBC # FLD AUTO: 9.01 K/UL — SIGNIFICANT CHANGE UP (ref 3.8–10.5)

## 2022-05-27 PROCEDURE — 93010 ELECTROCARDIOGRAM REPORT: CPT

## 2022-05-27 PROCEDURE — 74176 CT ABD & PELVIS W/O CONTRAST: CPT | Mod: 26,MA

## 2022-05-27 PROCEDURE — 99285 EMERGENCY DEPT VISIT HI MDM: CPT

## 2022-05-27 RX ORDER — SODIUM CHLORIDE 9 MG/ML
1000 INJECTION INTRAMUSCULAR; INTRAVENOUS; SUBCUTANEOUS ONCE
Refills: 0 | Status: COMPLETED | OUTPATIENT
Start: 2022-05-27 | End: 2022-05-27

## 2022-05-27 RX ADMIN — SODIUM CHLORIDE 1000 MILLILITER(S): 9 INJECTION INTRAMUSCULAR; INTRAVENOUS; SUBCUTANEOUS at 21:48

## 2022-05-27 NOTE — ED ADULT TRIAGE NOTE - IDEAL BODY WEIGHT(KG)
Subjective:      Patient ID: Kevin Kennedy is a 45 y.o. male. HPI  Is here for follow. He has h/o hypogonadism- currently on testosterone shots   He feels well. Has better energy and better libido     Review of Systems   Constitutional: Negative. Negative for fatigue and fever. Respiratory: Negative for cough, chest tightness, shortness of breath and wheezing. Cardiovascular: Negative for chest pain and palpitations. Gastrointestinal: Negative for abdominal pain, blood in stool, diarrhea, nausea and vomiting. Objective:   Physical Exam  Constitutional:       Appearance: He is well-developed. HENT:      Head: Normocephalic and atraumatic. Eyes:      Pupils: Pupils are equal, round, and reactive to light. Neck:      Musculoskeletal: Normal range of motion and neck supple. Thyroid: No thyromegaly. Cardiovascular:      Rate and Rhythm: Normal rate and regular rhythm. Heart sounds: Normal heart sounds. No murmur. No friction rub. No gallop. Comments: No carotid bruit  Pulmonary:      Effort: Pulmonary effort is normal. No respiratory distress. Breath sounds: Normal breath sounds. No wheezing or rales. Chest:      Chest wall: No tenderness. Abdominal:      General: Bowel sounds are normal. There is no distension. Palpations: Abdomen is soft. There is no mass. Tenderness: There is no abdominal tenderness. There is no guarding or rebound. Neurological:      Mental Status: He is alert and oriented to person, place, and time. Assessment:       Diagnosis Orders   1. Hypogonadism in male  Testosterone   2.  Varicose veins of both lower extremities, unspecified whether complicated             Plan:      Continue testosterone shots   Check level 1 week after shot     Avoid prolonged standing  Wear tight stocking   See vascular surgeon        Alex House MD 71

## 2022-05-27 NOTE — ED ADULT TRIAGE NOTE - CHIEF COMPLAINT QUOTE
hx of PE, CVA, BPH, DM PW urinary retention and constipation x 3 days given Miralax by son 3 hrs ago not effective. pt denies pain.

## 2022-05-27 NOTE — ED ADULT NURSE NOTE - OBJECTIVE STATEMENT
Patient received at bed 6. Patient A&Ox4. Patient reports lower and mid abdominal pain and not being able to urinate since this morning. Patient reports abdominal cramping and pain, patient denies difficulty urinating prior to that, denies fevers, headache and sob. Patient denies vomiting and diarrhea. Patient denies chest pain. Patient denies retention happening to him previously. Patient has PMH of PE, BPH.

## 2022-05-28 ENCOUNTER — INPATIENT (INPATIENT)
Facility: HOSPITAL | Age: 87
LOS: 2 days | Discharge: ANOTHER HOSPITAL | End: 2022-05-31
Attending: GENERAL ACUTE CARE HOSPITAL | Admitting: GENERAL ACUTE CARE HOSPITAL
Payer: MEDICARE

## 2022-05-28 ENCOUNTER — RX RENEWAL (OUTPATIENT)
Age: 87
End: 2022-05-28

## 2022-05-28 VITALS
HEART RATE: 97 BPM | TEMPERATURE: 99 F | OXYGEN SATURATION: 95 % | RESPIRATION RATE: 18 BRPM | SYSTOLIC BLOOD PRESSURE: 150 MMHG | DIASTOLIC BLOOD PRESSURE: 78 MMHG

## 2022-05-28 VITALS
SYSTOLIC BLOOD PRESSURE: 152 MMHG | RESPIRATION RATE: 16 BRPM | TEMPERATURE: 98 F | HEART RATE: 118 BPM | HEIGHT: 69 IN | DIASTOLIC BLOOD PRESSURE: 104 MMHG | WEIGHT: 175.05 LBS

## 2022-05-28 DIAGNOSIS — Z98.890 OTHER SPECIFIED POSTPROCEDURAL STATES: Chronic | ICD-10-CM

## 2022-05-28 LAB
ALBUMIN SERPL ELPH-MCNC: 3.2 G/DL — LOW (ref 3.3–5)
ALP SERPL-CCNC: 85 U/L — SIGNIFICANT CHANGE UP (ref 40–120)
ALT FLD-CCNC: 25 U/L — SIGNIFICANT CHANGE UP (ref 12–78)
ANION GAP SERPL CALC-SCNC: 9 MMOL/L — SIGNIFICANT CHANGE UP (ref 5–17)
AST SERPL-CCNC: 16 U/L — SIGNIFICANT CHANGE UP (ref 15–37)
BASOPHILS # BLD AUTO: 0.03 K/UL — SIGNIFICANT CHANGE UP (ref 0–0.2)
BASOPHILS NFR BLD AUTO: 0.3 % — SIGNIFICANT CHANGE UP (ref 0–2)
BILIRUB SERPL-MCNC: 0.6 MG/DL — SIGNIFICANT CHANGE UP (ref 0.2–1.2)
BUN SERPL-MCNC: 30 MG/DL — HIGH (ref 7–23)
CALCIUM SERPL-MCNC: 9.1 MG/DL — SIGNIFICANT CHANGE UP (ref 8.5–10.1)
CHLORIDE SERPL-SCNC: 110 MMOL/L — HIGH (ref 96–108)
CO2 SERPL-SCNC: 23 MMOL/L — SIGNIFICANT CHANGE UP (ref 22–31)
CREAT SERPL-MCNC: 1.97 MG/DL — HIGH (ref 0.5–1.3)
EGFR: 32 ML/MIN/1.73M2 — LOW
EOSINOPHIL # BLD AUTO: 0.12 K/UL — SIGNIFICANT CHANGE UP (ref 0–0.5)
EOSINOPHIL NFR BLD AUTO: 1.3 % — SIGNIFICANT CHANGE UP (ref 0–6)
FLUAV AG NPH QL: SIGNIFICANT CHANGE UP
FLUBV AG NPH QL: SIGNIFICANT CHANGE UP
GLUCOSE BLDC GLUCOMTR-MCNC: 166 MG/DL — HIGH (ref 70–99)
GLUCOSE SERPL-MCNC: 189 MG/DL — HIGH (ref 70–99)
HCT VFR BLD CALC: 29.8 % — LOW (ref 39–50)
HGB BLD-MCNC: 9.7 G/DL — LOW (ref 13–17)
IMM GRANULOCYTES NFR BLD AUTO: 0.5 % — SIGNIFICANT CHANGE UP (ref 0–1.5)
LYMPHOCYTES # BLD AUTO: 1.45 K/UL — SIGNIFICANT CHANGE UP (ref 1–3.3)
LYMPHOCYTES # BLD AUTO: 15.7 % — SIGNIFICANT CHANGE UP (ref 13–44)
MAGNESIUM SERPL-MCNC: 2.2 MG/DL — SIGNIFICANT CHANGE UP (ref 1.6–2.6)
MCHC RBC-ENTMCNC: 29.5 PG — SIGNIFICANT CHANGE UP (ref 27–34)
MCHC RBC-ENTMCNC: 32.6 G/DL — SIGNIFICANT CHANGE UP (ref 32–36)
MCV RBC AUTO: 90.6 FL — SIGNIFICANT CHANGE UP (ref 80–100)
MONOCYTES # BLD AUTO: 0.9 K/UL — SIGNIFICANT CHANGE UP (ref 0–0.9)
MONOCYTES NFR BLD AUTO: 9.8 % — SIGNIFICANT CHANGE UP (ref 2–14)
NEUTROPHILS # BLD AUTO: 6.67 K/UL — SIGNIFICANT CHANGE UP (ref 1.8–7.4)
NEUTROPHILS NFR BLD AUTO: 72.4 % — SIGNIFICANT CHANGE UP (ref 43–77)
NRBC # BLD: 0 /100 WBCS — SIGNIFICANT CHANGE UP (ref 0–0)
PLATELET # BLD AUTO: 275 K/UL — SIGNIFICANT CHANGE UP (ref 150–400)
POTASSIUM SERPL-MCNC: 4.1 MMOL/L — SIGNIFICANT CHANGE UP (ref 3.5–5.3)
POTASSIUM SERPL-SCNC: 4.1 MMOL/L — SIGNIFICANT CHANGE UP (ref 3.5–5.3)
PROT SERPL-MCNC: 7.7 GM/DL — SIGNIFICANT CHANGE UP (ref 6–8.3)
RBC # BLD: 3.29 M/UL — LOW (ref 4.2–5.8)
RBC # FLD: 14.1 % — SIGNIFICANT CHANGE UP (ref 10.3–14.5)
SARS-COV-2 RNA SPEC QL NAA+PROBE: DETECTED
SODIUM SERPL-SCNC: 142 MMOL/L — SIGNIFICANT CHANGE UP (ref 135–145)
TSH SERPL-MCNC: 1.32 UU/ML — SIGNIFICANT CHANGE UP (ref 0.36–3.74)
WBC # BLD: 9.22 K/UL — SIGNIFICANT CHANGE UP (ref 3.8–10.5)
WBC # FLD AUTO: 9.22 K/UL — SIGNIFICANT CHANGE UP (ref 3.8–10.5)

## 2022-05-28 PROCEDURE — 99223 1ST HOSP IP/OBS HIGH 75: CPT

## 2022-05-28 PROCEDURE — 71045 X-RAY EXAM CHEST 1 VIEW: CPT | Mod: 26

## 2022-05-28 PROCEDURE — 70450 CT HEAD/BRAIN W/O DYE: CPT | Mod: 26,MA

## 2022-05-28 PROCEDURE — 99285 EMERGENCY DEPT VISIT HI MDM: CPT

## 2022-05-28 RX ORDER — BACLOFEN 100 %
2.5 POWDER (GRAM) MISCELLANEOUS EVERY 8 HOURS
Refills: 0 | Status: DISCONTINUED | OUTPATIENT
Start: 2022-05-28 | End: 2022-05-31

## 2022-05-28 RX ORDER — TAMSULOSIN HYDROCHLORIDE 0.4 MG/1
0.4 CAPSULE ORAL AT BEDTIME
Refills: 0 | Status: DISCONTINUED | OUTPATIENT
Start: 2022-05-28 | End: 2022-05-31

## 2022-05-28 RX ORDER — GABAPENTIN 400 MG/1
200 CAPSULE ORAL
Refills: 0 | Status: DISCONTINUED | OUTPATIENT
Start: 2022-05-28 | End: 2022-05-29

## 2022-05-28 RX ORDER — APIXABAN 2.5 MG/1
2.5 TABLET, FILM COATED ORAL
Refills: 0 | Status: DISCONTINUED | OUTPATIENT
Start: 2022-05-28 | End: 2022-05-31

## 2022-05-28 RX ORDER — ACETAMINOPHEN 500 MG
650 TABLET ORAL EVERY 6 HOURS
Refills: 0 | Status: DISCONTINUED | OUTPATIENT
Start: 2022-05-28 | End: 2022-05-28

## 2022-05-28 RX ORDER — POLYETHYLENE GLYCOL 3350 17 G/17G
17 POWDER, FOR SOLUTION ORAL
Qty: 119 | Refills: 0
Start: 2022-05-28 | End: 2022-06-03

## 2022-05-28 RX ORDER — METRONIDAZOLE 500 MG
500 TABLET ORAL ONCE
Refills: 0 | Status: DISCONTINUED | OUTPATIENT
Start: 2022-05-28 | End: 2022-05-28

## 2022-05-28 RX ORDER — POLYETHYLENE GLYCOL 3350 17 G/17G
17 POWDER, FOR SOLUTION ORAL
Refills: 0 | Status: DISCONTINUED | OUTPATIENT
Start: 2022-05-28 | End: 2022-05-31

## 2022-05-28 RX ORDER — SENNA PLUS 8.6 MG/1
2 TABLET ORAL AT BEDTIME
Refills: 0 | Status: DISCONTINUED | OUTPATIENT
Start: 2022-05-28 | End: 2022-05-30

## 2022-05-28 RX ORDER — DORZOLAMIDE HYDROCHLORIDE TIMOLOL MALEATE 20; 5 MG/ML; MG/ML
1 SOLUTION/ DROPS OPHTHALMIC
Refills: 0 | Status: DISCONTINUED | OUTPATIENT
Start: 2022-05-28 | End: 2022-05-31

## 2022-05-28 RX ORDER — DULOXETINE HYDROCHLORIDE 30 MG/1
30 CAPSULE, DELAYED RELEASE ORAL DAILY
Refills: 0 | Status: DISCONTINUED | OUTPATIENT
Start: 2022-05-28 | End: 2022-05-31

## 2022-05-28 RX ORDER — ATORVASTATIN CALCIUM 80 MG/1
80 TABLET, FILM COATED ORAL AT BEDTIME
Refills: 0 | Status: DISCONTINUED | OUTPATIENT
Start: 2022-05-28 | End: 2022-05-31

## 2022-05-28 RX ORDER — GABAPENTIN 400 MG/1
2 CAPSULE ORAL
Qty: 120 | Refills: 0
Start: 2022-05-28 | End: 2022-06-26

## 2022-05-28 RX ORDER — METRONIDAZOLE 500 MG
1 TABLET ORAL
Qty: 14 | Refills: 0
Start: 2022-05-28 | End: 2022-06-03

## 2022-05-28 RX ORDER — PREGABALIN 225 MG/1
1000 CAPSULE ORAL DAILY
Refills: 0 | Status: DISCONTINUED | OUTPATIENT
Start: 2022-05-28 | End: 2022-05-29

## 2022-05-28 RX ORDER — TAMSULOSIN HYDROCHLORIDE 0.4 MG/1
1 CAPSULE ORAL
Qty: 30 | Refills: 0
Start: 2022-05-28 | End: 2022-06-26

## 2022-05-28 RX ORDER — HEPARIN SODIUM 5000 [USP'U]/ML
5000 INJECTION INTRAVENOUS; SUBCUTANEOUS EVERY 12 HOURS
Refills: 0 | Status: DISCONTINUED | OUTPATIENT
Start: 2022-05-28 | End: 2022-05-28

## 2022-05-28 RX ORDER — BACLOFEN 100 %
1 POWDER (GRAM) MISCELLANEOUS
Qty: 14 | Refills: 0
Start: 2022-05-28 | End: 2022-06-03

## 2022-05-28 RX ORDER — ACETAMINOPHEN 500 MG
650 TABLET ORAL EVERY 6 HOURS
Refills: 0 | Status: DISCONTINUED | OUTPATIENT
Start: 2022-05-28 | End: 2022-05-31

## 2022-05-28 RX ORDER — FINASTERIDE 5 MG/1
5 TABLET, FILM COATED ORAL DAILY
Refills: 0 | Status: DISCONTINUED | OUTPATIENT
Start: 2022-05-28 | End: 2022-05-31

## 2022-05-28 RX ORDER — MOXIFLOXACIN HYDROCHLORIDE TABLETS, 400 MG 400 MG/1
1 TABLET, FILM COATED ORAL
Qty: 14 | Refills: 0
Start: 2022-05-28 | End: 2022-06-03

## 2022-05-28 RX ORDER — CIPROFLOXACIN LACTATE 400MG/40ML
400 VIAL (ML) INTRAVENOUS ONCE
Refills: 0 | Status: DISCONTINUED | OUTPATIENT
Start: 2022-05-28 | End: 2022-05-28

## 2022-05-28 RX ORDER — BACLOFEN 100 %
10 POWDER (GRAM) MISCELLANEOUS ONCE
Refills: 0 | Status: COMPLETED | OUTPATIENT
Start: 2022-05-28 | End: 2022-05-28

## 2022-05-28 RX ORDER — SODIUM CHLORIDE 9 MG/ML
1000 INJECTION, SOLUTION INTRAVENOUS
Refills: 0 | Status: DISCONTINUED | OUTPATIENT
Start: 2022-05-28 | End: 2022-05-30

## 2022-05-28 RX ORDER — LATANOPROST 0.05 MG/ML
1 SOLUTION/ DROPS OPHTHALMIC; TOPICAL AT BEDTIME
Refills: 0 | Status: DISCONTINUED | OUTPATIENT
Start: 2022-05-28 | End: 2022-05-31

## 2022-05-28 RX ADMIN — Medication 650 MILLIGRAM(S): at 22:24

## 2022-05-28 RX ADMIN — Medication 1 ENEMA: at 01:34

## 2022-05-28 RX ADMIN — HEPARIN SODIUM 5000 UNIT(S): 5000 INJECTION INTRAVENOUS; SUBCUTANEOUS at 18:13

## 2022-05-28 RX ADMIN — DORZOLAMIDE HYDROCHLORIDE TIMOLOL MALEATE 1 DROP(S): 20; 5 SOLUTION/ DROPS OPHTHALMIC at 18:13

## 2022-05-28 RX ADMIN — Medication 10 MILLIGRAM(S): at 13:05

## 2022-05-28 NOTE — ED PROVIDER NOTE - NS ED ROS FT
Constitutional: See HPI.  Eyes: No visual changes, eye pain or discharge. No Photophobia  ENMT: No neck pain or stiffness. No limited ROM  Cardiac: No SOB or edema. No chest pain with exertion.  Respiratory: No cough or respiratory distress.   GI: see hpi   : see hpi   MS: No myalgia, muscle weakness, joint pain or back pain.  Neuro: No headache or weakness. No LOC.  Skin: No skin rash.  Except as documented in the HPI, all other systems are negative.

## 2022-05-28 NOTE — ED PROVIDER NOTE - OBJECTIVE STATEMENT
88m hx dementia and neuropathy on neurontin 600 bid since 1 week pw several days of tremors vs clonus. pt seen 2 days ago for urinary retention, sent home with jaramillo

## 2022-05-28 NOTE — H&P ADULT - ASSESSMENT
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 Started after Neurontin dose was increased.   Patient seen 2 days ago for urinary retention, sent home with ella acevedo, now family returns requesting to speak to Neurology about tremors.       Plan: Admit to medicine for tremors. Will decrease Neurontin to 200 mg bid from 600 mg bid. Neurology was called by ER, will seen in AM.  CT head notes atrophy, no acute findings. PT eval. Speech and Swallow eval in AM, has difficulty swallowing with tremors, NPO  except for meds for now.  UA negative for infection, Flu and Covid negative.     Mild ALISON on CKD III, creatinine 1.97, last was 1.78. Will hydrate and follow in AM.      Continue home meds orally as tolerated: Lipitor, Vitamin B-12 (level in AM), Proscar, Flomax, Duloxetine.  Has jaramillo in place, was   placed 2 days ago.  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 Started after Neurontin dose was increased.   Patient seen 2 days ago for urinary retention, sent home with ella acevedo, now family returns requesting to speak to Neurology about tremors.       Plan: Admit to medicine for tremors. Will decrease Neurontin to 200 mg bid from 600 mg bid. Neurology was called by ER, will seen in AM.  CT head notes atrophy, no acute findings. PT eval. Speech and Swallow eval in AM, has difficulty swallowing with tremors, NPO  except for meds for now.  UA negative for infection, Flu and Covid negative.     Mild ALISON on CKD III, creatinine 1.97, last was 1.78. Will hydrate and follow in AM.      DM: SSC for now while NPO. Consider daily Lantus when eating.      Continue home meds orally as tolerated: Eliquis 2.5mg bid for PE from COVID one month ago, Lipitor, Vitamin B-12 (level in AM), Proscar, Flomax, Duloxetine.  Has ella in place, was placed 2 days ago.

## 2022-05-28 NOTE — ED PROVIDER NOTE - CLINICAL SUMMARY MEDICAL DECISION MAKING FREE TEXT BOX
spasticity. likely neurontin mediated. spasticity. likely neurontin mediated. now at 900 bid. rec reduce to 600 bid. start baclofen prn. start flomax for jaramillo. uro fu spasticity. likely neurontin mediated. now at 900 bid. rec reduce to 600 bid. start baclofen prn. start flomax for jaramillo. admit for neuro work up

## 2022-05-28 NOTE — ED ADULT NURSE REASSESSMENT NOTE - NS ED NURSE REASSESS COMMENT FT1
spoke with Dr Stanton made aware of coughing with swallowing, I will continue to keep PT NPO MD will order shortly and reevaluate tomm to determine if etiology is tremors, will order fluids shortly, informed faily of the plan

## 2022-05-28 NOTE — ED PROVIDER NOTE - PHYSICAL EXAMINATION
GENERAL: Pt lying in bed uncomfortable due to tremors  HEENT:  Atraumatic, EOMI, dry MM  NECK: Supple  CHEST/LUNG: Clear to auscultation bilaterally  HEART: Regular rate and rhythm  ABDOMEN: Bowel sounds present; Soft, Nontender, Nondistended.   EXTREMITIES:  2+ Peripheral Pulses, trace pedal edema  NEUROLOGICAL:  Alert & Oriented, grossly nonfocal exam  MSK: no overt deformity  SKIN: warm, dry

## 2022-05-28 NOTE — ED PROVIDER NOTE - PATIENT PORTAL LINK FT
[] : Resident
You can access the FollowMyHealth Patient Portal offered by [facility] by registering at the following website: [email or web address]. By joining [employer]’s FollowMyHealth portal, you will also be able to view your health information using other applications (apps) compatible with our system.

## 2022-05-28 NOTE — ED PROVIDER NOTE - PATIENT PORTAL LINK FT
You can access the FollowMyHealth Patient Portal offered by [facility] by registering at the following website: [email or web address]. By joining [employer] [facility]’s FollowMyHealth portal, you will also be able to view your health information using other applications (apps) compatible with our system.

## 2022-05-28 NOTE — H&P ADULT - HISTORY OF PRESENT ILLNESS
88 year old male PMH dementia, neuropathy on Neurontin 600 bid p/w several days of tremors vs clonus.  Patien seen 2 days ago for urinary retention, sent home with jaramillo  bag, now family returns requesting to speak to Neurology about tremors.  88 year old male PMH dementia, BPH, DM neuropathy on Neurontin 600 bid, CKD III, prior CVA, p/w several days of tremors vs clonus.  Patient seen 2 days ago for urinary retention, sent home with jaramillo cath, now family returns requesting to speak to Neurology about tremors.  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 Started after Neurontin dose was increased.   Patient seen 2 days ago for urinary retention, sent home with jaramillo cath, now family returns requesting to speak to Neurology about tremors.

## 2022-05-28 NOTE — CONSULT NOTE ADULT - ASSESSMENT
87 y/o M with PMHx of T2DM, CKD3, BPH, CVA and dementia recently 4/25 - 4/29 admitted acute hypoxic respiratory failure POA 2/2 to COVID-19 infection, ALISON on CKD, found w/ high probability of PE started on Eliquis presents to the ED c/o tremors, urinary retention, concern for sepsis medicine consulted for admission    1) Tremors  - SE of Neurontin which was recently re-started on Sunday per son; pt previously on it however had not taken it since early April  - discontinue; pt reports was not even helping him at all  - may give low dose Benztropine x1 if not contraindicated  - tremors should improve on own over time after medication cleared from system    2) Urinary rentention  Pt w/ known hx of BPH  - Diez placed in ED  - CT w/ ? proctitis, clinically does not have acute proctitis, UA clean   - c/w Flomax and finasteride  - outpt f/u w/ Urology for TOV; pls provide appt    3) Constipation/fecal impaction  - Medication induced, dec mobility at home  - pt needs to be disimpacted prior to being discharged  - discharge on PO laxatives    Case discussed w/ Dr Mccoy, clinically pt does not presently require hospitalization, no clinical sns of sepsis or bacteremia  Also discussed w/ son, may call EMS if pt w/ worsening sxs, sns of CVA, fever or chills, change in MS

## 2022-05-28 NOTE — ED PROVIDER NOTE - NSFOLLOWUPINSTRUCTIONS_ED_ALL_ED_FT
Follow up with the UROLOGIST as previously instructed.    Take the TAMSULOSIN to help with urinary flow.    Take the BACLOFEN as needed for spasticity or tremors.    Call the NEUROLOGIST for a follow up appointment.    Decrease the NEURONTIN to 600 twice daily from 900 twice daily.

## 2022-05-28 NOTE — ED PROVIDER NOTE - CLINICAL SUMMARY MEDICAL DECISION MAKING FREE TEXT BOX
acute urinary retention   ?Proctitis   f/u with outpatient urology for TOV  antibiotics for questionable proctitis   Miralax script   fleet enema

## 2022-05-28 NOTE — ED PROVIDER NOTE - NSFOLLOWUPINSTRUCTIONS_ED_ALL_ED_FT
Urinary Retention    Urinary retention is the inability to completely empty your bladder. This is a common problem in older men, especially with enlarged prostates. If you are sent home with a jaramillo catheter and a drainage system make sure to keep the drainage bag emptied and lower than your catheter. Keep the jaramillo catheter in until you follow up with a urologist.    SEEK IMMEDIATE MEDICAL CARE IF YOU DEVELOP THE FOLLOWING SYMPTOMS: the catheter stops draining urine, the catheter falls out, abdominal pain, nausea/vomiting, or chills/fever.

## 2022-05-28 NOTE — ED PROVIDER NOTE - PHYSICAL EXAMINATION
Gen: Alert, NAD  Head: NC, AT   Eyes: left eye closed. right eye eomi. perrl.   ENT: normal hearing, patent oropharynx without erythema/exudate, uvula midline  Neck: supple, no tenderness, Trachea midline  Pulm: Bilateral BS, normal resp effort, no wheeze/stridor/retractions  CV: RRR, no M/R/G, 2+ radial and dp pulses bl, no edema  Abd: soft, NT/ND, +BS, no hepatosplenomegaly  Mskel: extremities x4 with normal ROM and no joint effusions. no ctl spine ttp.   Skin: no rash, no bruising   Neuro: Alert, oriented to self and person, not time or place. frequent convulsive movements of hand and face. global 4-/5 motor

## 2022-05-28 NOTE — ED ADULT NURSE REASSESSMENT NOTE - NS ED NURSE REASSESS COMMENT FT1
Patient in no acute distress, patient to be discharged with jaramillo attached to leg bag as per Dr. Mccoy. Patient and son at bedside to explain the care of jaramillo and cleaning. Patient denies pain and discomfort. Dr. Mccoy aware of tremors and seen by Dr. Cruz. Patient still to be discharged home per Dr. Cruz and Dr. Mccoy.

## 2022-05-28 NOTE — CONSULT NOTE ADULT - SUBJECTIVE AND OBJECTIVE BOX
87 y/o M with PMHx of T2DM, CKD3, BPH, CVA and dementia recently admitted at [facility] from  -  for acute hypoxic respiratory failure POA 2/2 to COVID-19 infection, ALISON on CKD, found w/ high probability of PE started on Eliquis presents to the ED c/o tremors. Hx obtained from pt and son at bedside. Son reports for the last few days pt had been tremulous and today it seemed worse. Pt reports in the last few days he has had some hip pain and has been constipated and bloated, last BM 3 days ago. Son reports pt had not urinated since yesterday and was complaining of alot of pelvic pressure thus EMS was called. Of note son reports pt started on Neurontin 600mg BID on  by PCP for peripheral neuropathy. No reported fever or chills, n/v, fall or unilateral weakness. Pt is looked after  between him, his sister and HHA; pt has been weaker and less active since last admission however family tries to get up and move around daily.     In ED pt tachy to 103, pt afebrile rest of vitals stable. Labs sig for H/H 9.2/27.9, BUN/Cr 32/1.7 rest of labs stable. Diez placed in ED w/ 1L UO so far. CT A/P done The rectum is mildly distended with stool. There is rectal and anal wall thickening. There is also mild rectal prolapse. Question proctitis. Diez placed      PAST MEDICAL & SURGICAL HISTORY:  DM (diabetes mellitus)  Diabetic neuropathy  BPH (benign prostatic hyperplasia)  Macular degeneration  CVA (cerebrovascular accident)  H/O laminectomy  in his 60s    Allergies  penicillins (Unknown)  Intolerances    Home Medications:  atorvastatin 80 mg oral tablet: 1 tab(s) orally once a day (at bedtime) (2022 18:54)  dorzolamide-timolol 2%-0.5% preservative-free ophthalmic solution: 1 drop(s) to each affected eye 2 times a day (2022 18:54)  DULoxetine 30 mg oral delayed release capsule: 1 cap(s) orally once a day (2022 18:54)  finasteride 5 mg oral tablet: 1 tab(s) orally once a day (2022 18:54)  gabapentin 300 mg oral capsule: 2 cap(s) orally 2 times a day (2022 18:54)  LATANOPROST 0.005% EYE DROPS: 1  to each affected eye once a day (at bedtime) (2022 18:54)  METFORMIN HCL 1,000 MG TABLET: 1 tab(s) orally 2 times a day (2022 18:54)  tamsulosin 0.4 mg oral capsule: 1 cap(s) orally once a day (at bedtime) (2022 18:54)    Soc Hx  - Neg x 3    Family Hx  - NC to present illness    ROS - As per HPI    PHYSICAL EXAM:    Vital Signs Last 24 Hrs  T(C): 36.8 (27 May 2022 21:10), Max: 36.8 (27 May 2022 21:10)  T(F): 98.3 (27 May 2022 21:10), Max: 98.3 (27 May 2022 21:10)  HR: 103 (27 May 2022 21:10) (103 - 103)  BP: 137/75 (27 May 2022 21:10) (137/75 - 137/75)  BP(mean): --  RR: 18 (27 May 2022 21:10) (18 - 18)  SpO2: 98% (27 May 2022 21:10) (98% - 98%)    GENERAL: Pt lying in bed uncomfortable due to tremors  HEENT:  Atraumatic, EOMI, dry MM  NECK: Supple  CHEST/LUNG: Clear to auscultation bilaterally  HEART: Regular rate and rhythm  ABDOMEN: Bowel sounds present; Soft, Nontender, Nondistended.   EXTREMITIES:  2+ Peripheral Pulses, trace pedal edema  NEUROLOGICAL:  Alert & Oriented, grossly nonfocal exam  MSK: no overt deformity  SKIN: warm, dry    T(C): 36.8 (22 @ 21:10), Max: 36.8 (22 @ 21:10)  HR: 103 (22 @ 21:10) (103 - 103)  BP: 137/75 (22 @ 21:10) (137/75 - 137/75)  RR: 18 (22 @ 21:10) (18 - 18)  SpO2: 98% (22 @ 21:10) (98% - 98%)                        9.2    9.01  )-----------( 271      ( 27 May 2022 21:49 )             27.9         140  |  106  |  32<H>  ----------------------------<  168<H>  4.1   |  25  |  1.78<H>    Ca    9.1      27 May 2022 21:49    TPro  7.5  /  Alb  3.1<L>  /  TBili  0.3  /  DBili  x   /  AST  16  /  ALT  32  /  AlkPhos  79      LIVER FUNCTIONS - ( 27 May 2022 21:49 )  Alb: 3.1 g/dL / Pro: 7.5 gm/dL / ALK PHOS: 79 U/L / ALT: 32 U/L / AST: 16 U/L / GGT: x             Urinalysis Basic - ( 27 May 2022 22:18 )    Color: Yellow / Appearance: Clear / S.010 / pH: x  Gluc: x / Ketone: Negative  / Bili: Negative / Urobili: Negative mg/dL   Blood: x / Protein: Negative mg/dL / Nitrite: Negative   Leuk Esterase: Negative / RBC: x / WBC x   Sq Epi: x / Non Sq Epi: x / Bacteria: x    < from: CT Abdomen and Pelvis No Cont (22 @ 22:39) >  IMPRESSION:    The rectum is mildly distended with stool. There is rectal and anal wall   thickening. There is also mild rectal prolapse. Question proctitis.   Correlate clinically.    < end of copied text >    saline laxative (FLEET) Rectal Enema 1 Enema Rectal once

## 2022-05-28 NOTE — ED PROVIDER NOTE - PROGRESS NOTE DETAILS
discussed w/ Dr. Cruz, at this time patient does not meet criteria for admission. Evaluated patient pending note but patient can be discharged w/ outpatient follow up. return precautions discussed. will send antibiotics for ?proctitis and miralax. Son and patient agreeable w/ discharge planning.

## 2022-05-28 NOTE — ED PROVIDER NOTE - CARE PROVIDER_API CALL
Shaq Justice)  Clinical Neurophysiology; Neurology  [address], [address]  [place] [address]  Phone: [phone]  Fax: [phone]  Follow Up Time: 1-3 Days

## 2022-05-28 NOTE — ED ADULT NURSE REASSESSMENT NOTE - NS ED NURSE REASSESS COMMENT FT1
put call out to  for home care services, [facility] jaramillo as per family request MD claudio smiley to [facility], pt's family verbalized an understanding of s/s consistent with urinary retention

## 2022-05-28 NOTE — H&P ADULT - NSHPPHYSICALEXAM_GEN_ALL_CORE
PHYSICAL EXAMINATION:  Vital Signs Last 24 Hrs  T(C): 36.8 (28 May 2022 15:11), Max: 37.2 (28 May 2022 02:13)  T(F): 98.2 (28 May 2022 15:11), Max: 98.9 (28 May 2022 02:13)  HR: 89 (28 May 2022 15:11) (89 - 118)  BP: 111/59 (28 May 2022 15:11) (111/59 - 152/104)  BP(mean): 102 (28 May 2022 02:13) (102 - 102)  RR: 16 (28 May 2022 15:11) (16 - 18)  SpO2: 96% (28 May 2022 15:11) (95% - 98%)  CAPILLARY BLOOD GLUCOSE          GENERAL: NAD, well-groomed, well-developed  HEAD:  atraumatic, normocephalic  EYES: sclera anicteric  ENMT: mucous membranes moist  NECK: supple, No JVD  CHEST/LUNG: clear to auscultation bilaterally; no rales, rhonchi, or wheezing b/l  HEART: normal S1, S2  ABDOMEN: BS+, soft, ND, NT   EXTREMITIES:  pulses palpable; no clubbing, cyanosis, or edema b/l LEs  NEURO: awake, alert, interactive; moves all extremities  SKIN: no rashes or lesions PHYSICAL EXAMINATION:  Vital Signs Last 24 Hrs  T(C): 36.8 (28 May 2022 15:11), Max: 37.2 (28 May 2022 02:13)  T(F): 98.2 (28 May 2022 15:11), Max: 98.9 (28 May 2022 02:13)  HR: 89 (28 May 2022 15:11) (89 - 118)  BP: 111/59 (28 May 2022 15:11) (111/59 - 152/104)  BP(mean): 102 (28 May 2022 02:13) (102 - 102)  RR: 16 (28 May 2022 15:11) (16 - 18)  SpO2: 96% (28 May 2022 15:11) (95% - 98%)  CAPILLARY BLOOD GLUCOSE          GENERAL: NAD, seen in ER, comfortable, family at bedside, no fevers or SOB, tremors both arms at rest.  Diez in place.   ENMT: mucous membranes moist  NECK: supple, No JVD  CHEST/LUNG: clear to auscultation bilaterally; no rales, rhonchi, or wheezing b/l  HEART: normal S1, S2  ABDOMEN: BS+, soft, ND, NT   EXTREMITIES:  pulses palpable; no clubbing, cyanosis, or edema b/l LEs  NEURO: awake, alert, interactive; moves all extremities  SKIN: no rashes or lesions

## 2022-05-28 NOTE — H&P ADULT - NSHPLABSRESULTS_GEN_ALL_CORE
LABS:                        9.7    9.22  )-----------( 275      ( 28 May 2022 13:41 )             29.8         142  |  110<H>  |  30<H>  ----------------------------<  189<H>  4.1   |  23  |  1.97<H>    Ca    9.1      28 May 2022 13:41  Mg     2.2         TPro  7.7  /  Alb  3.2<L>  /  TBili  0.6  /  DBili  x   /  AST  16  /  ALT  25  /  AlkPhos  85        Urinalysis Basic - ( 27 May 2022 22:18 )    Color: Yellow / Appearance: Clear / S.010 / pH: x  Gluc: x / Ketone: Negative  / Bili: Negative / Urobili: Negative mg/dL   Blood: x / Protein: Negative mg/dL / Nitrite: Negative   Leuk Esterase: Negative / RBC: x / WBC x   Sq Epi: x / Non Sq Epi: x / Bacteria: x          RADIOLOGY & ADDITIONAL TESTS:

## 2022-05-28 NOTE — ED PROVIDER NOTE - OBJECTIVE STATEMENT
88M with PMHx of T2DM, CKD3, BPH, CVA November 2021, recent admission for COVID PNA, PE on eliquis presenting to ED for urinary retention and tremors. Per son, patient has been constipated x 3 days, LBM 3 days ago, passing flatus w/o associated nausea or vomiting. Patient reports last urinated yesterday evening and c/o lower abdominal pressure and discomfort. Denies fevers/chills, sick contacts or other complaints.

## 2022-05-29 LAB
ANION GAP SERPL CALC-SCNC: 6 MMOL/L — SIGNIFICANT CHANGE UP (ref 5–17)
APPEARANCE UR: CLEAR — SIGNIFICANT CHANGE UP
BACTERIA # UR AUTO: ABNORMAL
BILIRUB UR-MCNC: NEGATIVE — SIGNIFICANT CHANGE UP
BUN SERPL-MCNC: 29 MG/DL — HIGH (ref 7–23)
CALCIUM SERPL-MCNC: 9.1 MG/DL — SIGNIFICANT CHANGE UP (ref 8.5–10.1)
CHLORIDE SERPL-SCNC: 110 MMOL/L — HIGH (ref 96–108)
CO2 SERPL-SCNC: 27 MMOL/L — SIGNIFICANT CHANGE UP (ref 22–31)
COLOR SPEC: YELLOW — SIGNIFICANT CHANGE UP
CREAT SERPL-MCNC: 1.85 MG/DL — HIGH (ref 0.5–1.3)
CULTURE RESULTS: NO GROWTH — SIGNIFICANT CHANGE UP
DIFF PNL FLD: ABNORMAL
EGFR: 35 ML/MIN/1.73M2 — LOW
EPI CELLS # UR: SIGNIFICANT CHANGE UP
GLUCOSE BLDC GLUCOMTR-MCNC: 132 MG/DL — HIGH (ref 70–99)
GLUCOSE BLDC GLUCOMTR-MCNC: 141 MG/DL — HIGH (ref 70–99)
GLUCOSE BLDC GLUCOMTR-MCNC: 162 MG/DL — HIGH (ref 70–99)
GLUCOSE SERPL-MCNC: 139 MG/DL — HIGH (ref 70–99)
GLUCOSE UR QL: NEGATIVE MG/DL — SIGNIFICANT CHANGE UP
KETONES UR-MCNC: NEGATIVE — SIGNIFICANT CHANGE UP
LEUKOCYTE ESTERASE UR-ACNC: ABNORMAL
NITRITE UR-MCNC: NEGATIVE — SIGNIFICANT CHANGE UP
PH UR: 6 — SIGNIFICANT CHANGE UP (ref 5–8)
POTASSIUM SERPL-MCNC: 3.9 MMOL/L — SIGNIFICANT CHANGE UP (ref 3.5–5.3)
POTASSIUM SERPL-SCNC: 3.9 MMOL/L — SIGNIFICANT CHANGE UP (ref 3.5–5.3)
PROT UR-MCNC: 30 MG/DL
RBC CASTS # UR COMP ASSIST: ABNORMAL /HPF (ref 0–4)
SODIUM SERPL-SCNC: 143 MMOL/L — SIGNIFICANT CHANGE UP (ref 135–145)
SP GR SPEC: 1.01 — SIGNIFICANT CHANGE UP (ref 1.01–1.02)
SPECIMEN SOURCE: SIGNIFICANT CHANGE UP
UROBILINOGEN FLD QL: NEGATIVE MG/DL — SIGNIFICANT CHANGE UP
VIT B12 SERPL-MCNC: 1635 PG/ML — HIGH (ref 232–1245)
WBC UR QL: SIGNIFICANT CHANGE UP

## 2022-05-29 PROCEDURE — 74018 RADEX ABDOMEN 1 VIEW: CPT | Mod: 26

## 2022-05-29 PROCEDURE — 99233 SBSQ HOSP IP/OBS HIGH 50: CPT

## 2022-05-29 RX ORDER — FERROUS SULFATE 325(65) MG
325 TABLET ORAL DAILY
Refills: 0 | Status: DISCONTINUED | OUTPATIENT
Start: 2022-05-29 | End: 2022-05-31

## 2022-05-29 RX ORDER — LACTULOSE 10 G/15ML
10 SOLUTION ORAL DAILY
Refills: 0 | Status: DISCONTINUED | OUTPATIENT
Start: 2022-05-29 | End: 2022-05-31

## 2022-05-29 RX ORDER — PREGABALIN 225 MG/1
1000 CAPSULE ORAL ONCE
Refills: 0 | Status: COMPLETED | OUTPATIENT
Start: 2022-05-29 | End: 2022-05-29

## 2022-05-29 RX ORDER — PREGABALIN 225 MG/1
1000 CAPSULE ORAL ONCE
Refills: 0 | Status: DISCONTINUED | OUTPATIENT
Start: 2022-05-29 | End: 2022-05-29

## 2022-05-29 RX ADMIN — PREGABALIN 1000 MICROGRAM(S): 225 CAPSULE ORAL at 18:07

## 2022-05-29 RX ADMIN — ATORVASTATIN CALCIUM 80 MILLIGRAM(S): 80 TABLET, FILM COATED ORAL at 23:12

## 2022-05-29 RX ADMIN — TAMSULOSIN HYDROCHLORIDE 0.4 MILLIGRAM(S): 0.4 CAPSULE ORAL at 23:12

## 2022-05-29 RX ADMIN — SODIUM CHLORIDE 75 MILLILITER(S): 9 INJECTION, SOLUTION INTRAVENOUS at 06:40

## 2022-05-29 RX ADMIN — LACTULOSE 10 GRAM(S): 10 SOLUTION ORAL at 19:23

## 2022-05-29 RX ADMIN — PREGABALIN 1000 MICROGRAM(S): 225 CAPSULE ORAL at 12:43

## 2022-05-29 RX ADMIN — SENNA PLUS 2 TABLET(S): 8.6 TABLET ORAL at 23:11

## 2022-05-29 RX ADMIN — LACTULOSE 10 GRAM(S): 10 SOLUTION ORAL at 18:07

## 2022-05-29 RX ADMIN — DORZOLAMIDE HYDROCHLORIDE TIMOLOL MALEATE 1 DROP(S): 20; 5 SOLUTION/ DROPS OPHTHALMIC at 19:22

## 2022-05-29 RX ADMIN — FINASTERIDE 5 MILLIGRAM(S): 5 TABLET, FILM COATED ORAL at 12:43

## 2022-05-29 RX ADMIN — POLYETHYLENE GLYCOL 3350 17 GRAM(S): 17 POWDER, FOR SOLUTION ORAL at 18:06

## 2022-05-29 RX ADMIN — SODIUM CHLORIDE 75 MILLILITER(S): 9 INJECTION, SOLUTION INTRAVENOUS at 09:28

## 2022-05-29 RX ADMIN — LATANOPROST 1 DROP(S): 0.05 SOLUTION/ DROPS OPHTHALMIC; TOPICAL at 23:12

## 2022-05-29 RX ADMIN — APIXABAN 2.5 MILLIGRAM(S): 2.5 TABLET, FILM COATED ORAL at 18:06

## 2022-05-29 RX ADMIN — DORZOLAMIDE HYDROCHLORIDE TIMOLOL MALEATE 1 DROP(S): 20; 5 SOLUTION/ DROPS OPHTHALMIC at 06:40

## 2022-05-29 RX ADMIN — Medication 325 MILLIGRAM(S): at 18:06

## 2022-05-29 RX ADMIN — DULOXETINE HYDROCHLORIDE 30 MILLIGRAM(S): 30 CAPSULE, DELAYED RELEASE ORAL at 12:42

## 2022-05-29 NOTE — PATIENT PROFILE ADULT - FALL HARM RISK - HARM RISK INTERVENTIONS
Assistance with ambulation/Assistance OOB with selected safe patient handling equipment/Communicate Risk of Fall with Harm to all staff/Discuss with provider need for PT consult/Monitor for mental status changes/Monitor gait and stability/Move patient closer to nurses' station/Provide patient with walking aids - walker, cane, crutches/Reinforce activity limits and safety measures with patient and family/Reorient to person, place and time as needed/Tailored Fall Risk Interventions/Toileting schedule using arm’s reach rule for commode and bathroom/Use of alarms - bed, chair and/or voice tab/Visual Cue: Yellow wristband and red socks/Bed in lowest position, wheels locked, appropriate side rails in place/Call bell, personal items and telephone in reach/Instruct patient to call for assistance before getting out of bed or chair/Non-slip footwear when patient is out of bed/[place] to call system/Physically safe environment - no spills, clutter or unnecessary equipment/Purposeful Proactive Rounding/Room/bathroom lighting operational, light cord in reach

## 2022-05-29 NOTE — PATIENT PROFILE ADULT - VISION (WITH CORRECTIVE LENSES IF THE PATIENT USUALLY WEARS THEM):
Left eye cataract surgery last month./Partially impaired: cannot see medication labels or newsprint, but can see obstacles in path, and the surrounding layout; can count fingers at arm's length

## 2022-05-29 NOTE — PHYSICAL THERAPY INITIAL EVALUATION ADULT - ADDITIONAL COMMENTS
Per care coordination from previous hospitalization in April 2022, patient lives in a house. Patient has an aide who assists with ADLs 12 hours x 7 days.

## 2022-05-29 NOTE — PROGRESS NOTE ADULT - ASSESSMENT
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 Started after Neurontin dose was increased.   Patient seen 2 days ago for urinary retention, sent home with jaramillo cath, now family returns requesting to speak to Neurology about tremors.       Assessment and Plan:   Presumed Gabapentin toxicity  d/c gabapentin   baclofen prn    Neurology eval     PE   dx 4-2022   continue with eliquis     #ALISON on CKD 3   baseline Cr appears to be 1.3-1.5  -c/w gentle hydration   avoid nephrotoxic meds, dose all meds per eGFR   monitor Cr     #BPH  acute urinary retention   -Continue with Flomax & Finasteride   renal US: No evidence of hydronephrosis. 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

## 2022-05-29 NOTE — CHART NOTE - NSCHARTNOTEFT_GEN_A_CORE
Informed by RN, pt w/ fever, BP stable, COVID PCR +ve again, will also obtain UA given recent jaramillo, blood cxs ordered as well. Tylenol given.    LEENA Shemi

## 2022-05-30 LAB
ALBUMIN SERPL ELPH-MCNC: 2.7 G/DL — LOW (ref 3.3–5)
ALP SERPL-CCNC: 64 U/L — SIGNIFICANT CHANGE UP (ref 40–120)
ALT FLD-CCNC: 18 U/L — SIGNIFICANT CHANGE UP (ref 12–78)
ANION GAP SERPL CALC-SCNC: 8 MMOL/L — SIGNIFICANT CHANGE UP (ref 5–17)
AST SERPL-CCNC: 12 U/L — LOW (ref 15–37)
BILIRUB SERPL-MCNC: 0.5 MG/DL — SIGNIFICANT CHANGE UP (ref 0.2–1.2)
BUN SERPL-MCNC: 27 MG/DL — HIGH (ref 7–23)
CALCIUM SERPL-MCNC: 8.9 MG/DL — SIGNIFICANT CHANGE UP (ref 8.5–10.1)
CHLORIDE SERPL-SCNC: 108 MMOL/L — SIGNIFICANT CHANGE UP (ref 96–108)
CO2 SERPL-SCNC: 25 MMOL/L — SIGNIFICANT CHANGE UP (ref 22–31)
CREAT SERPL-MCNC: 1.74 MG/DL — HIGH (ref 0.5–1.3)
CULTURE RESULTS: SIGNIFICANT CHANGE UP
EGFR: 37 ML/MIN/1.73M2 — LOW
FERRITIN SERPL-MCNC: 546 NG/ML — HIGH (ref 30–400)
FOLATE SERPL-MCNC: 12.8 NG/ML — SIGNIFICANT CHANGE UP
GLUCOSE BLDC GLUCOMTR-MCNC: 155 MG/DL — HIGH (ref 70–99)
GLUCOSE BLDC GLUCOMTR-MCNC: 181 MG/DL — HIGH (ref 70–99)
GLUCOSE BLDC GLUCOMTR-MCNC: 183 MG/DL — HIGH (ref 70–99)
GLUCOSE BLDC GLUCOMTR-MCNC: 185 MG/DL — HIGH (ref 70–99)
GLUCOSE BLDC GLUCOMTR-MCNC: 193 MG/DL — HIGH (ref 70–99)
GLUCOSE BLDC GLUCOMTR-MCNC: 202 MG/DL — HIGH (ref 70–99)
GLUCOSE SERPL-MCNC: 191 MG/DL — HIGH (ref 70–99)
HCT VFR BLD CALC: 27.2 % — LOW (ref 39–50)
HGB BLD-MCNC: 8.8 G/DL — LOW (ref 13–17)
IRON SATN MFR SERPL: 21 % — SIGNIFICANT CHANGE UP (ref 16–55)
IRON SATN MFR SERPL: 52 UG/DL — SIGNIFICANT CHANGE UP (ref 45–165)
MAGNESIUM SERPL-MCNC: 2.2 MG/DL — SIGNIFICANT CHANGE UP (ref 1.6–2.6)
MCHC RBC-ENTMCNC: 29 PG — SIGNIFICANT CHANGE UP (ref 27–34)
MCHC RBC-ENTMCNC: 32.4 G/DL — SIGNIFICANT CHANGE UP (ref 32–36)
MCV RBC AUTO: 89.8 FL — SIGNIFICANT CHANGE UP (ref 80–100)
NRBC # BLD: 0 /100 WBCS — SIGNIFICANT CHANGE UP (ref 0–0)
PHOSPHATE SERPL-MCNC: 3.2 MG/DL — SIGNIFICANT CHANGE UP (ref 2.5–4.5)
PLATELET # BLD AUTO: 248 K/UL — SIGNIFICANT CHANGE UP (ref 150–400)
POTASSIUM SERPL-MCNC: 3.7 MMOL/L — SIGNIFICANT CHANGE UP (ref 3.5–5.3)
POTASSIUM SERPL-SCNC: 3.7 MMOL/L — SIGNIFICANT CHANGE UP (ref 3.5–5.3)
PROT SERPL-MCNC: 6.9 GM/DL — SIGNIFICANT CHANGE UP (ref 6–8.3)
RBC # BLD: 3.03 M/UL — LOW (ref 4.2–5.8)
RBC # FLD: 13.9 % — SIGNIFICANT CHANGE UP (ref 10.3–14.5)
SODIUM SERPL-SCNC: 141 MMOL/L — SIGNIFICANT CHANGE UP (ref 135–145)
SPECIMEN SOURCE: SIGNIFICANT CHANGE UP
TIBC SERPL-MCNC: 245 UG/DL — SIGNIFICANT CHANGE UP (ref 220–430)
UIBC SERPL-MCNC: 193 UG/DL — SIGNIFICANT CHANGE UP (ref 110–370)
VIT B12 SERPL-MCNC: >2000 PG/ML — HIGH (ref 232–1245)
WBC # BLD: 5.91 K/UL — SIGNIFICANT CHANGE UP (ref 3.8–10.5)
WBC # FLD AUTO: 5.91 K/UL — SIGNIFICANT CHANGE UP (ref 3.8–10.5)

## 2022-05-30 PROCEDURE — 99232 SBSQ HOSP IP/OBS MODERATE 35: CPT

## 2022-05-30 RX ORDER — SODIUM CHLORIDE 9 MG/ML
1000 INJECTION INTRAMUSCULAR; INTRAVENOUS; SUBCUTANEOUS
Refills: 0 | Status: DISCONTINUED | OUTPATIENT
Start: 2022-05-30 | End: 2022-05-31

## 2022-05-30 RX ORDER — SODIUM CHLORIDE 9 MG/ML
1000 INJECTION, SOLUTION INTRAVENOUS
Refills: 0 | Status: DISCONTINUED | OUTPATIENT
Start: 2022-05-30 | End: 2022-05-31

## 2022-05-30 RX ORDER — MIDODRINE HYDROCHLORIDE 2.5 MG/1
10 TABLET ORAL ONCE
Refills: 0 | Status: COMPLETED | OUTPATIENT
Start: 2022-05-30 | End: 2022-05-30

## 2022-05-30 RX ORDER — ASPIRIN/CALCIUM CARB/MAGNESIUM 324 MG
81 TABLET ORAL DAILY
Refills: 0 | Status: DISCONTINUED | OUTPATIENT
Start: 2022-05-30 | End: 2022-05-31

## 2022-05-30 RX ORDER — INSULIN GLARGINE 100 [IU]/ML
5 INJECTION, SOLUTION SUBCUTANEOUS EVERY MORNING
Refills: 0 | Status: DISCONTINUED | OUTPATIENT
Start: 2022-05-31 | End: 2022-05-31

## 2022-05-30 RX ORDER — DEXTROSE 50 % IN WATER 50 %
25 SYRINGE (ML) INTRAVENOUS ONCE
Refills: 0 | Status: DISCONTINUED | OUTPATIENT
Start: 2022-05-30 | End: 2022-05-31

## 2022-05-30 RX ORDER — DEXTROSE 50 % IN WATER 50 %
12.5 SYRINGE (ML) INTRAVENOUS ONCE
Refills: 0 | Status: DISCONTINUED | OUTPATIENT
Start: 2022-05-30 | End: 2022-05-31

## 2022-05-30 RX ORDER — GLUCAGON INJECTION, SOLUTION 0.5 MG/.1ML
1 INJECTION, SOLUTION SUBCUTANEOUS ONCE
Refills: 0 | Status: DISCONTINUED | OUTPATIENT
Start: 2022-05-30 | End: 2022-05-31

## 2022-05-30 RX ORDER — INSULIN LISPRO 100/ML
VIAL (ML) SUBCUTANEOUS
Refills: 0 | Status: DISCONTINUED | OUTPATIENT
Start: 2022-05-30 | End: 2022-05-31

## 2022-05-30 RX ORDER — DEXTROSE 50 % IN WATER 50 %
15 SYRINGE (ML) INTRAVENOUS ONCE
Refills: 0 | Status: DISCONTINUED | OUTPATIENT
Start: 2022-05-30 | End: 2022-05-31

## 2022-05-30 RX ADMIN — Medication 1: at 18:13

## 2022-05-30 RX ADMIN — DORZOLAMIDE HYDROCHLORIDE TIMOLOL MALEATE 1 DROP(S): 20; 5 SOLUTION/ DROPS OPHTHALMIC at 18:15

## 2022-05-30 RX ADMIN — POLYETHYLENE GLYCOL 3350 17 GRAM(S): 17 POWDER, FOR SOLUTION ORAL at 06:01

## 2022-05-30 RX ADMIN — DULOXETINE HYDROCHLORIDE 30 MILLIGRAM(S): 30 CAPSULE, DELAYED RELEASE ORAL at 11:59

## 2022-05-30 RX ADMIN — TAMSULOSIN HYDROCHLORIDE 0.4 MILLIGRAM(S): 0.4 CAPSULE ORAL at 22:03

## 2022-05-30 RX ADMIN — FINASTERIDE 5 MILLIGRAM(S): 5 TABLET, FILM COATED ORAL at 11:59

## 2022-05-30 RX ADMIN — ATORVASTATIN CALCIUM 80 MILLIGRAM(S): 80 TABLET, FILM COATED ORAL at 22:14

## 2022-05-30 RX ADMIN — POLYETHYLENE GLYCOL 3350 17 GRAM(S): 17 POWDER, FOR SOLUTION ORAL at 18:14

## 2022-05-30 RX ADMIN — LACTULOSE 10 GRAM(S): 10 SOLUTION ORAL at 18:14

## 2022-05-30 RX ADMIN — APIXABAN 2.5 MILLIGRAM(S): 2.5 TABLET, FILM COATED ORAL at 06:01

## 2022-05-30 RX ADMIN — SODIUM CHLORIDE 75 MILLILITER(S): 9 INJECTION INTRAMUSCULAR; INTRAVENOUS; SUBCUTANEOUS at 12:17

## 2022-05-30 RX ADMIN — APIXABAN 2.5 MILLIGRAM(S): 2.5 TABLET, FILM COATED ORAL at 18:14

## 2022-05-30 RX ADMIN — Medication 1: at 09:19

## 2022-05-30 RX ADMIN — Medication 1: at 11:57

## 2022-05-30 RX ADMIN — MIDODRINE HYDROCHLORIDE 10 MILLIGRAM(S): 2.5 TABLET ORAL at 12:40

## 2022-05-30 RX ADMIN — DORZOLAMIDE HYDROCHLORIDE TIMOLOL MALEATE 1 DROP(S): 20; 5 SOLUTION/ DROPS OPHTHALMIC at 06:01

## 2022-05-30 RX ADMIN — SODIUM CHLORIDE 75 MILLILITER(S): 9 INJECTION, SOLUTION INTRAVENOUS at 11:59

## 2022-05-30 RX ADMIN — Medication 81 MILLIGRAM(S): at 12:17

## 2022-05-30 RX ADMIN — Medication 325 MILLIGRAM(S): at 11:59

## 2022-05-30 RX ADMIN — LATANOPROST 1 DROP(S): 0.05 SOLUTION/ DROPS OPHTHALMIC; TOPICAL at 22:04

## 2022-05-30 NOTE — PROGRESS NOTE ADULT - ASSESSMENT
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 Started after Neurontin dose was increased.   Patient seen 2 days ago for urinary retention, sent home with jaramillo cath, now family returns requesting to speak to Neurology about tremors.       Assessment and Plan:   Presumed Gabapentin toxicity  d/c gabapentin   baclofen prn    Neurology eval     PE   dx 4-2022   continue with eliquis     #ALISON on CKD 3   baseline Cr appears to be 1.3-1.5  -c/w gentle hydration   avoid nephrotoxic meds, dose all meds per eGFR   monitor Cr     #BPH  acute urinary retention   -Continue with Flomax & Finasteride   renal US: No evidence of hydronephrosis. 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 Started after Neurontin dose was increased.   Patient seen 2 days ago for urinary retention, sent home with jaramillo cath, now family returns requesting to speak to Neurology about tremors.       Assessment and Plan:   Presumed Gabapentin toxicity  d/c gabapentin   baclofen prn    Neurology eval     PE   dx 4-2022   continue with eliquis     #ALISON on CKD 3   baseline Cr appears to be 1.3-1.5  -c/w gentle hydration   avoid nephrotoxic meds, dose all meds per eGFR   monitor Cr     #BPH  acute urinary retention s/p straight cath   -Continue with Flomax & Finasteride   renal US: No evidence of hydronephrosis. 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

## 2022-05-30 NOTE — CONSULT NOTE ADULT - SUBJECTIVE AND OBJECTIVE BOX
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 Started after Neurontin dose was increased.   Patient seen 2 days ago for urinary retention, sent home with jaramillo cath, now family returns requesting to speak to Neurology about tremors. Improved after dc'ing gabapentin. Pt denies falls, rigidity, cordination intact.

## 2022-05-30 NOTE — CONSULT NOTE ADULT - ASSESSMENT
Fine tremor without rigidity or falls acutely X1 day after neurontin increase- improved off Neurontin  COVID19 infection   Dementia  FM  BPH  Prior CVA  CKD    Plan  improved off neurontin  MRI Brain w/o   PT/OT  rest as per primary team   will continue to follow Fine tremor without rigidity or falls acutely X1 day after neurontin increase- improved off Neurontin  COVID19 infection   Dementia  FM  BPH  Prior CVA  CKD    Plan  improved off neurontin  MRI Brain w/o   PT/OT  B12. TSH wnl  rest as per primary team   will continue to follow Fine tremor without rigidity or falls acutely X1 day after neurontin increase- improved off Neurontin  COVID19 infection   Dementia  DM  BPH  Prior CVA  CKD    Plan  improved off neurontin  MRI Brain w/o   PT/OT  B12. TSH wnl  rest as per primary team   will continue to follow

## 2022-05-31 ENCOUNTER — TRANSCRIPTION ENCOUNTER (OUTPATIENT)
Age: 87
End: 2022-05-31

## 2022-05-31 VITALS
OXYGEN SATURATION: 99 % | HEART RATE: 84 BPM | DIASTOLIC BLOOD PRESSURE: 72 MMHG | RESPIRATION RATE: 18 BRPM | SYSTOLIC BLOOD PRESSURE: 104 MMHG | TEMPERATURE: 98 F

## 2022-05-31 LAB
ANION GAP SERPL CALC-SCNC: 5 MMOL/L — SIGNIFICANT CHANGE UP (ref 5–17)
BUN SERPL-MCNC: 25 MG/DL — HIGH (ref 7–23)
CALCIUM SERPL-MCNC: 8.6 MG/DL — SIGNIFICANT CHANGE UP (ref 8.5–10.1)
CHLORIDE SERPL-SCNC: 110 MMOL/L — HIGH (ref 96–108)
CO2 SERPL-SCNC: 27 MMOL/L — SIGNIFICANT CHANGE UP (ref 22–31)
CREAT SERPL-MCNC: 1.66 MG/DL — HIGH (ref 0.5–1.3)
EGFR: 39 ML/MIN/1.73M2 — LOW
GLUCOSE BLDC GLUCOMTR-MCNC: 171 MG/DL — HIGH (ref 70–99)
GLUCOSE BLDC GLUCOMTR-MCNC: 171 MG/DL — HIGH (ref 70–99)
GLUCOSE BLDC GLUCOMTR-MCNC: 226 MG/DL — HIGH (ref 70–99)
GLUCOSE BLDC GLUCOMTR-MCNC: 231 MG/DL — HIGH (ref 70–99)
GLUCOSE SERPL-MCNC: 137 MG/DL — HIGH (ref 70–99)
HCT VFR BLD CALC: 25 % — LOW (ref 39–50)
HGB BLD-MCNC: 8.1 G/DL — LOW (ref 13–17)
MCHC RBC-ENTMCNC: 28.9 PG — SIGNIFICANT CHANGE UP (ref 27–34)
MCHC RBC-ENTMCNC: 32.4 G/DL — SIGNIFICANT CHANGE UP (ref 32–36)
MCV RBC AUTO: 89.3 FL — SIGNIFICANT CHANGE UP (ref 80–100)
NRBC # BLD: 0 /100 WBCS — SIGNIFICANT CHANGE UP (ref 0–0)
PLATELET # BLD AUTO: 232 K/UL — SIGNIFICANT CHANGE UP (ref 150–400)
POTASSIUM SERPL-MCNC: 3.7 MMOL/L — SIGNIFICANT CHANGE UP (ref 3.5–5.3)
POTASSIUM SERPL-SCNC: 3.7 MMOL/L — SIGNIFICANT CHANGE UP (ref 3.5–5.3)
RBC # BLD: 2.8 M/UL — LOW (ref 4.2–5.8)
RBC # FLD: 13.7 % — SIGNIFICANT CHANGE UP (ref 10.3–14.5)
SODIUM SERPL-SCNC: 142 MMOL/L — SIGNIFICANT CHANGE UP (ref 135–145)
WBC # BLD: 5.9 K/UL — SIGNIFICANT CHANGE UP (ref 3.8–10.5)
WBC # FLD AUTO: 5.9 K/UL — SIGNIFICANT CHANGE UP (ref 3.8–10.5)

## 2022-05-31 PROCEDURE — 99239 HOSP IP/OBS DSCHRG MGMT >30: CPT

## 2022-05-31 RX ORDER — RADIOPAQUE PVC MARKERS/BARIUM 24MARKERS
450 CAPSULE ORAL ONCE
Refills: 0 | Status: DISCONTINUED | OUTPATIENT
Start: 2022-05-31 | End: 2022-05-31

## 2022-05-31 RX ORDER — SUCRALFATE 1 G
1 TABLET ORAL
Refills: 0 | Status: DISCONTINUED | OUTPATIENT
Start: 2022-05-31 | End: 2022-05-31

## 2022-05-31 RX ORDER — FERROUS SULFATE 325(65) MG
1 TABLET ORAL
Qty: 0 | Refills: 0 | DISCHARGE
Start: 2022-05-31

## 2022-05-31 RX ORDER — LACTULOSE 10 G/15ML
15 SOLUTION ORAL
Qty: 450 | Refills: 0
Start: 2022-05-31 | End: 2022-06-29

## 2022-05-31 RX ORDER — PANTOPRAZOLE SODIUM 20 MG/1
40 TABLET, DELAYED RELEASE ORAL ONCE
Refills: 0 | Status: COMPLETED | OUTPATIENT
Start: 2022-05-31 | End: 2022-05-31

## 2022-05-31 RX ORDER — METFORMIN HYDROCHLORIDE 850 MG/1
1 TABLET ORAL
Qty: 0 | Refills: 0 | DISCHARGE

## 2022-05-31 RX ORDER — SUCRALFATE 1 G
1 TABLET ORAL
Qty: 120 | Refills: 0
Start: 2022-05-31 | End: 2022-06-29

## 2022-05-31 RX ORDER — METFORMIN HYDROCHLORIDE 850 MG/1
1 TABLET ORAL
Qty: 60 | Refills: 0
Start: 2022-05-31 | End: 2022-06-29

## 2022-05-31 RX ORDER — LATANOPROST 0.05 MG/ML
1 SOLUTION/ DROPS OPHTHALMIC; TOPICAL
Qty: 0 | Refills: 0 | DISCHARGE

## 2022-05-31 RX ORDER — DORZOLAMIDE HYDROCHLORIDE TIMOLOL MALEATE 20; 5 MG/ML; MG/ML
1 SOLUTION/ DROPS OPHTHALMIC
Qty: 0 | Refills: 0 | DISCHARGE
Start: 2022-05-31

## 2022-05-31 RX ORDER — LATANOPROST 0.05 MG/ML
1 SOLUTION/ DROPS OPHTHALMIC; TOPICAL
Qty: 0 | Refills: 0 | DISCHARGE
Start: 2022-05-31

## 2022-05-31 RX ORDER — APIXABAN 2.5 MG/1
1 TABLET, FILM COATED ORAL
Qty: 0 | Refills: 0 | DISCHARGE
Start: 2022-05-31

## 2022-05-31 RX ADMIN — SODIUM CHLORIDE 75 MILLILITER(S): 9 INJECTION INTRAMUSCULAR; INTRAVENOUS; SUBCUTANEOUS at 08:13

## 2022-05-31 RX ADMIN — Medication 81 MILLIGRAM(S): at 12:05

## 2022-05-31 RX ADMIN — Medication 1: at 08:11

## 2022-05-31 RX ADMIN — Medication 325 MILLIGRAM(S): at 12:05

## 2022-05-31 RX ADMIN — DORZOLAMIDE HYDROCHLORIDE TIMOLOL MALEATE 1 DROP(S): 20; 5 SOLUTION/ DROPS OPHTHALMIC at 06:15

## 2022-05-31 RX ADMIN — FINASTERIDE 5 MILLIGRAM(S): 5 TABLET, FILM COATED ORAL at 12:05

## 2022-05-31 RX ADMIN — DULOXETINE HYDROCHLORIDE 30 MILLIGRAM(S): 30 CAPSULE, DELAYED RELEASE ORAL at 12:12

## 2022-05-31 RX ADMIN — Medication 1: at 16:50

## 2022-05-31 RX ADMIN — APIXABAN 2.5 MILLIGRAM(S): 2.5 TABLET, FILM COATED ORAL at 06:15

## 2022-05-31 RX ADMIN — POLYETHYLENE GLYCOL 3350 17 GRAM(S): 17 POWDER, FOR SOLUTION ORAL at 06:15

## 2022-05-31 RX ADMIN — SODIUM CHLORIDE 75 MILLILITER(S): 9 INJECTION INTRAMUSCULAR; INTRAVENOUS; SUBCUTANEOUS at 01:39

## 2022-05-31 RX ADMIN — Medication 2: at 12:45

## 2022-05-31 RX ADMIN — INSULIN GLARGINE 5 UNIT(S): 100 INJECTION, SOLUTION SUBCUTANEOUS at 09:52

## 2022-05-31 RX ADMIN — PANTOPRAZOLE SODIUM 40 MILLIGRAM(S): 20 TABLET, DELAYED RELEASE ORAL at 15:18

## 2022-05-31 NOTE — DISCHARGE NOTE NURSING/CASE MANAGEMENT/SOCIAL WORK - PATIENT PORTAL LINK FT
You can access the FollowMyHealth Patient Portal offered by [facility] by registering at the following website: [email or web address]. By joining [employer]’s FollowMyHealth portal, you will also be able to view your health information using other applications (apps) compatible with our system.

## 2022-05-31 NOTE — SWALLOW BEDSIDE ASSESSMENT ADULT - H & P REVIEW
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 Started after Neurontin dose was increased.   Patient seen 2 days ago for urinary retention, sent home with jaramillo cath, now family returns requesting to speak to Neurology about tremors./yes

## 2022-05-31 NOTE — SWALLOW BEDSIDE ASSESSMENT ADULT - COMMENTS
CThead no contrast5/28/2022IMPRESSION:NO EVIDENCE OF ACUTE INTRACRANIAL HEMORRHAGE OR HYDROCEPHALUS.  ATROPHY   WITH WHITE MATTER ISCHEMIC CHANGES. NO SIGNIFICANT INTERVAL CHANGE FROM PRIOR    CXR5/28/2022 IMPRESSION:No focal lung consolidation or sizable pleural effusion.

## 2022-05-31 NOTE — DISCHARGE NOTE PROVIDER - HOSPITAL COURSE
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 Started after Neurontin dose was increased.   Patient seen 2 days ago for urinary retention, sent home with jaramillo cath, now family returns requesting to speak to Neurology about tremors.       DISCHARGE DIAGNOSES:    hyperkinetic muscle activity/tremors sec to Presumed Gabapentin toxicity  d/c gabapentin , NO NEED FOR BACLOFEN   CT head: no acute findings   Neurology eval APPRECIATED, recommended MRI head , however patient has a Right buttocks neurostimulator generator with leads in the spinal canal.  outpatient MRI if warranted, outpatient Neuro follow-up   no further tremors, patient is at baseline MS       PE   dx 4-2022   continue with eliquis     #ALISON on CKD 3   baseline Cr appears to be 1.3-1.5  outpatient follow-up and monitoring       #BPH  acute urinary retention s/p straight cath , resolved   -Continue with Flomax & Finasteride   recent renal US: No evidence of hydronephrosis. Enlarged prostate gland    bladder scan  PVR : 112cc ; patient voiding without difficulty voiding 300-400cc     #T2DM  A1c 7.9%   continue with home meds     #H/o CVA  -Continue with statin and ASA 81mg daily     #Normocytic anemia , most likely d/t AOCD  no e/o bleeding or bruising   history of vit B12 deficiency, s/p IM vit B12 inj x 3 weeks at home, now levels OK    on ferrous sulfate at home   outpatient follow-up     #Presumed Dementia   supportive care     #Macular degeneration  --c/w eye drops     Chronic constipation --lactulose 10g daily   KUB noted , +BMs     findings and care plan discussed with patient and daughter Regina.     Discharge time : 40 min     RETURN PARAMETERS DISCUSSED WITH PATIENT and daughter Regina, They EXPRESSED UNDERSTANDING AND IS AGREEABLE. DISCUSSED WITH PATIENT ON REFRAINING FROM DRIVING UNTIL FOLLOW-UP/ CLEARED BY PMD. PATIENT EXPRESSED UNDERSTANDING.    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 Started after Neurontin dose was increased.   Patient seen 2 days ago for urinary retention, sent home with jaramillo cath, now family returns requesting to speak to Neurology about tremors.       DISCHARGE DIAGNOSES:    hyperkinetic muscle activity/tremors sec to Presumed Gabapentin toxicity  d/c gabapentin , NO NEED FOR BACLOFEN   CT head: no acute findings   Neurology eval APPRECIATED, recommended MRI head , however patient has a Right buttocks neurostimulator generator with leads in the spinal canal.  outpatient MRI if warranted, outpatient Neuro follow-up   no further tremors, patient is at baseline MS       PE   dx 4-2022   continue with eliquis     #ALISON on CKD 3   baseline Cr appears to be 1.3-1.5  outpatient follow-up and monitoring       #BPH  acute urinary retention s/p straight cath; s/p jaramillo  , resolved   -Continue with Flomax & Finasteride   recent renal US: No evidence of hydronephrosis. Enlarged prostate gland    bladder scan  PVR : 112cc ; patient voiding without difficulty voiding 300-400cc     #T2DM  A1c 7.9%   continue with home meds     #H/o CVA  -Continue with statin and ASA 81mg daily     #Normocytic anemia , most likely d/t AOCD  no e/o bleeding or bruising , however hgb trended down (baseline ~9.5)  history of vit B12 deficiency, s/p IM vit B12 inj x 3 weeks at home, now levels OK    on ferrous sulfate at home   outpatient follow-up   discussed with daughter , she doesn't want any further work-up inpatient. She stated she will make appointment with patient's PMD on Thurs 6/2 for repeat H/H   patient also refusing to stay and states he wants to go home. he denies brbpr or melena. Per daughter (who is an RN) states patient does not have brbpr or melena. has history of hemorrhoids but no diverticulosis. No recent colonoscopy.   Patient denies hemoptysis or hematemesis. Denies Abd pain. Refusing any further inpatient work-up.   discussed with daughter and patient , importance of close outptient follow-up     #Presumed Dementia   supportive care     #Macular degeneration  --c/w eye drops     Chronic constipation --lactulose 10g daily   KUB noted , +BMs     findings and care plan discussed with patient and daughter Regina.     Discharge time : 40 min     RETURN PARAMETERS DISCUSSED WITH PATIENT and daughter Regina, They EXPRESSED UNDERSTANDING AND IS AGREEABLE. DISCUSSED WITH PATIENT ON REFRAINING FROM DRIVING UNTIL FOLLOW-UP/ CLEARED BY PMD. PATIENT EXPRESSED UNDERSTANDING.    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 Started after Neurontin dose was increased.   Patient seen 2 days ago for urinary retention, sent home with jaramillo cath, now family returns requesting to speak to Neurology about tremors.     Vital Signs Last 24 Hrs  T(C): 36.6 (31 May 2022 16:55), Max: 36.9 (31 May 2022 10:40)  T(F): 97.9 (31 May 2022 16:55), Max: 98.5 (31 May 2022 10:40)  HR: 82 (31 May 2022 16:55) (60 - 82)  BP: 134/69 (31 May 2022 16:55) (107/69 - 134/69)  BP(mean): --  RR: 18 (31 May 2022 16:55) (17 - 18)  SpO2: 96% (31 May 2022 16:55) (96% - 99%) on RA     GENERAL: Elderly and on room air  NAD, not using accessory muscles, no increased WOB  CHEST/LUNG: Clear to ausculation bilaterally, no wheezing, no crackles   HEART: Regular rate and rhythm; + SM  ABDOMEN: Soft, Nontender, Nondistended; Bowel sounds present  EXTREMITIES:  Moving all four extremities spontaneously, No calf tenderness or edema b/l   NERVOUS SYSTEM:  Grossly non focal.  Psychiatry: AAO x 2 -3     DISCHARGE DIAGNOSES:    hyperkinetic muscle activity/tremors sec to Presumed Gabapentin toxicity  d/c gabapentin , NO NEED FOR BACLOFEN   CT head: no acute findings   Neurology eval APPRECIATED, recommended MRI head , however patient has a Right buttocks neurostimulator generator with leads in the spinal canal.  outpatient MRI if warranted, outpatient Neuro follow-up   no further tremors, patient is at baseline MS       PE   dx 4-2022   continue with eliquis     #ALISON on CKD 3   baseline Cr appears to be 1.3-1.5  outpatient follow-up and monitoring       #BPH  acute urinary retention s/p straight cath; s/p jaramillo  , resolved   -Continue with Flomax & Finasteride    bladder scan  PVR : 112cc ; patient voiding without difficulty voiding 300-400cc   --patient has been on cymbalta for few months for pain, but daughter has noted that patient complaining of "bladder spasms" and frequency. Denies dysuria or hesitancy.   stop cymbalta. recommended to make appointment with outpatient urology for possible urodynamic studies and further work-up/recommendations.   UCx neg , blood Cx NGTD     #T2DM  A1c 7.9%   decreased metformin to 500mg bid (renally adj)     #H/o CVA  -Continue with statin   will d/c ASA as hgb downtrending (patient already on eliquis)     #Normocytic anemia , most likely d/t AOCD  no e/o bleeding or bruising , however hgb trended down (baseline ~9.5)  history of vit B12 deficiency, s/p IM vit B12 inj x 3 weeks at home, now levels OK    on ferrous sulfate at home 3x per week   outpatient follow-up   discussed with daughter , she doesn't want any further work-up inpatient. She stated she will make appointment with patient's PMD on Thurs 6/2 for repeat H/H   patient also refusing to stay and states he wants to go home. he denies brbpr or melena. Per daughter (who is an RN) states patient does not have brbpr or melena. has history of hemorrhoids but no diverticulosis. No recent colonoscopy.   Patient denies hemoptysis or hematemesis. Denies Abd pain. Refusing any further inpatient work-up.   discussed with daughter and patient , importance of close outptient follow-up   daughter endorsed that she wants patient to continue with eliquis , understands risks     #Presumed Dementia   supportive care     #Macular degeneration  --c/w eye drops     Chronic constipation --lactulose 10g daily   KUB noted with improvement , +BMs     findings and care plan discussed with patient and daughter Regina [phone]     Discharge time : 40 min     RETURN PARAMETERS DISCUSSED WITH PATIENT and daughter Regina, They EXPRESSED UNDERSTANDING AND IS AGREEABLE. DISCUSSED WITH PATIENT ON REFRAINING FROM DRIVING UNTIL FOLLOW-UP/ CLEARED BY PMD. PATIENT EXPRESSED UNDERSTANDING.

## 2022-05-31 NOTE — DISCHARGE NOTE PROVIDER - PROVIDER TOKENS
PROVIDER:[TOKEN:[[id number]],FOLLOWUP:[1-3 days]],PROVIDER:[TOKEN:[[id number]],FOLLOWUP:[1 week]]

## 2022-05-31 NOTE — SWALLOW BEDSIDE ASSESSMENT ADULT - SLP GENERAL OBSERVATIONS
pt seen bedside alert and oriented x2-3, and stated that he "feels a lot better". pt responded to simple want questions with good accuracy, he verbalized wants and was able to [facility]

## 2022-05-31 NOTE — DISCHARGE NOTE PROVIDER - NSDCFUSCHEDAPPT_GEN_ALL_CORE_FT
Chief Complaint Patient presents with  Hypertension  Vitamin D Deficiency 1. Have you been to the ER, urgent care clinic since your last visit? Hospitalized since your last visit? No 
 
2. Have you seen or consulted any other health care providers outside of the [address] [place] [address] since your last visit? Include any pap smears or colon screening.  No 
 
 [facility] Physician [facility] [phone]  Scheduled Appointment: 06/08/2022

## 2022-05-31 NOTE — DISCHARGE NOTE PROVIDER - CARE PROVIDER_API CALL
Kg Atkinson)  Family Medicine  [address] [facility] [address]st [place] [address]  Phone: [phone]  Fax: [phone]  Follow Up Time: 1-3 days    Shaq Justice)  Clinical Neurophysiology; Neurology  [address] [facility]  [place] [address]  Phone: [phone]  Fax: [phone]  Follow Up Time: 1 week

## 2022-05-31 NOTE — DISCHARGE NOTE PROVIDER - NSDCMRMEDTOKEN_GEN_ALL_CORE_FT
atorvastatin 80 mg oral tablet: 1 tab(s) orally once a day (at bedtime)  baclofen 5 mg oral tablet: 1 tab(s) orally 2 times a day, As Needed -for muscle spasm   Cipro 500 mg oral tablet: 1 tab(s) orally every 12 hours   cyanocobalamin 1000 mcg oral tablet: 1 tab(s) orally once a day  cyanocobalamin 1000 mcg/mL injectable solution: 1000 microgram(s) intramuscularly 3 times a week   dorzolamide-timolol 2%-0.5% preservative-free ophthalmic solution: 1 drop(s) to each affected eye 2 times a day  DULoxetine 30 mg oral delayed release capsule: 1 cap(s) orally once a day  Eliquis Starter Pack for Treatment of DVT and PE 5 mg oral tablet: 2 tab(s) orally every 12 hours  finasteride 5 mg oral tablet: 1 tab(s) orally once a day  Flagyl 375 oral capsule: 1 cap(s) orally 2 times a day   gabapentin 300 mg oral capsule: 2 cap(s) orally 2 times a day  gabapentin 300 mg oral capsule: 2 cap(s) orally 2 times a day   LATANOPROST 0.005% EYE DROPS: 1  to each affected eye once a day (at bedtime)  METFORMIN HCL 1,000 MG TABLET: 1 tab(s) orally 2 times a day  MiraLax oral powder for reconstitution: 17 gram(s) orally once a day   tamsulosin 0.4 mg oral capsule: 1 cap(s) orally once a day (at bedtime)  tamsulosin 0.4 mg oral capsule: 1 cap(s) orally once a day   apixaban 2.5 mg oral tablet: 1 tab(s) orally 2 times a day  atorvastatin 80 mg oral tablet: 1 tab(s) orally once a day (at bedtime)  cyanocobalamin 1000 mcg oral tablet: 1 tab(s) orally once a day  dorzolamide-timolol 2%-0.5% preservative-free ophthalmic solution: 1 drop(s) to each affected eye 2 times a day  ferrous sulfate 325 mg (65 mg elemental iron) oral tablet: 1 tab(s) orally 3 times a week  finasteride 5 mg oral tablet: 1 tab(s) orally once a day  lactulose 10 g/15 mL oral syrup: 15 milliliter(s) orally once a day  latanoprost 0.005% ophthalmic solution: 1 drop(s) to each affected eye once a day (at bedtime)  metFORMIN 500 mg oral tablet: 1 tab(s) orally 2 times a day   sucralfate 1 g oral tablet: 1 tab(s) orally 4 times a day  tamsulosin 0.4 mg oral capsule: 1 cap(s) orally once a day (at bedtime)

## 2022-05-31 NOTE — DISCHARGE NOTE PROVIDER - CARE PROVIDERS DIRECT ADDRESSES
[email or web address]

## 2022-05-31 NOTE — DISCHARGE NOTE NURSING/CASE MANAGEMENT/SOCIAL WORK - NSDCPEFALRISK_GEN_ALL_CORE
For information on Fall & Injury Prevention, visit: [email or web address] OR  [email or web address] OR  https://www.cdc.gov/steadi/patient.html

## 2022-05-31 NOTE — DISCHARGE NOTE PROVIDER - NSDCCPCAREPLAN_GEN_ALL_CORE_FT
PRINCIPAL DISCHARGE DIAGNOSIS  Diagnosis: Spasticity  Assessment and Plan of Treatment:       SECONDARY DISCHARGE DIAGNOSES  Diagnosis: Gabapentin-induced toxicity  Assessment and Plan of Treatment: stop gabapentin    Diagnosis: Stage 3 chronic kidney disease  Assessment and Plan of Treatment:     Diagnosis: Pulmonary embolism  Assessment and Plan of Treatment: dx 4/2022    Diagnosis: Diabetes  Assessment and Plan of Treatment: decreased metformin to 500mg bid, renally adj. . follow-up with PMD    Diagnosis: BPH without urinary obstruction  Assessment and Plan of Treatment: stop cymbalta    Diagnosis: Normocytic anemia  Assessment and Plan of Treatment:     Diagnosis: Constipation  Assessment and Plan of Treatment:

## 2022-05-31 NOTE — PROGRESS NOTE ADULT - SUBJECTIVE AND OBJECTIVE BOX
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 Started after Neurontin dose was increased.   Patient seen 2 days ago for urinary retention, sent home with jaramillo cath, now family returns requesting to speak to Neurology about tremors.  (28 May 2022 17:39)      SUBJECTIVE & OBJECTIVE:   Pt seen and examined at bedside.   no overnight events.   no complaints   states tremors resolved     REVIEW OF SYSTEMS: remaining ROS negative         PHYSICAL EXAM:  vitals stable.     GENERAL: Elderly and on room air  NAD, not using accessory muscles, no increased WOB  CHEST/LUNG: Clear to ausculation bilaterally, no wheezing, no crackles   HEART: Regular rate and rhythm; + SM  ABDOMEN: Soft, Nontender, Nondistended; Bowel sounds present  EXTREMITIES:  Moving all four extremities spontaneously, No calf tenderness or edema b/l   NERVOUS SYSTEM:  Grossly non focal.  Psychiatry: AAO x 2     MEDICATIONS  (STANDING):  apixaban 2.5 milliGRAM(s) Oral two times a day  atorvastatin 80 milliGRAM(s) Oral at bedtime  dorzolamide 2%/timolol 0.5% Ophthalmic Solution 1 Drop(s) Both EYES two times a day  DULoxetine 30 milliGRAM(s) Oral daily  ferrous    sulfate 325 milliGRAM(s) Oral daily  finasteride 5 milliGRAM(s) Oral daily  lactated ringers. 1000 milliLiter(s) (75 mL/Hr) IV Continuous <Continuous>  lactulose Syrup 10 Gram(s) Oral daily  latanoprost 0.005% Ophthalmic Solution 1 Drop(s) Both EYES at bedtime  polyethylene glycol 3350 17 Gram(s) Oral two times a day  senna 2 Tablet(s) Oral at bedtime  tamsulosin 0.4 milliGRAM(s) Oral at bedtime    MEDICATIONS  (PRN):  acetaminophen  Suppository .. 650 milliGRAM(s) Rectal every 6 hours PRN Temp greater or equal to 38C (100.4F), Mild Pain (1 - 3)  baclofen 2.5 milliGRAM(s) Oral every 8 hours PRN Muscle Spasm      LABS:                        9.7    9.22  )-----------( 275      ( 28 May 2022 13:41 )             29.8     05-29    143  |  110<H>  |  29<H>  ----------------------------<  139<H>  3.9   |  27  |  1.85<H>    Ca    9.1      29 May 2022 07:03  Mg     2.2         TPro  7.7  /  Alb  3.2<L>  /  TBili  0.6  /  DBili  x   /  AST  16  /  ALT  25  /  AlkPhos  85        Urinalysis Basic - ( 29 May 2022 01:49 )    Color: Yellow / Appearance: Clear / S.015 / pH: x  Gluc: x / Ketone: Negative  / Bili: Negative / Urobili: Negative mg/dL   Blood: x / Protein: 30 mg/dL / Nitrite: Negative   Leuk Esterase: Trace / RBC: 11-25 /HPF / WBC 3-5   Sq Epi: x / Non Sq Epi: Occasional / Bacteria: Few        RADIOLOGY & ADDITIONAL TESTS:          Imaging Personally Reviewed:  [ ] YES  [ ] NO    Consultant(s) Notes Reviewed:  [x ] YES  [ ] NO    Care Discussed with Consultants/Other Providers [x ] YES  [ ] NO  Care discussed in detail with patient.  All questions and concerns addressed    
Pt resting comfortably, tremors improved.     Physical Exam:   · Neurological	detailed exam  · Mental Status	AOX1-2. Language intact  · Cranial Nerve	[id number] grossly intact, poor hearing  · Motor	4/5 throughout  · Gait/Balance	FNF wnl  mild low amplitude fine tremor with movement    Assessment and Recommendation:   · Assessment	  Fine tremor without rigidity or falls acutely X1 day after neurontin increase- improved off Neurontin  COVID19 infection   Dementia  DM  BPH  Prior CVA  CKD    Plan  improved off neurontin  MRI Brain w/o if compatible  PT/OT  B12. TSH wnl  rest as per primary team   neuro stable
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 Started after Neurontin dose was increased.   Patient seen 2 days ago for urinary retention, sent home with jaramillo cath, now family returns requesting to speak to Neurology about tremors.  (28 May 2022 17:39)      SUBJECTIVE & OBJECTIVE:   Pt seen and examined at bedside.   no overnight events.   no complaints        REVIEW OF SYSTEMS: remaining ROS negative         PHYSICAL EXAM:  vitals stable.     GENERAL: Elderly and on room air  NAD, not using accessory muscles, no increased WOB  CHEST/LUNG: Clear to ausculation bilaterally, no wheezing, no crackles   HEART: Regular rate and rhythm; + SM  ABDOMEN: Soft, Nontender, Nondistended; Bowel sounds present  EXTREMITIES:  Moving all four extremities spontaneously, No calf tenderness or edema b/l   NERVOUS SYSTEM:  Grossly non focal.  Psychiatry: AAO x 2     MEDICATIONS  (STANDING):  apixaban 2.5 milliGRAM(s) Oral two times a day  atorvastatin 80 milliGRAM(s) Oral at bedtime  dorzolamide 2%/timolol 0.5% Ophthalmic Solution 1 Drop(s) Both EYES two times a day  DULoxetine 30 milliGRAM(s) Oral daily  ferrous    sulfate 325 milliGRAM(s) Oral daily  finasteride 5 milliGRAM(s) Oral daily  lactated ringers. 1000 milliLiter(s) (75 mL/Hr) IV Continuous <Continuous>  lactulose Syrup 10 Gram(s) Oral daily  latanoprost 0.005% Ophthalmic Solution 1 Drop(s) Both EYES at bedtime  polyethylene glycol 3350 17 Gram(s) Oral two times a day  senna 2 Tablet(s) Oral at bedtime  tamsulosin 0.4 milliGRAM(s) Oral at bedtime    MEDICATIONS  (PRN):  acetaminophen  Suppository .. 650 milliGRAM(s) Rectal every 6 hours PRN Temp greater or equal to 38C (100.4F), Mild Pain (1 - 3)  baclofen 2.5 milliGRAM(s) Oral every 8 hours PRN Muscle Spasm      LABS:                        8.8    5.91  )-----------( 248      ( 30 May 2022 07:09 )             27.2   05-30    141  |  108  |  27<H>  ----------------------------<  191<H>  3.7   |  25  |  1.74<H>    Ca    8.9      30 May 2022 07:09  Phos  3.2     05-30  Mg     2.2     05-30    TPro  6.9  /  Alb  2.7<L>  /  TBili  0.5  /  DBili  x   /  AST  12<L>  /  ALT  18  /  AlkPhos  64  05-30        Urinalysis Basic - ( 29 May 2022 01:49 )    Color: Yellow / Appearance: Clear / S.015 / pH: x  Gluc: x / Ketone: Negative  / Bili: Negative / Urobili: Negative mg/dL   Blood: x / Protein: 30 mg/dL / Nitrite: Negative   Leuk Esterase: Trace / RBC: 11-25 /HPF / WBC 3-5   Sq Epi: x / Non Sq Epi: Occasional / Bacteria: Few        RADIOLOGY & ADDITIONAL TESTS:          Imaging Personally Reviewed:  [ ] YES  [ ] NO    Consultant(s) Notes Reviewed:  [x ] YES  [ ] NO    Care Discussed with Consultants/Other Providers [x ] YES  [ ] NO  Care discussed in detail with patient.  All questions and concerns addressed

## 2022-05-31 NOTE — DISCHARGE NOTE PROVIDER - NSDCFUADDAPPT_GEN_ALL_CORE_FT
It is important to see your primary physician as well as other necessary consultants within the next week to perform a comprehensive medical review.  Call their offices for an appointment as soon as you leave the hospital.  If you do not have a primary physician or cant reach him/her, contact the [facility] Physician Referral Service at [phone].  Your medical issues appear to be stable at this time, but if your symptoms recur or worsen, contact your physicians and/or return to the hospital if necessary.  If you encounter any issues or questions with your medication, call your physicians before stopping the medication.

## 2022-06-02 ENCOUNTER — NON-APPOINTMENT (OUTPATIENT)
Age: 87
End: 2022-06-02

## 2022-06-02 LAB
CULTURE RESULTS: SIGNIFICANT CHANGE UP
CULTURE RESULTS: SIGNIFICANT CHANGE UP
SPECIMEN SOURCE: SIGNIFICANT CHANGE UP
SPECIMEN SOURCE: SIGNIFICANT CHANGE UP

## 2022-06-03 LAB
ALBUMIN SERPL ELPH-MCNC: 3.9 G/DL
ALP BLD-CCNC: 82 U/L
ALT SERPL-CCNC: 17 U/L
ANION GAP SERPL CALC-SCNC: 14 MMOL/L
AST SERPL-CCNC: 18 U/L
BASOPHILS # BLD AUTO: 0.04 K/UL
BASOPHILS NFR BLD AUTO: 0.7 %
BILIRUB SERPL-MCNC: 0.3 MG/DL
BUN SERPL-MCNC: 24 MG/DL
CALCIUM SERPL-MCNC: 9.3 MG/DL
CHLORIDE SERPL-SCNC: 108 MMOL/L
CO2 SERPL-SCNC: 21 MMOL/L
CREAT SERPL-MCNC: 1.79 MG/DL
CULTURE RESULTS: SIGNIFICANT CHANGE UP
CULTURE RESULTS: SIGNIFICANT CHANGE UP
EGFR: 36 ML/MIN/1.73M2
EOSINOPHIL # BLD AUTO: 0.26 K/UL
EOSINOPHIL NFR BLD AUTO: 4.3 %
GLUCOSE SERPL-MCNC: 151 MG/DL
HCT VFR BLD CALC: 29.4 %
HGB BLD-MCNC: 8.9 G/DL
IMM GRANULOCYTES NFR BLD AUTO: 0.3 %
LYMPHOCYTES # BLD AUTO: 1.7 K/UL
LYMPHOCYTES NFR BLD AUTO: 28 %
MAN DIFF?: NORMAL
MCHC RBC-ENTMCNC: 28.4 PG
MCHC RBC-ENTMCNC: 30.3 GM/DL
MCV RBC AUTO: 93.9 FL
MONOCYTES # BLD AUTO: 0.58 K/UL
MONOCYTES NFR BLD AUTO: 9.5 %
NEUTROPHILS # BLD AUTO: 3.48 K/UL
NEUTROPHILS NFR BLD AUTO: 57.2 %
PLATELET # BLD AUTO: 264 K/UL
POTASSIUM SERPL-SCNC: 4.6 MMOL/L
PROT SERPL-MCNC: 7 G/DL
RBC # BLD: 3.13 M/UL
RBC # FLD: 14 %
SODIUM SERPL-SCNC: 143 MMOL/L
SPECIMEN SOURCE: SIGNIFICANT CHANGE UP
SPECIMEN SOURCE: SIGNIFICANT CHANGE UP
WBC # FLD AUTO: 6.08 K/UL

## 2022-06-07 ENCOUNTER — APPOINTMENT (OUTPATIENT)
Dept: FAMILY MEDICINE | Facility: CLINIC | Age: 87
End: 2022-06-07

## 2022-06-07 DIAGNOSIS — Z79.01 LONG TERM (CURRENT) USE OF ANTICOAGULANTS: ICD-10-CM

## 2022-06-07 DIAGNOSIS — D63.8 ANEMIA IN OTHER CHRONIC DISEASES CLASSIFIED ELSEWHERE: ICD-10-CM

## 2022-06-07 DIAGNOSIS — N18.30 CHRONIC KIDNEY DISEASE, STAGE 3 UNSPECIFIED: ICD-10-CM

## 2022-06-07 DIAGNOSIS — U07.1 COVID-19: ICD-10-CM

## 2022-06-07 DIAGNOSIS — E11.40 TYPE 2 DIABETES MELLITUS WITH DIABETIC NEUROPATHY, UNSPECIFIED: ICD-10-CM

## 2022-06-07 DIAGNOSIS — I27.82 CHRONIC PULMONARY EMBOLISM: ICD-10-CM
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
## 2022-06-07 DIAGNOSIS — H35.30 UNSPECIFIED MACULAR DEGENERATION: ICD-10-CM

## 2022-06-07 DIAGNOSIS — Z88.0 ALLERGY STATUS TO PENICILLIN: ICD-10-CM

## 2022-06-07 DIAGNOSIS — R33.8 OTHER RETENTION OF URINE: ICD-10-CM

## 2022-06-07 DIAGNOSIS — G25.1 DRUG-INDUCED TREMOR: ICD-10-CM

## 2022-06-07 DIAGNOSIS — F03.90 UNSPECIFIED DEMENTIA, UNSPECIFIED SEVERITY, WITHOUT BEHAVIORAL DISTURBANCE, PSYCHOTIC DISTURBANCE, MOOD DISTURBANCE, AND ANXIETY: ICD-10-CM

## 2022-06-07 DIAGNOSIS — Z96.82 PRESENCE OF NEUROSTIMULATOR: ICD-10-CM

## 2022-06-07 DIAGNOSIS — T42.6X5A ADVERSE EFFECT OF OTHER ANTIEPILEPTIC AND SEDATIVE-HYPNOTIC DRUGS, INITIAL ENCOUNTER: ICD-10-CM

## 2022-06-07 DIAGNOSIS — N17.9 ACUTE KIDNEY FAILURE, UNSPECIFIED: ICD-10-CM

## 2022-06-07 DIAGNOSIS — Z86.19 PERSONAL HISTORY OF OTHER INFECTIOUS AND PARASITIC DISEASES: ICD-10-CM

## 2022-06-07 DIAGNOSIS — K59.09 OTHER CONSTIPATION: ICD-10-CM

## 2022-06-07 DIAGNOSIS — T43.215A ADVERSE EFFECT OF SELECTIVE SEROTONIN AND NOREPINEPHRINE REUPTAKE INHIBITORS, INITIAL ENCOUNTER: ICD-10-CM

## 2022-06-07 DIAGNOSIS — N40.1 BENIGN PROSTATIC HYPERPLASIA WITH LOWER URINARY TRACT SYMPTOMS: ICD-10-CM

## 2022-06-07 DIAGNOSIS — Z86.73 PERSONAL HISTORY OF TRANSIENT ISCHEMIC ATTACK (TIA), AND CEREBRAL INFARCTION WITHOUT RESIDUAL DEFICITS: ICD-10-CM

## 2022-06-07 DIAGNOSIS — E11.22 TYPE 2 DIABETES MELLITUS WITH DIABETIC CHRONIC KIDNEY DISEASE: ICD-10-CM

## 2022-06-08 ENCOUNTER — NON-APPOINTMENT (OUTPATIENT)
Age: 87
End: 2022-06-08

## 2022-06-08 ENCOUNTER — APPOINTMENT (OUTPATIENT)
Dept: ELECTROPHYSIOLOGY | Facility: CLINIC | Age: 87
End: 2022-06-08
Payer: MEDICARE

## 2022-06-08 PROCEDURE — G2066: CPT

## 2022-06-08 PROCEDURE — 93298 REM INTERROG DEV EVAL SCRMS: CPT

## 2022-06-10 ENCOUNTER — RX RENEWAL (OUTPATIENT)
Age: 87
End: 2022-06-10

## 2022-06-13 ENCOUNTER — RX RENEWAL (OUTPATIENT)
Age: 87
End: 2022-06-13

## 2022-06-26 ENCOUNTER — RX RENEWAL (OUTPATIENT)
Age: 87
End: 2022-06-26
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
## 2022-07-13 ENCOUNTER — APPOINTMENT (OUTPATIENT)
Dept: ELECTROPHYSIOLOGY | Facility: CLINIC | Age: 87
End: 2022-07-13

## 2022-07-13 ENCOUNTER — NON-APPOINTMENT (OUTPATIENT)
Age: 87
End: 2022-07-13

## 2022-07-13 PROCEDURE — G2066: CPT

## 2022-07-13 PROCEDURE — 93298 REM INTERROG DEV EVAL SCRMS: CPT

## 2022-08-16 ENCOUNTER — RX RENEWAL (OUTPATIENT)
Age: 87
End: 2022-08-16

## 2022-08-17 ENCOUNTER — APPOINTMENT (OUTPATIENT)
Dept: ELECTROPHYSIOLOGY | Facility: CLINIC | Age: 87
End: 2022-08-17

## 2022-08-17 ENCOUNTER — NON-APPOINTMENT (OUTPATIENT)
Age: 87
End: 2022-08-17

## 2022-08-17 PROCEDURE — G2066: CPT

## 2022-08-17 PROCEDURE — 93298 REM INTERROG DEV EVAL SCRMS: CPT

## 2022-08-23 NOTE — H&P ADULT - NSVTERISKASSESS_GEN_ALL_CORE FT
Medical Assessment Completed on: 25-Apr-2022 18:56 Tissue Cultured Epidermal Autograft Text: The defect edges were debeveled with a #15 scalpel blade.  Given the location of the defect, shape of the defect and the proximity to free margins a tissue cultured epidermal autograft was deemed most appropriate.  The graft was then trimmed to fit the size of the defect.  The graft was then placed in the primary defect and oriented appropriately.

## 2022-09-21 ENCOUNTER — NON-APPOINTMENT (OUTPATIENT)
Age: 87
End: 2022-09-21

## 2022-09-21 ENCOUNTER — APPOINTMENT (OUTPATIENT)
Dept: ELECTROPHYSIOLOGY | Facility: CLINIC | Age: 87
End: 2022-09-21

## 2022-09-21 PROCEDURE — G2066: CPT

## 2022-09-21 PROCEDURE — 93298 REM INTERROG DEV EVAL SCRMS: CPT

## 2022-10-26 ENCOUNTER — APPOINTMENT (OUTPATIENT)
Dept: ELECTROPHYSIOLOGY | Facility: CLINIC | Age: 87
End: 2022-10-26

## 2022-10-26 ENCOUNTER — NON-APPOINTMENT (OUTPATIENT)
Age: 87
End: 2022-10-26

## 2022-10-26 PROCEDURE — G2066: CPT

## 2022-10-26 PROCEDURE — 93298 REM INTERROG DEV EVAL SCRMS: CPT

## 2022-11-19 ENCOUNTER — RX RENEWAL (OUTPATIENT)
Age: 87
End: 2022-11-19

## 2022-11-30 ENCOUNTER — NON-APPOINTMENT (OUTPATIENT)
Age: 87
End: 2022-11-30

## 2022-11-30 ENCOUNTER — APPOINTMENT (OUTPATIENT)
Dept: ELECTROPHYSIOLOGY | Facility: CLINIC | Age: 87
End: 2022-11-30

## 2022-11-30 PROCEDURE — G2066: CPT

## 2022-11-30 PROCEDURE — 93298 REM INTERROG DEV EVAL SCRMS: CPT

## 2022-12-29 ENCOUNTER — RX RENEWAL (OUTPATIENT)
Age: 87
End: 2022-12-29

## 2023-01-04 ENCOUNTER — APPOINTMENT (OUTPATIENT)
Dept: ELECTROPHYSIOLOGY | Facility: CLINIC | Age: 88
End: 2023-01-04
Payer: MEDICARE

## 2023-01-04 ENCOUNTER — NON-APPOINTMENT (OUTPATIENT)
Age: 88
End: 2023-01-04

## 2023-01-04 PROCEDURE — G2066: CPT

## 2023-01-04 PROCEDURE — 93298 REM INTERROG DEV EVAL SCRMS: CPT

## 2023-01-24 ENCOUNTER — OFFICE (OUTPATIENT)
Dept: URBAN - METROPOLITAN AREA CLINIC 93 | Facility: CLINIC | Age: 88
Setting detail: OPHTHALMOLOGY
End: 2023-01-24
Payer: MEDICARE

## 2023-01-24 DIAGNOSIS — H53.2: ICD-10-CM

## 2023-01-24 DIAGNOSIS — H04.123: ICD-10-CM

## 2023-01-24 DIAGNOSIS — H40.1133: ICD-10-CM

## 2023-01-24 PROCEDURE — 99213 OFFICE O/P EST LOW 20 MIN: CPT | Performed by: OPHTHALMOLOGY

## 2023-01-24 ASSESSMENT — REFRACTION_CURRENTRX
OS_VPRISM_DIRECTION: BF
OS_ADD: +3.00
OS_SPHERE: PLANO
OD_AXIS: 173
OD_OVR_VA: 20/
OS_OVR_VA: 20/
OS_AXIS: 172
OD_VPRISM_DIRECTION: BF
OD_ADD: +3.00
OD_SPHERE: PLANO
OS_CYLINDER: +1.00
OD_CYLINDER: +1.00

## 2023-01-24 ASSESSMENT — REFRACTION_MANIFEST
OD_AXIS: 95
OD_SPHERE: +0.75
OD_AXIS: 175
OS_SPHERE: PLANO
OS_VA1: 20/60
OS_VA1: 20/30-
OS_CYLINDER: +1.25
OD_VA1: 20/40+2
OD_SPHERE: PLANO
OS_AXIS: 180
OD_CYLINDER: +1.25
OD_CYLINDER: -0.50
OS_SPHERE: +1.25
OS_CYLINDER: -1.00
OS_AXIS: 95

## 2023-01-24 ASSESSMENT — VISUAL ACUITY
OD_BCVA: 20/60
OS_BCVA: 20/40

## 2023-01-24 ASSESSMENT — TONOMETRY
OD_IOP_MMHG: 13
OS_IOP_MMHG: 11

## 2023-01-24 ASSESSMENT — PUNCTA - ASSESSMENT
OD_PUNCTA: RLL SMALL
OS_PUNCTA: LLL SMALL

## 2023-01-24 ASSESSMENT — SPHEQUIV_DERIVED
OS_SPHEQUIV: 0.75
OD_SPHEQUIV: 0.5
OD_SPHEQUIV: 0.875
OS_SPHEQUIV: 2.75

## 2023-01-24 ASSESSMENT — KERATOMETRY
OD_K1POWER_DIOPTERS: 42.75
OD_AXISANGLE_DEGREES: 083
OS_K2POWER_DIOPTERS: 41.75
OS_K1POWER_DIOPTERS: 42.50
OD_K2POWER_DIOPTERS: 41.75
OS_AXISANGLE_DEGREES: 100

## 2023-01-24 ASSESSMENT — AXIALLENGTH_DERIVED
OD_AL: 23.8592
OS_AL: 23.0388
OS_AL: 23.8067
OD_AL: 23.7107

## 2023-01-24 ASSESSMENT — REFRACTION_AUTOREFRACTION
OS_CYLINDER: -1.50
OD_AXIS: 074
OD_SPHERE: +1.50
OS_AXIS: 116
OD_CYLINDER: -1.25
OS_SPHERE: +3.50

## 2023-01-24 ASSESSMENT — CONFRONTATIONAL VISUAL FIELD TEST (CVF)
OS_FINDINGS: FULL
OD_FINDINGS: FULL

## 2023-02-08 ENCOUNTER — APPOINTMENT (OUTPATIENT)
Dept: ELECTROPHYSIOLOGY | Facility: CLINIC | Age: 88
End: 2023-02-08
Payer: MEDICARE

## 2023-02-08 ENCOUNTER — NON-APPOINTMENT (OUTPATIENT)
Age: 88
End: 2023-02-08

## 2023-02-08 PROCEDURE — G2066: CPT

## 2023-02-08 PROCEDURE — 93298 REM INTERROG DEV EVAL SCRMS: CPT

## 2023-03-09 ENCOUNTER — NON-APPOINTMENT (OUTPATIENT)
Age: 88
End: 2023-03-09

## 2023-03-09 ENCOUNTER — APPOINTMENT (OUTPATIENT)
Dept: ELECTROPHYSIOLOGY | Facility: CLINIC | Age: 88
End: 2023-03-09
Payer: MEDICARE

## 2023-03-09 PROCEDURE — G2066: CPT

## 2023-03-09 PROCEDURE — 93298 REM INTERROG DEV EVAL SCRMS: CPT

## 2023-04-13 ENCOUNTER — NON-APPOINTMENT (OUTPATIENT)
Age: 88
End: 2023-04-13

## 2023-04-13 ENCOUNTER — APPOINTMENT (OUTPATIENT)
Dept: ELECTROPHYSIOLOGY | Facility: CLINIC | Age: 88
End: 2023-04-13
Payer: MEDICARE

## 2023-04-13 PROCEDURE — 93298 REM INTERROG DEV EVAL SCRMS: CPT

## 2023-04-13 PROCEDURE — G2066: CPT

## 2023-05-17 ENCOUNTER — APPOINTMENT (OUTPATIENT)
Dept: ELECTROPHYSIOLOGY | Facility: CLINIC | Age: 88
End: 2023-05-17
Payer: MEDICARE

## 2023-05-17 ENCOUNTER — NON-APPOINTMENT (OUTPATIENT)
Age: 88
End: 2023-05-17

## 2023-05-17 PROCEDURE — G2066: CPT

## 2023-05-17 PROCEDURE — 93298 REM INTERROG DEV EVAL SCRMS: CPT

## 2023-05-21 ENCOUNTER — RX RENEWAL (OUTPATIENT)
Age: 88
End: 2023-05-21

## 2023-05-22 ENCOUNTER — APPOINTMENT (OUTPATIENT)
Dept: FAMILY MEDICINE | Facility: CLINIC | Age: 88
End: 2023-05-22

## 2023-06-21 ENCOUNTER — APPOINTMENT (OUTPATIENT)
Dept: ELECTROPHYSIOLOGY | Facility: CLINIC | Age: 88
End: 2023-06-21
Payer: MEDICARE

## 2023-06-21 ENCOUNTER — NON-APPOINTMENT (OUTPATIENT)
Age: 88
End: 2023-06-21

## 2023-06-21 PROCEDURE — 93298 REM INTERROG DEV EVAL SCRMS: CPT

## 2023-06-21 PROCEDURE — G2066: CPT

## 2023-07-03 ENCOUNTER — EMERGENCY (EMERGENCY)
Facility: HOSPITAL | Age: 88
LOS: 0 days | Discharge: HOME | End: 2023-07-04
Attending: STUDENT IN AN ORGANIZED HEALTH CARE EDUCATION/TRAINING PROGRAM
Payer: MEDICARE

## 2023-07-03 VITALS
HEIGHT: 68 IN | WEIGHT: 179.9 LBS | RESPIRATION RATE: 18 BRPM | TEMPERATURE: 97 F | OXYGEN SATURATION: 95 % | HEART RATE: 77 BPM | SYSTOLIC BLOOD PRESSURE: 144 MMHG | DIASTOLIC BLOOD PRESSURE: 67 MMHG

## 2023-07-03 DIAGNOSIS — R06.02 SHORTNESS OF BREATH: ICD-10-CM

## 2023-07-03 DIAGNOSIS — F03.90 UNSPECIFIED DEMENTIA, UNSPECIFIED SEVERITY, WITHOUT BEHAVIORAL DISTURBANCE, PSYCHOTIC DISTURBANCE, MOOD DISTURBANCE, AND ANXIETY: ICD-10-CM

## 2023-07-03 DIAGNOSIS — Z79.84 LONG TERM (CURRENT) USE OF ORAL HYPOGLYCEMIC DRUGS: ICD-10-CM

## 2023-07-03 DIAGNOSIS — M54.2 CERVICALGIA: ICD-10-CM

## 2023-07-03 DIAGNOSIS — Z79.01 LONG TERM (CURRENT) USE OF ANTICOAGULANTS: ICD-10-CM

## 2023-07-03 DIAGNOSIS — Z98.890 OTHER SPECIFIED POSTPROCEDURAL STATES: Chronic | ICD-10-CM

## 2023-07-03 DIAGNOSIS — E11.40 TYPE 2 DIABETES MELLITUS WITH DIABETIC NEUROPATHY, UNSPECIFIED: ICD-10-CM

## 2023-07-03 DIAGNOSIS — N18.30 CHRONIC KIDNEY DISEASE, STAGE 3 UNSPECIFIED: ICD-10-CM

## 2023-07-03 DIAGNOSIS — E11.22 TYPE 2 DIABETES MELLITUS WITH DIABETIC CHRONIC KIDNEY DISEASE: ICD-10-CM

## 2023-07-03 DIAGNOSIS — Z87.438 PERSONAL HISTORY OF OTHER DISEASES OF MALE GENITAL ORGANS: ICD-10-CM

## 2023-07-03 DIAGNOSIS — Z88.0 ALLERGY STATUS TO PENICILLIN: ICD-10-CM

## 2023-07-03 DIAGNOSIS — R07.89 OTHER CHEST PAIN: ICD-10-CM

## 2023-07-03 DIAGNOSIS — I12.9 HYPERTENSIVE CHRONIC KIDNEY DISEASE WITH STAGE 1 THROUGH STAGE 4 CHRONIC KIDNEY DISEASE, OR UNSPECIFIED CHRONIC KIDNEY DISEASE: ICD-10-CM

## 2023-07-03 DIAGNOSIS — Z86.73 PERSONAL HISTORY OF TRANSIENT ISCHEMIC ATTACK (TIA), AND CEREBRAL INFARCTION WITHOUT RESIDUAL DEFICITS: ICD-10-CM

## 2023-07-03 DIAGNOSIS — R00.1 BRADYCARDIA, UNSPECIFIED: ICD-10-CM

## 2023-07-03 LAB
BASOPHILS # BLD AUTO: 0.04 K/UL — SIGNIFICANT CHANGE UP (ref 0–0.2)
BASOPHILS NFR BLD AUTO: 0.7 % — SIGNIFICANT CHANGE UP (ref 0–2)
EOSINOPHIL # BLD AUTO: 0.23 K/UL — SIGNIFICANT CHANGE UP (ref 0–0.5)
EOSINOPHIL NFR BLD AUTO: 3.7 % — SIGNIFICANT CHANGE UP (ref 0–6)
HCT VFR BLD CALC: 32.4 % — LOW (ref 39–50)
HGB BLD-MCNC: 10.6 G/DL — LOW (ref 13–17)
IMM GRANULOCYTES NFR BLD AUTO: 0.3 % — SIGNIFICANT CHANGE UP (ref 0–0.9)
LYMPHOCYTES # BLD AUTO: 2.15 K/UL — SIGNIFICANT CHANGE UP (ref 1–3.3)
LYMPHOCYTES # BLD AUTO: 35 % — SIGNIFICANT CHANGE UP (ref 13–44)
MCHC RBC-ENTMCNC: 28.1 PG — SIGNIFICANT CHANGE UP (ref 27–34)
MCHC RBC-ENTMCNC: 32.7 G/DL — SIGNIFICANT CHANGE UP (ref 32–36)
MCV RBC AUTO: 85.9 FL — SIGNIFICANT CHANGE UP (ref 80–100)
MONOCYTES # BLD AUTO: 0.62 K/UL — SIGNIFICANT CHANGE UP (ref 0–0.9)
MONOCYTES NFR BLD AUTO: 10.1 % — SIGNIFICANT CHANGE UP (ref 2–14)
NEUTROPHILS # BLD AUTO: 3.09 K/UL — SIGNIFICANT CHANGE UP (ref 1.8–7.4)
NEUTROPHILS NFR BLD AUTO: 50.2 % — SIGNIFICANT CHANGE UP (ref 43–77)
NRBC # BLD: 0 /100 WBCS — SIGNIFICANT CHANGE UP (ref 0–0)
PLATELET # BLD AUTO: 216 K/UL — SIGNIFICANT CHANGE UP (ref 150–400)
RBC # BLD: 3.77 M/UL — LOW (ref 4.2–5.8)
RBC # FLD: 14.5 % — SIGNIFICANT CHANGE UP (ref 10.3–14.5)
TROPONIN I, HIGH SENSITIVITY RESULT: 7.5 NG/L — SIGNIFICANT CHANGE UP
WBC # BLD: 6.15 K/UL — SIGNIFICANT CHANGE UP (ref 3.8–10.5)
WBC # FLD AUTO: 6.15 K/UL — SIGNIFICANT CHANGE UP (ref 3.8–10.5)

## 2023-07-03 PROCEDURE — 99285 EMERGENCY DEPT VISIT HI MDM: CPT

## 2023-07-03 PROCEDURE — 93010 ELECTROCARDIOGRAM REPORT: CPT

## 2023-07-04 VITALS
OXYGEN SATURATION: 96 % | SYSTOLIC BLOOD PRESSURE: 143 MMHG | DIASTOLIC BLOOD PRESSURE: 68 MMHG | TEMPERATURE: 97 F | RESPIRATION RATE: 17 BRPM | HEART RATE: 55 BPM

## 2023-07-04 LAB
ALBUMIN SERPL ELPH-MCNC: 3.3 G/DL — SIGNIFICANT CHANGE UP (ref 3.3–5)
ALP SERPL-CCNC: 94 U/L — SIGNIFICANT CHANGE UP (ref 40–120)
ALT FLD-CCNC: 55 U/L — SIGNIFICANT CHANGE UP (ref 12–78)
ANION GAP SERPL CALC-SCNC: 3 MMOL/L — LOW (ref 5–17)
AST SERPL-CCNC: 26 U/L — SIGNIFICANT CHANGE UP (ref 15–37)
BILIRUB SERPL-MCNC: 0.2 MG/DL — SIGNIFICANT CHANGE UP (ref 0.2–1.2)
BUN SERPL-MCNC: 33 MG/DL — HIGH (ref 7–23)
CALCIUM SERPL-MCNC: 9.1 MG/DL — SIGNIFICANT CHANGE UP (ref 8.5–10.1)
CHLORIDE SERPL-SCNC: 113 MMOL/L — HIGH (ref 96–108)
CO2 SERPL-SCNC: 22 MMOL/L — SIGNIFICANT CHANGE UP (ref 22–31)
CREAT SERPL-MCNC: 1.8 MG/DL — HIGH (ref 0.5–1.3)
EGFR: 36 ML/MIN/1.73M2 — LOW
GLUCOSE SERPL-MCNC: 108 MG/DL — HIGH (ref 70–99)
POTASSIUM SERPL-MCNC: 4.3 MMOL/L — SIGNIFICANT CHANGE UP (ref 3.5–5.3)
POTASSIUM SERPL-SCNC: 4.3 MMOL/L — SIGNIFICANT CHANGE UP (ref 3.5–5.3)
PROT SERPL-MCNC: 7.5 GM/DL — SIGNIFICANT CHANGE UP (ref 6–8.3)
SODIUM SERPL-SCNC: 138 MMOL/L — SIGNIFICANT CHANGE UP (ref 135–145)

## 2023-07-04 PROCEDURE — 71045 X-RAY EXAM CHEST 1 VIEW: CPT | Mod: 26

## 2023-07-04 NOTE — ED PROVIDER NOTE - OBJECTIVE STATEMENT
89yo male with pmh htn, dementia, BPH, DM, CKD III, prior CVA, presenting with chest pain.  Has been having intermittent L sided cp, non radiating since yesterday around 6p.  Never had this before.  Pain is sharp, stabbing, occurs at the longest for 1 minute but usually shorter.  Occurs randomly, has been sitting/ at rest every time.  Feels sob from the pain but denies any complaints at this time.  No fever, cough, edema.  Had neck stiffness earlier which he states is unchanged from chronic pain and went to chiropractor where they did acupuncture that helped.  States neck and chest pain feel like different pain and do not feel associated.

## 2023-07-04 NOTE — ED PROVIDER NOTE - PATIENT PORTAL LINK FT
You can access the FollowMyHealth Patient Portal offered by [facility] by registering at the following website: [email or web address]. By joining [employer]’s FollowMyHealth portal, you will also be able to view your health information using other applications (apps) compatible with our system.

## 2023-07-04 NOTE — ED PROVIDER NOTE - PROGRESS NOTE DETAILS
Antwan: Patient feels better, no complaint at this time and no pain recently.  Labs reviewed with patient and daughter at bedside.  They would prefer to go home and do not want to be admitted to hospital.  Has cardiologist to follow up with.  Will harriett.

## 2023-07-04 NOTE — ED PROVIDER NOTE - PHYSICAL EXAMINATION
General appearance: Nontoxic appearing, conversant, afebrile    Eyes: anicteric sclerae, FAB, EOMI   HENT: Atraumatic; oropharynx clear, MMM and no ulcerations, no pharyngeal erythema or exudate   Neck: Trachea midline; Full range of motion, supple   Pulm: CTA bl, normal respiratory effort and no intercostal retractions, normal work of breathing   CV: RRR, No murmurs, rubs, or gallops   Abdomen: Soft, non-tender, non-distended; no guarding or rebound   Extremities: No peripheral edema, no gross deformities, FROM x4   Skin: Dry, normal temperature, turgor and texture; no rash   Psych: Appropriate affect, cooperative

## 2023-07-04 NOTE — ED PROVIDER NOTE - NSFOLLOWUPINSTRUCTIONS_ED_ALL_ED_FT
Follow up with your cardiologist  Rest, drink plenty of fluids  Advance activity as tolerated  Continue all previously prescribed medications as directed  Follow up with your PMD - bring copies of your results  Return to the ER for recurrent symptoms, worsening symptoms, difficulty breathing, or other new or concerning symptoms

## 2023-07-04 NOTE — ED PROVIDER NOTE - CLINICAL SUMMARY MEDICAL DECISION MAKING FREE TEXT BOX
Presenting with L sided intermittent stabbing cp lasting less than a minute and completely resolving.  Non exertional, non pleuritic.  Lower suspicion acs.  ECG unchanged from prior.  Not c/w pe or other acute intrathoracic, aortic pathology.  No infectious symptoms.  No abd ttp.  Plan for labs, xr, reassess.

## 2023-07-04 NOTE — ED ADULT NURSE NOTE - NSFALLOOBATTEMPT_ED_ALL_ED
Writer was informed by attending psychiatrist that requested medications on the [facility] are: Clozapine, Risperidone, Zyprexa and Haldol). [facility] [address] allows 4 medications to be on the [facility].    No

## 2023-07-04 NOTE — ED ADULT NURSE NOTE - OBJECTIVE STATEMENT
Pt presents to the ED for c/o chest pain. Denies SOB, unsteady gait or any distress. A&OX3 and in no acute distress. Pt placed on cardiac monitoring, NSR. Monitoring ongoing.

## 2023-07-04 NOTE — ED ADULT NURSE NOTE - NSFALLUNIVINTERV_ED_ALL_ED
Bed/Stretcher in lowest position, wheels locked, appropriate side rails in place/Call bell, personal items and telephone in reach/Instruct patient to call for assistance before getting out of bed/chair/stretcher/Non-slip footwear applied when patient is off stretcher/[place] to call system/Physically safe environment - no spills, clutter or unnecessary equipment/Purposeful proactive rounding/Room/bathroom lighting operational, light cord in reach

## 2023-07-04 NOTE — ED ADULT NURSE NOTE - CAS ELECT INFOMATION PROVIDED
MD notified of fever increase from 38.0 to 38.3. This RN decreased room temperature and removed patient blankets. RN notified MD of decrease in WOB and RR over the hour from 60 respirations to 48 respirations. Waiting for MD reply.    DC instructions

## 2023-07-26 ENCOUNTER — NON-APPOINTMENT (OUTPATIENT)
Age: 88
End: 2023-07-26

## 2023-07-26 ENCOUNTER — APPOINTMENT (OUTPATIENT)
Dept: ELECTROPHYSIOLOGY | Facility: CLINIC | Age: 88
End: 2023-07-26
Payer: MEDICARE

## 2023-07-26 PROCEDURE — 93298 REM INTERROG DEV EVAL SCRMS: CPT

## 2023-07-26 PROCEDURE — G2066: CPT

## 2023-07-30 ENCOUNTER — NON-APPOINTMENT (OUTPATIENT)
Age: 88
End: 2023-07-30

## 2023-08-30 ENCOUNTER — NON-APPOINTMENT (OUTPATIENT)
Age: 88
End: 2023-08-30

## 2023-08-30 ENCOUNTER — APPOINTMENT (OUTPATIENT)
Dept: ELECTROPHYSIOLOGY | Facility: CLINIC | Age: 88
End: 2023-08-30
Payer: MEDICARE

## 2023-08-31 PROCEDURE — G2066: CPT

## 2023-08-31 PROCEDURE — 93298 REM INTERROG DEV EVAL SCRMS: CPT

## 2023-09-02 ENCOUNTER — EMERGENCY (EMERGENCY)
Facility: HOSPITAL | Age: 88
LOS: 0 days | Discharge: HOME | End: 2023-09-02
Attending: STUDENT IN AN ORGANIZED HEALTH CARE EDUCATION/TRAINING PROGRAM
Payer: MEDICARE

## 2023-09-02 VITALS
RESPIRATION RATE: 20 BRPM | TEMPERATURE: 98 F | OXYGEN SATURATION: 98 % | WEIGHT: 188.05 LBS | SYSTOLIC BLOOD PRESSURE: 99 MMHG | HEART RATE: 74 BPM | DIASTOLIC BLOOD PRESSURE: 62 MMHG | HEIGHT: 70 IN

## 2023-09-02 VITALS
OXYGEN SATURATION: 99 % | HEART RATE: 65 BPM | SYSTOLIC BLOOD PRESSURE: 125 MMHG | RESPIRATION RATE: 18 BRPM | DIASTOLIC BLOOD PRESSURE: 69 MMHG

## 2023-09-02 DIAGNOSIS — S91.115A LACERATION WITHOUT FOREIGN BODY OF LEFT LESSER TOE(S) WITHOUT DAMAGE TO NAIL, INITIAL ENCOUNTER: ICD-10-CM

## 2023-09-02 DIAGNOSIS — Z87.438 PERSONAL HISTORY OF OTHER DISEASES OF MALE GENITAL ORGANS: ICD-10-CM

## 2023-09-02 DIAGNOSIS — Z23 ENCOUNTER FOR IMMUNIZATION: ICD-10-CM

## 2023-09-02 DIAGNOSIS — Z86.73 PERSONAL HISTORY OF TRANSIENT ISCHEMIC ATTACK (TIA), AND CEREBRAL INFARCTION WITHOUT RESIDUAL DEFICITS: ICD-10-CM
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
## 2023-09-02 DIAGNOSIS — E11.22 TYPE 2 DIABETES MELLITUS WITH DIABETIC CHRONIC KIDNEY DISEASE: ICD-10-CM

## 2023-09-02 DIAGNOSIS — Z98.890 OTHER SPECIFIED POSTPROCEDURAL STATES: Chronic | ICD-10-CM

## 2023-09-02 DIAGNOSIS — Z79.84 LONG TERM (CURRENT) USE OF ORAL HYPOGLYCEMIC DRUGS: ICD-10-CM

## 2023-09-02 DIAGNOSIS — F03.90 UNSPECIFIED DEMENTIA, UNSPECIFIED SEVERITY, WITHOUT BEHAVIORAL DISTURBANCE, PSYCHOTIC DISTURBANCE, MOOD DISTURBANCE, AND ANXIETY: ICD-10-CM

## 2023-09-02 DIAGNOSIS — I12.9 HYPERTENSIVE CHRONIC KIDNEY DISEASE WITH STAGE 1 THROUGH STAGE 4 CHRONIC KIDNEY DISEASE, OR UNSPECIFIED CHRONIC KIDNEY DISEASE: ICD-10-CM

## 2023-09-02 DIAGNOSIS — N18.30 CHRONIC KIDNEY DISEASE, STAGE 3 UNSPECIFIED: ICD-10-CM

## 2023-09-02 DIAGNOSIS — Z79.01 LONG TERM (CURRENT) USE OF ANTICOAGULANTS: ICD-10-CM

## 2023-09-02 DIAGNOSIS — Z88.0 ALLERGY STATUS TO PENICILLIN: ICD-10-CM

## 2023-09-02 PROCEDURE — 73630 X-RAY EXAM OF FOOT: CPT | Mod: 26,LT

## 2023-09-02 PROCEDURE — 99284 EMERGENCY DEPT VISIT MOD MDM: CPT | Mod: 25

## 2023-09-02 PROCEDURE — 12001 RPR S/N/AX/GEN/TRNK 2.5CM/<: CPT

## 2023-09-02 RX ORDER — TETANUS TOXOID, REDUCED DIPHTHERIA TOXOID AND ACELLULAR PERTUSSIS VACCINE, ADSORBED 5; 2.5; 8; 8; 2.5 [IU]/.5ML; [IU]/.5ML; UG/.5ML; UG/.5ML; UG/.5ML
0.5 SUSPENSION INTRAMUSCULAR ONCE
Refills: 0 | Status: COMPLETED | OUTPATIENT
Start: 2023-09-02 | End: 2023-09-02

## 2023-09-02 RX ADMIN — Medication 100 MILLIGRAM(S): at 16:51

## 2023-09-02 RX ADMIN — TETANUS TOXOID, REDUCED DIPHTHERIA TOXOID AND ACELLULAR PERTUSSIS VACCINE, ADSORBED 0.5 MILLILITER(S): 5; 2.5; 8; 8; 2.5 SUSPENSION INTRAMUSCULAR at 16:52

## 2023-09-02 NOTE — ED ADULT TRIAGE NOTE - CHIEF COMPLAINT QUOTE
Patient to ed with son and HHA c/o laceration/ tear under his left 4th toe after sliding out of bed unto his buttock this morning. Occasional lower back pains in morning but resolved usually after his bath, denies back pains now. h/o DM, HTN, [place]. Denies feeling dizzy prior to falling.

## 2023-09-02 NOTE — ED ADULT NURSE NOTE - CHIEF COMPLAINT QUOTE
Patient to ed with son and HHA c/o laceration/ tear under his left 4th toe after sliding out of bed unto his buttock this morning. Occasional lower back pains in morning but resolved usually after his bath, denies back pains now. h/o DM, HTN, [place]. Denies feeling dizzy prior to falling.

## 2023-09-02 NOTE — ED PROVIDER NOTE - CLINICAL SUMMARY MEDICAL DECISION MAKING FREE TEXT BOX
89-year-old male with history of hypertension, dementia, BPH, DM, CKD 3, prior CVA presenting to the ED with noted laceration to left toe, denies hitting head or LOC, primary history obtained by son at bedside, no noted other complaints.    Left fourth toe flexor surface laceration, approximated with 3 sutures, Tdap doxycycline prescription given history of diabetes and possible neuropathy, patient to follow-up with outpatient podiatrist return precautions discussed verbalized understanding with son at bedside and agreeable with discharge plan.

## 2023-09-02 NOTE — ED PROVIDER NOTE - PHYSICAL EXAMINATION
VITAL SIGNS: I have reviewed nursing notes and confirm.  CONSTITUTIONAL: well-appearing, non-toxic, NAD  SKIN: Warm dry, normal skin turgor, left 4th proximal volar aspect of toe w/ flexural laceration   HEAD: NCAT  CARD: RRR, no murmurs, rubs or gallops  RESP: clear to ausculation b/l.  No rales, rhonchi, or wheezing.  EXT: Full ROM, no bony tenderness, no pedal edema, no calf tenderness  PSYCH: Cooperative, appropriate.

## 2023-09-02 NOTE — ED ADULT NURSE NOTE - NSFALLHARMRISKINTERV_ED_ALL_ED

## 2023-09-02 NOTE — ED PROVIDER NOTE - PATIENT PORTAL LINK FT
You can access the FollowMyHealth Patient Portal offered by [facility] by registering at the following website: [email or web address]. By joining [employer]’s FollowMyHealth portal, you will also be able to view your health information using other applications (apps) compatible with our system.

## 2023-09-02 NOTE — ED ADULT NURSE NOTE - OBJECTIVE STATEMENT
pt stated he was getting out and had a controlled slip on the bed to the floor. pt denies any LOC or head trauma. PMH DM, L foot wound, Hypotension.

## 2023-09-02 NOTE — ED PROVIDER NOTE - CARE PROVIDER_API CALL
Camille Ramirez  Podiatric Medicine and Surgery  [address] [place] [id number]  Phone: [phone]  Fax: [phone]  Follow Up Time: 4-6 Days

## 2023-09-27 ENCOUNTER — RX RENEWAL (OUTPATIENT)
Age: 88
End: 2023-09-27

## 2023-10-04 ENCOUNTER — NON-APPOINTMENT (OUTPATIENT)
Age: 88
End: 2023-10-04

## 2023-10-04 ENCOUNTER — APPOINTMENT (OUTPATIENT)
Dept: ELECTROPHYSIOLOGY | Facility: CLINIC | Age: 88
End: 2023-10-04
Payer: MEDICARE

## 2023-10-04 PROCEDURE — 93298 REM INTERROG DEV EVAL SCRMS: CPT

## 2023-10-04 PROCEDURE — G2066: CPT

## 2023-10-10 ENCOUNTER — OFFICE (OUTPATIENT)
Facility: LOCATION | Age: 88
Setting detail: OPHTHALMOLOGY
End: 2023-10-10
Payer: MEDICARE

## 2023-10-10 DIAGNOSIS — H04.123: ICD-10-CM

## 2023-10-10 DIAGNOSIS — H40.1133: ICD-10-CM

## 2023-10-10 DIAGNOSIS — H53.2: ICD-10-CM

## 2023-10-10 PROCEDURE — 99213 OFFICE O/P EST LOW 20 MIN: CPT | Performed by: OPHTHALMOLOGY

## 2023-10-10 ASSESSMENT — CONFRONTATIONAL VISUAL FIELD TEST (CVF)
OS_FINDINGS: FULL
OD_FINDINGS: FULL

## 2023-10-10 ASSESSMENT — REFRACTION_MANIFEST
OS_SPHERE: +1.25
OD_CYLINDER: -0.50
OD_SPHERE: +0.75
OD_CYLINDER: +1.25
OS_AXIS: 180
OS_VA1: 20/30-
OS_VA1: 20/60
OS_CYLINDER: +1.25
OD_AXIS: 175
OS_AXIS: 95
OS_SPHERE: PLANO
OD_AXIS: 95
OD_VA1: 20/40+2
OD_SPHERE: PLANO
OS_CYLINDER: -1.00

## 2023-10-10 ASSESSMENT — KERATOMETRY
OD_K1POWER_DIOPTERS: 43.00
OD_AXISANGLE_DEGREES: 074
OS_K2POWER_DIOPTERS: 41.75
OS_K1POWER_DIOPTERS: 43.00
OS_AXISANGLE_DEGREES: 119
OD_K2POWER_DIOPTERS: 42.25

## 2023-10-10 ASSESSMENT — AXIALLENGTH_DERIVED
OD_AL: 23.7192
OS_AL: 23.6644
OS_AL: 23.7135

## 2023-10-10 ASSESSMENT — REFRACTION_CURRENTRX
OD_SPHERE: PLANO
OD_VPRISM_DIRECTION: BF
OS_CYLINDER: +1.00
OD_OVR_VA: 20/
OS_ADD: +3.00
OS_OVR_VA: 20/
OS_AXIS: 080
OS_VPRISM_DIRECTION: BF
OD_ADD: +3.00
OD_AXIS: 087
OS_SPHERE: PLANO
OD_CYLINDER: +1.00

## 2023-10-10 ASSESSMENT — REFRACTION_AUTOREFRACTION
OD_AXIS: 079
OS_CYLINDER: -2.25
OS_AXIS: 111
OD_SPHERE: +1.50
OS_SPHERE: +2.00
OD_CYLINDER: +-1.50

## 2023-10-10 ASSESSMENT — VISUAL ACUITY
OD_BCVA: 20/80
OS_BCVA: 20/30-2

## 2023-10-10 ASSESSMENT — SPHEQUIV_DERIVED
OD_SPHEQUIV: 0.5
OS_SPHEQUIV: 0.875
OS_SPHEQUIV: 0.75

## 2023-10-10 ASSESSMENT — PUNCTA - ASSESSMENT
OS_PUNCTA: LLL SMALL
OD_PUNCTA: RLL SMALL

## 2023-10-10 ASSESSMENT — TONOMETRY
OS_IOP_MMHG: 10
OD_IOP_MMHG: 10

## 2023-10-20 ENCOUNTER — APPOINTMENT (OUTPATIENT)
Dept: FAMILY MEDICINE | Facility: CLINIC | Age: 88
End: 2023-10-20
Payer: MEDICARE

## 2023-10-20 VITALS
HEIGHT: 69 IN | BODY MASS INDEX: 27.25 KG/M2 | WEIGHT: 184 LBS | DIASTOLIC BLOOD PRESSURE: 50 MMHG | TEMPERATURE: 97.2 F | OXYGEN SATURATION: 99 % | SYSTOLIC BLOOD PRESSURE: 85 MMHG | RESPIRATION RATE: 16 BRPM | HEART RATE: 92 BPM

## 2023-10-20 VITALS — SYSTOLIC BLOOD PRESSURE: 102 MMHG | DIASTOLIC BLOOD PRESSURE: 68 MMHG

## 2023-10-20 DIAGNOSIS — R30.0 DYSURIA: ICD-10-CM

## 2023-10-20 PROCEDURE — 81003 URINALYSIS AUTO W/O SCOPE: CPT | Mod: QW

## 2023-10-20 PROCEDURE — 99214 OFFICE O/P EST MOD 30 MIN: CPT | Mod: 25

## 2023-10-21 LAB
ALBUMIN SERPL ELPH-MCNC: 4.1 G/DL
ALP BLD-CCNC: 90 U/L
ALT SERPL-CCNC: 15 U/L
ANION GAP SERPL CALC-SCNC: 11 MMOL/L
AST SERPL-CCNC: 17 U/L
BASOPHILS # BLD AUTO: 0.04 K/UL
BASOPHILS NFR BLD AUTO: 0.6 %
BILIRUB SERPL-MCNC: 0.4 MG/DL
BUN SERPL-MCNC: 36 MG/DL
CALCIUM SERPL-MCNC: 8.9 MG/DL
CHLORIDE SERPL-SCNC: 105 MMOL/L
CO2 SERPL-SCNC: 20 MMOL/L
CREAT SERPL-MCNC: 2.12 MG/DL
EGFR: 29 ML/MIN/1.73M2
EOSINOPHIL # BLD AUTO: 0.46 K/UL
EOSINOPHIL NFR BLD AUTO: 7.4 %
GLUCOSE SERPL-MCNC: 99 MG/DL
HCT VFR BLD CALC: 36.3 %
HGB BLD-MCNC: 11.4 G/DL
IMM GRANULOCYTES NFR BLD AUTO: 0.3 %
LYMPHOCYTES # BLD AUTO: 2.08 K/UL
LYMPHOCYTES NFR BLD AUTO: 33.7 %
MAN DIFF?: NORMAL
MCHC RBC-ENTMCNC: 28.4 PG
MCHC RBC-ENTMCNC: 31.4 GM/DL
MCV RBC AUTO: 90.5 FL
MONOCYTES # BLD AUTO: 0.46 K/UL
MONOCYTES NFR BLD AUTO: 7.4 %
NEUTROPHILS # BLD AUTO: 3.12 K/UL
NEUTROPHILS NFR BLD AUTO: 50.6 %
PLATELET # BLD AUTO: 225 K/UL
POTASSIUM SERPL-SCNC: 4.8 MMOL/L
PROT SERPL-MCNC: 7.6 G/DL
RBC # BLD: 4.01 M/UL
RBC # FLD: 14.1 %
SODIUM SERPL-SCNC: 136 MMOL/L
WBC # FLD AUTO: 6.18 K/UL

## 2023-10-23 LAB — BACTERIA UR CULT: NORMAL

## 2023-10-31 ENCOUNTER — NON-APPOINTMENT (OUTPATIENT)
Age: 88
End: 2023-10-31

## 2023-11-06 ENCOUNTER — APPOINTMENT (OUTPATIENT)
Dept: UROLOGY | Facility: CLINIC | Age: 88
End: 2023-11-06
Payer: MEDICARE

## 2023-11-06 VITALS
SYSTOLIC BLOOD PRESSURE: 133 MMHG | DIASTOLIC BLOOD PRESSURE: 77 MMHG | HEART RATE: 71 BPM | OXYGEN SATURATION: 99 % | TEMPERATURE: 97.8 F

## 2023-11-06 DIAGNOSIS — R35.1 NOCTURIA: ICD-10-CM

## 2023-11-06 DIAGNOSIS — Z80.0 FAMILY HISTORY OF MALIGNANT NEOPLASM OF DIGESTIVE ORGANS: ICD-10-CM

## 2023-11-06 DIAGNOSIS — R35.1 BENIGN PROSTATIC HYPERPLASIA WITH LOWER URINARY TRACT SYMPMS: ICD-10-CM

## 2023-11-06 DIAGNOSIS — N40.1 BENIGN PROSTATIC HYPERPLASIA WITH LOWER URINARY TRACT SYMPMS: ICD-10-CM

## 2023-11-06 DIAGNOSIS — Z63.4 DISAPPEARANCE AND DEATH OF FAMILY MEMBER: ICD-10-CM

## 2023-11-06 PROCEDURE — 99203 OFFICE O/P NEW LOW 30 MIN: CPT

## 2023-11-06 SDOH — SOCIAL STABILITY - SOCIAL INSECURITY: DISSAPEARANCE AND DEATH OF FAMILY MEMBER: Z63.4

## 2023-11-08 ENCOUNTER — APPOINTMENT (OUTPATIENT)
Dept: ELECTROPHYSIOLOGY | Facility: CLINIC | Age: 88
End: 2023-11-08
Payer: MEDICARE

## 2023-11-08 ENCOUNTER — NON-APPOINTMENT (OUTPATIENT)
Age: 88
End: 2023-11-08

## 2023-11-09 PROCEDURE — 93298 REM INTERROG DEV EVAL SCRMS: CPT

## 2023-11-09 PROCEDURE — G2066: CPT

## 2023-11-27 ENCOUNTER — APPOINTMENT (OUTPATIENT)
Dept: FAMILY MEDICINE | Facility: CLINIC | Age: 88
End: 2023-11-27

## 2023-11-28 ENCOUNTER — INPATIENT (INPATIENT)
Facility: HOSPITAL | Age: 88
LOS: 2 days | Discharge: NURSING, REHAB OR LONG-TERM CARE | End: 2023-12-01
Attending: STUDENT IN AN ORGANIZED HEALTH CARE EDUCATION/TRAINING PROGRAM | Admitting: STUDENT IN AN ORGANIZED HEALTH CARE EDUCATION/TRAINING PROGRAM
Payer: MEDICARE

## 2023-11-28 ENCOUNTER — APPOINTMENT (OUTPATIENT)
Dept: FAMILY MEDICINE | Facility: CLINIC | Age: 88
End: 2023-11-28
Payer: MEDICARE

## 2023-11-28 VITALS
OXYGEN SATURATION: 100 % | HEART RATE: 100 BPM | RESPIRATION RATE: 20 BRPM | DIASTOLIC BLOOD PRESSURE: 72 MMHG | SYSTOLIC BLOOD PRESSURE: 128 MMHG | TEMPERATURE: 98 F

## 2023-11-28 VITALS
SYSTOLIC BLOOD PRESSURE: 128 MMHG | HEART RATE: 103 BPM | BODY MASS INDEX: 27.25 KG/M2 | TEMPERATURE: 97.8 F | WEIGHT: 184 LBS | RESPIRATION RATE: 16 BRPM | DIASTOLIC BLOOD PRESSURE: 76 MMHG | OXYGEN SATURATION: 93 % | HEIGHT: 69 IN

## 2023-11-28 DIAGNOSIS — R05.1 ACUTE COUGH: ICD-10-CM

## 2023-11-28 DIAGNOSIS — Z29.9 ENCOUNTER FOR PROPHYLACTIC MEASURES, UNSPECIFIED: ICD-10-CM

## 2023-11-28 DIAGNOSIS — N40.0 BENIGN PROSTATIC HYPERPLASIA WITHOUT LOWER URINARY TRACT SYMPTOMS: ICD-10-CM

## 2023-11-28 DIAGNOSIS — J10.1 INFLUENZA DUE TO OTHER IDENTIFIED INFLUENZA VIRUS WITH OTHER RESPIRATORY MANIFESTATIONS: ICD-10-CM

## 2023-11-28 DIAGNOSIS — H35.30 UNSPECIFIED MACULAR DEGENERATION: ICD-10-CM

## 2023-11-28 DIAGNOSIS — R06.09 OTHER FORMS OF DYSPNEA: ICD-10-CM

## 2023-11-28 DIAGNOSIS — Z91.89 OTHER SPECIFIED PERSONAL RISK FACTORS, NOT ELSEWHERE CLASSIFIED: ICD-10-CM

## 2023-11-28 DIAGNOSIS — I27.82 CHRONIC PULMONARY EMBOLISM: ICD-10-CM

## 2023-11-28 DIAGNOSIS — R53.1 WEAKNESS: ICD-10-CM

## 2023-11-28 DIAGNOSIS — Z98.890 OTHER SPECIFIED POSTPROCEDURAL STATES: Chronic | ICD-10-CM

## 2023-11-28 DIAGNOSIS — E11.40 TYPE 2 DIABETES MELLITUS WITH DIABETIC NEUROPATHY, UNSPECIFIED: ICD-10-CM

## 2023-11-28 DIAGNOSIS — N18.9 CHRONIC KIDNEY DISEASE, UNSPECIFIED: ICD-10-CM

## 2023-11-28 DIAGNOSIS — E11.9 TYPE 2 DIABETES MELLITUS WITHOUT COMPLICATIONS: ICD-10-CM

## 2023-11-28 DIAGNOSIS — I26.99 OTHER PULMONARY EMBOLISM WITHOUT ACUTE COR PULMONALE: ICD-10-CM

## 2023-11-28 LAB
A1C WITH ESTIMATED AVERAGE GLUCOSE RESULT: 6.7 % — HIGH (ref 4–5.6)
A1C WITH ESTIMATED AVERAGE GLUCOSE RESULT: 6.7 % — HIGH (ref 4–5.6)
ALBUMIN SERPL ELPH-MCNC: 3.6 G/DL — SIGNIFICANT CHANGE UP (ref 3.3–5)
ALBUMIN SERPL ELPH-MCNC: 3.6 G/DL — SIGNIFICANT CHANGE UP (ref 3.3–5)
ALP SERPL-CCNC: 96 U/L — SIGNIFICANT CHANGE UP (ref 40–120)
ALP SERPL-CCNC: 96 U/L — SIGNIFICANT CHANGE UP (ref 40–120)
ALT FLD-CCNC: 24 U/L — SIGNIFICANT CHANGE UP (ref 4–41)
ALT FLD-CCNC: 24 U/L — SIGNIFICANT CHANGE UP (ref 4–41)
ANION GAP SERPL CALC-SCNC: 12 MMOL/L — SIGNIFICANT CHANGE UP (ref 7–14)
ANION GAP SERPL CALC-SCNC: 12 MMOL/L — SIGNIFICANT CHANGE UP (ref 7–14)
AST SERPL-CCNC: 26 U/L — SIGNIFICANT CHANGE UP (ref 4–40)
AST SERPL-CCNC: 26 U/L — SIGNIFICANT CHANGE UP (ref 4–40)
B PERT DNA SPEC QL NAA+PROBE: SIGNIFICANT CHANGE UP
B PERT DNA SPEC QL NAA+PROBE: SIGNIFICANT CHANGE UP
B PERT+PARAPERT DNA PNL SPEC NAA+PROBE: SIGNIFICANT CHANGE UP
B PERT+PARAPERT DNA PNL SPEC NAA+PROBE: SIGNIFICANT CHANGE UP
BASOPHILS # BLD AUTO: 0.03 K/UL — SIGNIFICANT CHANGE UP (ref 0–0.2)
BASOPHILS # BLD AUTO: 0.03 K/UL — SIGNIFICANT CHANGE UP (ref 0–0.2)
BASOPHILS NFR BLD AUTO: 0.4 % — SIGNIFICANT CHANGE UP (ref 0–2)
BASOPHILS NFR BLD AUTO: 0.4 % — SIGNIFICANT CHANGE UP (ref 0–2)
BILIRUB SERPL-MCNC: 0.5 MG/DL — SIGNIFICANT CHANGE UP (ref 0.2–1.2)
BILIRUB SERPL-MCNC: 0.5 MG/DL — SIGNIFICANT CHANGE UP (ref 0.2–1.2)
BORDETELLA PARAPERTUSSIS (RAPRVP): SIGNIFICANT CHANGE UP
BORDETELLA PARAPERTUSSIS (RAPRVP): SIGNIFICANT CHANGE UP
BUN SERPL-MCNC: 33 MG/DL — HIGH (ref 7–23)
BUN SERPL-MCNC: 33 MG/DL — HIGH (ref 7–23)
C PNEUM DNA SPEC QL NAA+PROBE: SIGNIFICANT CHANGE UP
C PNEUM DNA SPEC QL NAA+PROBE: SIGNIFICANT CHANGE UP
CALCIUM SERPL-MCNC: 8.9 MG/DL — SIGNIFICANT CHANGE UP (ref 8.4–10.5)
CALCIUM SERPL-MCNC: 8.9 MG/DL — SIGNIFICANT CHANGE UP (ref 8.4–10.5)
CHLORIDE SERPL-SCNC: 105 MMOL/L — SIGNIFICANT CHANGE UP (ref 98–107)
CHLORIDE SERPL-SCNC: 105 MMOL/L — SIGNIFICANT CHANGE UP (ref 98–107)
CO2 SERPL-SCNC: 20 MMOL/L — LOW (ref 22–31)
CO2 SERPL-SCNC: 20 MMOL/L — LOW (ref 22–31)
CREAT SERPL-MCNC: 1.88 MG/DL — HIGH (ref 0.5–1.3)
CREAT SERPL-MCNC: 1.88 MG/DL — HIGH (ref 0.5–1.3)
EGFR: 34 ML/MIN/1.73M2 — LOW
EGFR: 34 ML/MIN/1.73M2 — LOW
EOSINOPHIL # BLD AUTO: 0.29 K/UL — SIGNIFICANT CHANGE UP (ref 0–0.5)
EOSINOPHIL # BLD AUTO: 0.29 K/UL — SIGNIFICANT CHANGE UP (ref 0–0.5)
EOSINOPHIL NFR BLD AUTO: 4 % — SIGNIFICANT CHANGE UP (ref 0–6)
EOSINOPHIL NFR BLD AUTO: 4 % — SIGNIFICANT CHANGE UP (ref 0–6)
ESTIMATED AVERAGE GLUCOSE: 146 — SIGNIFICANT CHANGE UP
ESTIMATED AVERAGE GLUCOSE: 146 — SIGNIFICANT CHANGE UP
FLUAV H1 2009 PAND RNA SPEC QL NAA+PROBE: DETECTED
FLUAV H1 2009 PAND RNA SPEC QL NAA+PROBE: DETECTED
FLUBV RNA SPEC QL NAA+PROBE: SIGNIFICANT CHANGE UP
FLUBV RNA SPEC QL NAA+PROBE: SIGNIFICANT CHANGE UP
GLUCOSE BLDC GLUCOMTR-MCNC: 127 MG/DL — HIGH (ref 70–99)
GLUCOSE BLDC GLUCOMTR-MCNC: 127 MG/DL — HIGH (ref 70–99)
GLUCOSE BLDC GLUCOMTR-MCNC: 210 MG/DL — HIGH (ref 70–99)
GLUCOSE BLDC GLUCOMTR-MCNC: 210 MG/DL — HIGH (ref 70–99)
GLUCOSE BLDC GLUCOMTR-MCNC: 212 MG/DL — HIGH (ref 70–99)
GLUCOSE BLDC GLUCOMTR-MCNC: 212 MG/DL — HIGH (ref 70–99)
GLUCOSE SERPL-MCNC: 164 MG/DL — HIGH (ref 70–99)
GLUCOSE SERPL-MCNC: 164 MG/DL — HIGH (ref 70–99)
HADV DNA SPEC QL NAA+PROBE: SIGNIFICANT CHANGE UP
HADV DNA SPEC QL NAA+PROBE: SIGNIFICANT CHANGE UP
HCOV 229E RNA SPEC QL NAA+PROBE: SIGNIFICANT CHANGE UP
HCOV 229E RNA SPEC QL NAA+PROBE: SIGNIFICANT CHANGE UP
HCOV HKU1 RNA SPEC QL NAA+PROBE: SIGNIFICANT CHANGE UP
HCOV HKU1 RNA SPEC QL NAA+PROBE: SIGNIFICANT CHANGE UP
HCOV NL63 RNA SPEC QL NAA+PROBE: SIGNIFICANT CHANGE UP
HCOV NL63 RNA SPEC QL NAA+PROBE: SIGNIFICANT CHANGE UP
HCOV OC43 RNA SPEC QL NAA+PROBE: SIGNIFICANT CHANGE UP
HCOV OC43 RNA SPEC QL NAA+PROBE: SIGNIFICANT CHANGE UP
HCT VFR BLD CALC: 34.6 % — LOW (ref 39–50)
HCT VFR BLD CALC: 34.6 % — LOW (ref 39–50)
HGB BLD-MCNC: 11.3 G/DL — LOW (ref 13–17)
HGB BLD-MCNC: 11.3 G/DL — LOW (ref 13–17)
HMPV RNA SPEC QL NAA+PROBE: SIGNIFICANT CHANGE UP
HMPV RNA SPEC QL NAA+PROBE: SIGNIFICANT CHANGE UP
HPIV1 RNA SPEC QL NAA+PROBE: SIGNIFICANT CHANGE UP
HPIV1 RNA SPEC QL NAA+PROBE: SIGNIFICANT CHANGE UP
HPIV2 RNA SPEC QL NAA+PROBE: SIGNIFICANT CHANGE UP
HPIV2 RNA SPEC QL NAA+PROBE: SIGNIFICANT CHANGE UP
HPIV3 RNA SPEC QL NAA+PROBE: SIGNIFICANT CHANGE UP
HPIV3 RNA SPEC QL NAA+PROBE: SIGNIFICANT CHANGE UP
HPIV4 RNA SPEC QL NAA+PROBE: SIGNIFICANT CHANGE UP
HPIV4 RNA SPEC QL NAA+PROBE: SIGNIFICANT CHANGE UP
IANC: 4.83 K/UL — SIGNIFICANT CHANGE UP (ref 1.8–7.4)
IANC: 4.83 K/UL — SIGNIFICANT CHANGE UP (ref 1.8–7.4)
IMM GRANULOCYTES NFR BLD AUTO: 0.4 % — SIGNIFICANT CHANGE UP (ref 0–0.9)
IMM GRANULOCYTES NFR BLD AUTO: 0.4 % — SIGNIFICANT CHANGE UP (ref 0–0.9)
LYMPHOCYTES # BLD AUTO: 1.57 K/UL — SIGNIFICANT CHANGE UP (ref 1–3.3)
LYMPHOCYTES # BLD AUTO: 1.57 K/UL — SIGNIFICANT CHANGE UP (ref 1–3.3)
LYMPHOCYTES # BLD AUTO: 21.4 % — SIGNIFICANT CHANGE UP (ref 13–44)
LYMPHOCYTES # BLD AUTO: 21.4 % — SIGNIFICANT CHANGE UP (ref 13–44)
M PNEUMO DNA SPEC QL NAA+PROBE: SIGNIFICANT CHANGE UP
M PNEUMO DNA SPEC QL NAA+PROBE: SIGNIFICANT CHANGE UP
MCHC RBC-ENTMCNC: 28 PG — SIGNIFICANT CHANGE UP (ref 27–34)
MCHC RBC-ENTMCNC: 28 PG — SIGNIFICANT CHANGE UP (ref 27–34)
MCHC RBC-ENTMCNC: 32.7 GM/DL — SIGNIFICANT CHANGE UP (ref 32–36)
MCHC RBC-ENTMCNC: 32.7 GM/DL — SIGNIFICANT CHANGE UP (ref 32–36)
MCV RBC AUTO: 85.9 FL — SIGNIFICANT CHANGE UP (ref 80–100)
MCV RBC AUTO: 85.9 FL — SIGNIFICANT CHANGE UP (ref 80–100)
MONOCYTES # BLD AUTO: 0.57 K/UL — SIGNIFICANT CHANGE UP (ref 0–0.9)
MONOCYTES # BLD AUTO: 0.57 K/UL — SIGNIFICANT CHANGE UP (ref 0–0.9)
MONOCYTES NFR BLD AUTO: 7.8 % — SIGNIFICANT CHANGE UP (ref 2–14)
MONOCYTES NFR BLD AUTO: 7.8 % — SIGNIFICANT CHANGE UP (ref 2–14)
NEUTROPHILS # BLD AUTO: 4.83 K/UL — SIGNIFICANT CHANGE UP (ref 1.8–7.4)
NEUTROPHILS # BLD AUTO: 4.83 K/UL — SIGNIFICANT CHANGE UP (ref 1.8–7.4)
NEUTROPHILS NFR BLD AUTO: 66 % — SIGNIFICANT CHANGE UP (ref 43–77)
NEUTROPHILS NFR BLD AUTO: 66 % — SIGNIFICANT CHANGE UP (ref 43–77)
NRBC # BLD: 0 /100 WBCS — SIGNIFICANT CHANGE UP (ref 0–0)
NRBC # BLD: 0 /100 WBCS — SIGNIFICANT CHANGE UP (ref 0–0)
NRBC # FLD: 0 K/UL — SIGNIFICANT CHANGE UP (ref 0–0)
NRBC # FLD: 0 K/UL — SIGNIFICANT CHANGE UP (ref 0–0)
NT-PROBNP SERPL-SCNC: 528 PG/ML — HIGH
NT-PROBNP SERPL-SCNC: 528 PG/ML — HIGH
PLATELET # BLD AUTO: 272 K/UL — SIGNIFICANT CHANGE UP (ref 150–400)
PLATELET # BLD AUTO: 272 K/UL — SIGNIFICANT CHANGE UP (ref 150–400)
POTASSIUM SERPL-MCNC: 4 MMOL/L — SIGNIFICANT CHANGE UP (ref 3.5–5.3)
POTASSIUM SERPL-MCNC: 4 MMOL/L — SIGNIFICANT CHANGE UP (ref 3.5–5.3)
POTASSIUM SERPL-SCNC: 4 MMOL/L — SIGNIFICANT CHANGE UP (ref 3.5–5.3)
POTASSIUM SERPL-SCNC: 4 MMOL/L — SIGNIFICANT CHANGE UP (ref 3.5–5.3)
PROT SERPL-MCNC: 7.6 G/DL — SIGNIFICANT CHANGE UP (ref 6–8.3)
PROT SERPL-MCNC: 7.6 G/DL — SIGNIFICANT CHANGE UP (ref 6–8.3)
RAPID RVP RESULT: DETECTED
RAPID RVP RESULT: DETECTED
RBC # BLD: 4.03 M/UL — LOW (ref 4.2–5.8)
RBC # BLD: 4.03 M/UL — LOW (ref 4.2–5.8)
RBC # FLD: 14.6 % — HIGH (ref 10.3–14.5)
RBC # FLD: 14.6 % — HIGH (ref 10.3–14.5)
RSV RNA SPEC QL NAA+PROBE: SIGNIFICANT CHANGE UP
RSV RNA SPEC QL NAA+PROBE: SIGNIFICANT CHANGE UP
RV+EV RNA SPEC QL NAA+PROBE: SIGNIFICANT CHANGE UP
RV+EV RNA SPEC QL NAA+PROBE: SIGNIFICANT CHANGE UP
SARS-COV-2 RNA SPEC QL NAA+PROBE: SIGNIFICANT CHANGE UP
SARS-COV-2 RNA SPEC QL NAA+PROBE: SIGNIFICANT CHANGE UP
SODIUM SERPL-SCNC: 137 MMOL/L — SIGNIFICANT CHANGE UP (ref 135–145)
SODIUM SERPL-SCNC: 137 MMOL/L — SIGNIFICANT CHANGE UP (ref 135–145)
TROPONIN T, HIGH SENSITIVITY RESULT: 38 NG/L — SIGNIFICANT CHANGE UP
TROPONIN T, HIGH SENSITIVITY RESULT: 38 NG/L — SIGNIFICANT CHANGE UP
TROPONIN T, HIGH SENSITIVITY RESULT: 50 NG/L — SIGNIFICANT CHANGE UP
TROPONIN T, HIGH SENSITIVITY RESULT: 50 NG/L — SIGNIFICANT CHANGE UP
WBC # BLD: 7.32 K/UL — SIGNIFICANT CHANGE UP (ref 3.8–10.5)
WBC # BLD: 7.32 K/UL — SIGNIFICANT CHANGE UP (ref 3.8–10.5)
WBC # FLD AUTO: 7.32 K/UL — SIGNIFICANT CHANGE UP (ref 3.8–10.5)
WBC # FLD AUTO: 7.32 K/UL — SIGNIFICANT CHANGE UP (ref 3.8–10.5)

## 2023-11-28 PROCEDURE — 71046 X-RAY EXAM CHEST 2 VIEWS: CPT | Mod: 26

## 2023-11-28 PROCEDURE — 99285 EMERGENCY DEPT VISIT HI MDM: CPT

## 2023-11-28 PROCEDURE — 99222 1ST HOSP IP/OBS MODERATE 55: CPT | Mod: GC

## 2023-11-28 PROCEDURE — 99214 OFFICE O/P EST MOD 30 MIN: CPT | Mod: 25

## 2023-11-28 RX ORDER — POLYETHYLENE GLYCOL 3350 17 G/17G
17 POWDER, FOR SOLUTION ORAL
Refills: 0 | DISCHARGE

## 2023-11-28 RX ORDER — SODIUM CHLORIDE 9 MG/ML
1000 INJECTION, SOLUTION INTRAVENOUS
Refills: 0 | Status: DISCONTINUED | OUTPATIENT
Start: 2023-11-28 | End: 2023-12-01

## 2023-11-28 RX ORDER — FINASTERIDE 5 MG/1
5 TABLET, FILM COATED ORAL DAILY
Refills: 0 | Status: DISCONTINUED | OUTPATIENT
Start: 2023-11-28 | End: 2023-12-01

## 2023-11-28 RX ORDER — LANOLIN ALCOHOL/MO/W.PET/CERES
3 CREAM (GRAM) TOPICAL AT BEDTIME
Refills: 0 | Status: DISCONTINUED | OUTPATIENT
Start: 2023-11-28 | End: 2023-12-01

## 2023-11-28 RX ORDER — APIXABAN 2.5 MG/1
5 TABLET, FILM COATED ORAL EVERY 12 HOURS
Refills: 0 | Status: DISCONTINUED | OUTPATIENT
Start: 2023-11-28 | End: 2023-11-28

## 2023-11-28 RX ORDER — TAMSULOSIN HYDROCHLORIDE 0.4 MG/1
0.4 CAPSULE ORAL AT BEDTIME
Refills: 0 | Status: DISCONTINUED | OUTPATIENT
Start: 2023-11-28 | End: 2023-12-01

## 2023-11-28 RX ORDER — DEXTROSE 50 % IN WATER 50 %
25 SYRINGE (ML) INTRAVENOUS ONCE
Refills: 0 | Status: DISCONTINUED | OUTPATIENT
Start: 2023-11-28 | End: 2023-12-01

## 2023-11-28 RX ORDER — APIXABAN 2.5 MG/1
2.5 TABLET, FILM COATED ORAL EVERY 12 HOURS
Refills: 0 | Status: DISCONTINUED | OUTPATIENT
Start: 2023-11-28 | End: 2023-12-01

## 2023-11-28 RX ORDER — GLUCAGON INJECTION, SOLUTION 0.5 MG/.1ML
1 INJECTION, SOLUTION SUBCUTANEOUS ONCE
Refills: 0 | Status: DISCONTINUED | OUTPATIENT
Start: 2023-11-28 | End: 2023-12-01

## 2023-11-28 RX ORDER — DORZOLAMIDE HYDROCHLORIDE TIMOLOL MALEATE 20; 5 MG/ML; MG/ML
1 SOLUTION/ DROPS OPHTHALMIC
Refills: 0 | Status: DISCONTINUED | OUTPATIENT
Start: 2023-11-28 | End: 2023-12-01

## 2023-11-28 RX ORDER — CIPROFLOXACIN HYDROCHLORIDE 500 MG/1
500 TABLET, FILM COATED ORAL
Qty: 14 | Refills: 0 | Status: DISCONTINUED | COMMUNITY
Start: 2023-10-20 | End: 2023-11-28

## 2023-11-28 RX ORDER — DEXTROSE 50 % IN WATER 50 %
15 SYRINGE (ML) INTRAVENOUS ONCE
Refills: 0 | Status: DISCONTINUED | OUTPATIENT
Start: 2023-11-28 | End: 2023-12-01

## 2023-11-28 RX ORDER — LATANOPROST 0.05 MG/ML
1 SOLUTION/ DROPS OPHTHALMIC; TOPICAL AT BEDTIME
Refills: 0 | Status: DISCONTINUED | OUTPATIENT
Start: 2023-11-28 | End: 2023-12-01

## 2023-11-28 RX ORDER — DEXTROSE 50 % IN WATER 50 %
12.5 SYRINGE (ML) INTRAVENOUS ONCE
Refills: 0 | Status: DISCONTINUED | OUTPATIENT
Start: 2023-11-28 | End: 2023-12-01

## 2023-11-28 RX ORDER — GABAPENTIN 400 MG/1
300 CAPSULE ORAL
Refills: 0 | Status: DISCONTINUED | OUTPATIENT
Start: 2023-11-28 | End: 2023-12-01

## 2023-11-28 RX ORDER — ACETAMINOPHEN 500 MG
650 TABLET ORAL EVERY 6 HOURS
Refills: 0 | Status: DISCONTINUED | OUTPATIENT
Start: 2023-11-28 | End: 2023-12-01

## 2023-11-28 RX ORDER — GABAPENTIN 400 MG/1
1 CAPSULE ORAL
Refills: 0 | DISCHARGE

## 2023-11-28 RX ORDER — GLYBURIDE 5 MG
1 TABLET ORAL
Refills: 0 | DISCHARGE

## 2023-11-28 RX ORDER — INSULIN LISPRO 100/ML
VIAL (ML) SUBCUTANEOUS
Refills: 0 | Status: DISCONTINUED | OUTPATIENT
Start: 2023-11-28 | End: 2023-12-01

## 2023-11-28 RX ORDER — CLOPIDOGREL BISULFATE 75 MG/1
75 TABLET, FILM COATED ORAL DAILY
Qty: 90 | Refills: 1 | Status: DISCONTINUED | COMMUNITY
Start: 2021-12-20 | End: 2023-11-28

## 2023-11-28 RX ORDER — POLYETHYLENE GLYCOL 3350 17 G/17G
17 POWDER, FOR SOLUTION ORAL
Refills: 0 | Status: DISCONTINUED | OUTPATIENT
Start: 2023-11-28 | End: 2023-12-01

## 2023-11-28 RX ORDER — INSULIN LISPRO 100/ML
VIAL (ML) SUBCUTANEOUS AT BEDTIME
Refills: 0 | Status: DISCONTINUED | OUTPATIENT
Start: 2023-11-28 | End: 2023-12-01

## 2023-11-28 RX ADMIN — SODIUM CHLORIDE 100 MILLILITER(S): 9 INJECTION, SOLUTION INTRAVENOUS at 19:11

## 2023-11-28 RX ADMIN — APIXABAN 2.5 MILLIGRAM(S): 2.5 TABLET, FILM COATED ORAL at 17:54

## 2023-11-28 RX ADMIN — GABAPENTIN 300 MILLIGRAM(S): 400 CAPSULE ORAL at 17:54

## 2023-11-28 NOTE — MEDICAL STUDENT ADULT H&P (EDUCATION) - NSHPREVIEWOFSYSTEMS_GEN_ALL_CORE
General:	 No fevers, chills, or night sweats; no loss of consciousness  Skin/Breast: No rashes, lesions, or pruritus  Ophthalmologic: No changes in vision  Respiratory and Thorax: No hemoptysis  Cardiovascular: No palpitations, chest pain, or claudication  Gastrointestinal: No changes in bowel movement frequency or quality  Genitourinary: No changes in urinary frequency or quality  Musculoskeletal: No joint pain, swelling, redness, or impaired range of motion  Neurological: No vertigo, confusion; + numbness (chronic), +weakness  Psychiatric: No significant changes in mood or perception; no delusions elicited  Hematology/Lymphatics: No lymphadenopathy

## 2023-11-28 NOTE — H&P ADULT - PROBLEM SELECTOR PLAN 6
- hgb 11.3, baseline appears to be 8-9   - Normal MCV MHC  - likely 2/2 CKD   - f/u iron studies Reticulocyte

## 2023-11-28 NOTE — H&P ADULT - ASSESSMENT
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

## 2023-11-28 NOTE — H&P ADULT - PROBLEM SELECTOR PLAN 5
Diet:  Psych/Sleep:  GI:  DVT ppx: Eliquis  Code Status:  Dispo: - Cr. 1.88 on admission; appears to be at baseline  - avoid nephrotoxic agents

## 2023-11-28 NOTE — ED PROVIDER NOTE - NS ED ROS FT
Patient denies any fevers, chills, nausea, vomiting, diarrhea, constipation, painful urination, new rashes.

## 2023-11-28 NOTE — MEDICAL STUDENT ADULT H&P (EDUCATION) - NS MD HP STUD PMH FT
BPH (benign prostatic hyperplasia)   Diabetic neuropathy   DM (diabetes mellitus)  diabetic neprhopathy  COVID-19  Pulmonary embolism  Macular degeneration  Chronic anemia of kidney disease   Macular degeneration.

## 2023-11-28 NOTE — H&P ADULT - ATTENDING COMMENTS
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  PE  Vital Signs Last 24 Hrs  T(C): 36.6 (28 Nov 2023 11:38), Max: 36.6 (28 Nov 2023 11:38)  T(F): 97.9 (28 Nov 2023 11:38), Max: 97.9 (28 Nov 2023 11:38)  HR: 84 (28 Nov 2023 14:31) (84 - 100)  BP: 128/72 (28 Nov 2023 11:38) (128/72 - 128/72)  BP(mean): --  RR: 17 (28 Nov 2023 14:31) (17 - 20)  SpO2: 99% (28 Nov 2023 14:31) (99% - 100%)    Parameters below as of 28 Nov 2023 14:31  Patient On (Oxygen Delivery Method): room air    rad< from: Xray Chest 2 Views PA/Lat (11.28.23 @ 13:06) >  IMPRESSION:  No focal consolidations.    11/28/23 Ekg Sinus tachy 103 bpm    RVP positive for Influenza but otherwise Negative  A/p  # Influenza A-->  IVF Hydration  # Tobacco-->  # PE in 2021. will need to check with PCP about need to c/w Eliquis.  # BPH c/w Tamiflu  # CKD , f/u with  ivf hydration. &/3/23 Creat 1.8  # weakness , PT eval  # Dvt proph hep Sq Patient seen and examined with team  Agree with above a/p by Dr Lopez  89M hx of PE in 2021 on eliquis, T2DM on glyburide with  Diabetic nephropathy, htn, dementia, BPH, DM, CKD III, prior CVA is here for evaluation and management of weakness iso influenza infection.   Seen with daughter Regina, RN at bedside  tel # [phone]. Daughter wants to take patient home  Stated she can arrange for physical therapy for her father at home.  PE  Vital Signs Last 24 Hrs  T(C): 36.6 (28 Nov 2023 11:38), Max: 36.6 (28 Nov 2023 11:38)  T(F): 97.9 (28 Nov 2023 11:38), Max: 97.9 (28 Nov 2023 11:38)  HR: 84 (28 Nov 2023 14:31) (84 - 100)  BP: 128/72 (28 Nov 2023 11:38) (128/72 - 128/72)  BP(mean): --  RR: 17 (28 Nov 2023 14:31) (17 - 20)  SpO2: 99% (28 Nov 2023 14:31) (99% - 100%)  Lungs Positive ronchi b/i to base of lung, Cor distant HS , Abd soft n/t, ext neg e/c/c    Parameters below as of 28 Nov 2023 14:31  Patient On (Oxygen Delivery Method): room air    rad< from: Xray Chest 2 Views PA/Lat (11.28.23 @ 13:06) >  IMPRESSION:  No focal consolidations.    11/28/23 Ekg Sinus tachy 103 bpm    RVP positive for Influenza but otherwise Negative  A/p  # Influenza A-->  IVF Hydration and supportive care  # Tobacco-->  # PE in 2021. will need to check with PCP about need to c/w Eliquis.  # BPH c/w Tamiflu  # CKD , f/u with  ivf hydration. 7/3/23 Creat 1.8  # weakness , PT eval, but daugter wants to take patient home  # Dvt proph hep Sq Patient seen and examined with team  Agree with above a/p by Dr Lopez  89M hx of PE in 2021 on eliquis, T2DM on glyburide with  Diabetic nephropathy, htn, dementia, BPH, DM, CKD III, ??prior TIA/CVA is here for evaluation and management of weakness iso influenza infection.   Seen with daughter Regina RN at bedside  tel # [phone]. Daughter wants to take patient home  Stated she can arrange for physical therapy for her father at home.  PE  Vital Signs Last 24 Hrs  T(C): 36.6 (28 Nov 2023 11:38), Max: 36.6 (28 Nov 2023 11:38)  T(F): 97.9 (28 Nov 2023 11:38), Max: 97.9 (28 Nov 2023 11:38)  HR: 84 (28 Nov 2023 14:31) (84 - 100)  BP: 128/72 (28 Nov 2023 11:38) (128/72 - 128/72)  BP(mean): --  RR: 17 (28 Nov 2023 14:31) (17 - 20)  SpO2: 99% (28 Nov 2023 14:31) (99% - 100%)  Lungs Positive ronchi b/i to base of lung, Cor distant HS , Abd soft n/t, ext neg e/c/c    Parameters below as of 28 Nov 2023 14:31  Patient On (Oxygen Delivery Method): room air    rad< from: Xray Chest 2 Views PA/Lat (11.28.23 @ 13:06) >  IMPRESSION:  No focal consolidations.    11/28/23 Ekg Sinus tachy 103 bpm    RVP positive for Influenza but otherwise Negative  A/p  # Influenza A-->  IVF Hydration and supportive care  # elevated pro BMP, echo and hs trop in am  # PE in 2021. will need to check with PCP about need to c/w Eliquis.  # BPH c/w Tamiflu  # CKD , f/u with  ivf hydration. 7/3/23 Creat 1.8  # weakness , PT eval, but daughter wanted to take but agrees with echo and pt eval while in hospital as rec by ED physician  # Dvt proph hep Sq Patient seen and examined with team  Agree with above a/p by Dr Lopez  89M hx of PE in 2021 on eliquis, T2DM on glyburide with  Diabetic nephropathy, htn, dementia, BPH, DM, CKD III, ??prior TIA/CVA is here for evaluation and management of weakness iso influenza infection.   Seen with daughter Regina RN at bedside  tel # [phone]. Daughter wants to take patient home  Stated she can arrange for physical therapy for her father at home.  PE  Vital Signs Last 24 Hrs  T(C): 36.6 (28 Nov 2023 11:38), Max: 36.6 (28 Nov 2023 11:38)  T(F): 97.9 (28 Nov 2023 11:38), Max: 97.9 (28 Nov 2023 11:38)  HR: 84 (28 Nov 2023 14:31) (84 - 100)  BP: 128/72 (28 Nov 2023 11:38) (128/72 - 128/72)  BP(mean): --  RR: 17 (28 Nov 2023 14:31) (17 - 20)  SpO2: 99% (28 Nov 2023 14:31) (99% - 100%)  Lungs Positive ronchi b/i to base of lung, Cor distant HS , Abd soft n/t, ext neg e/c/c    Parameters below as of 28 Nov 2023 14:31  Patient On (Oxygen Delivery Method): room air    rad< from: Xray Chest 2 Views PA/Lat (11.28.23 @ 13:06) >  IMPRESSION:  No focal consolidations.    11/28/23 Ekg Sinus tachy 103 bpm    RVP positive for Influenza but otherwise Negative  A/p  # Influenza A-->  IVF Hydration and supportive care  # elevated pro BMP, echo and hs trop in am  # PE in 2021. will need to check with PCP about need to c/w Eliquis.  # BPH c/w Flomax  # CKD , f/u with  ivf hydration. 7/3/23 Creat 1.8  # weakness , PT eval, but daughter wanted to take but agrees with echo and pt eval while in hospital as rec by ED physician  # Dvt proph hep Sq

## 2023-11-28 NOTE — H&P ADULT - PROBLEM SELECTOR PLAN 4
Diet:  Psych/Sleep:  GI:  DVT ppx: Eliquis  Code Status:  Dispo: - Hold home glyburide  - ERROL   - Consistent carb diet

## 2023-11-28 NOTE — ED ADULT NURSE REASSESSMENT NOTE - NS ED NURSE REASSESS COMMENT FT1
pt received A&Ox2-3, offering no complaints at this time. respirations even and unlabored. remains on continuos monitor, NSR noted. on RA at this time. daughter concerned that pt has not "urinated since this morning." abd, soft, slightly distended at this time. MD Ferguson paged regarding concern. pt denies urge to urinate at this time. no acute distress noted. report given to admitting RN. awaiting transport. safety maintained, daughter at bedside
Received patient in room 12, pending lab results. Patient resting in stretcher, no distress noted. Patient denies SOB, chest pain, fever. Patient is on cardiac monitor sinus rhythm w/O2 sat 99% on a room air. Patient is A&Ox4, airway patent, breathing unlabored and even. Side rails up and safety maintained. Fall precaution in place. Call bells within reach. Family at bedside.

## 2023-11-28 NOTE — H&P ADULT - NSHPPHYSICALEXAM_GEN_ALL_CORE
T(C): 36.6 (11-28-23 @ 11:38), Max: 36.6 (11-28-23 @ 11:38)  HR: 84 (11-28-23 @ 14:31) (84 - 100)  BP: 128/72 (11-28-23 @ 11:38) (128/72 - 128/72)  RR: 17 (11-28-23 @ 14:31) (17 - 20)  SpO2: 99% (11-28-23 @ 14:31) (99% - 100%)    CONSTITUTIONAL: Well groomed, no apparent distress  EYES: PERRLA and symmetric, EOMI, No conjunctival or scleral injection, non-icteric  ENMT: Oral mucosa with moist membranes. Normal dentition; no pharyngeal injection or exudates             NECK: Supple, symmetric and without tracheal deviation   RESP: No respiratory distress, no use of accessory muscles; CTA b/l, no WRR  CV: RRR, +S1S2, no MRG; no JVD; no peripheral edema  GI: Soft, NT, ND, no rebound, no guarding; no palpable masses; no hepatosplenomegaly; no hernia palpated  LYMPH: No cervical LAD or tenderness; no axillary LAD or tenderness; no inguinal LAD or tenderness  MSK: Normal gait; No digital clubbing or cyanosis; examination of the (head/neck/spine/ribs/pelvis, RUE, LUE, RLE, LLE) without misalignment,            Normal ROM without pain, no spinal tenderness, normal muscle strength/tone  SKIN: No rashes or ulcers noted; no subcutaneous nodules or induration palpable  NEURO: CN II-XII intact; normal reflexes in upper and lower extremities, sensation intact in upper and lower extremities b/l to light touch   PSYCH: Appropriate insight/judgment; A+O x 3, mood and affect appropriate, recent/remote memory intact T(C): 36.6 (11-28-23 @ 11:38), Max: 36.6 (11-28-23 @ 11:38)  HR: 84 (11-28-23 @ 14:31) (84 - 100)  BP: 128/72 (11-28-23 @ 11:38) (128/72 - 128/72)  RR: 17 (11-28-23 @ 14:31) (17 - 20)  SpO2: 99% (11-28-23 @ 14:31) (99% - 100%)    General: Patient in NAD  HEENT: NCAT, EOMI, PERRL, no oral lesions  CV: S1+S2, no m/r/g appreciated, distant heart sounds  Lungs: No respiratory distress, ROX lower lobe Ronchi   Abd: Soft, nontender, no guarding, no rebound tenderness, + bowel sounds   : No suprapubic tenderness  Neuro: Alert and oriented to time, place and person. No focal deficits noted.   Ext: No cyanosis, no edema  Skin: No rash, no phlebitis

## 2023-11-28 NOTE — H&P ADULT - SOCIAL HISTORY: TOBACCO USE
~60 pack-year hx [Flat Open Anterior Klemme] : flat open anterior fontanelle [Red Reflex Bilateral] : red reflex bilateral [Normally Placed Ears] : normally placed ears [PERRL] : PERRL [Uvula Midline] : uvula midline [Symmetric Chest Rise] : symmetric chest rise [Supple, full passive range of motion] : supple, full passive range of motion [Regular Rate and Rhythm] : regular rate and rhythm [S1, S2 present] : S1, S2 present [+2 Femoral Pulses] : +2 femoral pulses [Umbilical Stump Dry, Clean, Intact] : umbilical stump dry, clean, intact [Normal external genitalia] : normal external genitalia [Patent Vagina] : patent vagina [No Abnormal Lymph Nodes Palpated] : no abnormal lymph nodes palpated [Symmetric Flexed Extremities] : symmetric flexed extremities [Distended] : distended [Alert] : alert [Normocephalic] : normocephalic [Palate Intact] : palate intact [Clear to Auscultation Bilaterally] : clear to auscultation bilaterally [Soft] : soft [Normal external genitailia] : normal external genitalia [Patent] : patent [Normally Placed] : normally placed [Straight] : straight [Symmetric Nia] : symmetric [place] [Estonian Spots] : Estonian spots [Acute Distress] : no acute distress [Icteric sclera] : nonicteric sclera [Discharge] : no discharge [Palpable Masses] : no palpable masses [Murmurs] : no murmurs [Tender] : nontender [Hepatomegaly] : no hepatomegaly [Splenomegaly] : no splenomegaly [Clitoromegaly] : no clitoromegaly [Clavicular Crepitus] : no clavicular crepitus [Breast Tissue] : no prominent breast tissue [Spinal Dimple] : no spinal dimple [Tuft of Hair] : no tuft of hair [Jaundice] : not jaundice [[email or web address]] : HYPERACTIVE BOWEL SOUNDS [de-identified] : [place] SPOTS TO BUTTOCKS

## 2023-11-28 NOTE — ED PROVIDER NOTE - PHYSICAL EXAMINATION
VITALS: reviewed  GEN: NAD, A & O x 4  HEAD/EYES: NCAT, PERRL, EOMI, anicteric sclerae, no conjunctival pallor  ENT: mucus membranes moist, oropharynx WNL, trachea midline, no JVD  RESP: B/l mild crackles, chest wall nontender and atraumatic  CV: heart with reg rhythm S1, S2, no murmur; distal pulses intact and symmetric bilaterally  ABDOMEN: normoactive bowel sounds, soft, nondistended, nontender, no palpable masses  : no CVAT  MSK: extremities atraumatic and nontender, no edema, no asymmetry. the back is without midline or lateral tenderness, there is no spinal deformity or stepoff and the back is ranged painlessly. the neck has no midline tenderness, deformity, or stepoff, and is ranged painlessly.  SKIN: warm, dry, no rash, no bruising, no cyanosis. color appropriate for ethnicity  NEURO: R eyelid ptosis (daughter states this is patients' baseline) alert, mentating appropriately,.  gross sensation, motor, coordination are intact. Patient states he is not ambulatory.   PSYCH: Affect appropriate

## 2023-11-28 NOTE — MEDICAL STUDENT ADULT H&P (EDUCATION) - PLAN 3
- Tamsulosin 0.4 at night (patient was taking in the morning and became unsteady when dose was doubled)   - Finasteride 5mg daily   - Bladder scans and straight cath as appropriate.

## 2023-11-28 NOTE — MEDICAL STUDENT ADULT H&P (EDUCATION) - PLAN 9
consistent carb diet   Psych/Sleep: Melatonin QHs  DVT ppx: Eliquis 2.5 BID  Code Status: Full Code  Dispo: Home pending PT.

## 2023-11-28 NOTE — H&P ADULT - NSHPLABSRESULTS_GEN_ALL_CORE
11.3   7.32  )-----------( 272      ( 28 Nov 2023 13:33 )             34.6       11-28    137  |  105  |  33<H>  ----------------------------<  164<H>  4.0   |  20<L>  |  1.88<H>    Ca    8.9      28 Nov 2023 13:33    TPro  7.6  /  Alb  3.6  /  TBili  0.5  /  DBili  x   /  AST  26  /  ALT  24  /  AlkPhos  96  11-28              Urinalysis Basic - ( 28 Nov 2023 13:33 )    Color: x / Appearance: x / SG: x / pH: x  Gluc: 164 mg/dL / Ketone: x  / Bili: x / Urobili: x   Blood: x / Protein: x / Nitrite: x   Leuk Esterase: x / RBC: x / WBC x   Sq Epi: x / Non Sq Epi: x / Bacteria: x            Lactate Trend            CAPILLARY BLOOD GLUCOSE      POCT Blood Glucose.: 149 mg/dL (28 Nov 2023 11:45) 11.3   7.32  )-----------( 272      ( 28 Nov 2023 13:33 )             34.6       11-28    137  |  105  |  33<H>  ----------------------------<  164<H>  4.0   |  20<L>  |  1.88<H>    Ca    8.9      28 Nov 2023 13:33    TPro  7.6  /  Alb  3.6  /  TBili  0.5  /  DBili  x   /  AST  26  /  ALT  24  /  AlkPhos  96  11-28          Urinalysis Basic - ( 28 Nov 2023 13:33 )    Color: x / Appearance: x / SG: x / pH: x  Gluc: 164 mg/dL / Ketone: x  / Bili: x / Urobili: x   Blood: x / Protein: x / Nitrite: x   Leuk Esterase: x / RBC: x / WBC x   Sq Epi: x / Non Sq Epi: x / Bacteria: x    CAPILLARY BLOOD GLUCOSE      POCT Blood Glucose.: 149 mg/dL (28 Nov 2023 11:45)    CXR: no focal consolidations seen, increased vascular markings. Await official radiology read

## 2023-11-28 NOTE — H&P ADULT - PROBLEM SELECTOR PLAN 1
- RVP positive for Influenza A; COVID negative  - CXR showing increased vascular markings, negative for lobar consolidation; ronchorous at bases on Exam  - Likely has undiagnosed COPD based on exam and 60 pack-year smoking hx  - Saturating well on RA,    - Out of window for Oseltamivir   - monitor O2 sat and fevers; Tylenol PRN for fevers  - supportive therapy w/ IV fluids   - f/u Orthostatic BP   - f/u PT Eval  - f/u urine legionella f/u sputum cx

## 2023-11-28 NOTE — MEDICAL STUDENT ADULT H&P (EDUCATION) - NSHPLABSRESULTS_GEN_ALL_CORE
11.3   7.32  )-----------( 272      ( 28 Nov 2023 13:33 )             34.6     11-28    137  |  105  |  33<H>  ----------------------------<  164<H>  4.0   |  20<L>  |  1.88<H>    Ca    8.9      28 Nov 2023 13:33    TPro  7.6  /  Alb  3.6  /  TBili  0.5  /  DBili  x   /  AST  26  /  ALT  24  /  AlkPhos  96  11-28      Pro-Brain Natriuretic Peptide: 528    Troponin T, High Sensitivity Result: 50    Influenza AH3 (RapRVP): Detected (11.28.23 @ 13:33)

## 2023-11-28 NOTE — H&P ADULT - HISTORY OF PRESENT ILLNESS
89M hx of PE on eliquis, DM presents c/o cough/weakness x2 weeks worsening over the past 4 days currently not ambulatory  Admits ocasional CP including at rest which he states feels like a midsternal pressure    ED: 36.6; 128/72 100/min, 100% RA, 20/min  AFebrile  HDS on RA  Flu+ ALISON 89-year-old male past medical history DM2, PE dx in 2021 on Eliquis, BPH, and 75 pack year history of smoking, presenting to the ED with a productive cough and shortness of breath for 1 week. His symptoms began Last Wednesday (11/22) with fatigue, shortness of breath, and dry cough. On Friday, his daughter noted a BP of 88/50, which she attributed to an doubling of his tamsulosin dose that Tuesday. Tamsulosin was held since then, and BP recovered to his baseline of SBP ~110, but his weakness persisted. His daughter states that for the past 2 weeks he has had a cough which became a wet cough around 4 days ago. Over the weekend, his cough became productive of whitish sputum and he developed a headache today, so he went to his PMD this morning, who referred him to the ED for workup. He denies any fevers, chills, nausea, vomiting, diarrhea, confusion, orthopnea, or loss of consciousness.    No sick contacts no recent travel.    ED: 36.6; 128/72 100/min, 100% RA, 20/min, AFebrile HDS on RA  RVP positive for Influenza A and Labs significant for Anemia 11.3 and Cr. elevated to 1.88 BUN 33.  CXR done at the ED was negative for lobar consolidations. Pending official radiology read.     The patient was admitted to medicine for management of his weakness iso influenza infection.

## 2023-11-28 NOTE — H&P ADULT - NSHPREVIEWOFSYSTEMS_GEN_ALL_CORE
REVIEW OF SYSTEMS:  CONSTITUTIONAL: No weakness, fevers or chills  EYES/ENT: No visual changes;  No vertigo or throat pain   NECK: No pain or stiffness  RESPIRATORY: No cough, wheezing, hemoptysis; No shortness of breath  CARDIOVASCULAR: No chest pain or palpitations  GASTROINTESTINAL: No abdominal or epigastric pain. No nausea, vomiting, or hematemesis; No diarrhea or constipation. No melena or hematochezia.  GENITOURINARY: No dysuria, frequency or hematuria  NEUROLOGICAL: No numbness or weakness  SKIN: No itching, rashes REVIEW OF SYSTEMS:  CONSTITUTIONAL: endorses weakness, but fevers or chills  EYES/ENT: No visual changes;  No vertigo or throat pain   NECK: No pain or stiffness  RESPIRATORY: cough productive sputum but no wheezing, hemoptysis; No shortness of breath  CARDIOVASCULAR: No chest pain or palpitations  GASTROINTESTINAL: No abdominal or epigastric pain. No nausea, vomiting, or hematemesis; No diarrhea or constipation. No melena or hematochezia.  GENITOURINARY: No dysuria, frequency or hematuria  NEUROLOGICAL: endorses numbness of legs ROX and weakness  SKIN: No itching, rashes

## 2023-11-28 NOTE — H&P ADULT - PROBLEM SELECTOR PLAN 2
Statement Selected - DVT w/ PE in 2021 when patient admitted for covid infection   - c/w Eliquis 2.5 BID

## 2023-11-28 NOTE — ED PROVIDER NOTE - ATTENDING CONTRIBUTION TO CARE
Attending Statement: I have personally seen and examined this patient. I have fully participated in the care of this patient. I have reviewed all pertinent clinical information, including history physical exam, plan and the Resident's note and agree except as noted  89-year-old male past medical history DM2, PE on Eliquis, CVA, BPH.  From home with daughter at bedside chief complaint of increased weakness since last week.  As well as shortness of breath and dyspnea on exertion.  And a productive cough.  Mom states since last week he has been overall weak with 2 falls at home denies head trauma or LOC.  States "legs gave out" no focal weakness or numbness.  Endorsing worsening shortness of breath and dyspnea on exertion since last Tuesday (a week ago), productive cough, sputum is clear.  No hemoptysis.  No chest pain.  No abdominal pain.  No vomiting or diarrhea.  No urinary complaints.  Adherent to his medications.  Daughter recently stopped his prazosin concerned about causing his blood pressure to be low.  Vital signs noted elderly male sitting up in bed ANO x 3.  At baseline tenderness right eyelid ptosis no new changes hard of hearing.  Coughing intermittently during exam.  With mild crackles at bases.  Nontender abdomen.  No significant pedal edema no calf tenderness.  No focal deficits.  Plan for precaution, EKG, labs, chest x-ray, admission

## 2023-11-28 NOTE — H&P ADULT - PROBLEM SELECTOR PLAN 3
- Tamsulosin 0.4 at night (patient was taking in the morning and became unsteady when dose was doubled)   - Finasteride 5mg daily   - Bladder scans and straight cath as appropriate

## 2023-11-28 NOTE — MEDICAL STUDENT ADULT H&P (EDUCATION) - ASSESSMENT
89-year-old male past with a history DM2, CKD, PE on Eliquis, BPH, and 75 pack year history of smoking, presenting to the ED with a productive cough and shortness of breath for 1 week. On exam afebrile, normotensive, in no acute distress with rhonchorous breath sounds and intermittent cough productive of clear sputum. Positive Influezna AH3 on RVP. Mildly elevated pro-BNP in the context of an elderly patient without  cardiac history or clinical evidence of heart failure argue against new HF diagnosis. Most likely influenza vs post-viral syndrome with continued weakness.

## 2023-11-28 NOTE — ED PROVIDER NOTE - OBJECTIVE STATEMENT
89-year-old male past medical history DM2, PE on Eliquis, CVA, BPH.  He presents with his daughter who states that for the past 2 weeks he has had a cough which became a wet cough around 4 days ago.  She states he was taking tamsulosin and had a change to his regimen which she believes caused him to be hypotensive and weak.  Patient admits to occasional chest pain and dyspnea on exertion.  Daughter states that normally he is able to climb the steps on his own but for the past 4 to 5 days he has become extremely weak and due to the generalized weakness he has not been ambulatory.  Patient denies any dizziness, fevers, nausea, vomiting, diarrhea.  No sick contacts no recent travel.

## 2023-11-28 NOTE — ED PROVIDER NOTE - CLINICAL SUMMARY MEDICAL DECISION MAKING FREE TEXT BOX
89 y/o M PMH PE on elliquis, DM presents c/o cough and weakness x2 weeks worsening over the past 4 days currently not ambulatory. Admits ocasional CP including at rest which he states feels like a midsternal pressure sensation.   VSS  On exam patient has No focal neuro deficits.     DDx includes but is not limited to Pneumonia vs ACS will get chxr ekg trop. PE less likely as patient reports compliance with his ellequis, no signs of dvt on exam, not hypoxic or tachycardic. Will also consider CHF without a known history of the above.

## 2023-11-28 NOTE — ED ADULT NURSE NOTE - OBJECTIVE STATEMENT
BREAK RN: pt. received to room 12 A&Ox4 ambulatory at baseline p/w weakness. pt. daughter at bedside endorses recent change in his medications, causing his blood pressure to drop and becoming weak. Pt. daughter endorses a cough x5 days, productive with clear phlegm. NAD noted at this time. respirations even and unlabored on RA. 20g IV placed in the R AC. labs drawn and sent. comfort measures provided. safety precautions maintained.

## 2023-11-28 NOTE — MEDICAL STUDENT ADULT H&P (EDUCATION) - NS MD HP STUD MEDICATIONS FT
Glyburide 2mg po qd  latanoprost eye drops qd  timolol eye drops bid  miralax qd  apixiban 2.5mg bid  cyanocobalamin 1000mg injection q 1 month  Atorvastatin 40mg qd  Tamsulosin 0.4mg qd  Finasteride 5mg qhs

## 2023-11-28 NOTE — ED ADULT TRIAGE NOTE - CHIEF COMPLAINT QUOTE
Pt c/o increased weakness and SOB. Recent increase in his Tamsulosin. Daughter reporting two recent falls- one on Tuesday and Thursday. Denies head injury, LOC. States his "knees gave out." and productive cough. Denies feeling dizzy. Hx DM, PE on Eliquis, CVA, BPH.

## 2023-11-28 NOTE — ED PROVIDER NOTE - PROGRESS NOTE DETAILS
Flu positive on RVP. Likeley the cause of patient's SOB and weakness. Will notify daughter. will admit to Dr. Joe due to the dyspnea on exertion ISO positive flu and generalized weakness. Daughter at bedside updated on results patient positive for flu.  Will explain his weakness however concern for increased shortness of breath.  Daughter states he has not seen a cardiologist in years and has not had any cardiac testing.  Amenable to admit for dyspnea on exertion

## 2023-11-28 NOTE — MEDICAL STUDENT ADULT H&P (EDUCATION) - HISTORY OF PRESENT ILLNESS
89-year-old male past with a history DM2, CKD, PE on Eliquis, BPH, and 75 pack year history of smoking, presenting to the ED with a productive cough and shortness of breath for 1 week. His symptoms began Last Wednesday (11/22) with fatigue, shortness of breath, and dry cough. On Friday, his daughter noted a BP of 88/50, which she attributed to an increase in his tamsulosin dose that Tuesday. He presents with his daughter who states that for the past 2 weeks he has had a cough which became a wet cough around 4 days ago. Over the weekend, his cough became productive of whitish sputum and he developed a headache today, so he went to his PMD this morning, who referred him to the ED for workup. he denies any fevers, chills, nausea, vomiting, diarrhea, confusion, orthopnea, or loss of consciousness. No sick contacts no recent travel.

## 2023-11-28 NOTE — PATIENT PROFILE ADULT - NSPROSPHOSPCHAPLAINYN_GEN_A_NUR
no transient paralysis/no weakness/no generalized seizures/no focal seizures/no syncope/no tremors
no

## 2023-11-28 NOTE — MEDICAL STUDENT ADULT H&P (EDUCATION) - NSHPSOCIALHISTORY_GEN_ALL_CORE
Retired. Lives with adult son; two aides help him with ADLs during the day. Not sexually active. Feels that he is generally in good physical condition.

## 2023-11-28 NOTE — MEDICAL STUDENT ADULT H&P (EDUCATION) - PLAN 6
- hgb 11.3, baseline appears to be 8-9   - Normal MCV MHC  - likely 2/2 CKD   - f/u iron studies Reticulocyte.

## 2023-11-28 NOTE — MEDICAL STUDENT ADULT H&P (EDUCATION) - NSHPPHYSICALEXAM_GEN_ALL_CORE
T(C): 36.6 (11-28-23 @ 11:38)  HR: 84 (11-28-23 @ 14:31)  BP: 128/72 (11-28-23 @ 11:38)  BP(mean): --  RR: 17 (11-28-23 @ 14:31)  SpO2: 99% (11-28-23 @ 14:31)    General:  Psych: Appropriate affect; mood is "fine"  Neuro: PEARLA, EOMI, no gross deficits, CNII-XII intact bilaterally  HEENT: Moist mucous membranes, neck supple and nontender with normal ROM and no cervical lymphadenopathy  Cadio: Unable to appreciate heart sounds supine or LLD position  Pulm: Diffusely rhonchorous on expiration  GI: Normoactive bowel sounds; nontender, midly distended; normotympanic  MSK: Normal ROM; No deformities  Ext: Right foot cool to touch without appreciable DP or PT pulses; other extremities warm and dry with strong pulses  Derm: No rashes or lesions

## 2023-11-28 NOTE — H&P ADULT - PROBLEM SELECTOR PLAN 9
Diet: consistent carb diet   Psych/Sleep: Melatonin QHs  DVT ppx: Eliquis 2.5 BID  Code Status: Full Code  Dispo: Home pending PT

## 2023-11-28 NOTE — MEDICAL STUDENT ADULT H&P (EDUCATION) - PLAN 1
- IV NS for fluid support  - Out of window for oseltamivir  - Monitor O2 saturation and temperature; acetaminophen PRN for fever  - F/u orthostatic BP  - F/u PT eval  - F/u urine legionella  - F/u sputum culture

## 2023-11-28 NOTE — H&P ADULT - NSICDXPASTMEDICALHX_GEN_ALL_CORE_FT
PAST MEDICAL HISTORY:  BPH (benign prostatic hyperplasia)     CVA (cerebrovascular accident)     Diabetic neuropathy     DM (diabetes mellitus)     Macular degeneration      PAST MEDICAL HISTORY:  BPH (benign prostatic hyperplasia)     Diabetic neuropathy     DM (diabetes mellitus)     Macular degeneration     Pulmonary embolism

## 2023-11-29 ENCOUNTER — TRANSCRIPTION ENCOUNTER (OUTPATIENT)
Age: 88
End: 2023-11-29

## 2023-11-29 DIAGNOSIS — R53.1 WEAKNESS: ICD-10-CM

## 2023-11-29 LAB
ALBUMIN SERPL ELPH-MCNC: 3.3 G/DL — SIGNIFICANT CHANGE UP (ref 3.3–5)
ALBUMIN SERPL ELPH-MCNC: 3.3 G/DL — SIGNIFICANT CHANGE UP (ref 3.3–5)
ALP SERPL-CCNC: 85 U/L — SIGNIFICANT CHANGE UP (ref 40–120)
ALP SERPL-CCNC: 85 U/L — SIGNIFICANT CHANGE UP (ref 40–120)
ALT FLD-CCNC: 23 U/L — SIGNIFICANT CHANGE UP (ref 4–41)
ALT FLD-CCNC: 23 U/L — SIGNIFICANT CHANGE UP (ref 4–41)
ANION GAP SERPL CALC-SCNC: 12 MMOL/L — SIGNIFICANT CHANGE UP (ref 7–14)
ANION GAP SERPL CALC-SCNC: 12 MMOL/L — SIGNIFICANT CHANGE UP (ref 7–14)
ANISOCYTOSIS BLD QL: SLIGHT — SIGNIFICANT CHANGE UP
ANISOCYTOSIS BLD QL: SLIGHT — SIGNIFICANT CHANGE UP
AST SERPL-CCNC: 21 U/L — SIGNIFICANT CHANGE UP (ref 4–40)
AST SERPL-CCNC: 21 U/L — SIGNIFICANT CHANGE UP (ref 4–40)
BASOPHILS # BLD AUTO: 0.05 K/UL — SIGNIFICANT CHANGE UP (ref 0–0.2)
BASOPHILS # BLD AUTO: 0.05 K/UL — SIGNIFICANT CHANGE UP (ref 0–0.2)
BASOPHILS NFR BLD AUTO: 0.9 % — SIGNIFICANT CHANGE UP (ref 0–2)
BASOPHILS NFR BLD AUTO: 0.9 % — SIGNIFICANT CHANGE UP (ref 0–2)
BILIRUB SERPL-MCNC: 0.4 MG/DL — SIGNIFICANT CHANGE UP (ref 0.2–1.2)
BILIRUB SERPL-MCNC: 0.4 MG/DL — SIGNIFICANT CHANGE UP (ref 0.2–1.2)
BUN SERPL-MCNC: 31 MG/DL — HIGH (ref 7–23)
BUN SERPL-MCNC: 31 MG/DL — HIGH (ref 7–23)
CALCIUM SERPL-MCNC: 8.6 MG/DL — SIGNIFICANT CHANGE UP (ref 8.4–10.5)
CALCIUM SERPL-MCNC: 8.6 MG/DL — SIGNIFICANT CHANGE UP (ref 8.4–10.5)
CHLORIDE SERPL-SCNC: 107 MMOL/L — SIGNIFICANT CHANGE UP (ref 98–107)
CHLORIDE SERPL-SCNC: 107 MMOL/L — SIGNIFICANT CHANGE UP (ref 98–107)
CO2 SERPL-SCNC: 19 MMOL/L — LOW (ref 22–31)
CO2 SERPL-SCNC: 19 MMOL/L — LOW (ref 22–31)
CREAT SERPL-MCNC: 1.71 MG/DL — HIGH (ref 0.5–1.3)
CREAT SERPL-MCNC: 1.71 MG/DL — HIGH (ref 0.5–1.3)
EGFR: 38 ML/MIN/1.73M2 — LOW
EGFR: 38 ML/MIN/1.73M2 — LOW
EOSINOPHIL # BLD AUTO: 0.36 K/UL — SIGNIFICANT CHANGE UP (ref 0–0.5)
EOSINOPHIL # BLD AUTO: 0.36 K/UL — SIGNIFICANT CHANGE UP (ref 0–0.5)
EOSINOPHIL NFR BLD AUTO: 6 % — SIGNIFICANT CHANGE UP (ref 0–6)
EOSINOPHIL NFR BLD AUTO: 6 % — SIGNIFICANT CHANGE UP (ref 0–6)
FERRITIN SERPL-MCNC: 461 NG/ML — HIGH (ref 30–400)
FERRITIN SERPL-MCNC: 461 NG/ML — HIGH (ref 30–400)
GLUCOSE BLDC GLUCOMTR-MCNC: 112 MG/DL — HIGH (ref 70–99)
GLUCOSE BLDC GLUCOMTR-MCNC: 112 MG/DL — HIGH (ref 70–99)
GLUCOSE BLDC GLUCOMTR-MCNC: 126 MG/DL — HIGH (ref 70–99)
GLUCOSE BLDC GLUCOMTR-MCNC: 126 MG/DL — HIGH (ref 70–99)
GLUCOSE BLDC GLUCOMTR-MCNC: 137 MG/DL — HIGH (ref 70–99)
GLUCOSE BLDC GLUCOMTR-MCNC: 137 MG/DL — HIGH (ref 70–99)
GLUCOSE BLDC GLUCOMTR-MCNC: 163 MG/DL — HIGH (ref 70–99)
GLUCOSE BLDC GLUCOMTR-MCNC: 163 MG/DL — HIGH (ref 70–99)
GLUCOSE BLDC GLUCOMTR-MCNC: 181 MG/DL — HIGH (ref 70–99)
GLUCOSE BLDC GLUCOMTR-MCNC: 181 MG/DL — HIGH (ref 70–99)
GLUCOSE SERPL-MCNC: 133 MG/DL — HIGH (ref 70–99)
GLUCOSE SERPL-MCNC: 133 MG/DL — HIGH (ref 70–99)
HCT VFR BLD CALC: 31.9 % — LOW (ref 39–50)
HCT VFR BLD CALC: 31.9 % — LOW (ref 39–50)
HGB BLD-MCNC: 10.3 G/DL — LOW (ref 13–17)
HGB BLD-MCNC: 10.3 G/DL — LOW (ref 13–17)
HYPOCHROMIA BLD QL: SLIGHT — SIGNIFICANT CHANGE UP
HYPOCHROMIA BLD QL: SLIGHT — SIGNIFICANT CHANGE UP
IANC: 3.04 K/UL — SIGNIFICANT CHANGE UP (ref 1.8–7.4)
IANC: 3.04 K/UL — SIGNIFICANT CHANGE UP (ref 1.8–7.4)
IRON SATN MFR SERPL: 26 UG/DL — LOW (ref 45–165)
IRON SATN MFR SERPL: 26 UG/DL — LOW (ref 45–165)
LYMPHOCYTES # BLD AUTO: 1.49 K/UL — SIGNIFICANT CHANGE UP (ref 1–3.3)
LYMPHOCYTES # BLD AUTO: 1.49 K/UL — SIGNIFICANT CHANGE UP (ref 1–3.3)
LYMPHOCYTES # BLD AUTO: 25 % — SIGNIFICANT CHANGE UP (ref 13–44)
LYMPHOCYTES # BLD AUTO: 25 % — SIGNIFICANT CHANGE UP (ref 13–44)
MAGNESIUM SERPL-MCNC: 1.9 MG/DL — SIGNIFICANT CHANGE UP (ref 1.6–2.6)
MAGNESIUM SERPL-MCNC: 1.9 MG/DL — SIGNIFICANT CHANGE UP (ref 1.6–2.6)
MCHC RBC-ENTMCNC: 27.3 PG — SIGNIFICANT CHANGE UP (ref 27–34)
MCHC RBC-ENTMCNC: 27.3 PG — SIGNIFICANT CHANGE UP (ref 27–34)
MCHC RBC-ENTMCNC: 32.3 GM/DL — SIGNIFICANT CHANGE UP (ref 32–36)
MCHC RBC-ENTMCNC: 32.3 GM/DL — SIGNIFICANT CHANGE UP (ref 32–36)
MCV RBC AUTO: 84.6 FL — SIGNIFICANT CHANGE UP (ref 80–100)
MCV RBC AUTO: 84.6 FL — SIGNIFICANT CHANGE UP (ref 80–100)
MICROCYTES BLD QL: SLIGHT — SIGNIFICANT CHANGE UP
MICROCYTES BLD QL: SLIGHT — SIGNIFICANT CHANGE UP
MONOCYTES # BLD AUTO: 0.56 K/UL — SIGNIFICANT CHANGE UP (ref 0–0.9)
MONOCYTES # BLD AUTO: 0.56 K/UL — SIGNIFICANT CHANGE UP (ref 0–0.9)
MONOCYTES NFR BLD AUTO: 9.5 % — SIGNIFICANT CHANGE UP (ref 2–14)
MONOCYTES NFR BLD AUTO: 9.5 % — SIGNIFICANT CHANGE UP (ref 2–14)
NEUTROPHILS # BLD AUTO: 3.38 K/UL — SIGNIFICANT CHANGE UP (ref 1.8–7.4)
NEUTROPHILS # BLD AUTO: 3.38 K/UL — SIGNIFICANT CHANGE UP (ref 1.8–7.4)
NEUTROPHILS NFR BLD AUTO: 56.9 % — SIGNIFICANT CHANGE UP (ref 43–77)
NEUTROPHILS NFR BLD AUTO: 56.9 % — SIGNIFICANT CHANGE UP (ref 43–77)
OVALOCYTES BLD QL SMEAR: SLIGHT — SIGNIFICANT CHANGE UP
OVALOCYTES BLD QL SMEAR: SLIGHT — SIGNIFICANT CHANGE UP
PHOSPHATE SERPL-MCNC: 3.1 MG/DL — SIGNIFICANT CHANGE UP (ref 2.5–4.5)
PHOSPHATE SERPL-MCNC: 3.1 MG/DL — SIGNIFICANT CHANGE UP (ref 2.5–4.5)
PLAT MORPH BLD: ABNORMAL
PLAT MORPH BLD: ABNORMAL
PLATELET # BLD AUTO: 274 K/UL — SIGNIFICANT CHANGE UP (ref 150–400)
PLATELET # BLD AUTO: 274 K/UL — SIGNIFICANT CHANGE UP (ref 150–400)
PLATELET COUNT - ESTIMATE: NORMAL — SIGNIFICANT CHANGE UP
PLATELET COUNT - ESTIMATE: NORMAL — SIGNIFICANT CHANGE UP
POIKILOCYTOSIS BLD QL AUTO: SLIGHT — SIGNIFICANT CHANGE UP
POIKILOCYTOSIS BLD QL AUTO: SLIGHT — SIGNIFICANT CHANGE UP
POLYCHROMASIA BLD QL SMEAR: SLIGHT — SIGNIFICANT CHANGE UP
POLYCHROMASIA BLD QL SMEAR: SLIGHT — SIGNIFICANT CHANGE UP
POTASSIUM SERPL-MCNC: 3.6 MMOL/L — SIGNIFICANT CHANGE UP (ref 3.5–5.3)
POTASSIUM SERPL-MCNC: 3.6 MMOL/L — SIGNIFICANT CHANGE UP (ref 3.5–5.3)
POTASSIUM SERPL-SCNC: 3.6 MMOL/L — SIGNIFICANT CHANGE UP (ref 3.5–5.3)
POTASSIUM SERPL-SCNC: 3.6 MMOL/L — SIGNIFICANT CHANGE UP (ref 3.5–5.3)
PROT SERPL-MCNC: 7.2 G/DL — SIGNIFICANT CHANGE UP (ref 6–8.3)
PROT SERPL-MCNC: 7.2 G/DL — SIGNIFICANT CHANGE UP (ref 6–8.3)
RBC # BLD: 3.77 M/UL — LOW (ref 4.2–5.8)
RBC # FLD: 14.6 % — HIGH (ref 10.3–14.5)
RBC # FLD: 14.6 % — HIGH (ref 10.3–14.5)
RBC BLD AUTO: ABNORMAL
RBC BLD AUTO: ABNORMAL
RETICS #: 46.4 K/UL — SIGNIFICANT CHANGE UP (ref 25–125)
RETICS #: 46.4 K/UL — SIGNIFICANT CHANGE UP (ref 25–125)
RETICS/RBC NFR: 1.2 % — SIGNIFICANT CHANGE UP (ref 0.5–2.5)
RETICS/RBC NFR: 1.2 % — SIGNIFICANT CHANGE UP (ref 0.5–2.5)
SODIUM SERPL-SCNC: 138 MMOL/L — SIGNIFICANT CHANGE UP (ref 135–145)
SODIUM SERPL-SCNC: 138 MMOL/L — SIGNIFICANT CHANGE UP (ref 135–145)
TROPONIN T, HIGH SENSITIVITY RESULT: 39 NG/L — SIGNIFICANT CHANGE UP
TROPONIN T, HIGH SENSITIVITY RESULT: 39 NG/L — SIGNIFICANT CHANGE UP
VARIANT LYMPHS # BLD: 1.7 % — SIGNIFICANT CHANGE UP (ref 0–6)
VARIANT LYMPHS # BLD: 1.7 % — SIGNIFICANT CHANGE UP (ref 0–6)
WBC # BLD: 5.94 K/UL — SIGNIFICANT CHANGE UP (ref 3.8–10.5)
WBC # BLD: 5.94 K/UL — SIGNIFICANT CHANGE UP (ref 3.8–10.5)
WBC # FLD AUTO: 5.94 K/UL — SIGNIFICANT CHANGE UP (ref 3.8–10.5)
WBC # FLD AUTO: 5.94 K/UL — SIGNIFICANT CHANGE UP (ref 3.8–10.5)

## 2023-11-29 PROCEDURE — 99232 SBSQ HOSP IP/OBS MODERATE 35: CPT | Mod: GC

## 2023-11-29 PROCEDURE — 71045 X-RAY EXAM CHEST 1 VIEW: CPT | Mod: 26

## 2023-11-29 PROCEDURE — 93306 TTE W/DOPPLER COMPLETE: CPT | Mod: 26

## 2023-11-29 RX ORDER — TAMSULOSIN HYDROCHLORIDE 0.4 MG/1
1 CAPSULE ORAL
Qty: 0 | Refills: 0 | DISCHARGE
Start: 2023-11-29

## 2023-11-29 RX ADMIN — FINASTERIDE 5 MILLIGRAM(S): 5 TABLET, FILM COATED ORAL at 13:15

## 2023-11-29 RX ADMIN — TAMSULOSIN HYDROCHLORIDE 0.4 MILLIGRAM(S): 0.4 CAPSULE ORAL at 00:08

## 2023-11-29 RX ADMIN — DORZOLAMIDE HYDROCHLORIDE TIMOLOL MALEATE 1 DROP(S): 20; 5 SOLUTION/ DROPS OPHTHALMIC at 18:04

## 2023-11-29 RX ADMIN — TAMSULOSIN HYDROCHLORIDE 0.4 MILLIGRAM(S): 0.4 CAPSULE ORAL at 22:29

## 2023-11-29 RX ADMIN — POLYETHYLENE GLYCOL 3350 17 GRAM(S): 17 POWDER, FOR SOLUTION ORAL at 05:43

## 2023-11-29 RX ADMIN — POLYETHYLENE GLYCOL 3350 17 GRAM(S): 17 POWDER, FOR SOLUTION ORAL at 18:04

## 2023-11-29 RX ADMIN — DORZOLAMIDE HYDROCHLORIDE TIMOLOL MALEATE 1 DROP(S): 20; 5 SOLUTION/ DROPS OPHTHALMIC at 05:44

## 2023-11-29 RX ADMIN — Medication 1: at 13:14

## 2023-11-29 RX ADMIN — DORZOLAMIDE HYDROCHLORIDE TIMOLOL MALEATE 1 DROP(S): 20; 5 SOLUTION/ DROPS OPHTHALMIC at 03:00

## 2023-11-29 RX ADMIN — GABAPENTIN 300 MILLIGRAM(S): 400 CAPSULE ORAL at 18:03

## 2023-11-29 RX ADMIN — LATANOPROST 1 DROP(S): 0.05 SOLUTION/ DROPS OPHTHALMIC; TOPICAL at 02:59

## 2023-11-29 RX ADMIN — LATANOPROST 1 DROP(S): 0.05 SOLUTION/ DROPS OPHTHALMIC; TOPICAL at 22:29

## 2023-11-29 RX ADMIN — GABAPENTIN 300 MILLIGRAM(S): 400 CAPSULE ORAL at 05:44

## 2023-11-29 RX ADMIN — APIXABAN 2.5 MILLIGRAM(S): 2.5 TABLET, FILM COATED ORAL at 18:03

## 2023-11-29 RX ADMIN — APIXABAN 2.5 MILLIGRAM(S): 2.5 TABLET, FILM COATED ORAL at 05:43

## 2023-11-29 NOTE — CHART NOTE - NSCHARTNOTEFT_GEN_A_CORE
Called PCP Dr China rivera. Spoke to Shanique Bernal who sent patient in to ED.  Explained Patient had Influenza A and is out of the window for Tamiflu .  XXR cler on 11/8 and 11/29  Echo 11/29 Ef 68 %  DR Louise noted he had PE in 2022? and Eliquis was continued by Home MDs.  She was concerned because daughter kept saying he was weak, this was a change and he fell recently.  We are awaiting PT eval before D/c  Patient will f/u with Dr Atkinson/ Dani for eval with Pulmonary before they stop Eliquis. Also for f/u with Urology  Patient can benefit from stopping Eliquis with concern for falls and BPH with ??? need for Turp in future.  PCP f/u on Called PCP Dr Atkinson office. Spoke to Shanique Bernal who sent patient in to ED.  Explained Patient had Influenza A and is out of the window for Tamiflu .  XXR cler on 11/8 and 11/29  Echo 11/29 Ef 68 %  DR Louise noted he had PE in 2022? and Eliquis was continued by Home MDs.  She was concerned because daughter kept saying he was weak, this was a change and he fell recently.  We are awaiting PT eval before D/c  Patient will f/u with Dr Atkinson/ Dani for eval with Pulmonary before they stop Eliquis. Also for f/u with Urology  Patient can benefit from stopping Eliquis with concern for falls and BPH with ??? need for Turp in future.  PCP f/u on 12/5/23 at 9 am with Dr Sifuentes at Dr Atkinsons office. Called PCP Dr Atkinson office. Spoke to Shanique Bernal who sent patient in to ED.  Explained Patient had Influenza A and is out of the window for Tamiflu .  XXR cler on 11/8 and 11/29  Echo 11/29 Ef 68 %  DR Louise noted he had PE in 2022? and Eliquis was continued by Home MDs.  She was concerned because daughter kept saying he was weak, this was a change and he fell recently.  We are awaiting PT eval before D/c  Patient will f/u with Dr Atkinson/ Dani for eval with Pulmonary before they stop Eliquis. Also for f/u with Urology  Patient can benefit from stopping Eliquis with concern for falls and BPH with ??? need for Turp in future.  PCP f/u on 12/5/23 at 9 am with Dr Sifuentes at Dr Atkinsons office.  PT rec Rehab  Called daughter Called PCP Dr Atkinson office. Spoke to Shanique Bernal who sent patient in to ED.  Explained Patient had Influenza A and is out of the window for Tamiflu .  XXR cler on 11/8 and 11/29  Echo 11/29 Ef 68 %  DR Louise noted he had PE in 2022? and Eliquis was continued by Home MDs.  She was concerned because daughter kept saying he was weak, this was a change and he fell recently.  We are awaiting PT eval before D/c  Patient will f/u with Dr Atkinson/ Dani for eval with Pulmonary before they stop Eliquis. Also for f/u with Urology  Patient can benefit from stopping Eliquis with concern for falls and BPH with ??? need for Turp in future.  PCP f/u on 12/5/23 at 9 am with Dr Sifuentes at Dr Waller office.  PT rec Rehab.   Called daughter Regina [phone] and updated, she noted she wants father to go to REHAB

## 2023-11-29 NOTE — PHYSICAL THERAPY INITIAL EVALUATION ADULT - ADDITIONAL COMMENTS
Patient lives alone but has aides during the day 12 hours and someone will always come stay with him at night. Patient owns a walker but was not using it much. Patient was getting assist with ADL prior to admission.

## 2023-11-29 NOTE — PHYSICAL THERAPY INITIAL EVALUATION ADULT - REFERRING PHYSICIAN, REHAB EVAL
DOI:  Insidious onset    Initial Treatment date:  09/11/2020  Date last seen:  9/22/2020  [facility]:  Unknown  Occupation:  Cleaning   Referred by: Jose Medrano MD      SUBJECTIVE:  The patient is a pleasant 65 year old male that presents to our office today for an evaluation and treatment of low back pain.  The pain radiates from his right sacroiliac and gluteal region to the posterior lateral aspect of his right thigh.  Occasionally the pain does radiate past the knee.      Patient rates his pain today at 5/10.  He states that she be helping a little.  He states that is long as he is not bending over and lifting anything heavy and continues to take his Tylenol in the morning and the evening his pain is more manageable.         Oswestry low Back Pain Disability index questionnaire 34/50 or 68% on 09/11/2020.    Date Time   May 1, 2019  5:13 PM   Reason For Exam    Radiculopathy   Associated Diagnoses    Right lumbar radiculopathy       Sedation Narrator    Link to Narrator   IR Timeline    IR Event Log   PACS Images     Show images for MRI Lumbar Spine   Study Result    MRI LUMBAR SPINE WO CONTRAST     CLINICAL INFORMATION:  64 years-old Male with history of RADICULOPATHY.     COMPARISON:  Lumbar spine radiographs 4/17/2019 and CT chest, abdomen and  pelvis 8/2/2018.     TECHNIQUE:  Using a 1.5 Naomi imaging system, multiplanar multisequence  lumbar spine MRI was performed without contrast.     FINDINGS:   Vertebrae:  For spinal numbering purposes there are 5 lumbar-type vertebral bodies with  the caudal most fully formed disc space designated as L5-S1.  Vertebral body heights are preserved.  Mild straightening of the normal lumbar lordosis.       Bone Marrow:  Normal bone marrow signal for age.  Small subcentimeter  probable hemangioma at L5.  No destructive lesions identified.          Soft tissues:  Mild dorsal subcutaneous soft tissue edema asymmetrically at  right L4-S1.  Redemonstrated left nephrectomy  changes.  Redemonstrated  moderate colonic diverticulosis.  Questionable circumferential wall  thickening involving the partially imaged portion of the mid descending  colon (series 10, image 12).     Spinal Cord:    Conus medullaris terminates at L1-L2.    Unremarkable cauda equina nerve roots.     Degenerative changes:  Mild multilevel degenerative disc changes throughout the lumbar spine with  intervertebral disc height and T2 signal loss, worst at L5-S1.  Mild  inflammatory endplate degeneration asymmetrically at left L5-S1.   Multilevel small anterior osteophytes.  Mild bilateral degenerative  sacroiliitis.     Degenerative changes by level:  T12-L1:   No significant abnormality.          L1-L2:   Minimal symmetric disc bulge without significant spinal canal or foraminal  stenosis.       L2-L3:   Symmetric disc bulge, thick ligamenta flava and facet arthropathy without  significant spinal canal stenosis.    Mild bilateral foraminal narrowing due to disc bulge, dorsal osteophytic  ridging and facet arthropathy.     L3-L4:  Slightly asymmetric to the left disc bulge, thick ligamenta flava and facet  arthropathy without significant spinal canal stenosis.    Moderate left and mild right foraminal stenosis due to slightly asymmetric  to the left disc bulge, dorsal osteophytic ridging and facet arthropathy.     L4-L5:   Small posterior annular fissure suggested within the left central zone.  Slightly asymmetric to the left disc bulge with small superimposed left  central/subarticular disc protrusion, thick ligamenta flava facet  arthropathy without significant spinal canal stenosis.    Moderate-severe left and mild right foraminal stenosis due to slightly  asymmetric to the left disc bulge, dorsal osteophytic ridging and facet  arthropathy.     L5-S1:   Asymmetric to the left disc bulge with superimposed right  central/subarticular disc extrusion with minimal caudal migration of  approximately 0.4 cm below the  superior endplate of S1.  Disc extrusion  displaces, flattens and potentially impinges upon the traversing right S1  nerve root.    Moderate-severe left and mild-to-moderate right foraminal stenosis due to  asymmetric to the left disc bulge, dorsal osteophytic ridging and facet  arthropathy.        IMPRESSION:       1.  Multilevel degenerative disc changes without significant spinal canal  stenosis.    2.  Minimally caudally migrated right central/subarticular disc extrusion  at L5-S1 with displacement, flattening and potential impingement  impingement upon the traversing right S1 nerve root.    3.  Small posterior annular fissure at L4-L5.    4.  Multilevel degenerative foraminal stenosis, worst at left L4-L5 and  left L5-S1.    5.  Questionable circumferential wall thickening involving the partially  imaged portion of the mid descending colon, possibly related to  decompressed state however correlation with colonoscopy could be considered  to exclude an underlying abnormality.        Date Time   Apr 17, 2019  4:12 PM   Reason For Exam    Standing AP and lateral ONLY, no obliques   Associated Diagnoses    Right lumbar radiculopathy       Sedation Narrator    Link to Narrator   IR Timeline    IR Event Log   PACS Images     Show images for XR Lumbar Spine 2 or 3 Views   Study Result    XR LUMBAR SPINE 2 OR 3 VIEWS     HISTORY:  Standing AP and lateral ONLY, no obliques.         COMPARISON:   None.     FINDINGS:  Frontal and lateral views are supplied.There are 5 nonrib-bearing lumbar  type vertebral bodies present.Lordosis is preserved.There is no evidence of  acute fracture or subluxation. There is osteophytosis and mild disc height  loss at L1/L2. Left center upper abdominal surgical clips are present.        IMPRESSION:   No acute-appearing osseous abnormality.          Have you had any other conditions?  Patient Active Problem List   Diagnosis   • Cocaine abuse (CMS/[facility])   • Personal history of tobacco use,  presenting hazards to health   • Essential hypertension, benign   • Prediabetes   • Proteinuria   • Renal mass, left   • Malignant neoplasm of left kidney (CMS/HCC)   • Bradycardia   • Lightheadedness   • Orthostatic hypotension   • Other hydrocele         HEALTH HISTORY:    Essential (primary) hypertension                              PVD (peripheral vascular disease) (CMS/HCC)                   Compression, esophagus                                          Comment: pt reports trouble swallowing at times/ throat                closes when eats - takes medication for this    Cerebral artery occlusion with cerebral infarc*               Kidney mass                                                     Comment: left    Malignant neoplasm (CMS/HCC)                                  Diabetes mellitus (CMS/HCC)                                   SOCIAL HISTORY:  Patient completed Chiropractic Patient History and Chiropractic Systems Review.  These were reviewed with the patient.    OBJECTIVE FINDINGS:   Problem focus examination revealed:    (L & P-spine) Lumbar spine facet joint function is within normal limits except for his L1-2 and L5-S1  facet joints that exhibited limited passive range of motion and segmental restriction and tenderness on palpation; sacroiliac joint function is within normal limits except for his right sacroiliac joint that exhibited limited passive range of motion and joint restriction; The following muscles were examined for flexibility and tone at rest; right hip flexor, left hip flexor, right hip rotator, left hip rotator, right piriformis, left piriformis, right hamstring, left hamstring, right lumbar paraspinals, left lumbar paraspinals, right gluteus medius, left gluteus medius, right gluteus penelope, left gluteus penelope, right quadratus lumborum, left quadratus lumborum, right tensor fasciae latae, left tensor fasciae latae, right internal hip rotators, left internal hip rotators, right  quadriceps, left quadriceps, right iliotibial band and left iliotibial band. These muscles were found to be within normal limits except for hisright piriformis, right gluteus medius, right gluteus penelope, right quadratus lumborum and left quadratus lumborum muscle(s) that exhibited limited flexibility and were hypertonic at rest.  Tolerance testing for Quintana protocol 2 valid for treatment.      Assessment:  1. Lumbar radiculopathy right  2. Segmental dysfunction lumbar  3. Segmental dysfunction pelvic  4. Segmental dysfunction sacral       Plan:  Patient was evaluated and then treated with soft tissue manipulation to his lumbar pelvic sacral spine via /Quintana distraction technique protocol 2 to improve function and passive range of motion of facet joints. Patient also treated with contract/relax stretch to muscle noted as taut in objective findings to improve flexibility and decrease strain to spinal structures. Patient also treated with lumbar distraction x 5 minutes to stretch lumbar paraspinal muscle and centralize possible contained migration of lumbar intervertebral nucleus.    Rehabilitation/Modalities:  Discussed with patient necessity that he not do any lifting while trying to help him with his pain.  Patient was given verbal restriction of no lifting or bending.    Goal of care is to improve muscular and skeletal function and provide symptom relief. Patient responded to the treatment today. Patient rates pain at 5/10 immediately after the treatment today.  Patient is to return for continued care and treatment. Care is planned at 2 x /week x 4-6 weeks.     Total exam and treatment time was 20 minutes.   Chuck Lopez

## 2023-11-29 NOTE — PROGRESS NOTE ADULT - PROBLEM SELECTOR PLAN 9
Diet: consistent carb diet   Psych/Sleep: Melatonin QHs  DVT ppx: Eliquis 2.5 BID  Code Status: Full Code  Dispo: Home pending PT - dorzolamaide-timolol 2%-5%  - Latanoprost 0.005%

## 2023-11-29 NOTE — DISCHARGE NOTE PROVIDER - HOSPITAL COURSE
HPI:  89-year-old male past medical history DM2, PE dx in 2021 on Eliquis, BPH, and 75 pack year history of smoking, presenting to the ED with a productive cough and shortness of breath for 1 week. His symptoms began Last Wednesday (11/22) with fatigue, shortness of breath, and dry cough. On Friday, his daughter noted a BP of 88/50, which she attributed to an doubling of his tamsulosin dose that Tuesday. Tamsulosin was held since then, and BP recovered to his baseline of SBP ~110, but his weakness persisted. His daughter states that for the past 2 weeks he has had a cough which became a wet cough around 4 days ago. Over the weekend, his cough became productive of whitish sputum and he developed a headache today, so he went to his PMD this morning, who referred him to the ED for workup. He denies any fevers, chills, nausea, vomiting, diarrhea, confusion, orthopnea, or loss of consciousness.    No sick contacts no recent travel.    ED: 36.6; 128/72 100/min, 100% RA, 20/min, AFebrile HDS on RA  RVP positive for Influenza A and Labs significant for Anemia 11.3 and Cr. elevated to 1.88 BUN 33.  CXR done at the ED was negative for lobar consolidations. Pending official radiology read.     The patient was admitted to medicine for management of his weakness iso influenza infection. (28 Nov 2023 15:39)    Hospital Course:  The patient underwent supportive care for Influenza A infection. He was outside of the window for Oseltamivir. HS troponin were WNL and downtrending, Initial ProBNP was in the 500s and Echocardiogram was a limited study but revealed a LVEF of 59% w/o significant valvular abnormalities. The patient's A1C was found to be 6.7 and blood glucose was managed with Low dose insulin sliding scale. The patient came in on Eliquis 2.5mg BID due to a history of PE 2 years ago. Primary team attempted to reach out to PCP Dr. Kg Atkinson about whether there are other reasons for why the patient is still on Eliquis at this time. The patient had some urinary retention ~300mL postvoid residual, requiring Straight catheterization. The patient's Tamsulosin was started at 0.4mg nightly. The patient's orthostatic vitals revealed =====.   PT was consulted and suggested =========.    Important Medication Changes and Reason:  Take Tamsulosin 0.4mg at night rather than in the morning to prevent falls secondary to hypotension    Active or Pending Issues Requiring Follow-up:  Follow up with PCP    Advanced Directives:   [x] Full code  [ ] DNR  [ ] Hospice    Discharge Diagnoses:  Influenza A infection        HPI:  89-year-old male past medical history DM2, PE dx in 2021 on Eliquis, BPH, and 75 pack year history of smoking, presenting to the ED with a productive cough and shortness of breath for 1 week. His symptoms began Last Wednesday (11/22) with fatigue, shortness of breath, and dry cough. On Friday, his daughter noted a BP of 88/50, which she attributed to an doubling of his tamsulosin dose that Tuesday. Tamsulosin was held since then, and BP recovered to his baseline of SBP ~110, but his weakness persisted. His daughter states that for the past 2 weeks he has had a cough which became a wet cough around 4 days ago. Over the weekend, his cough became productive of whitish sputum and he developed a headache today, so he went to his PMD this morning, who referred him to the ED for workup. He denies any fevers, chills, nausea, vomiting, diarrhea, confusion, orthopnea, or loss of consciousness.    No sick contacts no recent travel.    ED: 36.6; 128/72 100/min, 100% RA, 20/min, AFebrile HDS on RA  RVP positive for Influenza A and Labs significant for Anemia 11.3 and Cr. elevated to 1.88 BUN 33.  CXR done at the ED was negative for lobar consolidations. Pending official radiology read.     The patient was admitted to medicine for management of his weakness iso influenza infection. (28 Nov 2023 15:39)    Hospital Course:  The patient underwent supportive care for Influenza A infection. He was outside of the window for Oseltamivir. HS troponin were WNL and downtrending, Initial ProBNP was in the 500s and Echocardiogram was a limited study but revealed a LVEF of 59% w/o significant valvular abnormalities. The patient's A1C was found to be 6.7 and blood glucose was managed with Low dose insulin sliding scale. The patient came in on Eliquis 2.5mg BID due to a history of PE 2 years ago. Primary team attempted to reach out to PCP Dr. Kg Atkinson about whether there are other reasons for why the patient is still on Eliquis at this time. The patient had some urinary retention ~300mL postvoid residual, requiring Straight catheterization. The patient's Tamsulosin was started at 0.4mg nightly. The patient's orthostatic vitals revealed =====.   PT was consulted and suggested inpatient [facility].    Important Medication Changes and Reason:  Take Tamsulosin 0.4mg at night rather than in the morning to prevent falls secondary to hypotension    Active or Pending Issues Requiring Follow-up:  Follow up with PCP    Advanced Directives:   [x] Full code  [ ] DNR  [ ] Hospice    Discharge Diagnoses:  Influenza A infection        HPI:  89-year-old male past medical history DM2, PE dx in 2021 on Eliquis, BPH, and 75 pack year history of smoking, presenting to the ED with a productive cough and shortness of breath for 1 week. His symptoms began Last Wednesday (11/22) with fatigue, shortness of breath, and dry cough. On Friday, his daughter noted a BP of 88/50, which she attributed to an doubling of his tamsulosin dose that Tuesday. Tamsulosin was held since then, and BP recovered to his baseline of SBP ~110, but his weakness persisted. His daughter states that for the past 2 weeks he has had a cough which became a wet cough around 4 days ago. Over the weekend, his cough became productive of whitish sputum and he developed a headache today, so he went to his PMD this morning, who referred him to the ED for workup. He denies any fevers, chills, nausea, vomiting, diarrhea, confusion, orthopnea, or loss of consciousness.    No sick contacts no recent travel.    ED: 36.6; 128/72 100/min, 100% RA, 20/min, AFebrile HDS on RA  RVP positive for Influenza A and Labs significant for Anemia 11.3 and Cr. elevated to 1.88 BUN 33.  CXR done at the ED was negative for lobar consolidations. Pending official radiology read.     The patient was admitted to medicine for management of his weakness iso influenza infection. (28 Nov 2023 15:39)    Hospital Course:  The patient underwent supportive care for Influenza A infection. He was outside of the window for Oseltamivir. HS troponin were WNL and downtrending, Initial ProBNP was in the 500s and Echocardiogram was a limited study but revealed a LVEF of 59% w/o significant valvular abnormalities. The patient's A1C was found to be 6.7 and blood glucose was managed with Low dose insulin sliding scale. The patient came in on Eliquis 2.5mg BID due to a history of PE 2 years ago. Primary team attempted to reach out to PCP Dr. Kg Atkinson about whether there are other reasons for why the patient is still on Eliquis at this time. The patient had some urinary retention ~300mL postvoid residual, requiring Straight catheterization. The patient's Tamsulosin was started at 0.4mg nightly. The patient's orthostatic vitals were negative for orthostatic hypotension.   PT was consulted and suggested inpatient [facility].    Important Medication Changes and Reason:  Take Tamsulosin 0.4mg at night rather than in the morning to prevent falls secondary to hypotension    Active or Pending Issues Requiring Follow-up:  Follow up with PCP    Advanced Directives:   [x] Full code  [ ] DNR  [ ] Hospice    Discharge Diagnoses:  Influenza A infection

## 2023-11-29 NOTE — DISCHARGE NOTE PROVIDER - CARE PROVIDER_API CALL
Kg Atkinson  [facility]  [address] [id number]  Phone: [phone]  Fax: [phone]  Established Patient  Follow Up Time: 1 week   Kg Atkinson  [facility]  [address] [id number]  Phone: [phone]  Fax: [phone]  Established Patient  Follow Up Time: 1 week   Kg Atkinson  [facility]  [address] [id number]  Phone: [phone]  Fax: [phone]  Established Patient  Follow Up Time: 1 week

## 2023-11-29 NOTE — PROGRESS NOTE ADULT - PROBLEM SELECTOR PLAN 6
- hgb 11.3, baseline appears to be 8-9   - Normal MCV MHC  - Reticulocyte count 1.2  - likely 2/2 CKD   - f/u iron studies - Cr. 1.88 on admission; appears to be at baseline  - avoid nephrotoxic agents

## 2023-11-29 NOTE — MEDICAL STUDENT PROGRESS NOTE(EDUCATION) - PLAN 4
- hold home glyburide  - ERROL  - Consistent carb diet  - HbA1C with next labs - hold home glyburide  - ERROL  - Consistent carb diet  - HbA1C <7

## 2023-11-29 NOTE — PROGRESS NOTE ADULT - PROBLEM SELECTOR PLAN 3
- Tamsulosin 0.4 at night (patient was taking in the morning and became unsteady when dose was doubled)   - Finasteride 5mg daily   - Bladder scans and straight cath as appropriate - DVT w/ PE in 2021 when patient admitted for covid infection   - c/w Eliquis 2.5 BID  - Follow up with PCP about why patient is still on Eliquis for the PE in 2021

## 2023-11-29 NOTE — DISCHARGE NOTE PROVIDER - NSDCMRMEDTOKEN_GEN_ALL_CORE_FT
apixaban 2.5 mg oral tablet: 1 tab(s) orally 2 times a day  dorzolamide-timolol 2%-0.5% preservative-free ophthalmic solution: 1 drop(s) to each affected eye 2 times a day  finasteride 5 mg oral tablet: 1 tab(s) orally once a day  glyBURIDE 2.5 mg oral tablet: 1 tab(s) orally once a day  latanoprost 0.005% ophthalmic solution: 1 drop(s) to each affected eye once a day (at bedtime)  MiraLax oral powder for reconstitution: 17 gram(s) orally once a day  Neurontin 300 mg oral capsule: 1 cap(s) orally 2 times a day  tamsulosin 0.4 mg oral capsule: 1 cap(s) orally once a day (at bedtime)   apixaban 2.5 mg oral tablet: 1 tab(s) orally 2 times a day  benzonatate 100 mg oral capsule: 1 cap(s) orally 3 times a day  dorzolamide-timolol 2%-0.5% preservative-free ophthalmic solution: 1 drop(s) to each affected eye 2 times a day  finasteride 5 mg oral tablet: 1 tab(s) orally once a day  glyBURIDE 2.5 mg oral tablet: 1 tab(s) orally once a day  guaiFENesin 600 mg oral tablet, extended release: 1 tab(s) orally every 12 hours  latanoprost 0.005% ophthalmic solution: 1 drop(s) to each affected eye once a day (at bedtime)  MiraLax oral powder for reconstitution: 17 gram(s) orally once a day  Neurontin 300 mg oral capsule: 1 cap(s) orally 2 times a day  tamsulosin 0.4 mg oral capsule: 1 cap(s) orally once a day (at bedtime)

## 2023-11-29 NOTE — DISCHARGE NOTE PROVIDER - NSDCFUADDAPPT_GEN_ALL_CORE_FT
APPTS ARE READY TO BE MADE: [x] YES    Best Family or Patient Contact (if needed):  Regina Watson Daughter [phone]  Additional Information about above appointments (if needed):    1: Dr. Kg Atkinson PCP [phone]  2:   3:     Other comments or requests:    APPTS ARE READY TO BE MADE: [x] YES    Best Family or Patient Contact (if needed):  Regina Watson Daughter [phone]  Additional Information about above appointments (if needed):    1: Dr. Kg Atkinson PCP [phone]  2:   3:     Other comments or requests:    APPTS ARE READY TO BE MADE: [x] YES    Best Family or Patient Contact (if needed):  Regina Watson Daughter [phone]  Additional Information about above appointments (if needed):    1: Dr. Kg Atkinson PCP [phone]  2:   3:     Other comments or requests:    APPTS ARE READY TO BE MADE: [x] YES    Best Family or Patient Contact (if needed):  Regina Watson Daughter [phone]  Additional Information about above appointments (if needed):    1: Dr. Kg Atkinson PCP [phone]  2:   3:     Other comments or requests:     Patient is being discharged to SNF. Caregiver will arrange follow up. APPTS ARE READY TO BE MADE: [x] YES    Best Family or Patient Contact (if needed):  Regina Watson Daughter [phone]  Additional Information about above appointments (if needed):    1: Dr. Kg Atkinson PCP [phone]  2:   3:     Other comments or requests:     Patient is being discharged to SNF. Caregiver will arrange follow up. APPTS ARE READY TO BE MADE: [x] YES    Best Family or Patient Contact (if needed):  Regina Watson Daughter [phone]  Additional Information about above appointments (if needed):    1: Dr. Kg Atkinson PCP [phone]  2:   3:     Other comments or requests:     Patient is being discharged to SNF. Caregiver will arrange follow up.

## 2023-11-29 NOTE — PROGRESS NOTE ADULT - PROBLEM SELECTOR PLAN 5
- Cr. 1.88 on admission; appears to be at baseline  - avoid nephrotoxic agents - Hold home glyburide  - ERROL   - Consistent carb diet

## 2023-11-29 NOTE — DISCHARGE NOTE PROVIDER - ATTENDING DISCHARGE PHYSICAL EXAMINATION:
89M hx of PE in 2021 on eliquis, T2DM on glyburide with Diabetic nephropathy, HTN, dementia, BPH, DM, CKD III, ??prior TIA/CVA p/w generalized weakness, found to have RVP w/ influenza. Managed supportively. At this time pt hemodynamically stable for dc to rehab.      GENERAL: NAD  HEAD:  Atraumatic, Normocephalic  EYES: EOMI, conjunctiva and sclera clear  NECK: Supple  CHEST/LUNG: Coarse breath sounds B/L, normal respiratory effort   HEART: Regular rate and rhythm; No murmurs, rubs, or gallops  ABDOMEN: Soft, Nontender, Nondistended; Bowel sounds present  PSYCH: Awake and alert   NEUROLOGY: non-focal  SKIN: Warm and dry

## 2023-11-29 NOTE — PROGRESS NOTE ADULT - PROBLEM SELECTOR PLAN 2
- DVT w/ PE in 2021 when patient admitted for covid infection   - c/w Eliquis 2.5 BID  - Follow up with PCP about why patient is still on Eliquis for the PE in 2021 - likely iso Influenza infection   - HS troponin normal  - EKG reviewed, unremarkable  - f/u TTE   - Patient has loop recorder in >6mo, will need removal outpatient

## 2023-11-29 NOTE — MEDICAL STUDENT PROGRESS NOTE(EDUCATION) - SUBJECTIVE AND OBJECTIVE BOX
Sujective: Patient EREN BARRAGAN seen and examined at bedside at 11-29-23 @ 09:21. No overnight events. patient reports being surprised when he woke up due to being in an unusual location, but quickly got his bearings. He says that he got up and walked around this morning, but that the nurses did not want him to walk. He says that he can usually walk without issue but sometimes he "can't even get up". Denies any fevers, chills, headache, nausea, vomiting, diarrhea. Wishes to shower.    Objective:    T(C): 36.3 (11-29-23 @ 05:37), Max: 36.6 (11-28-23 @ 11:38)  HR: 69 (11-29-23 @ 05:37) (69 - 100)  BP: 119/63 (11-29-23 @ 05:37) (111/64 - 128/72)  RR: 18 (11-29-23 @ 05:37) (16 - 20)  SpO2: 98% (11-29-23 @ 05:37) (98% - 100%)    General: elderly man appearing younger than stated age, lying in a bed in no acute distress; slightly malodorous  Psych: Appropriate affect; mood is "good"  Neuro: PEARLA, EOMI, no gross deficits; AOx3  HEENT: Moist mucous membranes, neck supple and nontender with normal ROM and no cervical lymphadenopathy  Cadio: Regular rate and rhythm; normal S1/S2; no murmurs, rubs, or gallops  Pulm: Rhonchi in bilateral lower fields; normal symmetric excursion  GI: Normoactive bowel sounds; nontender, mildly distended, dull to percussion  MSK: Normal ROM; No deformities  Ext: Warm and dry with capillary refill <2 sec and palpable peripheral pulses x4  Derm: No rashes or lesions      11-28-23 @ 07:01  -  11-29-23 @ 07:00  --------------------------------------------------------  IN: 0 mL / OUT: 280 mL / NET: -280 mL      LABS:    11-29    138  |  107  |  31<H>  ----------------------------<  133<H>  3.6   |  19<L>  |  1.71<H>    Ca    8.6      29 Nov 2023 06:30  Phos  3.1     11-29  Mg     1.90     11-29    TPro  7.2  /  Alb  3.3  /  TBili  0.4  /  DBili  x   /  AST  21  /  ALT  23  /  AlkPhos  85  11-29  LIVER FUNCTIONS - ( 29 Nov 2023 06:30 )  Alb: 3.3 g/dL / Pro: 7.2 g/dL / ALK PHOS: 85 U/L / ALT: 23 U/L / AST: 21 U/L / GGT: x                               10.3   5.94  )-----------( 274      ( 29 Nov 2023 06:30 )             31.9     Urinalysis Basic - ( 29 Nov 2023 06:30 )    Color: x / Appearance: x / SG: x / pH: x  Gluc: 133 mg/dL / Ketone: x  / Bili: x / Urobili: x   Blood: x / Protein: x / Nitrite: x   Leuk Esterase: x / RBC: x / WBC x   Sq Epi: x / Non Sq Epi: x / Bacteria: x    CAPILLARY BLOOD GLUCOSE      POCT Blood Glucose.: 163 mg/dL (29 Nov 2023 00:06)  POCT Blood Glucose.: 212 mg/dL (28 Nov 2023 22:00)  POCT Blood Glucose.: 210 mg/dL (28 Nov 2023 21:07)  POCT Blood Glucose.: 127 mg/dL (28 Nov 2023 17:26)  POCT Blood Glucose.: 149 mg/dL (28 Nov 2023 11:45)    MEDICATIONS  (STANDING):  apixaban 2.5 milliGRAM(s) Oral every 12 hours  dextrose 5%. 1000 milliLiter(s) (50 mL/Hr) IV Continuous <Continuous>  dextrose 5%. 1000 milliLiter(s) (100 mL/Hr) IV Continuous <Continuous>  dextrose 50% Injectable 25 Gram(s) IV Push once  dextrose 50% Injectable 12.5 Gram(s) IV Push once  dextrose 50% Injectable 25 Gram(s) IV Push once  dorzolamide 2%/timolol 0.5% Ophthalmic Solution 1 Drop(s) Both EYES two times a day  finasteride 5 milliGRAM(s) Oral daily  gabapentin 300 milliGRAM(s) Oral two times a day  glucagon  Injectable 1 milliGRAM(s) IntraMuscular once  insulin lispro (ADMELOG) corrective regimen sliding scale   SubCutaneous at bedtime  insulin lispro (ADMELOG) corrective regimen sliding scale   SubCutaneous three times a day before meals  lactated ringers. 1000 milliLiter(s) (100 mL/Hr) IV Continuous <Continuous>  latanoprost 0.005% Ophthalmic Solution 1 Drop(s) Both EYES at bedtime  polyethylene glycol 3350 17 Gram(s) Oral two times a day  tamsulosin 0.4 milliGRAM(s) Oral at bedtime    MEDICATIONS  (PRN):  acetaminophen     Tablet .. 650 milliGRAM(s) Oral every 6 hours PRN Temp greater or equal to 38C (100.4F), Mild Pain (1 - 3)  aluminum hydroxide/magnesium hydroxide/simethicone Suspension 30 milliLiter(s) Oral every 4 hours PRN Dyspepsia  dextrose Oral Gel 15 Gram(s) Oral once PRN Blood Glucose LESS THAN 70 milliGRAM(s)/deciliter  melatonin 3 milliGRAM(s) Oral at bedtime PRN Insomnia   Sujective: Patient EREN BARRAGAN seen and examined at bedside at 11-29-23 @ 09:21. No overnight events. patient reports being surprised when he woke up due to being in an unusual location, but quickly got his bearings. He says that he got up and walked around this morning, but that the nurses did not want him to walk. He says that he can usually walk without issue but sometimes he "can't even get up". Denies any fevers, chills, headache, nausea, vomiting, diarrhea. Wishes to shower.    Objective:    T(C): 36.3 (11-29-23 @ 05:37), Max: 36.6 (11-28-23 @ 11:38)  HR: 69 (11-29-23 @ 05:37) (69 - 100)  BP: 119/63 (11-29-23 @ 05:37) (111/64 - 128/72)  RR: 18 (11-29-23 @ 05:37) (16 - 20)  SpO2: 98% (11-29-23 @ 05:37) (98% - 100%)    General: elderly man appearing younger than stated age, lying in a bed in no acute distress; slightly malodorous  Psych: Appropriate affect; mood is "good"  Neuro: PEARLA, EOMI, no gross deficits; AOx3  HEENT: Moist mucous membranes, neck supple and nontender with normal ROM and no cervical lymphadenopathy  Cadio: Regular rate and rhythm; normal S1/S2; no murmurs, rubs, or gallops  Pulm: clear to auscultation in all fields; normal symmetric excursion  GI: Normoactive bowel sounds; nontender, mildly distended, dull to percussion  MSK: Normal ROM; No deformities  Ext: Warm and dry with capillary refill <2 sec and palpable peripheral pulses x4  Derm: No rashes or lesions      11-28-23 @ 07:01  -  11-29-23 @ 07:00  --------------------------------------------------------  IN: 0 mL / OUT: 280 mL / NET: -280 mL      LABS:    11-29    138  |  107  |  31<H>  ----------------------------<  133<H>  3.6   |  19<L>  |  1.71<H>    Ca    8.6      29 Nov 2023 06:30  Phos  3.1     11-29  Mg     1.90     11-29    TPro  7.2  /  Alb  3.3  /  TBili  0.4  /  DBili  x   /  AST  21  /  ALT  23  /  AlkPhos  85  11-29  LIVER FUNCTIONS - ( 29 Nov 2023 06:30 )  Alb: 3.3 g/dL / Pro: 7.2 g/dL / ALK PHOS: 85 U/L / ALT: 23 U/L / AST: 21 U/L / GGT: x                               10.3   5.94  )-----------( 274      ( 29 Nov 2023 06:30 )             31.9     Urinalysis Basic - ( 29 Nov 2023 06:30 )    Color: x / Appearance: x / SG: x / pH: x  Gluc: 133 mg/dL / Ketone: x  / Bili: x / Urobili: x   Blood: x / Protein: x / Nitrite: x   Leuk Esterase: x / RBC: x / WBC x   Sq Epi: x / Non Sq Epi: x / Bacteria: x    CAPILLARY BLOOD GLUCOSE      POCT Blood Glucose.: 163 mg/dL (29 Nov 2023 00:06)  POCT Blood Glucose.: 212 mg/dL (28 Nov 2023 22:00)  POCT Blood Glucose.: 210 mg/dL (28 Nov 2023 21:07)  POCT Blood Glucose.: 127 mg/dL (28 Nov 2023 17:26)  POCT Blood Glucose.: 149 mg/dL (28 Nov 2023 11:45)    MEDICATIONS  (STANDING):  apixaban 2.5 milliGRAM(s) Oral every 12 hours  dextrose 5%. 1000 milliLiter(s) (50 mL/Hr) IV Continuous <Continuous>  dextrose 5%. 1000 milliLiter(s) (100 mL/Hr) IV Continuous <Continuous>  dextrose 50% Injectable 25 Gram(s) IV Push once  dextrose 50% Injectable 12.5 Gram(s) IV Push once  dextrose 50% Injectable 25 Gram(s) IV Push once  dorzolamide 2%/timolol 0.5% Ophthalmic Solution 1 Drop(s) Both EYES two times a day  finasteride 5 milliGRAM(s) Oral daily  gabapentin 300 milliGRAM(s) Oral two times a day  glucagon  Injectable 1 milliGRAM(s) IntraMuscular once  insulin lispro (ADMELOG) corrective regimen sliding scale   SubCutaneous at bedtime  insulin lispro (ADMELOG) corrective regimen sliding scale   SubCutaneous three times a day before meals  lactated ringers. 1000 milliLiter(s) (100 mL/Hr) IV Continuous <Continuous>  latanoprost 0.005% Ophthalmic Solution 1 Drop(s) Both EYES at bedtime  polyethylene glycol 3350 17 Gram(s) Oral two times a day  tamsulosin 0.4 milliGRAM(s) Oral at bedtime    MEDICATIONS  (PRN):  acetaminophen     Tablet .. 650 milliGRAM(s) Oral every 6 hours PRN Temp greater or equal to 38C (100.4F), Mild Pain (1 - 3)  aluminum hydroxide/magnesium hydroxide/simethicone Suspension 30 milliLiter(s) Oral every 4 hours PRN Dyspepsia  dextrose Oral Gel 15 Gram(s) Oral once PRN Blood Glucose LESS THAN 70 milliGRAM(s)/deciliter  melatonin 3 milliGRAM(s) Oral at bedtime PRN Insomnia

## 2023-11-29 NOTE — PROGRESS NOTE ADULT - SUBJECTIVE AND OBJECTIVE BOX
Progress Note  Authored by:   Chuck Lopez MD PGY-1 Internal Medicine    11-28-23 (1d)    Patient is a 89y old  Male who presents with a chief complaint of Weakness (29 Nov 2023 07:11)      Subjective / Overnight Events :  - No acute events overnight.  - Pt seen and examined at bedside. Retained overnight >300, not straight cathed. Patient pulled out IV this morning and voided small volume. Disoriented to place stating not sure why he is here. Patient was redirectable. Denies fevers, chills, dyspnea, lower abdominal pain    Additional ROS (if any):    MEDICATIONS  (STANDING):  apixaban 2.5 milliGRAM(s) Oral every 12 hours  dextrose 5%. 1000 milliLiter(s) (100 mL/Hr) IV Continuous <Continuous>  dextrose 5%. 1000 milliLiter(s) (50 mL/Hr) IV Continuous <Continuous>  dextrose 50% Injectable 25 Gram(s) IV Push once  dextrose 50% Injectable 12.5 Gram(s) IV Push once  dextrose 50% Injectable 25 Gram(s) IV Push once  dorzolamide 2%/timolol 0.5% Ophthalmic Solution 1 Drop(s) Both EYES two times a day  finasteride 5 milliGRAM(s) Oral daily  gabapentin 300 milliGRAM(s) Oral two times a day  glucagon  Injectable 1 milliGRAM(s) IntraMuscular once  insulin lispro (ADMELOG) corrective regimen sliding scale   SubCutaneous at bedtime  insulin lispro (ADMELOG) corrective regimen sliding scale   SubCutaneous three times a day before meals  lactated ringers. 1000 milliLiter(s) (100 mL/Hr) IV Continuous <Continuous>  latanoprost 0.005% Ophthalmic Solution 1 Drop(s) Both EYES at bedtime  polyethylene glycol 3350 17 Gram(s) Oral two times a day  tamsulosin 0.4 milliGRAM(s) Oral at bedtime    MEDICATIONS  (PRN):  acetaminophen     Tablet .. 650 milliGRAM(s) Oral every 6 hours PRN Temp greater or equal to 38C (100.4F), Mild Pain (1 - 3)  aluminum hydroxide/magnesium hydroxide/simethicone Suspension 30 milliLiter(s) Oral every 4 hours PRN Dyspepsia  dextrose Oral Gel 15 Gram(s) Oral once PRN Blood Glucose LESS THAN 70 milliGRAM(s)/deciliter  melatonin 3 milliGRAM(s) Oral at bedtime PRN Insomnia          PHYSICAL EXAM:  Vital Signs Last 24 Hrs  T(C): 36.3 (29 Nov 2023 05:37), Max: 36.6 (28 Nov 2023 11:38)  T(F): 97.4 (29 Nov 2023 05:37), Max: 97.9 (28 Nov 2023 11:38)  HR: 69 (29 Nov 2023 05:37) (69 - 100)  BP: 119/63 (29 Nov 2023 05:37) (111/64 - 128/72)  BP(mean): --  RR: 18 (29 Nov 2023 05:37) (16 - 20)  SpO2: 98% (29 Nov 2023 05:37) (98% - 100%)    Parameters below as of 29 Nov 2023 05:37  Patient On (Oxygen Delivery Method): room air        I&O's Summary    28 Nov 2023 07:01  -  29 Nov 2023 07:00  --------------------------------------------------------  IN: 0 mL / OUT: 280 mL / NET: -280 mL        General: NAD, non-toxic appearing   HEENT: EOMi, no scleral icterus, no anterior/posterior cervical lymphadenopathy  CV: RRR, normal S1 and S2, no m/r/g  Lungs: normal respiratory effort. CTAB, no wheezes, rales, or rhonchi  Abd: soft, nontender, nondistended  Ext: no edema, 2+ peripheral pulses   Pysch: AAOx2, appropriate affect   Neuro: grossly non-focal, moving all extremities spontaneously   Skin: no rashes or lesions     LABS:  CAPILLARY BLOOD GLUCOSE      POCT Blood Glucose.: 163 mg/dL (29 Nov 2023 00:06)  POCT Blood Glucose.: 212 mg/dL (28 Nov 2023 22:00)  POCT Blood Glucose.: 210 mg/dL (28 Nov 2023 21:07)  POCT Blood Glucose.: 127 mg/dL (28 Nov 2023 17:26)  POCT Blood Glucose.: 149 mg/dL (28 Nov 2023 11:45)                              10.3   5.94  )-----------( 274      ( 29 Nov 2023 06:30 )             31.9       WBC Trend: 5.94<--, 7.32<--  Hb Trend: 10.3<--, 11.3<--    11-29    138  |  107  |  31<H>  ----------------------------<  133<H>  3.6   |  19<L>  |  1.71<H>    Ca    8.6      29 Nov 2023 06:30  Phos  3.1     11-29  Mg     1.90     11-29    TPro  7.2  /  Alb  3.3  /  TBili  0.4  /  DBili  x   /  AST  21  /  ALT  23  /  AlkPhos  85  11-29          Urinalysis Basic - ( 29 Nov 2023 06:30 )    Color: x / Appearance: x / SG: x / pH: x  Gluc: 133 mg/dL / Ketone: x  / Bili: x / Urobili: x   Blood: x / Protein: x / Nitrite: x   Leuk Esterase: x / RBC: x / WBC x   Sq Epi: x / Non Sq Epi: x / Bacteria: x            RADIOLOGY & ADDITIONAL TESTS: Reviewed

## 2023-11-29 NOTE — DISCHARGE NOTE PROVIDER - NSDCCPCAREPLAN_GEN_ALL_CORE_FT
PRINCIPAL DISCHARGE DIAGNOSIS  Diagnosis: Influenza A  Assessment and Plan of Treatment: You were diagnosed with Influenza A during this admission. Given that your symptoms began one week prior to admission, you were out of the window for starting an antiviral medication called Oseltamivir. We managed you supportively with fluids and tylenol as needed. Please continue to stay hydrated at home and take Tylenol if you develop any fevers or chills.   Please follow up with your PCP in 1 week for a post-hospital check.  Please return to the ED if you develop worsening cough, Fevers >101F, chest pain, difficulty breathing, or worsening confusion.      SECONDARY DISCHARGE DIAGNOSES  Diagnosis: Weakness  Assessment and Plan of Treatment: Your symptoms of weakness and unsteadiness were likely a combination of high dose of tamsulosin taken during the day and also the Flu infection.   We changed the timing of your Tamsulosin dosing to night time. Please take Tamsulosin 0.4mg at night to prevent low blood pressure during the day. This will help prevent falls and dizziness during the day.  If you develop worsening dizziness, weakness, and lethargy, please return to the ED.    Diagnosis: History of pulmonary embolism  Assessment and Plan of Treatment: Please discuss with your PCP about whether you need to continue taking Eliquis 2.5mg twice a day given that your Pulmonary Embolism episode was two years ago. Clarify with your physician whether there are other medical reasons that necessitate your being on Eliquis.    Diagnosis: Benign prostatic hyperplasia  Assessment and Plan of Treatment: Please Take your home Tamsulosin 04mg at night rather than in the morning to prevent daytime hypotension, dizziness and falls.   If you develop increasing urinary retention, urinary tract infection symptoms such as abdominal pain, fevers, pain with urination, confusion, please return to the ED.     PRINCIPAL DISCHARGE DIAGNOSIS  Diagnosis: Influenza A  Assessment and Plan of Treatment: You were diagnosed with Influenza A during this admission. Given that your symptoms began one week prior to admission, you were out of the window for starting an antiviral medication called Oseltamivir. We managed you supportively with fluids and tylenol as needed. Please continue to stay hydrated at home and take Tylenol if you develop any fevers or chills.   Please follow up with your PCP in 1 week for a post-hospital check.  Please return to the ED if you develop worsening cough, Fevers >101F, chest pain, difficulty breathing, or worsening confusion.      SECONDARY DISCHARGE DIAGNOSES  Diagnosis: Weakness  Assessment and Plan of Treatment: Your symptoms of weakness and unsteadiness were likely a combination of high dose of tamsulosin taken during the day and also the Flu infection.   We changed the timing of your Tamsulosin dosing to night time. Please take Tamsulosin 0.4mg at night to prevent low blood pressure during the day. This will help prevent falls and dizziness during the day.  If you develop worsening dizziness, weakness, and lethargy, please return to the ED.    Diagnosis: History of pulmonary embolism  Assessment and Plan of Treatment: Please discuss with your PCP about whether you need to continue taking Eliquis 2.5mg twice a day given that your Pulmonary Embolism episode was two years ago. Clarify with your physician whether there are other medical reasons that necessitate your being on Eliquis. Your PCP may refer you to a pulmonologist for evaluation of whether you need to be on the Eliquis. If so, please follow with pulmonologist for further evaluation.    Diagnosis: Benign prostatic hyperplasia  Assessment and Plan of Treatment: Please Take your home Tamsulosin 04mg at night rather than in the morning to prevent daytime hypotension, dizziness and falls.   If you develop increasing urinary retention, urinary tract infection symptoms such as abdominal pain, fevers, pain with urination, confusion, please return to the ED.

## 2023-11-29 NOTE — PROGRESS NOTE ADULT - PROBLEM SELECTOR PLAN 4
- Hold home glyburide  - ERROL   - Consistent carb diet - Tamsulosin 0.4 at night (patient was taking in the morning and became unsteady when dose was doubled)   - Finasteride 5mg daily   - Bladder scans and straight cath as appropriate

## 2023-11-29 NOTE — PROGRESS NOTE ADULT - PROBLEM SELECTOR PLAN 7
Neurontin 300 BID - hgb 11.3 on admission, baseline appears to be 8-9; no active bleed at this time   - Normal MCV MHC  - Reticulocyte count 1.2  - likely 2/2 CKD   - Low iron, elevated ferritin suggests component of Anemia of chronic disease

## 2023-11-29 NOTE — DISCHARGE NOTE PROVIDER - NSDCFUSCHEDAPPT_GEN_ALL_CORE_FT
[facility] Physician [facility] [phone]  Scheduled Appointment: 12/13/2023     [facility] Physician [facility] [phone]  Scheduled Appointment: 12/13/2023     [facility] Physician [facility] [phone]  Scheduled Appointment: 12/13/2023     Dwight Sifuentes  [facility] Physician [facility]  FAMILYMED [address]  Scheduled Appointment: 12/05/2023    [facility] [phone]  Scheduled Appointment: 12/13/2023     Dwight Sifuentes  [facility] Physician [facility]  FAMILYMED [address]  Scheduled Appointment: 12/05/2023    [facility] [phone]  Scheduled Appointment: 12/13/2023     Dwight Sifuentes  [facility] Physician [facility]  FAM[address]MED [address]  Scheduled Appointment: 12/05/2023    [facility] [phone]  Scheduled Appointment: 12/13/2023     [facility] Physician [facility] [phone]  Scheduled Appointment: 12/13/2023     [facility] Physician [facility] [phone]  Scheduled Appointment: 12/13/2023     [facility] Physician [facility] [phone]  Scheduled Appointment: 12/13/2023

## 2023-11-29 NOTE — PROGRESS NOTE ADULT - PROBLEM SELECTOR PLAN 1
----- Message from Ana Laura Ny sent at 7/12/2023  9:25 AM CDT -----  Contact: [place], [phone]  Requesting an RX refill or new RX.  Is this a refill or new RX: Refill  RX name and strength (copy/paste from chart):  PRADAXA 150 mg Cap  Is this a 30 day or 90 day RX: 90  Pharmacy name and phone # (copy/paste from chart):    [facility] - [place], [place] - [address] [place] [id number]  Phone: [phone] Fax: [phone]  The doctors have asked that we provide their patients with the following 2 reminders -- prescription refills can take up to 72 hours, and a friendly reminder that in the future you can use your [facility] account to request refills: Yes     - RVP positive for Influenza A; COVID negative  - CXR showing increased vascular markings, negative for lobar consolidation; ronchorous at bases on Exam  - Likely has undiagnosed COPD based on exam and 60 pack-year smoking hx  - Saturating well on RA,    - Out of window for Oseltamivir   - monitor O2 sat and fevers; Tylenol PRN for fevers  - supportive therapy w/ IV fluids   - f/u Orthostatic BP   - f/u PT Eval  - f/u urine legionella f/u sputum cx - RVP positive for Influenza A; COVID negative; Out of window for oseltamivir  - CXR showing increased vascular markings, negative for lobar consolidation; ronchorous at bases on Exam  - Likely has undiagnosed COPD based on exam and 60 pack-year smoking hx  - Saturating well on RA,    - monitor O2 sat and fevers; Tylenol PRN for fevers  - supportive therapy w/ IV fluids   - f/u Orthostatic BP   - f/u PT Eval  - f/u urine legionella f/u sputum cx

## 2023-11-29 NOTE — DISCHARGE NOTE PROVIDER - NSDCCPTREATMENT_GEN_ALL_CORE_FT
PRINCIPAL PROCEDURE  Procedure: Chest xray, PA & lateral  Findings and Treatment:   FINDINGS:  Spinal stimulation hardware.  Loop recorder.  Low lung volumes. Lungs are clear.  There is no pleural effusion or pneumothorax.  The heart is normal in size  The visualized osseous structures demonstrate no acute pathology.  IMPRESSION:  No focal consolidations.< from: Xray Chest 2 Views PA/Lat (11.28.23 @ 13:06) >        SECONDARY PROCEDURE  Procedure: Transthoracic echocardiography (TTE)  Findings and Treatment:   1. Technically difficult study with no parasternal views feasible.   2. Technically difficult image quality.   3. Left ventricular systolic function is normal with an ejection fraction of 58 % by Connell's method of disks. There are no regional wall motion abnormalities seen.   4. Normal right ventricular cavity size and probably normal systolic function.   5. Structurally normal mitral valve with normal leaflet excursion. There is calcification of the mitral valve annulus. There is trace mitral regurgitation.   6. The aortic valve was not well visualized. There is calcification ofthe aortic valve leaflets. There is moderate aortic stenosis. The peak transaortic velocity is 2.84 m/s, peak transaortic gradient is 32.4 mmHg and mean transaortic gradient is 20.1 mmHg with an LVOT/aortic valve VTI ratio of 0.45. The aortic valve area is estimated at 1.35 cm² by the continuity equation. There is mild aortic regurgitation.< from: TTE W or WO Ultrasound Enhancing Agent (11.29.23 @ 10:32) >

## 2023-11-29 NOTE — DISCHARGE NOTE PROVIDER - CARE PROVIDERS DIRECT ADDRESSES
[email or web address]

## 2023-11-30 PROBLEM — I26.99 OTHER PULMONARY EMBOLISM WITHOUT ACUTE COR PULMONALE: Chronic | Status: ACTIVE | Noted: 2023-11-28

## 2023-11-30 LAB
ALBUMIN SERPL ELPH-MCNC: 3.6 G/DL — SIGNIFICANT CHANGE UP (ref 3.3–5)
ALBUMIN SERPL ELPH-MCNC: 3.6 G/DL — SIGNIFICANT CHANGE UP (ref 3.3–5)
ALP SERPL-CCNC: 97 U/L — SIGNIFICANT CHANGE UP (ref 40–120)
ALP SERPL-CCNC: 97 U/L — SIGNIFICANT CHANGE UP (ref 40–120)
ALT FLD-CCNC: 23 U/L — SIGNIFICANT CHANGE UP (ref 4–41)
ALT FLD-CCNC: 23 U/L — SIGNIFICANT CHANGE UP (ref 4–41)
ANION GAP SERPL CALC-SCNC: 11 MMOL/L — SIGNIFICANT CHANGE UP (ref 7–14)
ANION GAP SERPL CALC-SCNC: 11 MMOL/L — SIGNIFICANT CHANGE UP (ref 7–14)
AST SERPL-CCNC: 20 U/L — SIGNIFICANT CHANGE UP (ref 4–40)
AST SERPL-CCNC: 20 U/L — SIGNIFICANT CHANGE UP (ref 4–40)
BASOPHILS # BLD AUTO: 0.02 K/UL — SIGNIFICANT CHANGE UP (ref 0–0.2)
BASOPHILS # BLD AUTO: 0.02 K/UL — SIGNIFICANT CHANGE UP (ref 0–0.2)
BASOPHILS NFR BLD AUTO: 0.3 % — SIGNIFICANT CHANGE UP (ref 0–2)
BASOPHILS NFR BLD AUTO: 0.3 % — SIGNIFICANT CHANGE UP (ref 0–2)
BILIRUB SERPL-MCNC: 0.5 MG/DL — SIGNIFICANT CHANGE UP (ref 0.2–1.2)
BILIRUB SERPL-MCNC: 0.5 MG/DL — SIGNIFICANT CHANGE UP (ref 0.2–1.2)
BUN SERPL-MCNC: 28 MG/DL — HIGH (ref 7–23)
BUN SERPL-MCNC: 28 MG/DL — HIGH (ref 7–23)
CALCIUM SERPL-MCNC: 9.1 MG/DL — SIGNIFICANT CHANGE UP (ref 8.4–10.5)
CALCIUM SERPL-MCNC: 9.1 MG/DL — SIGNIFICANT CHANGE UP (ref 8.4–10.5)
CHLORIDE SERPL-SCNC: 107 MMOL/L — SIGNIFICANT CHANGE UP (ref 98–107)
CHLORIDE SERPL-SCNC: 107 MMOL/L — SIGNIFICANT CHANGE UP (ref 98–107)
CO2 SERPL-SCNC: 21 MMOL/L — LOW (ref 22–31)
CO2 SERPL-SCNC: 21 MMOL/L — LOW (ref 22–31)
CREAT SERPL-MCNC: 1.71 MG/DL — HIGH (ref 0.5–1.3)
CREAT SERPL-MCNC: 1.71 MG/DL — HIGH (ref 0.5–1.3)
EGFR: 38 ML/MIN/1.73M2 — LOW
EGFR: 38 ML/MIN/1.73M2 — LOW
EOSINOPHIL # BLD AUTO: 0.34 K/UL — SIGNIFICANT CHANGE UP (ref 0–0.5)
EOSINOPHIL # BLD AUTO: 0.34 K/UL — SIGNIFICANT CHANGE UP (ref 0–0.5)
EOSINOPHIL NFR BLD AUTO: 5.3 % — SIGNIFICANT CHANGE UP (ref 0–6)
EOSINOPHIL NFR BLD AUTO: 5.3 % — SIGNIFICANT CHANGE UP (ref 0–6)
GLUCOSE BLDC GLUCOMTR-MCNC: 133 MG/DL — HIGH (ref 70–99)
GLUCOSE BLDC GLUCOMTR-MCNC: 133 MG/DL — HIGH (ref 70–99)
GLUCOSE BLDC GLUCOMTR-MCNC: 149 MG/DL — HIGH (ref 70–99)
GLUCOSE BLDC GLUCOMTR-MCNC: 149 MG/DL — HIGH (ref 70–99)
GLUCOSE BLDC GLUCOMTR-MCNC: 232 MG/DL — HIGH (ref 70–99)
GLUCOSE BLDC GLUCOMTR-MCNC: 232 MG/DL — HIGH (ref 70–99)
GLUCOSE BLDC GLUCOMTR-MCNC: 258 MG/DL — HIGH (ref 70–99)
GLUCOSE BLDC GLUCOMTR-MCNC: 258 MG/DL — HIGH (ref 70–99)
GLUCOSE SERPL-MCNC: 158 MG/DL — HIGH (ref 70–99)
GLUCOSE SERPL-MCNC: 158 MG/DL — HIGH (ref 70–99)
HCT VFR BLD CALC: 35.1 % — LOW (ref 39–50)
HCT VFR BLD CALC: 35.1 % — LOW (ref 39–50)
HGB BLD-MCNC: 11.5 G/DL — LOW (ref 13–17)
HGB BLD-MCNC: 11.5 G/DL — LOW (ref 13–17)
IANC: 3.92 K/UL — SIGNIFICANT CHANGE UP (ref 1.8–7.4)
IANC: 3.92 K/UL — SIGNIFICANT CHANGE UP (ref 1.8–7.4)
IMM GRANULOCYTES NFR BLD AUTO: 0.6 % — SIGNIFICANT CHANGE UP (ref 0–0.9)
IMM GRANULOCYTES NFR BLD AUTO: 0.6 % — SIGNIFICANT CHANGE UP (ref 0–0.9)
LYMPHOCYTES # BLD AUTO: 1.56 K/UL — SIGNIFICANT CHANGE UP (ref 1–3.3)
LYMPHOCYTES # BLD AUTO: 1.56 K/UL — SIGNIFICANT CHANGE UP (ref 1–3.3)
LYMPHOCYTES # BLD AUTO: 24.1 % — SIGNIFICANT CHANGE UP (ref 13–44)
LYMPHOCYTES # BLD AUTO: 24.1 % — SIGNIFICANT CHANGE UP (ref 13–44)
MAGNESIUM SERPL-MCNC: 1.9 MG/DL — SIGNIFICANT CHANGE UP (ref 1.6–2.6)
MAGNESIUM SERPL-MCNC: 1.9 MG/DL — SIGNIFICANT CHANGE UP (ref 1.6–2.6)
MCHC RBC-ENTMCNC: 28.1 PG — SIGNIFICANT CHANGE UP (ref 27–34)
MCHC RBC-ENTMCNC: 28.1 PG — SIGNIFICANT CHANGE UP (ref 27–34)
MCHC RBC-ENTMCNC: 32.8 GM/DL — SIGNIFICANT CHANGE UP (ref 32–36)
MCHC RBC-ENTMCNC: 32.8 GM/DL — SIGNIFICANT CHANGE UP (ref 32–36)
MCV RBC AUTO: 85.8 FL — SIGNIFICANT CHANGE UP (ref 80–100)
MCV RBC AUTO: 85.8 FL — SIGNIFICANT CHANGE UP (ref 80–100)
MONOCYTES # BLD AUTO: 0.58 K/UL — SIGNIFICANT CHANGE UP (ref 0–0.9)
MONOCYTES # BLD AUTO: 0.58 K/UL — SIGNIFICANT CHANGE UP (ref 0–0.9)
MONOCYTES NFR BLD AUTO: 9 % — SIGNIFICANT CHANGE UP (ref 2–14)
MONOCYTES NFR BLD AUTO: 9 % — SIGNIFICANT CHANGE UP (ref 2–14)
NEUTROPHILS # BLD AUTO: 3.92 K/UL — SIGNIFICANT CHANGE UP (ref 1.8–7.4)
NEUTROPHILS # BLD AUTO: 3.92 K/UL — SIGNIFICANT CHANGE UP (ref 1.8–7.4)
NEUTROPHILS NFR BLD AUTO: 60.7 % — SIGNIFICANT CHANGE UP (ref 43–77)
NEUTROPHILS NFR BLD AUTO: 60.7 % — SIGNIFICANT CHANGE UP (ref 43–77)
NRBC # BLD: 0 /100 WBCS — SIGNIFICANT CHANGE UP (ref 0–0)
NRBC # BLD: 0 /100 WBCS — SIGNIFICANT CHANGE UP (ref 0–0)
NRBC # FLD: 0 K/UL — SIGNIFICANT CHANGE UP (ref 0–0)
NRBC # FLD: 0 K/UL — SIGNIFICANT CHANGE UP (ref 0–0)
NT-PROBNP SERPL-SCNC: 479 PG/ML — HIGH
NT-PROBNP SERPL-SCNC: 479 PG/ML — HIGH
PHOSPHATE SERPL-MCNC: 3.1 MG/DL — SIGNIFICANT CHANGE UP (ref 2.5–4.5)
PHOSPHATE SERPL-MCNC: 3.1 MG/DL — SIGNIFICANT CHANGE UP (ref 2.5–4.5)
PLATELET # BLD AUTO: 338 K/UL — SIGNIFICANT CHANGE UP (ref 150–400)
PLATELET # BLD AUTO: 338 K/UL — SIGNIFICANT CHANGE UP (ref 150–400)
POTASSIUM SERPL-MCNC: 4.3 MMOL/L — SIGNIFICANT CHANGE UP (ref 3.5–5.3)
POTASSIUM SERPL-MCNC: 4.3 MMOL/L — SIGNIFICANT CHANGE UP (ref 3.5–5.3)
POTASSIUM SERPL-SCNC: 4.3 MMOL/L — SIGNIFICANT CHANGE UP (ref 3.5–5.3)
POTASSIUM SERPL-SCNC: 4.3 MMOL/L — SIGNIFICANT CHANGE UP (ref 3.5–5.3)
PROT SERPL-MCNC: 7.8 G/DL — SIGNIFICANT CHANGE UP (ref 6–8.3)
PROT SERPL-MCNC: 7.8 G/DL — SIGNIFICANT CHANGE UP (ref 6–8.3)
RBC # BLD: 4.09 M/UL — LOW (ref 4.2–5.8)
RBC # BLD: 4.09 M/UL — LOW (ref 4.2–5.8)
RBC # FLD: 14.3 % — SIGNIFICANT CHANGE UP (ref 10.3–14.5)
RBC # FLD: 14.3 % — SIGNIFICANT CHANGE UP (ref 10.3–14.5)
SODIUM SERPL-SCNC: 139 MMOL/L — SIGNIFICANT CHANGE UP (ref 135–145)
SODIUM SERPL-SCNC: 139 MMOL/L — SIGNIFICANT CHANGE UP (ref 135–145)
WBC # BLD: 6.46 K/UL — SIGNIFICANT CHANGE UP (ref 3.8–10.5)
WBC # BLD: 6.46 K/UL — SIGNIFICANT CHANGE UP (ref 3.8–10.5)
WBC # FLD AUTO: 6.46 K/UL — SIGNIFICANT CHANGE UP (ref 3.8–10.5)
WBC # FLD AUTO: 6.46 K/UL — SIGNIFICANT CHANGE UP (ref 3.8–10.5)

## 2023-11-30 PROCEDURE — 99232 SBSQ HOSP IP/OBS MODERATE 35: CPT | Mod: GC

## 2023-11-30 RX ORDER — SODIUM CHLORIDE 9 MG/ML
4 INJECTION INTRAMUSCULAR; INTRAVENOUS; SUBCUTANEOUS EVERY 12 HOURS
Refills: 0 | Status: DISCONTINUED | OUTPATIENT
Start: 2023-11-30 | End: 2023-12-01

## 2023-11-30 RX ADMIN — Medication 3: at 13:24

## 2023-11-30 RX ADMIN — POLYETHYLENE GLYCOL 3350 17 GRAM(S): 17 POWDER, FOR SOLUTION ORAL at 17:38

## 2023-11-30 RX ADMIN — POLYETHYLENE GLYCOL 3350 17 GRAM(S): 17 POWDER, FOR SOLUTION ORAL at 06:29

## 2023-11-30 RX ADMIN — SODIUM CHLORIDE 4 MILLILITER(S): 9 INJECTION INTRAMUSCULAR; INTRAVENOUS; SUBCUTANEOUS at 20:24

## 2023-11-30 RX ADMIN — GABAPENTIN 300 MILLIGRAM(S): 400 CAPSULE ORAL at 17:39

## 2023-11-30 RX ADMIN — LATANOPROST 1 DROP(S): 0.05 SOLUTION/ DROPS OPHTHALMIC; TOPICAL at 23:41

## 2023-11-30 RX ADMIN — Medication 100 MILLIGRAM(S): at 23:41

## 2023-11-30 RX ADMIN — Medication 100 MILLIGRAM(S): at 13:27

## 2023-11-30 RX ADMIN — APIXABAN 2.5 MILLIGRAM(S): 2.5 TABLET, FILM COATED ORAL at 17:39

## 2023-11-30 RX ADMIN — DORZOLAMIDE HYDROCHLORIDE TIMOLOL MALEATE 1 DROP(S): 20; 5 SOLUTION/ DROPS OPHTHALMIC at 17:39

## 2023-11-30 RX ADMIN — TAMSULOSIN HYDROCHLORIDE 0.4 MILLIGRAM(S): 0.4 CAPSULE ORAL at 23:41

## 2023-11-30 RX ADMIN — APIXABAN 2.5 MILLIGRAM(S): 2.5 TABLET, FILM COATED ORAL at 06:30

## 2023-11-30 RX ADMIN — GABAPENTIN 300 MILLIGRAM(S): 400 CAPSULE ORAL at 06:29

## 2023-11-30 RX ADMIN — FINASTERIDE 5 MILLIGRAM(S): 5 TABLET, FILM COATED ORAL at 11:42

## 2023-11-30 RX ADMIN — DORZOLAMIDE HYDROCHLORIDE TIMOLOL MALEATE 1 DROP(S): 20; 5 SOLUTION/ DROPS OPHTHALMIC at 06:32

## 2023-11-30 NOTE — PROGRESS NOTE ADULT - PROBLEM SELECTOR PLAN 2
- likely iso Influenza infection   - HS troponin normal  - EKG reviewed, unremarkable  - f/u TTE   - Patient has loop recorder in >6mo, will need removal outpatient - likely iso Influenza infection   - HS troponin normal  - EKG reviewed, unremarkable  - f/u TTE   - Patient has loop recorder in >6mo, will need removal outpatient  - PT reports gait instability; recommends REHAB  - Follow up final recs for Acute vs subacute rehab - likely iso Influenza infection   - HS troponin normal  - EKG reviewed, unremarkable  - f/u TTE   - Patient has loop recorder in >6mo, will need removal outpatient  - PT reports gait instability; recommends REHAB - likely iso Influenza infection   - HS troponin normal  - EKG reviewed, unremarkable  - TTE showing normal LVEF  - Patient has loop recorder in >6mo, will need removal outpatient  - PT reports gait instability; recommends REHAB

## 2023-11-30 NOTE — DIETITIAN INITIAL EVALUATION ADULT - OTHER INFO
Patient is A&Ox2-3 at baseline, noted hard of hearing during interview.  Patient was seen for MST scores 2 or >.  Patient endorses having HHA PTA to prepare meals for him, expresses that he is not a big eater, fair appetite at times, but trying to follow 3 meals/daily.  NKFA.  PO intake noted varies from meals (0-75%) per RN intake flowsheets.  Denied any chewing or swallowing difficulties at this time with some coughs (possibly related to current + influenza).  No reported GI issues such as nausea/vomiting/diarrhea/constipation, on bowel regimen, last BM reported 11/30/23 per Patient.  Skin intact of pressure injury but noted 1+ edema on R and L foot per RN flowsheets.  Patient reported UBW ~ 180#/81.8kg months ago, unsure if he had lost weight PTA.  Patient was on PO med for DM, not specifically following diabetic diet at home, DM handout provided and verbal education provided for My[employer] Method.  Current weight 69.9kg, BMI@ 22.1, noted a 11.9kg /14.5% weight loss from UBW.  No [facility] weight history available to verify weight loss.  Patient declined nutritional supplement when offered.  Plan to be discharged to a rehab center per care note.

## 2023-11-30 NOTE — PROGRESS NOTE ADULT - PROBLEM SELECTOR PLAN 10
Diet: consistent carb diet   Psych/Sleep: Melatonin QHs  DVT ppx: Eliquis 2.5 BID  Code Status: Full Code  Dispo: Home pending PT Diet: consistent carb diet   Psych/Sleep: Melatonin QHs  DVT ppx: Eliquis 2.5 BID  Code Status: Full Code  Dispo: Rehab Diet: consistent carb diet   Psych/Sleep: Melatonin QHs  DVT ppx: Eliquis 2.5 BID  Code Status: Full Code  Dispo: Subacute Rehab Diet: consistent carb diet   Psych/Sleep: Melatonin QHs  DVT ppx: Eliquis 2.5 BID  Code Status: Full Code  Dispo: Subacute Rehab; pending resolution of flu symptoms

## 2023-11-30 NOTE — PROGRESS NOTE ADULT - PROBLEM SELECTOR PLAN 4
- Tamsulosin 0.4 at night (patient was taking in the morning and became unsteady when dose was doubled)   - Finasteride 5mg daily   - Bladder scans and straight cath as appropriate - Tamsulosin 0.4 at night (patient was taking in the morning and became unsteady when dose was doubled)   - Finasteride 5mg daily   - Patient with elevated post-void residuals, however often does not meet indication for straight cath  - Bladder scans and straight cath as appropriate    - Plan: will hold off increasing tamsulosin given symptomatic hypotension witnessed at home

## 2023-11-30 NOTE — DIETITIAN INITIAL EVALUATION ADULT - CALCULATED FROM (CAL/KG)
----- Message from Aydin Orozco MD sent at 7/29/2020  1:34 PM CDT -----  A1 c is normal.  No diabetes.  Recommend and encourage regular exercise as well as reducing processed carbohydrates in diet.  Repeat fasting labs annually, thanks   [id number]

## 2023-11-30 NOTE — DIETITIAN INITIAL EVALUATION ADULT - ADD RECOMMEND
1. Encourage PO intake and honor food preferences as able.   2. Nursing to document PO in RN flow sheets and monitor weekly weights.   3. Continue to monitor skin integrity, bowel regimen, and nutrition pertinent labs.

## 2023-11-30 NOTE — DIETITIAN INITIAL EVALUATION ADULT - NSICDXPASTMEDICALHX_GEN_ALL_CORE_FT
PAST MEDICAL HISTORY:  BPH (benign prostatic hyperplasia)     Diabetic neuropathy     DM (diabetes mellitus)     Macular degeneration     Pulmonary embolism

## 2023-11-30 NOTE — PROGRESS NOTE ADULT - PROBLEM SELECTOR PLAN 7
- hgb 11.3 on admission, baseline appears to be 8-9; no active bleed at this time   - Normal MCV MHC  - Reticulocyte count 1.2  - likely 2/2 CKD   - Low iron, elevated ferritin suggests component of Anemia of chronic disease

## 2023-11-30 NOTE — DIETITIAN INITIAL EVALUATION ADULT - PERTINENT MEDS FT
MEDICATIONS  (STANDING):  apixaban 2.5 milliGRAM(s) Oral every 12 hours  benzonatate 100 milliGRAM(s) Oral three times a day  dextrose 5%. 1000 milliLiter(s) (100 mL/Hr) IV Continuous <Continuous>  dextrose 5%. 1000 milliLiter(s) (50 mL/Hr) IV Continuous <Continuous>  dextrose 50% Injectable 25 Gram(s) IV Push once  dextrose 50% Injectable 12.5 Gram(s) IV Push once  dextrose 50% Injectable 25 Gram(s) IV Push once  dorzolamide 2%/timolol 0.5% Ophthalmic Solution 1 Drop(s) Both EYES two times a day  finasteride 5 milliGRAM(s) Oral daily  gabapentin 300 milliGRAM(s) Oral two times a day  glucagon  Injectable 1 milliGRAM(s) IntraMuscular once  insulin lispro (ADMELOG) corrective regimen sliding scale   SubCutaneous at bedtime  insulin lispro (ADMELOG) corrective regimen sliding scale   SubCutaneous three times a day before meals  lactated ringers. 1000 milliLiter(s) (100 mL/Hr) IV Continuous <Continuous>  latanoprost 0.005% Ophthalmic Solution 1 Drop(s) Both EYES at bedtime  polyethylene glycol 3350 17 Gram(s) Oral two times a day  sodium chloride 3%  Inhalation 4 milliLiter(s) Inhalation every 12 hours  tamsulosin 0.4 milliGRAM(s) Oral at bedtime    MEDICATIONS  (PRN):  acetaminophen     Tablet .. 650 milliGRAM(s) Oral every 6 hours PRN Temp greater or equal to 38C (100.4F), Mild Pain (1 - 3)  aluminum hydroxide/magnesium hydroxide/simethicone Suspension 30 milliLiter(s) Oral every 4 hours PRN Dyspepsia  dextrose Oral Gel 15 Gram(s) Oral once PRN Blood Glucose LESS THAN 70 milliGRAM(s)/deciliter  melatonin 3 milliGRAM(s) Oral at bedtime PRN Insomnia

## 2023-11-30 NOTE — PROGRESS NOTE ADULT - PROBLEM SELECTOR PLAN 3
- DVT w/ PE in 2021 when patient admitted for covid infection   - c/w Eliquis 2.5 BID  - Follow up with PCP about why patient is still on Eliquis for the PE in 2021 - DVT w/ PE in 2021 when patient admitted for covid infection   - c/w Eliquis 2.5 BID  - Discussed eliquis with PCP; patient to follow up with PCP for pulmonary eval - DVT w/ PE in 2021 when patient admitted for covid infection   - c/w Eliquis 2.5 BID  - Discussed eliquis with PCP; patient to follow up with PCP for pulmonary eval to DC eliquis

## 2023-11-30 NOTE — DIETITIAN INITIAL EVALUATION ADULT - PERTINENT LABORATORY DATA
11-30    139  |  107  |  28<H>  ----------------------------<  158<H>  4.3   |  21<L>  |  1.71<H>    Ca    9.1      30 Nov 2023 07:26  Phos  3.1     11-30  Mg     1.90     11-30    TPro  7.8  /  Alb  3.6  /  TBili  0.5  /  DBili  x   /  AST  20  /  ALT  23  /  AlkPhos  97  11-30  POCT Blood Glucose.: 258 mg/dL (11-30-23 @ 12:33)  A1C with Estimated Average Glucose Result: 6.7 % (11-28-23 @ 13:33)

## 2023-11-30 NOTE — DIETITIAN INITIAL EVALUATION ADULT - REASON FOR ADMISSION
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

## 2023-11-30 NOTE — PROGRESS NOTE ADULT - SUBJECTIVE AND OBJECTIVE BOX
INCOMPLETE NOTE    Willy Ally | PGY2| Pager: [place] [phone]) -- LILY ([phone])  Interval Events:    REVIEW OF SYSTEMS:  CONSTITUTIONAL: No weakness, fevers or chills  EYES/ENT: No visual changes;  No vertigo or throat pain   NECK: No pain or stiffness  RESPIRATORY: No cough, wheezing, hemoptysis; No shortness of breath  CARDIOVASCULAR: No chest pain or palpitations  GASTROINTESTINAL: No abdominal or epigastric pain. No nausea, vomiting, or hematemesis; No diarrhea or constipation. No melena or hematochezia.  GENITOURINARY: No dysuria, frequency or hematuria  NEUROLOGICAL: No numbness or weakness  SKIN: No itching, burning, rashes, or lesions   All other review of systems is negative unless indicated above.    OBJECTIVE:  ICU Vital Signs Last 24 Hrs  T(C): 36.2 (29 Nov 2023 21:57), Max: 36.6 (29 Nov 2023 15:10)  T(F): 97.2 (29 Nov 2023 21:57), Max: 97.8 (29 Nov 2023 15:10)  HR: 63 (29 Nov 2023 21:57) (63 - 80)  BP: 156/68 (29 Nov 2023 21:57) (156/68 - 168/83)  BP(mean): --  ABP: --  ABP(mean): --  RR: 17 (29 Nov 2023 21:57) (17 - 18)  SpO2: 98% (29 Nov 2023 21:57) (97% - 98%)    O2 Parameters below as of 29 Nov 2023 21:57  Patient On (Oxygen Delivery Method): room air              11-28 @ 07:01 - 11-29 @ 07:00  --------------------------------------------------------  IN: 0 mL / OUT: 280 mL / NET: -280 mL    11-29 @ 07:01 - 11-30 @ 06:01  --------------------------------------------------------  IN: 0 mL / OUT: 810 mL / NET: -810 mL      CAPILLARY BLOOD GLUCOSE  181 (29 Nov 2023 12:46)      POCT Blood Glucose.: 112 mg/dL (29 Nov 2023 22:03)      PHYSICAL EXAM:  General: WN/WD NAD  Neurology: A&Ox3, nonfocal, CALHOUN x 4  Eyes: PERRLA/ EOMI, Gross vision intact  ENT/Neck: Neck supple, trachea midline, No JVD, Gross hearing intact  Respiratory: CTA B/L, No wheezing, rales, rhonchi  CV: RRR, +S1/S2, -S3/S4, no murmurs, rubs or gallops  Abdominal: Soft, NT, ND +BS, No HSM  MSK: 5/5 strength UE/LE bilaterally  Extremities: No edema, 2+ peripheral pulses  Skin: No Rashes, Hematoma, Ecchymosis  Incisions:   Tubes:    HOSPITAL MEDICATIONS:  MEDICATIONS  (STANDING):  apixaban 2.5 milliGRAM(s) Oral every 12 hours  dextrose 5%. 1000 milliLiter(s) (50 mL/Hr) IV Continuous <Continuous>  dextrose 5%. 1000 milliLiter(s) (100 mL/Hr) IV Continuous <Continuous>  dextrose 50% Injectable 25 Gram(s) IV Push once  dextrose 50% Injectable 12.5 Gram(s) IV Push once  dextrose 50% Injectable 25 Gram(s) IV Push once  dorzolamide 2%/timolol 0.5% Ophthalmic Solution 1 Drop(s) Both EYES two times a day  finasteride 5 milliGRAM(s) Oral daily  gabapentin 300 milliGRAM(s) Oral two times a day  glucagon  Injectable 1 milliGRAM(s) IntraMuscular once  insulin lispro (ADMELOG) corrective regimen sliding scale   SubCutaneous three times a day before meals  insulin lispro (ADMELOG) corrective regimen sliding scale   SubCutaneous at bedtime  lactated ringers. 1000 milliLiter(s) (100 mL/Hr) IV Continuous <Continuous>  latanoprost 0.005% Ophthalmic Solution 1 Drop(s) Both EYES at bedtime  polyethylene glycol 3350 17 Gram(s) Oral two times a day  tamsulosin 0.4 milliGRAM(s) Oral at bedtime    MEDICATIONS  (PRN):  acetaminophen     Tablet .. 650 milliGRAM(s) Oral every 6 hours PRN Temp greater or equal to 38C (100.4F), Mild Pain (1 - 3)  aluminum hydroxide/magnesium hydroxide/simethicone Suspension 30 milliLiter(s) Oral every 4 hours PRN Dyspepsia  dextrose Oral Gel 15 Gram(s) Oral once PRN Blood Glucose LESS THAN 70 milliGRAM(s)/deciliter  melatonin 3 milliGRAM(s) Oral at bedtime PRN Insomnia      LABS:                        10.3   5.94  )-----------( 274      ( 29 Nov 2023 06:30 )             31.9     Hgb Trend: 10.3<--, 11.3<--  11-29    138  |  107  |  31<H>  ----------------------------<  133<H>  3.6   |  19<L>  |  1.71<H>    Ca    8.6      29 Nov 2023 06:30  Phos  3.1     11-29  Mg     1.90     11-29    TPro  7.2  /  Alb  3.3  /  TBili  0.4  /  DBili  x   /  AST  21  /  ALT  23  /  AlkPhos  85  11-29    Creatinine Trend: 1.71<--, 1.88<--    Urinalysis Basic - ( 29 Nov 2023 06:30 )    Color: x / Appearance: x / SG: x / pH: x  Gluc: 133 mg/dL / Ketone: x  / Bili: x / Urobili: x   Blood: x / Protein: x / Nitrite: x   Leuk Esterase: x / RBC: x / WBC x   Sq Epi: x / Non Sq Epi: x / Bacteria: x            MICROBIOLOGY:          INCOMPLETE NOTE    Willy Canales | PGY2| Pager: [place] [phone]) -- [place] ([phone])  Interval Events:    Patient seen and examined at bedside. Patient confused about day/month, but aware that he is in the hospital. Endorses coughing clear sputum. Complains of being tired this morning. Expressed desire to go to rehab to get stronger. No nausea, vomiting, diarrhea, dyspnea, chest pain.    REVIEW OF SYSTEMS:  CONSTITUTIONAL: No weakness, fevers or chills  EYES/ENT: No visual changes;  No vertigo or throat pain   NECK: No pain or stiffness  RESPIRATORY: No wheezing, hemoptysis; No shortness of breath  CARDIOVASCULAR: No chest pain or palpitations  GASTROINTESTINAL: No abdominal or epigastric pain. No nausea, vomiting, or hematemesis; No diarrhea or constipation. No melena or hematochezia.  GENITOURINARY: No dysuria, frequency or hematuria  NEUROLOGICAL: No numbness or weakness  SKIN: No itching, burning, rashes, or lesions   All other review of systems is negative unless indicated above.    OBJECTIVE:  ICU Vital Signs Last 24 Hrs  T(C): 36.2 (29 Nov 2023 21:57), Max: 36.6 (29 Nov 2023 15:10)  T(F): 97.2 (29 Nov 2023 21:57), Max: 97.8 (29 Nov 2023 15:10)  HR: 63 (29 Nov 2023 21:57) (63 - 80)  BP: 156/68 (29 Nov 2023 21:57) (156/68 - 168/83)  BP(mean): --  ABP: --  ABP(mean): --  RR: 17 (29 Nov 2023 21:57) (17 - 18)  SpO2: 98% (29 Nov 2023 21:57) (97% - 98%)    O2 Parameters below as of 29 Nov 2023 21:57  Patient On (Oxygen Delivery Method): room air              11-28 @ 07:01  -  11-29 @ 07:00  --------------------------------------------------------  IN: 0 mL / OUT: 280 mL / NET: -280 mL    11-29 @ 07:01  -  11-30 @ 06:01  --------------------------------------------------------  IN: 0 mL / OUT: 810 mL / NET: -810 mL      CAPILLARY BLOOD GLUCOSE  181 (29 Nov 2023 12:46)      POCT Blood Glucose.: 112 mg/dL (29 Nov 2023 22:03)      PHYSICAL EXAM:  General: WN/WD NAD  Neurology: A&Ox3, nonfocal, CALHOUN x 4  Eyes: PERRLA/ EOMI, Gross vision intact  ENT/Neck: Neck supple, trachea midline, No JVD, Gross hearing intact  Respiratory: Diffuse expiratory rhonchi with minimal crackles at bases; normal respiratory effort  CV: RRR, quiet +S1/S2, -S3/S4, no murmurs, rubs or gallops  Abdominal: diffusely mildly firm, NT, ND +BS, No HSM  MSK: 5/5 strength UE/LE bilaterally  Extremities: No edema, 2+ peripheral pulses  Skin: No Rashes, Hematoma, Ecchymosis    HOSPITAL MEDICATIONS:  MEDICATIONS  (STANDING):  apixaban 2.5 milliGRAM(s) Oral every 12 hours  dextrose 5%. 1000 milliLiter(s) (50 mL/Hr) IV Continuous <Continuous>  dextrose 5%. 1000 milliLiter(s) (100 mL/Hr) IV Continuous <Continuous>  dextrose 50% Injectable 25 Gram(s) IV Push once  dextrose 50% Injectable 12.5 Gram(s) IV Push once  dextrose 50% Injectable 25 Gram(s) IV Push once  dorzolamide 2%/timolol 0.5% Ophthalmic Solution 1 Drop(s) Both EYES two times a day  finasteride 5 milliGRAM(s) Oral daily  gabapentin 300 milliGRAM(s) Oral two times a day  glucagon  Injectable 1 milliGRAM(s) IntraMuscular once  insulin lispro (ADMELOG) corrective regimen sliding scale   SubCutaneous three times a day before meals  insulin lispro (ADMELOG) corrective regimen sliding scale   SubCutaneous at bedtime  lactated ringers. 1000 milliLiter(s) (100 mL/Hr) IV Continuous <Continuous>  latanoprost 0.005% Ophthalmic Solution 1 Drop(s) Both EYES at bedtime  polyethylene glycol 3350 17 Gram(s) Oral two times a day  tamsulosin 0.4 milliGRAM(s) Oral at bedtime    MEDICATIONS  (PRN):  acetaminophen     Tablet .. 650 milliGRAM(s) Oral every 6 hours PRN Temp greater or equal to 38C (100.4F), Mild Pain (1 - 3)  aluminum hydroxide/magnesium hydroxide/simethicone Suspension 30 milliLiter(s) Oral every 4 hours PRN Dyspepsia  dextrose Oral Gel 15 Gram(s) Oral once PRN Blood Glucose LESS THAN 70 milliGRAM(s)/deciliter  melatonin 3 milliGRAM(s) Oral at bedtime PRN Insomnia      LABS:                        10.3   5.94  )-----------( 274      ( 29 Nov 2023 06:30 )             31.9     Hgb Trend: 10.3<--, 11.3<--  11-29    138  |  107  |  31<H>  ----------------------------<  133<H>  3.6   |  19<L>  |  1.71<H>    Ca    8.6      29 Nov 2023 06:30  Phos  3.1     11-29  Mg     1.90     11-29    TPro  7.2  /  Alb  3.3  /  TBili  0.4  /  DBili  x   /  AST  21  /  ALT  23  /  AlkPhos  85  11-29    Creatinine Trend: 1.71<--, 1.88<--    Urinalysis Basic - ( 29 Nov 2023 06:30 )    Color: x / Appearance: x / SG: x / pH: x  Gluc: 133 mg/dL / Ketone: x  / Bili: x / Urobili: x   Blood: x / Protein: x / Nitrite: x   Leuk Esterase: x / RBC: x / WBC x   Sq Epi: x / Non Sq Epi: x / Bacteria: x            MICROBIOLOGY:          Willy Ally | PGY2| Pager: [place] [phone]) -- [place] ([phone])  Interval Events:    Patient seen and examined at bedside. Patient confused about day/month, but aware that he is in the hospital. Endorses coughing clear sputum. Complains of being tired this morning. Expressed desire to go to rehab to get stronger. No nausea, vomiting, diarrhea, dyspnea, chest pain.    REVIEW OF SYSTEMS:  CONSTITUTIONAL: No weakness, fevers or chills  RESPIRATORY: No wheezing, hemoptysis; No shortness of breath  CARDIOVASCULAR: No chest pain or palpitations  GASTROINTESTINAL: No abdominal or epigastric pain. No nausea, vomiting, or hematemesis; No diarrhea or constipation. No melena or hematochezia.  GENITOURINARY: No dysuria, frequency or hematuria  All other review of systems is negative unless indicated above.    OBJECTIVE:  ICU Vital Signs Last 24 Hrs  T(C): 36.2 (29 Nov 2023 21:57), Max: 36.6 (29 Nov 2023 15:10)  T(F): 97.2 (29 Nov 2023 21:57), Max: 97.8 (29 Nov 2023 15:10)  HR: 63 (29 Nov 2023 21:57) (63 - 80)  BP: 156/68 (29 Nov 2023 21:57) (156/68 - 168/83)  BP(mean): --  ABP: --  ABP(mean): --  RR: 17 (29 Nov 2023 21:57) (17 - 18)  SpO2: 98% (29 Nov 2023 21:57) (97% - 98%)    O2 Parameters below as of 29 Nov 2023 21:57  Patient On (Oxygen Delivery Method): room air              11-28 @ 07:01  -  11-29 @ 07:00  --------------------------------------------------------  IN: 0 mL / OUT: 280 mL / NET: -280 mL    11-29 @ 07:01 - 11-30 @ 06:01  --------------------------------------------------------  IN: 0 mL / OUT: 810 mL / NET: -810 mL      CAPILLARY BLOOD GLUCOSE  181 (29 Nov 2023 12:46)      POCT Blood Glucose.: 112 mg/dL (29 Nov 2023 22:03)      PHYSICAL EXAM:  General: WN/WD NAD  Neurology: A&Ox3, nonfocal, CALHOUN x 4  Eyes: PERRLA/ EOMI, Gross vision intact  ENT/Neck: Neck supple, trachea midline, No JVD, Gross hearing intact  Respiratory: Diffuse expiratory rhonchi with minimal crackles at bases; Suspect some crackles are in the setting of transmitted upper airway sounds; normal respiratory effort  CV: RRR, quiet +S1/S2, -S3/S4, no murmurs, rubs or gallops  Abdominal: diffusely mildly firm, NT, ND +BS, No HSM  MSK: 5/5 strength UE/LE bilaterally  Extremities: No edema, 2+ peripheral pulses  Skin: No Rashes, Hematoma, Ecchymosis    HOSPITAL MEDICATIONS:  MEDICATIONS  (STANDING):  apixaban 2.5 milliGRAM(s) Oral every 12 hours  dextrose 5%. 1000 milliLiter(s) (50 mL/Hr) IV Continuous <Continuous>  dextrose 5%. 1000 milliLiter(s) (100 mL/Hr) IV Continuous <Continuous>  dextrose 50% Injectable 25 Gram(s) IV Push once  dextrose 50% Injectable 12.5 Gram(s) IV Push once  dextrose 50% Injectable 25 Gram(s) IV Push once  dorzolamide 2%/timolol 0.5% Ophthalmic Solution 1 Drop(s) Both EYES two times a day  finasteride 5 milliGRAM(s) Oral daily  gabapentin 300 milliGRAM(s) Oral two times a day  glucagon  Injectable 1 milliGRAM(s) IntraMuscular once  insulin lispro (ADMELOG) corrective regimen sliding scale   SubCutaneous three times a day before meals  insulin lispro (ADMELOG) corrective regimen sliding scale   SubCutaneous at bedtime  lactated ringers. 1000 milliLiter(s) (100 mL/Hr) IV Continuous <Continuous>  latanoprost 0.005% Ophthalmic Solution 1 Drop(s) Both EYES at bedtime  polyethylene glycol 3350 17 Gram(s) Oral two times a day  tamsulosin 0.4 milliGRAM(s) Oral at bedtime    MEDICATIONS  (PRN):  acetaminophen     Tablet .. 650 milliGRAM(s) Oral every 6 hours PRN Temp greater or equal to 38C (100.4F), Mild Pain (1 - 3)  aluminum hydroxide/magnesium hydroxide/simethicone Suspension 30 milliLiter(s) Oral every 4 hours PRN Dyspepsia  dextrose Oral Gel 15 Gram(s) Oral once PRN Blood Glucose LESS THAN 70 milliGRAM(s)/deciliter  melatonin 3 milliGRAM(s) Oral at bedtime PRN Insomnia      LABS:                        10.3   5.94  )-----------( 274      ( 29 Nov 2023 06:30 )             31.9     Hgb Trend: 10.3<--, 11.3<--  11-29    138  |  107  |  31<H>  ----------------------------<  133<H>  3.6   |  19<L>  |  1.71<H>    Ca    8.6      29 Nov 2023 06:30  Phos  3.1     11-29  Mg     1.90     11-29    TPro  7.2  /  Alb  3.3  /  TBili  0.4  /  DBili  x   /  AST  21  /  ALT  23  /  AlkPhos  85  11-29    Creatinine Trend: 1.71<--, 1.88<--    Urinalysis Basic - ( 29 Nov 2023 06:30 )    Color: x / Appearance: x / SG: x / pH: x  Gluc: 133 mg/dL / Ketone: x  / Bili: x / Urobili: x   Blood: x / Protein: x / Nitrite: x   Leuk Esterase: x / RBC: x / WBC x   Sq Epi: x / Non Sq Epi: x / Bacteria: x            MICROBIOLOGY:

## 2023-11-30 NOTE — PROGRESS NOTE ADULT - PROBLEM SELECTOR PLAN 1
- RVP positive for Influenza A; COVID negative; Out of window for oseltamivir  - CXR showing increased vascular markings, negative for lobar consolidation; ronchorous at bases on Exam  - Likely has undiagnosed COPD based on exam and 60 pack-year smoking hx  - Saturating well on RA,    - monitor O2 sat and fevers; Tylenol PRN for fevers  - supportive therapy w/ IV fluids   - f/u Orthostatic BP   - f/u PT Eval  - f/u urine legionella f/u sputum cx - RVP positive for Influenza A; COVID negative; Out of window for oseltamivir  - CXR showing increased vascular markings, negative for lobar consolidation; ronchorous at bases on Exam  - Likely has undiagnosed COPD based on exam and 60 pack-year smoking hx  - Saturating well on RA,    - monitor O2 sat and fevers; Tylenol PRN for fevers  - supportive therapy w/ IV fluids   - f/u Orthostatic BP   - f/u urine legionella f/u sputum cx  - CXR yesterday with clear lung fields  - Rhonchi likely reflect referred upper airway sounds - RVP positive for Influenza A; COVID negative; Out of window for oseltamivir  - CXR showing increased vascular markings, negative for lobar consolidation; ronchorous at bases on Exam  - Suspect has undiagnosed COPD based on exam and 60PY smoking hx, but no PFTs  - Saturating well on RA,    - monitor O2 sat and fevers; Tylenol PRN for fevers  - Rhonchi likely reflect referred upper airway sounds    Plan:  - supportive therapy w/ IV fluids   - Orthostatics negative  - f/u urine legionella  - f/u sputum cx

## 2023-11-30 NOTE — MEDICAL STUDENT PROGRESS NOTE(EDUCATION) - SUBJECTIVE AND OBJECTIVE BOX
Sujective: Patient EREN BARRAGAN seen and examined at bedside. Patient confused about day/month, but aware that he is in the hospital. Endorses coughing clear sputum. Complains of being tired this morning. Expressed desire to go to rehab to get stronger. No nausea, vomiting, diarrhea, dyspnea, chest pain.    REVIEW OF SYSTEMS:  CONSTITUTIONAL: No weakness, fevers or chills  EYES/ENT: No visual changes;  No vertigo or throat pain   NECK: No pain or stiffness  RESPIRATORY: No wheezing, hemoptysis; No shortness of breath  CARDIOVASCULAR: No chest pain or palpitations  GASTROINTESTINAL: No abdominal or epigastric pain. No nausea, vomiting, or hematemesis; No diarrhea or constipation. No melena or hematochezia.  GENITOURINARY: No dysuria, frequency or hematuria  NEUROLOGICAL: No numbness or weakness  SKIN: No itching, burning, rashes, or lesions   All other review of systems is negative unless indicated above.    OBJECTIVE:  ICU Vital Signs Last 24 Hrs  T(C): 36.2 (29 Nov 2023 21:57), Max: 36.6 (29 Nov 2023 15:10)  T(F): 97.2 (29 Nov 2023 21:57), Max: 97.8 (29 Nov 2023 15:10)  HR: 63 (29 Nov 2023 21:57) (63 - 80)  BP: 156/68 (29 Nov 2023 21:57) (156/68 - 168/83)  BP(mean): --  ABP: --  ABP(mean): --  RR: 17 (29 Nov 2023 21:57) (17 - 18)  SpO2: 98% (29 Nov 2023 21:57) (97% - 98%)    O2 Parameters below as of 29 Nov 2023 21:57  Patient On (Oxygen Delivery Method): room air              11-28 @ 07:01  -  11-29 @ 07:00  --------------------------------------------------------  IN: 0 mL / OUT: 280 mL / NET: -280 mL    11-29 @ 07:01  -  11-30 @ 06:01  --------------------------------------------------------  IN: 0 mL / OUT: 810 mL / NET: -810 mL      CAPILLARY BLOOD GLUCOSE  181 (29 Nov 2023 12:46)      POCT Blood Glucose.: 112 mg/dL (29 Nov 2023 22:03)      PHYSICAL EXAM:  General: WN/WD NAD  Neurology: A&Ox3, nonfocal, CALHOUN x 4  Eyes: PERRLA/ EOMI, Gross vision intact  ENT/Neck: Neck supple, trachea midline, No JVD, Gross hearing intact  Respiratory: Diffuse expiratory rhonchi with minimal crackles at bases; normal respiratory effort  CV: RRR, quiet +S1/S2, -S3/S4, no murmurs, rubs or gallops  Abdominal: diffusely mildly firm, NT, ND +BS, No HSM  MSK: 5/5 strength UE/LE bilaterally  Extremities: No edema, 2+ peripheral pulses  Skin: No Rashes, Hematoma, Ecchymosis    HOSPITAL MEDICATIONS:  MEDICATIONS  (STANDING):  apixaban 2.5 milliGRAM(s) Oral every 12 hours  dextrose 5%. 1000 milliLiter(s) (50 mL/Hr) IV Continuous <Continuous>  dextrose 5%. 1000 milliLiter(s) (100 mL/Hr) IV Continuous <Continuous>  dextrose 50% Injectable 25 Gram(s) IV Push once  dextrose 50% Injectable 12.5 Gram(s) IV Push once  dextrose 50% Injectable 25 Gram(s) IV Push once  dorzolamide 2%/timolol 0.5% Ophthalmic Solution 1 Drop(s) Both EYES two times a day  finasteride 5 milliGRAM(s) Oral daily  gabapentin 300 milliGRAM(s) Oral two times a day  glucagon  Injectable 1 milliGRAM(s) IntraMuscular once  insulin lispro (ADMELOG) corrective regimen sliding scale   SubCutaneous three times a day before meals  insulin lispro (ADMELOG) corrective regimen sliding scale   SubCutaneous at bedtime  lactated ringers. 1000 milliLiter(s) (100 mL/Hr) IV Continuous <Continuous>  latanoprost 0.005% Ophthalmic Solution 1 Drop(s) Both EYES at bedtime  polyethylene glycol 3350 17 Gram(s) Oral two times a day  tamsulosin 0.4 milliGRAM(s) Oral at bedtime    MEDICATIONS  (PRN):  acetaminophen     Tablet .. 650 milliGRAM(s) Oral every 6 hours PRN Temp greater or equal to 38C (100.4F), Mild Pain (1 - 3)  aluminum hydroxide/magnesium hydroxide/simethicone Suspension 30 milliLiter(s) Oral every 4 hours PRN Dyspepsia  dextrose Oral Gel 15 Gram(s) Oral once PRN Blood Glucose LESS THAN 70 milliGRAM(s)/deciliter  melatonin 3 milliGRAM(s) Oral at bedtime PRN Insomnia      LABS:                        10.3   5.94  )-----------( 274      ( 29 Nov 2023 06:30 )             31.9     Hgb Trend: 10.3<--, 11.3<--  11-29    138  |  107  |  31<H>  ----------------------------<  133<H>  3.6   |  19<L>  |  1.71<H>    Ca    8.6      29 Nov 2023 06:30  Phos  3.1     11-29  Mg     1.90     11-29    TPro  7.2  /  Alb  3.3  /  TBili  0.4  /  DBili  x   /  AST  21  /  ALT  23  /  AlkPhos  85  11-29    Creatinine Trend: 1.71<--, 1.88<--    Urinalysis Basic - ( 29 Nov 2023 06:30 )    Color: x / Appearance: x / SG: x / pH: x  Gluc: 133 mg/dL / Ketone: x  / Bili: x / Urobili: x   Blood: x / Protein: x / Nitrite: x   Leuk Esterase: x / RBC: x / WBC x   Sq Epi: x / Non Sq Epi: x / Bacteria: x            MICROBIOLOGY:

## 2023-12-01 ENCOUNTER — TRANSCRIPTION ENCOUNTER (OUTPATIENT)
Age: 88
End: 2023-12-01

## 2023-12-01 VITALS
TEMPERATURE: 98 F | DIASTOLIC BLOOD PRESSURE: 64 MMHG | RESPIRATION RATE: 18 BRPM | SYSTOLIC BLOOD PRESSURE: 120 MMHG | HEART RATE: 80 BPM | OXYGEN SATURATION: 99 %

## 2023-12-01 LAB
GLUCOSE BLDC GLUCOMTR-MCNC: 167 MG/DL — HIGH (ref 70–99)
GLUCOSE BLDC GLUCOMTR-MCNC: 167 MG/DL — HIGH (ref 70–99)
GLUCOSE BLDC GLUCOMTR-MCNC: 222 MG/DL — HIGH (ref 70–99)
GLUCOSE BLDC GLUCOMTR-MCNC: 222 MG/DL — HIGH (ref 70–99)
LEGIONELLA AG UR QL: NEGATIVE — SIGNIFICANT CHANGE UP
LEGIONELLA AG UR QL: NEGATIVE — SIGNIFICANT CHANGE UP

## 2023-12-01 PROCEDURE — 99239 HOSP IP/OBS DSCHRG MGMT >30: CPT

## 2023-12-01 PROCEDURE — 71045 X-RAY EXAM CHEST 1 VIEW: CPT | Mod: 26

## 2023-12-01 RX ADMIN — Medication 1: at 09:23

## 2023-12-01 RX ADMIN — APIXABAN 2.5 MILLIGRAM(S): 2.5 TABLET, FILM COATED ORAL at 05:52

## 2023-12-01 RX ADMIN — FINASTERIDE 5 MILLIGRAM(S): 5 TABLET, FILM COATED ORAL at 11:48

## 2023-12-01 RX ADMIN — Medication 2: at 13:13

## 2023-12-01 RX ADMIN — Medication 100 MILLIGRAM(S): at 05:52

## 2023-12-01 RX ADMIN — Medication 100 MILLIGRAM(S): at 13:14

## 2023-12-01 RX ADMIN — SODIUM CHLORIDE 4 MILLILITER(S): 9 INJECTION INTRAMUSCULAR; INTRAVENOUS; SUBCUTANEOUS at 11:12

## 2023-12-01 RX ADMIN — GABAPENTIN 300 MILLIGRAM(S): 400 CAPSULE ORAL at 05:52

## 2023-12-01 RX ADMIN — DORZOLAMIDE HYDROCHLORIDE TIMOLOL MALEATE 1 DROP(S): 20; 5 SOLUTION/ DROPS OPHTHALMIC at 05:53

## 2023-12-01 RX ADMIN — POLYETHYLENE GLYCOL 3350 17 GRAM(S): 17 POWDER, FOR SOLUTION ORAL at 05:52

## 2023-12-01 RX ADMIN — Medication 600 MILLIGRAM(S): at 09:28

## 2023-12-01 NOTE — PROGRESS NOTE ADULT - PROBLEM SELECTOR PLAN 4
- Tamsulosin 0.4 at night (patient was taking in the morning and became unsteady when dose was doubled)   - Finasteride 5mg daily   - Patient with elevated post-void residuals, however often does not meet indication for straight cath  - Bladder scans and straight cath as appropriate    - Plan: will hold off increasing tamsulosin given symptomatic hypotension witnessed at home

## 2023-12-01 NOTE — DISCHARGE NOTE NURSING/CASE MANAGEMENT/SOCIAL WORK - NSDCPEFALRISK_GEN_ALL_CORE
For information on Fall & Injury Prevention, visit: [email or web address] OR  [email or web address] OR  https://www.cdc.gov/steadi/patient.html

## 2023-12-01 NOTE — MEDICAL STUDENT PROGRESS NOTE(EDUCATION) - PLAN 9
patient and family endorse recent weakness and falls.    - PT reports gait instability; recommends subacute rehab
Plan: Diet: consistent carb diet   Psych/Sleep: Melatonin QHs  DVT ppx: Eliquis 2.5 BID  Code Status: Full Code  Dispo: Home pending PT.
patient and family endorse recent weakness and falls.    - PT reports gait instability; recommends subacute rehab

## 2023-12-01 NOTE — MEDICAL STUDENT PROGRESS NOTE(EDUCATION) - PLAN 5
Cr. 1.88 on admission; appeared to be at baseline; however, decreased today after IVF; possible prerenal on CKD    - avoid nephrotoxic agents.

## 2023-12-01 NOTE — PROGRESS NOTE ADULT - PROBLEM SELECTOR PLAN 10
Diet: consistent carb diet   Psych/Sleep: Melatonin QHs  DVT ppx: Eliquis 2.5 BID  Code Status: Full Code  Dispo: Subacute Rehab; pending resolution of flu symptoms

## 2023-12-01 NOTE — PROGRESS NOTE ADULT - PROBLEM SELECTOR PLAN 2
- likely iso Influenza infection   - HS troponin normal  - EKG reviewed, unremarkable  - TTE showing normal LVEF  - Patient has loop recorder in >6mo, will need removal outpatient  - PT reports gait instability; recommends REHAB - likely iso Influenza infection   - HS troponin normal  - EKG reviewed, unremarkable  - TTE showing normal LVEF  - Patient has loop recorder in >6mo, will need removal outpatient  - PT reports gait instability; recommends REHAB; Patient states he is having second thoughts about going to rehab. Will discuss w/ CM about status of Rehab and discuss w/ Patient and daughter in the PM regarding status and possible alternatives.

## 2023-12-01 NOTE — MEDICAL STUDENT PROGRESS NOTE(EDUCATION) - PLAN 8
- dorzolamaide-timolol 2%-5%  - Latanoprost 0.005%.

## 2023-12-01 NOTE — MEDICAL STUDENT PROGRESS NOTE(EDUCATION) - PLAN 2
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    - c/w eliquis 2.5mg bid for now]  - Discussed eliquis with PCP  - Patient to follow up with PCP about d/c eliquis
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    - c/w eliquis 2.5mg bid for now  - F/u outpatient prescriber about long-term anticoagulation
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    - c/w eliquis 2.5mg bid for now]  - Discussed eliquis with PCP  - Patient to follow up with PCP about d/c eliquis

## 2023-12-01 NOTE — DISCHARGE NOTE NURSING/CASE MANAGEMENT/SOCIAL WORK - NSDCFUADDAPPT_GEN_ALL_CORE_FT
APPTS ARE READY TO BE MADE: [x] YES    Best Family or Patient Contact (if needed):  Regina Watson Daughter [phone]  Additional Information about above appointments (if needed):    1: Dr. Kg Atkinson PCP [phone]  2:   3:     Other comments or requests:

## 2023-12-01 NOTE — PROGRESS NOTE ADULT - PROBLEM SELECTOR PLAN 1
- RVP positive for Influenza A; COVID negative; Out of window for oseltamivir  - CXR showing increased vascular markings, negative for lobar consolidation; ronchorous at bases on Exam  - Suspect has undiagnosed COPD based on exam and 60PY smoking hx, but no PFTs  - Saturating well on RA,    - monitor O2 sat and fevers; Tylenol PRN for fevers  - Rhonchi likely reflect referred upper airway sounds    Plan:  - supportive therapy w/ IV fluids   - Orthostatics negative  - f/u urine legionella  - f/u sputum cx - RVP positive for Influenza A; COVID negative; Out of window for oseltamivir  - CXR showing increased vascular markings, negative for lobar consolidation; ronchorous at bases on Exam  - Suspect has undiagnosed COPD based on exam and 60PY smoking hx, but no PFTs  - Saturating well on RA,    - monitor O2 sat and fevers; Tylenol PRN for fevers  - Rhonchi likely reflect referred upper airway sounds    Plan:  - supportive therapy w/ IV fluids + Cough suppression mucinex and Benzonatate   - Orthostatics negative  - f/u urine legionella  - f/u sputum cx

## 2023-12-01 NOTE — MEDICAL STUDENT PROGRESS NOTE(EDUCATION) - ASSESSMENT
89-year-old male past with a history DM2, CKD, PE on Eliquis, BPH, and 75 pack year history of smoking, presenting to the ED with a productive cough and shortness of breath for 1 week. On exam afebrile, normotensive, in no acute distress with rhonchorous breath sounds and intermittent cough productive of clear sputum. Positive Influezna AH3 on RVP. Mildly elevated pro-BNP in the context of an elderly patient without  cardiac history or clinical evidence of heart failure argue against new HF diagnosis. Most likely influenza vs post-viral syndrome with continued weakness.    Patient with more obvious bibasilar crackles today and wet cough concerning for postviral pneumonia. Unclear if daughter prefers [facility] or home rehab at this time. Will discuss with her and patient today.
89-year-old male past with a history DM2, CKD, PE on Eliquis, BPH, and 75 pack year history of smoking, presenting to the ED with a productive cough and shortness of breath for 1 week. On exam afebrile, normotensive, in no acute distress with rhonchorous breath sounds and intermittent cough productive of clear sputum. Positive Influezna AH3 on RVP. Mildly elevated pro-BNP in the context of an elderly patient without  cardiac history or clinical evidence of heart failure argue against new HF diagnosis. Most likely influenza vs post-viral syndrome with continued weakness.
89-year-old male past with a history DM2, CKD, PE on Eliquis, BPH, and 75 pack year history of smoking, presenting to the ED with a productive cough and shortness of breath for 1 week. On exam afebrile, normotensive, in no acute distress with rhonchorous breath sounds and intermittent cough productive of clear sputum. Positive Influezna AH3 on RVP. Mildly elevated pro-BNP in the context of an elderly patient without  cardiac history or clinical evidence of heart failure argue against new HF diagnosis. Most likely influenza vs post-viral syndrome with continued weakness.

## 2023-12-01 NOTE — MEDICAL STUDENT PROGRESS NOTE(EDUCATION) - SUBJECTIVE AND OBJECTIVE BOX
Sujective: Patient EREN BARRAGAN seen and examined at bedside at 12-01-23 @ 08:30. No overnight events. Patient says that he feels conflicted about going to [facility] vs. home with home rehab. In the past he had rehab and he says id didn't really do anything for him. Ultimately, he wants to do whatever his daugher thinks is best and is open to [facility]. He also reports annoyance with having to ask the nursing staff to help him with transitioning to the chair or going to the bathroom.    Objective:    T(C): 36.5 (12-01-23 @ 05:45), Max: 36.7 (11-30-23 @ 15:17)  HR: 81 (12-01-23 @ 05:45) (76 - 82)  BP: 144/73 (12-01-23 @ 05:45) (132/78 - 154/82)  RR: 17 (12-01-23 @ 05:45) (17 - 18)  SpO2: 96% (12-01-23 @ 05:45) (96% - 100%)    General:  Psych: Appropriate affect; mood is "okay"  Neuro: PEARLA, EOMI, no gross deficits; AOx3  HEENT: Moist mucous membranes, neck supple and nontender with normal ROM and no cervical lymphadenopathy  Cadio: Regular rate and rhythm; normal S1/S2; no murmurs, rubs, or gallops  Pulm: Bibasilar crackles with diffuse expiratory rhonchi; mild dullness to percussion of the lower lung fields without egophony or bronchial breath sounds; normal symmetric excursion  GI: Normoactive bowel sounds; nontender, nondistended, normotympanic; liver span 8 cm; no palpable splenomegaly  MSK: Normal ROM; No deformities  Ext: Warm and dry with capillary refill <2 sec and palpable peripheral pulses x4  Derm: Violaceous patch on back of neck      11-30-23 @ 07:01  -  12-01-23 @ 07:00  --------------------------------------------------------  IN: 0 mL / OUT: 800 mL / NET: -800 mL      LABS:    11-30    139  |  107  |  28<H>  ----------------------------<  158<H>  4.3   |  21<L>  |  1.71<H>    Ca    9.1      30 Nov 2023 07:26  Phos  3.1     11-30  Mg     1.90     11-30    TPro  7.8  /  Alb  3.6  /  TBili  0.5  /  DBili  x   /  AST  20  /  ALT  23  /  AlkPhos  97  11-30  LIVER FUNCTIONS - ( 30 Nov 2023 07:26 )  Alb: 3.6 g/dL / Pro: 7.8 g/dL / ALK PHOS: 97 U/L / ALT: 23 U/L / AST: 20 U/L / GGT: x                               11.5   6.46  )-----------( 338      ( 30 Nov 2023 07:26 )             35.1     Urinalysis Basic - ( 30 Nov 2023 07:26 )    Color: x / Appearance: x / SG: x / pH: x  Gluc: 158 mg/dL / Ketone: x  / Bili: x / Urobili: x   Blood: x / Protein: x / Nitrite: x   Leuk Esterase: x / RBC: x / WBC x   Sq Epi: x / Non Sq Epi: x / Bacteria: x    CAPILLARY BLOOD GLUCOSE      POCT Blood Glucose.: 167 mg/dL (01 Dec 2023 08:58)  POCT Blood Glucose.: 232 mg/dL (30 Nov 2023 21:57)  POCT Blood Glucose.: 133 mg/dL (30 Nov 2023 17:43)  POCT Blood Glucose.: 258 mg/dL (30 Nov 2023 12:33)    MEDICATIONS  (STANDING):  apixaban 2.5 milliGRAM(s) Oral every 12 hours  benzonatate 100 milliGRAM(s) Oral three times a day  dextrose 5%. 1000 milliLiter(s) (100 mL/Hr) IV Continuous <Continuous>  dextrose 5%. 1000 milliLiter(s) (50 mL/Hr) IV Continuous <Continuous>  dextrose 50% Injectable 25 Gram(s) IV Push once  dextrose 50% Injectable 12.5 Gram(s) IV Push once  dextrose 50% Injectable 25 Gram(s) IV Push once  dorzolamide 2%/timolol 0.5% Ophthalmic Solution 1 Drop(s) Both EYES two times a day  finasteride 5 milliGRAM(s) Oral daily  gabapentin 300 milliGRAM(s) Oral two times a day  glucagon  Injectable 1 milliGRAM(s) IntraMuscular once  guaiFENesin  milliGRAM(s) Oral every 12 hours  insulin lispro (ADMELOG) corrective regimen sliding scale   SubCutaneous three times a day before meals  insulin lispro (ADMELOG) corrective regimen sliding scale   SubCutaneous at bedtime  lactated ringers. 1000 milliLiter(s) (100 mL/Hr) IV Continuous <Continuous>  latanoprost 0.005% Ophthalmic Solution 1 Drop(s) Both EYES at bedtime  polyethylene glycol 3350 17 Gram(s) Oral two times a day  sodium chloride 3%  Inhalation 4 milliLiter(s) Inhalation every 12 hours  tamsulosin 0.4 milliGRAM(s) Oral at bedtime    MEDICATIONS  (PRN):  acetaminophen     Tablet .. 650 milliGRAM(s) Oral every 6 hours PRN Temp greater or equal to 38C (100.4F), Mild Pain (1 - 3)  aluminum hydroxide/magnesium hydroxide/simethicone Suspension 30 milliLiter(s) Oral every 4 hours PRN Dyspepsia  dextrose Oral Gel 15 Gram(s) Oral once PRN Blood Glucose LESS THAN 70 milliGRAM(s)/deciliter  melatonin 3 milliGRAM(s) Oral at bedtime PRN Insomnia

## 2023-12-01 NOTE — PROGRESS NOTE ADULT - SUBJECTIVE AND OBJECTIVE BOX
Progress Note  Authored by:   Chuck Lopez MD PGY-1 Internal Medicine    11-28-23 (3d)    Patient is a 89y old  Male who presents with a chief complaint of Weakness (01 Dec 2023 07:00)      Subjective / Overnight Events :  - No acute events overnight.  - Pt seen and examined at bedside. Feeling well today. Still having productive cough. No abdominal pain, has been having small amount of BM. No suprapubic pain order distension per patient this morning.     Additional ROS (if any):    MEDICATIONS  (STANDING):  apixaban 2.5 milliGRAM(s) Oral every 12 hours  benzonatate 100 milliGRAM(s) Oral three times a day  dextrose 5%. 1000 milliLiter(s) (100 mL/Hr) IV Continuous <Continuous>  dextrose 5%. 1000 milliLiter(s) (50 mL/Hr) IV Continuous <Continuous>  dextrose 50% Injectable 25 Gram(s) IV Push once  dextrose 50% Injectable 12.5 Gram(s) IV Push once  dextrose 50% Injectable 25 Gram(s) IV Push once  dorzolamide 2%/timolol 0.5% Ophthalmic Solution 1 Drop(s) Both EYES two times a day  finasteride 5 milliGRAM(s) Oral daily  gabapentin 300 milliGRAM(s) Oral two times a day  glucagon  Injectable 1 milliGRAM(s) IntraMuscular once  insulin lispro (ADMELOG) corrective regimen sliding scale   SubCutaneous at bedtime  insulin lispro (ADMELOG) corrective regimen sliding scale   SubCutaneous three times a day before meals  lactated ringers. 1000 milliLiter(s) (100 mL/Hr) IV Continuous <Continuous>  latanoprost 0.005% Ophthalmic Solution 1 Drop(s) Both EYES at bedtime  polyethylene glycol 3350 17 Gram(s) Oral two times a day  sodium chloride 3%  Inhalation 4 milliLiter(s) Inhalation every 12 hours  tamsulosin 0.4 milliGRAM(s) Oral at bedtime    MEDICATIONS  (PRN):  acetaminophen     Tablet .. 650 milliGRAM(s) Oral every 6 hours PRN Temp greater or equal to 38C (100.4F), Mild Pain (1 - 3)  aluminum hydroxide/magnesium hydroxide/simethicone Suspension 30 milliLiter(s) Oral every 4 hours PRN Dyspepsia  dextrose Oral Gel 15 Gram(s) Oral once PRN Blood Glucose LESS THAN 70 milliGRAM(s)/deciliter  melatonin 3 milliGRAM(s) Oral at bedtime PRN Insomnia          PHYSICAL EXAM:  Vital Signs Last 24 Hrs  T(C): 36.4 (30 Nov 2023 22:01), Max: 36.7 (30 Nov 2023 15:17)  T(F): 97.6 (30 Nov 2023 22:01), Max: 98 (30 Nov 2023 15:17)  HR: 76 (30 Nov 2023 22:01) (76 - 82)  BP: 132/78 (30 Nov 2023 22:01) (132/78 - 154/82)  BP(mean): --  RR: 18 (30 Nov 2023 22:01) (18 - 18)  SpO2: 100% (30 Nov 2023 22:01) (99% - 100%)    Parameters below as of 30 Nov 2023 22:02  Patient On (Oxygen Delivery Method): room air        I&O's Summary    30 Nov 2023 07:01  -  01 Dec 2023 07:00  --------------------------------------------------------  IN: 0 mL / OUT: 800 mL / NET: -800 mL        General: NAD, non-toxic appearing   HEENT: EOMi, no scleral icterus, no anterior/posterior cervical lymphadenopathy  CV: RRR, normal S1 and S2, no m/r/g  Lungs: normal respiratory effort. ROX ronchi unchanged since admission; possibly referred upper airway sounds; no wheezing or focal diminished lung sounds/crackles, productive cough,  Abd: soft, nontender, nondistended  Ext: no edema, 2+ peripheral pulses   Pysch: AAOx4, appropriate affect   Neuro: grossly non-focal, moving all extremities spontaneously   Skin: no rashes or lesions     LABS:  CAPILLARY BLOOD GLUCOSE      POCT Blood Glucose.: 232 mg/dL (30 Nov 2023 21:57)  POCT Blood Glucose.: 133 mg/dL (30 Nov 2023 17:43)  POCT Blood Glucose.: 258 mg/dL (30 Nov 2023 12:33)  POCT Blood Glucose.: 149 mg/dL (30 Nov 2023 08:44)                              11.5   6.46  )-----------( 338      ( 30 Nov 2023 07:26 )             35.1       WBC Trend: 6.46<--, 5.94<--, 7.32<--  Hb Trend: 11.5<--, 10.3<--, 11.3<--    11-30    139  |  107  |  28<H>  ----------------------------<  158<H>  4.3   |  21<L>  |  1.71<H>    Ca    9.1      30 Nov 2023 07:26  Phos  3.1     11-30  Mg     1.90     11-30    TPro  7.8  /  Alb  3.6  /  TBili  0.5  /  DBili  x   /  AST  20  /  ALT  23  /  AlkPhos  97  11-30          Urinalysis Basic - ( 30 Nov 2023 07:26 )    Color: x / Appearance: x / SG: x / pH: x  Gluc: 158 mg/dL / Ketone: x  / Bili: x / Urobili: x   Blood: x / Protein: x / Nitrite: x   Leuk Esterase: x / RBC: x / WBC x   Sq Epi: x / Non Sq Epi: x / Bacteria: x            RADIOLOGY & ADDITIONAL TESTS: Reviewed

## 2023-12-01 NOTE — DISCHARGE NOTE NURSING/CASE MANAGEMENT/SOCIAL WORK - NSDCVIVACCINE_GEN_ALL_CORE_FT
Tdap; 02-Sep-2023 16:52; Bobby Durant (RN); [employer]; [id number] (Exp. Date: 06-Jun-2025); IntraMuscular; Deltoid Left.; 0.5 milliLiter(s); VIS (VIS Published: 09-May-2013, VIS Presented: 02-Sep-2023);

## 2023-12-01 NOTE — MEDICAL STUDENT PROGRESS NOTE(EDUCATION) - PLAN 10
Plan: Diet: consistent carb diet   Psych/Sleep: Melatonin QHs  DVT ppx: Eliquis 2.5 BID  Code Status: Full Code  Dispo: Subacute rehab pending discussion with daughter
Plan: Diet: consistent carb diet   Psych/Sleep: Melatonin QHs  DVT ppx: Eliquis 2.5 BID  Code Status: Full Code  Dispo: Subacute rehab

## 2023-12-01 NOTE — PROGRESS NOTE ADULT - ATTENDING COMMENTS
89M hx of PE in 2021 on Eliquis, T2DM on glyburide with Diabetic nephropathy, HTN, dementia, BPH, DM, CKD III, ??prior TIA/CVA p/w generalized weakness, found to have RVP w/ influenza.     #Weakness  --Suspect i/s/o viral infection  --At this time out of treatment window for oseltamivir  --CXR unrevealing  --c/w supportive mgt, improving off Tamiflu  --s/p PT eval recs, rehab.     At this time pt hemodynamically stable for dc to rehab.    DVT: Eliquis  DIET: CC  DISPO: Rehab
Patient seen and examined with team  Agree with above a/p by Dr Canales  89M hx of PE in 2021 on eliquis, T2DM on glyburide with  Diabetic nephropathy, htn, dementia, BPH, DM, CKD III, ??prior TIA/CVA is here for evaluation and management of weakness iso influenza infection.   Seen with daughter Regina RN at bedside  tel # [phone]. Daughter wants to take patient home  Stated she can arrange for physical therapy for her father at home.  PE  T(F): 97.9 HR: 101 , BP: 148/86 , RR: 18, SpO2: 92% room air  Lungs few ronchi b/l , Cor distant HS , Abd soft n/t, ext neg e/c/c      RVP positive for Influenza but otherwise Negative  A/p Weakness secondary to viral syndrome  # Influenza A-->  IVF Hydration and supportive care. POST VIRAL SYNDROME. Out of window for Tamiflu on admission.  No PNA on CXE x2. NO CHF found on echo.  # elevated pro BMP, echo and hs trop in am  # PE in 2021. will need to check with PCP about need to c/w Eliquis.  # BPH c/w Flomax and proscar  # CKD , f/u with  ivf hydration. 7/3/23 Creat 1.8  # Dvt proph hep Sq  patient to go to REHAB and have out patient f/u with Dr Sifuentes at PCP office on 12/5/23 for assessment of Eliquis.  May be able to d/c the Eliquis.  plan d/w PCP office Dr Louise/ daughter/ team./SW
Patient seen and examined with team  Agree with above a/p by Dr Lopez  89M hx of PE in 2021 on eliquis, T2DM on glyburide with  Diabetic nephropathy, htn, dementia, BPH, DM, CKD III, ??prior TIA/CVA is here for evaluation and management of weakness iso influenza infection.   Seen with daughter Regina RN at bedside  tel # [phone]. Daughter wants to take patient home  Stated she can arrange for physical therapy for her father at home.  PE  Vital Signs Last 24 Hrs  T(F): 97.4 HR: 69 BP: 119/63 , RR: 18 , SpO2: 98%  room air  Lungs Positive ronchi b/i to base of lung, Cor distant HS , Abd soft n/t, ext neg e/c/c      rad< from: Xray Chest 2 Views PA/Lat (11.28.23 @ 13:06) >  IMPRESSION:  No focal consolidations.    11/28/23 Ekg Sinus tachy 103 bpm    RVP positive for Influenza but otherwise Negative  A/p  # Influenza A-->  IVF Hydration and supportive care. POST VIRAL SYNDROME  # elevated pro BMP, echo and hs trop in am  # PE in 2021. will need to check with PCP about need to c/w Eliquis.  # BPH c/w Flomax  # CKD , f/u with  ivf hydration. 7/3/23 Creat 1.8  # Dvt proph hep Sq  f/u cxr/ echo/PT eval

## 2023-12-01 NOTE — MEDICAL STUDENT PROGRESS NOTE(EDUCATION) - PLAN 1
RVP positive for Influenza A; COVID negative  - CXR showing increased vascular markings, negative for lobar consolidation; ronchorous at bases on Exam  - Likely has undiagnosed COPD based on exam and 60 pack-year smoking hx  - Saturating well on RA,    - Out of window for Oseltamivir   - monitor O2 sat and fevers; Tylenol PRN for fevers  - supportive therapy w/ IV fluids
RVP positive for Influenza A; COVID negative  - CXR showing increased vascular markings, negative for lobar consolidation; ronchorous at bases on Exam  - Likely has undiagnosed COPD based on exam and 60 pack-year smoking hx  - Saturating well on RA,    - Out of window for Oseltamivir   - monitor O2 sat and fevers; Tylenol PRN for fevers  - supportive therapy w/ IV fluids   - PT eval for reported weakness  - f/u Orthostatic BP   - f/u PT Eval  - f/u urine legionella
RVP positive for Influenza A; COVID negative  - CXR showing increased vascular markings, negative for lobar consolidation; ronchorous at bases on Exam  - Likely has undiagnosed COPD based on exam and 60 pack-year smoking hx  - Saturating well on RA,    - Out of window for Oseltamivir   - monitor O2 sat and fevers; Tylenol PRN for fevers  - supportive therapy w/ IV fluids  - Bibasilar crackles with dullness and wet cough today  - CXR for concern of postviral pneumonia

## 2023-12-01 NOTE — DISCHARGE NOTE NURSING/CASE MANAGEMENT/SOCIAL WORK - PATIENT PORTAL LINK FT
You can access the FollowMyHealth Patient Portal offered by [facility] by registering at the following website: [email or web address]. By joining [facility]’s FollowMyHealth portal, you will also be able to view your health information using other applications (apps) compatible with our system.

## 2023-12-01 NOTE — PROGRESS NOTE ADULT - PROBLEM SELECTOR PLAN 3
- DVT w/ PE in 2021 when patient admitted for covid infection   - c/w Eliquis 2.5 BID  - Discussed eliquis with PCP; patient to follow up with PCP for pulmonary eval to DC eliquis

## 2023-12-01 NOTE — MEDICAL STUDENT PROGRESS NOTE(EDUCATION) - PLAN 6
Nausea and vomiting for the past week. Patient reports she has not been able to hold down any food at all in the past 7 days. Experiencing LLQ abdominal pain with the vomiting. Denies any diarrhea. Hx GERD. Has an EGD scheduled in 4 days.   
Iron studies with high ferritin, low iron, low reticulocyte count, and low eGFR consistent with Anemia due to CKD    - hgb 10.3 today likely due to dilution after IVF, baseline appears to be 8-9   - Iron supplement
Iron studies with high ferritin, low iron, low reticulocyte count, and low eGFR consistent with Anemia due to CKD    - hgb 10.3 today likely due to dilution after IVF, baseline appears to be 8-9   - Iron supplement
Iron studies with high ferritin, low iron, low reticulocyte count, and low eGFR consistent with Anemia due to CKD    - hgb 10.3 today likely due to dilution after IVF, baseline appears to be 8-9   - Start iron supplement

## 2023-12-01 NOTE — PROGRESS NOTE ADULT - ASSESSMENT
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

## 2023-12-01 NOTE — MEDICAL STUDENT PROGRESS NOTE(EDUCATION) - PLAN 3
Some retention this AM.     - Tamsulosin 0.4 at night (patient was taking in the morning and became unsteady when dose was doubled)   - Finasteride 5mg daily   - Bladder scans and straight cath as appropriate.

## 2023-12-05 ENCOUNTER — APPOINTMENT (OUTPATIENT)
Dept: FAMILY MEDICINE | Facility: CLINIC | Age: 88
End: 2023-12-05

## 2023-12-13 ENCOUNTER — NON-APPOINTMENT (OUTPATIENT)
Age: 88
End: 2023-12-13

## 2023-12-13 ENCOUNTER — APPOINTMENT (OUTPATIENT)
Dept: ELECTROPHYSIOLOGY | Facility: CLINIC | Age: 88
End: 2023-12-13
Payer: MEDICARE

## 2023-12-14 PROCEDURE — 93298 REM INTERROG DEV EVAL SCRMS: CPT

## 2023-12-14 PROCEDURE — G2066: CPT

## 2023-12-21 NOTE — ED ADULT TRIAGE NOTE - TEMPERATURE IN CELSIUS (DEGREES C)
37
Upon ED arrival BP: 180/11  Current BP: 153/92  Continue home meds   - Benicar 20mg = Losartan 50mg   - Monitor BP

## 2024-01-17 ENCOUNTER — NON-APPOINTMENT (OUTPATIENT)
Age: 89
End: 2024-01-17

## 2024-01-17 ENCOUNTER — APPOINTMENT (OUTPATIENT)
Dept: ELECTROPHYSIOLOGY | Facility: CLINIC | Age: 89
End: 2024-01-17
Payer: MEDICARE

## 2024-01-18 PROCEDURE — 93298 REM INTERROG DEV EVAL SCRMS: CPT

## 2024-01-19 ENCOUNTER — APPOINTMENT (OUTPATIENT)
Dept: FAMILY MEDICINE | Facility: CLINIC | Age: 89
End: 2024-01-19
Payer: MEDICARE

## 2024-01-19 VITALS
WEIGHT: 187 LBS | TEMPERATURE: 97.9 F | RESPIRATION RATE: 16 BRPM | BODY MASS INDEX: 27.7 KG/M2 | DIASTOLIC BLOOD PRESSURE: 56 MMHG | HEART RATE: 88 BPM | HEIGHT: 69 IN | OXYGEN SATURATION: 95 % | SYSTOLIC BLOOD PRESSURE: 94 MMHG

## 2024-01-19 VITALS — DIASTOLIC BLOOD PRESSURE: 60 MMHG | SYSTOLIC BLOOD PRESSURE: 100 MMHG

## 2024-01-19 DIAGNOSIS — E11.9 TYPE 2 DIABETES MELLITUS W/OUT COMPLICATIONS: ICD-10-CM

## 2024-01-19 DIAGNOSIS — R53.81 OTHER MALAISE: ICD-10-CM

## 2024-01-19 DIAGNOSIS — R79.89 OTHER SPECIFIED ABNORMAL FINDINGS OF BLOOD CHEMISTRY: ICD-10-CM

## 2024-01-19 DIAGNOSIS — G62.9 POLYNEUROPATHY, UNSPECIFIED: ICD-10-CM

## 2024-01-19 DIAGNOSIS — E78.00 PURE HYPERCHOLESTEROLEMIA, UNSPECIFIED: ICD-10-CM

## 2024-01-19 PROCEDURE — 36415 COLL VENOUS BLD VENIPUNCTURE: CPT

## 2024-01-19 PROCEDURE — 99214 OFFICE O/P EST MOD 30 MIN: CPT | Mod: 25

## 2024-01-19 RX ORDER — CYANOCOBALAMIN 1000 UG/ML
1000 INJECTION INTRAMUSCULAR; SUBCUTANEOUS
Qty: 1 | Refills: 1 | Status: ACTIVE | COMMUNITY
Start: 2022-05-19 | End: 1900-01-01

## 2024-01-19 RX ORDER — GABAPENTIN 100 MG/1
100 CAPSULE ORAL
Qty: 60 | Refills: 2 | Status: ACTIVE | COMMUNITY
Start: 2024-01-19

## 2024-01-19 RX ORDER — GABAPENTIN 300 MG/1
300 CAPSULE ORAL
Qty: 360 | Refills: 0 | Status: DISCONTINUED | COMMUNITY
Start: 2017-03-07 | End: 2024-01-19

## 2024-01-19 RX ORDER — GLIMEPIRIDE 2 MG/1
2 TABLET ORAL DAILY
Qty: 30 | Refills: 0 | Status: DISCONTINUED | COMMUNITY
Start: 2022-05-19 | End: 2024-01-19

## 2024-01-19 RX ORDER — ATORVASTATIN CALCIUM 40 MG/1
40 TABLET, FILM COATED ORAL
Qty: 90 | Refills: 0 | Status: ACTIVE | COMMUNITY
Start: 2021-12-20 | End: 1900-01-01

## 2024-01-19 RX ORDER — DULOXETINE HYDROCHLORIDE 30 MG/1
30 CAPSULE, DELAYED RELEASE PELLETS ORAL
Qty: 30 | Refills: 0 | Status: DISCONTINUED | COMMUNITY
Start: 2018-10-30 | End: 2024-01-19

## 2024-01-19 NOTE — REVIEW OF SYSTEMS
[Chest Pain] : no chest pain [Palpitations] : no palpitations [Shortness Of Breath] : no shortness of breath [Wheezing] : no wheezing [Dyspnea on Exertion] : dyspnea on exertion [Constipation] : no constipation [Diarrhea] : no diarrhea [Vomiting] : no vomiting [Heartburn] : no heartburn [Headache] : no headache [Dizziness] : no dizziness [FreeTextEntry2] : little tired [FreeTextEntry5] : no palpitations- [de-identified] : trouble sleeping at night; didn't sleep well; has been taking 1/2 flexeril 15mg-has for muscle pain

## 2024-01-19 NOTE — PHYSICAL EXAM
[Normal] : affect was normal and insight and judgment were intact [No Acute Distress] : no acute distress [Well-Appearing] : well-appearing [Normal Oropharynx] : the oropharynx was normal [No Edema] : there was no peripheral edema [No Extremity Clubbing/Cyanosis] : no extremity clubbing/cyanosis

## 2024-01-19 NOTE — PLAN
[FreeTextEntry1] : Will follow up labwork drawn in office today. Hospital discharge note reviewed-recommending Pulm eval.   Fatigue/Deconditioning-post hospitalization-agreeable to Home PT re-evaluation.  Discussed Pulmonary evaluation-was not seen by Pulm post PE.  Discussed Pulmonary evaluation. Daughter also notes a history of breathing problems.  Referral placed.  Will adjust meds based on labs.  Patient expressed understanding of plan.

## 2024-01-19 NOTE — HISTORY OF PRESENT ILLNESS
[FreeTextEntry1] : HFU and Rehab FU [de-identified] : HFU and Rehab FU Here with daughter Regina-was in hospital for flu-then discharged to rehab.  Went home early from rehab, patient did not want to stay at rehab.  Daughter would like to have Dad evaluated possibly coming off of Xarelto.  Daughter notes he was on Plavix for CVA and was switched to Eliquis after having PE for COVID.   Was being followed by a home visiting doctor for a few years and medication regimen was not changed.   Tamsulosin 1 time in the AM  Atorvastatin 40mg  Gabapentin 100mg BID  Vitamin D weekly- Vitamin B12 injection [facility] LionAlejandralove Mullins-Mushroom  No other doctor's visits recently.  Seeing ophthalmology [facility]-next week.   Aides during the day; lives with son   Sometimes napping during the day;  Did have PT come to the home after rehab for about 6 sessions.  Does not feel 100% back to strength pre-hospital.   Daughter notes history of BP running low. Only had coffee this AM.

## 2024-01-22 DIAGNOSIS — R79.89 OTHER SPECIFIED ABNORMAL FINDINGS OF BLOOD CHEMISTRY: ICD-10-CM

## 2024-01-22 LAB
25(OH)D3 SERPL-MCNC: 32.8 NG/ML
ALBUMIN SERPL ELPH-MCNC: 3.9 G/DL
ALP BLD-CCNC: 91 U/L
ALT SERPL-CCNC: 30 U/L
ANION GAP SERPL CALC-SCNC: 12 MMOL/L
AST SERPL-CCNC: 22 U/L
BASOPHILS # BLD AUTO: 0.04 K/UL
BASOPHILS NFR BLD AUTO: 0.7 %
BILIRUB SERPL-MCNC: 0.2 MG/DL
BUN SERPL-MCNC: 30 MG/DL
CALCIUM SERPL-MCNC: 8.7 MG/DL
CHLORIDE SERPL-SCNC: 107 MMOL/L
CHOLEST SERPL-MCNC: 113 MG/DL
CO2 SERPL-SCNC: 21 MMOL/L
CREAT SERPL-MCNC: 1.93 MG/DL
EGFR: 32 ML/MIN/1.73M2
EOSINOPHIL # BLD AUTO: 0.27 K/UL
EOSINOPHIL NFR BLD AUTO: 4.7 %
ESTIMATED AVERAGE GLUCOSE: 137 MG/DL
FOLATE SERPL-MCNC: 5.3 NG/ML
GLUCOSE SERPL-MCNC: 152 MG/DL
HBA1C MFR BLD HPLC: 6.4 %
HCT VFR BLD CALC: 34.9 %
HDLC SERPL-MCNC: 45 MG/DL
HGB BLD-MCNC: 10.9 G/DL
IMM GRANULOCYTES NFR BLD AUTO: 0.3 %
LDLC SERPL CALC-MCNC: 47 MG/DL
LYMPHOCYTES # BLD AUTO: 1.57 K/UL
LYMPHOCYTES NFR BLD AUTO: 27.1 %
MAN DIFF?: NORMAL
MCHC RBC-ENTMCNC: 28.2 PG
MCHC RBC-ENTMCNC: 31.2 GM/DL
MCV RBC AUTO: 90.4 FL
MONOCYTES # BLD AUTO: 0.38 K/UL
MONOCYTES NFR BLD AUTO: 6.6 %
NEUTROPHILS # BLD AUTO: 3.52 K/UL
NEUTROPHILS NFR BLD AUTO: 60.6 %
NONHDLC SERPL-MCNC: 68 MG/DL
PLATELET # BLD AUTO: 207 K/UL
POTASSIUM SERPL-SCNC: 4.5 MMOL/L
PROT SERPL-MCNC: 7 G/DL
RBC # BLD: 3.86 M/UL
RBC # FLD: 14.8 %
SODIUM SERPL-SCNC: 140 MMOL/L
TRIGL SERPL-MCNC: 118 MG/DL
TSH SERPL-ACNC: 2.61 UIU/ML
VIT B12 SERPL-MCNC: 529 PG/ML
WBC # FLD AUTO: 5.8 K/UL

## 2024-01-24 LAB
FERRITIN SERPL-MCNC: 168 NG/ML
IRON SATN MFR SERPL: 22 %
IRON SERPL-MCNC: 62 UG/DL
TIBC SERPL-MCNC: 286 UG/DL
TRANSFERRIN SERPL-MCNC: 225 MG/DL
UIBC SERPL-MCNC: 225 UG/DL

## 2024-01-26 ENCOUNTER — OFFICE (OUTPATIENT)
Facility: LOCATION | Age: 89
Setting detail: OPHTHALMOLOGY
End: 2024-01-26
Payer: MEDICARE

## 2024-01-26 DIAGNOSIS — H52.7: ICD-10-CM

## 2024-01-26 DIAGNOSIS — H04.123: ICD-10-CM

## 2024-01-26 DIAGNOSIS — H26.491: ICD-10-CM

## 2024-01-26 DIAGNOSIS — H40.1133: ICD-10-CM

## 2024-01-26 DIAGNOSIS — H53.2: ICD-10-CM

## 2024-01-26 PROCEDURE — 92014 COMPRE OPH EXAM EST PT 1/>: CPT | Performed by: OPHTHALMOLOGY

## 2024-01-26 ASSESSMENT — REFRACTION_MANIFEST
OS_SPHERE: PLANO
OS_SPHERE: +2.25
OD_AXIS: 175
OD_AXIS: 95
OS_VA1: 20/60
OS_CYLINDER: +1.25
OS_SPHERE: +1.25
OD_CYLINDER: -1.50
OD_VA1: 20/40+2
OD_SPHERE: +0.75
OD_AXIS: 080
OS_AXIS: 110
OD_CYLINDER: +1.25
OS_AXIS: 180
OD_CYLINDER: -0.50
OS_AXIS: 95
OS_CYLINDER: -1.00
OS_CYLINDER: -1.25
OD_SPHERE: PLANO
OS_VA1: 20/30-
OD_SPHERE: +1.75

## 2024-01-26 ASSESSMENT — SPHEQUIV_DERIVED
OS_SPHEQUIV: 0.75
OD_SPHEQUIV: 1
OD_SPHEQUIV: 0.5
OS_SPHEQUIV: 1.625
OS_SPHEQUIV: 1.625
OD_SPHEQUIV: 1

## 2024-01-26 ASSESSMENT — CONFRONTATIONAL VISUAL FIELD TEST (CVF)
OD_FINDINGS: FULL
OS_FINDINGS: FULL

## 2024-01-26 ASSESSMENT — REFRACTION_CURRENTRX
OS_OVR_VA: 20/
OD_OVR_VA: 20/

## 2024-01-26 ASSESSMENT — PUNCTA - ASSESSMENT
OD_PUNCTA: RLL SMALL
OS_PUNCTA: LLL SMALL

## 2024-01-26 ASSESSMENT — REFRACTION_AUTOREFRACTION
OS_CYLINDER: -1.25
OD_CYLINDER: -1.50
OD_SPHERE: +1.75
OD_AXIS: 080
OS_SPHERE: +2.25
OS_AXIS: 110

## 2024-02-14 NOTE — ED ADULT NURSE NOTE - HOW OFTEN DO YOU HAVE A DRINK CONTAINING ALCOHOL?
"  Critical access hospital - Clinic Note  John Greenwood DO, 24     Ashley Eastman MRN: [id number] : 1935 Age: 88 y.o.     Assessment/Plan     1. Herpes zoster without complication    -Patient presents for recheck rash  -She was recently appropriately diagnosed and treated for shingles with Valtrex  -Patient without pain  -We discussed Shingrix vaccination series to complete after rash resolved    2. Normocytic anemia    - CBC (Includes Diff/Plt) (Refl); Future    3. Screening for thyroid disorder    - TSH, 3rd generation with Free T4 reflex; Future    4. Screening for nephropathy    - Comprehensive metabolic panel; Future    Ashley Eastman acknowledged understanding of treatment plan, all questions answered.    Subjective      Ashley Eastman is a 88 y.o. female who presents for recheck rash. \"Tiny spots on Friday\" (24), by  (24) rash fully erupted.  Patient offers she did have pain in that area prior to rash.  Patient describes today she is doing well.  She has been adherent with Valtrex course.  Her pain is limited to none.  Patient inquires about lab work.    The following portions of the patient's history were reviewed and updated as appropriate: allergies, current medications, past family history, past medical history, past social history, past surgical history and problem list.     Past Medical History:   Diagnosis Date    Aortic valve regurgitation     Aortic valve stenosis     Hypertension     Left ventricular hypertrophy     Mitral valve regurgitation     Osteoarthritis     Pure hypercholesterolemia     Shingles outbreak     back and left side       Allergies   Allergen Reactions    Acetaminophen Hives       Past Surgical History:   Procedure Laterality Date    BUNIONECTOMY Left 2014    COLONOSCOPY  2012    CORRECTION HAMMER TOE Left 2014    [place] COLECTOMY PARTIAL W/ANASTOMOSIS N/A 8/15/2023    Procedure: RIGHT HEMICOLECTOMY;  Surgeon: Rosario Rios MD;  Location: [facility]" MAIN OR;  Service: Surgical Oncology       Family History   Problem Relation Age of Onset    No Known Problems Mother     Coronary artery disease Father     Throat cancer Father     Diabetes Sister     Cancer Sister     Pancreatic cancer Sister 60    No Known Problems Daughter     No Known Problems Daughter     No Known Problems Maternal Grandmother     No Known Problems Maternal Grandfather     No Known Problems Paternal Grandmother     No Known Problems Paternal Grandfather     No Known Problems Brother     Cancer Brother     No Known Problems Son     No Known Problems Son     No Known Problems Son        Social History     Socioeconomic History    Marital status:      Spouse name: None    Number of children: None    Years of education: None    Highest education level: None   Occupational History    None   Tobacco Use    Smoking status: Former     Current packs/day: 0.00     Average packs/day: 1 pack/day for 25.0 years (25.0 ttl pk-yrs)     Types: Cigarettes     Start date:      Quit date:      Years since quittin.1     Passive exposure: Past    Smokeless tobacco: Never   Vaping Use    Vaping status: Never Used   Substance and Sexual Activity    Alcohol use: Not Currently    Drug use: Not Currently    Sexual activity: None   Other Topics Concern    None   Social History Narrative    · Most recent tobacco use screenin2019      · Do you currently or have you served in the [employer]:   No      Social Determinants of Health     Financial Resource Strain: Low Risk  (2023)    Overall Financial Resource Strain (CARDIA)     Difficulty of Paying Living Expenses: Not hard at all   Food Insecurity: No Food Insecurity (2023)    Hunger Vital Sign     Worried About Running Out of Food in the Last Year: Never true     Ran Out of Food in the Last Year: Never true   Transportation Needs: No Transportation Needs (2023)    PRAPARE - Transportation     Lack of Transportation  (Medical): No     Lack of Transportation (Non-Medical): No   Physical Activity: Not on file   Stress: Not on file   Social Connections: Not on file   Intimate Partner Violence: Not on file   Housing Stability: Low Risk  (8/16/2023)    Housing Stability Vital Sign     Unable to Pay for Housing in the Last Year: No     Number of Places Lived in the Last Year: 1     Unstable Housing in the Last Year: No       Current Outpatient Medications   Medication Sig Dispense Refill    amLODIPine (NORVASC) 5 mg tablet Take 1 tablet (5 mg total) by mouth daily at bedtime 90 tablet 1    atorvastatin (LIPITOR) 10 mg tablet Take 1 tablet by mouth once daily 90 tablet 1    Multiple Vitamins-Minerals (PRESERVISION AREDS 2 PO) Take by mouth      valACYclovir (VALTREX) 1,000 mg tablet Take 1 tablet (1,000 mg total) by mouth 3 (three) times a day for 7 days 21 tablet 0     No current facility-administered medications for this visit.       Review of Systems     As noted in HPI    Objective      /70 (BP Location: Right arm, Patient Position: Sitting, Cuff Size: Standard)   Pulse 82   Temp 97.5 °F (36.4 °C) (Temporal)   Ht 5' (1.524 m)   Wt 56.5 kg (124 lb 9.6 oz)   SpO2 98%   BMI 24.33 kg/m²     Physical Exam  Vitals reviewed.   Constitutional:       General: She is not in acute distress.     Appearance: Normal appearance. She is not ill-appearing, toxic-appearing or diaphoretic.   HENT:      Head: Normocephalic and atraumatic.      Right Ear: External ear normal.      Left Ear: External ear normal.      Nose: Nose normal.      Mouth/Throat:      Mouth: Mucous membranes are moist.      Pharynx: Oropharynx is clear.   Eyes:      Extraocular Movements: Extraocular movements intact.      Conjunctiva/sclera: Conjunctivae normal.      Pupils: Pupils are equal, round, and reactive to light.   Cardiovascular:      Rate and Rhythm: Normal rate and regular rhythm.      Heart sounds: Murmur heard.   Pulmonary:      Effort: Pulmonary  "effort is normal.      Breath sounds: Normal breath sounds.   Abdominal:      General: Abdomen is flat.      Palpations: Abdomen is soft.      Tenderness: There is no abdominal tenderness.   Musculoskeletal:      Cervical back: Neck supple.      Right lower leg: No edema.      Left lower leg: No edema.   Lymphadenopathy:      Cervical: No cervical adenopathy.   Skin:     General: Skin is warm and dry.      Capillary Refill: Capillary refill takes less than 2 seconds.      Findings: Rash present.      Comments: Crusting papular rash with surrounding erythema trunk dermatomal pattern left sided as pictured, no active vesicular lesions   Neurological:      Mental Status: She is alert and oriented to person, place, and time.   Psychiatric:         Mood and Affect: Mood normal.         Behavior: Behavior normal.         Thought Content: Thought content normal.         Judgment: Judgment normal.             Some portions of this record may have been generated with voice recognition software. There may be translation, syntax, or grammatical errors. Occasional wrong word or \"sound-a-like\" substitutions may have occurred due to the inherent limitations of the voice recognition software. Read the chart carefully and recognize, using context, where substations may have occurred. If you have any questions, please contact the dictating provider for clarification or correction, as needed.  " Never

## 2024-02-20 ENCOUNTER — NON-APPOINTMENT (OUTPATIENT)
Age: 89
End: 2024-02-20

## 2024-02-21 ENCOUNTER — APPOINTMENT (OUTPATIENT)
Dept: ELECTROPHYSIOLOGY | Facility: CLINIC | Age: 89
End: 2024-02-21
Payer: MEDICARE

## 2024-02-21 PROCEDURE — 93298 REM INTERROG DEV EVAL SCRMS: CPT

## 2024-03-21 RX ORDER — GABAPENTIN 300 MG/1
300 CAPSULE ORAL TWICE DAILY
Qty: 60 | Refills: 1 | Status: ACTIVE | COMMUNITY
Start: 2024-03-21 | End: 1900-01-01

## 2024-03-21 RX ORDER — FINASTERIDE 5 MG/1
5 TABLET, FILM COATED ORAL
Qty: 30 | Refills: 0 | Status: ACTIVE | COMMUNITY
Start: 2021-07-24 | End: 1900-01-01

## 2024-03-25 ENCOUNTER — APPOINTMENT (OUTPATIENT)
Dept: PULMONOLOGY | Facility: CLINIC | Age: 89
End: 2024-03-25
Payer: MEDICARE

## 2024-03-25 VITALS
HEIGHT: 69 IN | SYSTOLIC BLOOD PRESSURE: 120 MMHG | BODY MASS INDEX: 27.25 KG/M2 | OXYGEN SATURATION: 99 % | HEART RATE: 88 BPM | DIASTOLIC BLOOD PRESSURE: 64 MMHG | WEIGHT: 184 LBS

## 2024-03-25 PROCEDURE — 94060 EVALUATION OF WHEEZING: CPT

## 2024-03-25 PROCEDURE — 99204 OFFICE O/P NEW MOD 45 MIN: CPT | Mod: 25

## 2024-03-25 PROCEDURE — 99214 OFFICE O/P EST MOD 30 MIN: CPT | Mod: 25

## 2024-03-25 PROCEDURE — 94729 DIFFUSING CAPACITY: CPT

## 2024-03-25 PROCEDURE — 94727 GAS DIL/WSHOT DETER LNG VOL: CPT

## 2024-03-25 NOTE — HISTORY OF PRESENT ILLNESS
[TextBox_4] :  89-year-old man with a past medical history of BPH, diabetes type 2, former smoker who presents to [facility] care. Family's main concern is whether or not he continues to require Eliquis.  He was started on full dose AC after being admitted to the hospital for COVID-19 in April 2022.  During that hospitalization he developed acute hypoxic respiratory failure.  During the April 2022 admission he was started on Eliquis for possible PE.  VQ scan as below with indeterminate probability.  He has been on Eliquis since.  No other known pulmonary emboli.  No other known DVTs.  He has no known malignancy or immobility.  Of note family is concerned as he has frequent falls and an unsteady gait.  Regarding smoking history he smoked a pack a day since he was 13 and quit 40 years ago.  Approximately 37-pack-year history.  He has no known history of COPD.  He does not have any shortness of breath, wheezing, never hospitalized for COPD.  Never intubated.  Not on oxygen.  No complaint of cough.  VQ scan 4/2022 COMPARISON: Previous lung V/Q scan 12/14/2021 FINDINGS: The study demonstrates segmental perfusion defect in the superior segment of the right lower lobe, new since the previous study. There is heterogenous tracer distribution in both lungs in the remainder of the lungs. IMPRESSION: Abnormal perfusion scan. Indeterminate probability of pulmonary embolus.  VQ scan 12/2021 FINDINGS: There is heterogeneous radiotracer distribution in the lungs on both the ventilation and the perfusion images. There are no segmental perfusion defects. IMPRESSION: Very low probability of pulmonary embolus.

## 2024-03-25 NOTE — PHYSICAL EXAM
[No Acute Distress] : no acute distress [Normal Oropharynx] : normal oropharynx [Normal Appearance] : normal appearance [Normal Rate/Rhythm] : normal rate/rhythm [No Resp Distress] : no resp distress [Clear to Auscultation Bilaterally] : clear to auscultation bilaterally [TextBox_140] : Ambulates slowly with a walker.  Awake alert and answers questions

## 2024-03-25 NOTE — ASSESSMENT
[FreeTextEntry1] :  89-year-old man with a past medical history of BPH, diabetes type 2, former smoker who presents to establish care. Family's main concern is whether or not he continues to require Eliquis.  He was started on full dose AC after being admitted to the hospital for COVID-19 in April 2022. VQ scan at the time with indeterminate probability.  He has been on Eliquis since.No other known pulmonary emboli.  No other known DVTs.  He has no known malignancy or immobility.  Of note family is concerned as he has frequent falls and an unsteady gait.  Patient has been on anticoagulation for 2 years.  If he did have a PE was likely provoked in the setting of COVID.  Though of note it was a VQ scan with only indeterminate probability.  Not able to undergo CT angio due to renal function.  Given risks and benefits -patient with no other known history of thromboembolic disease, no malignancy no other risk factors and his high risk of bleeding and injury due to unsteady gait and frequent falls reasonable to discontinue Eliquis at this time.  Family will discuss if he needs some other form of antiplatelet therapy -was on Plavix and aspirin in the past-they will discuss this with his PMD.  They are aware of risks and benefits of stopping anticoagulation.  Regarding significant smoking history has no known diagnosis of COPD though PFTs today with obstructive ventilatory impairment present.  He is relatively asymptomatic.  Never hospitalized.  Regardless we will prescribe albuterol and Spiriva.  Will trial Spiriva briefly if no significant change in respiration can discontinue -Check TTE -in case any signs of pulmonary hypertension - out of window for lung cancer screening  - discussed obtaining CT chest non con if pt becomes symptomatic    I spent 55minutes during this encounter. Total time excludes separate billing services.

## 2024-03-26 ENCOUNTER — NON-APPOINTMENT (OUTPATIENT)
Age: 89
End: 2024-03-26

## 2024-03-26 ENCOUNTER — APPOINTMENT (OUTPATIENT)
Dept: ELECTROPHYSIOLOGY | Facility: CLINIC | Age: 89
End: 2024-03-26
Payer: MEDICARE

## 2024-03-26 PROCEDURE — 93298 REM INTERROG DEV EVAL SCRMS: CPT

## 2024-04-01 NOTE — PATIENT PROFILE ADULT - FUNCTIONAL ASSESSMENT - DAILY ACTIVITY 3.
Cath holding summary:    1415: Nurse handoff report received from HAVEN Cates on Olga Anderson being received from Cath Lab for ordered procedure. Report consisted of patient's Situation, Background, Assessment and Recommendations (SBAR). Information from the following report(s) Nurse Handoff Report, Adult Overview, Surgery Report, and MAR was reviewed with the receiving nurse. Pt A&O x 4, no c/o pain. Opportunity for questions and clarification provided.    Procedure: Tunneled Dialysis Catheter  Intervention: Yes  Site: Right, Groin    1420: Verbal report given to HAVEN Vital on Olga Anderson  being transferred to inpatient room from ordered procedure. Report consisted of patient's Situation, Background, Assessment and Recommendations (SBAR). Information from the following report(s) Nurse Handoff Report, Adult Overview, Surgery Report, and MAR was reviewed with the receiving nurse. Opportunity for questions and clarification was provided.    1445: Patient transported to inpatient room by transport   2 = A lot of assistance

## 2024-04-05 NOTE — ED ADULT TRIAGE NOTE - MODE OF ARRIVAL
"No chief complaint on file.          History of Present Illness  Patient today for follow-up.  He had a colonoscopy in October 2023 showing internal hemorrhoids and small aphtha in the rectum but biopsies were unremarkable.  He complains of vague issues with some abdominal bloating and a feeling of pressure around the belt line.  He also has had incomplete bowel movements with a feeling of difficulty having bowel movements and difficulty sometimes initiating them.  At other times, he does not have these issues.  He has had some improvement with probiotics.  No rectal bleeding.  He did try daily fiber for about a month with no relief.       Result Review :       COLONOSCOPY (10/19/2023 14:07)   Tissue Pathology Exam (10/19/2023 14:22)   Progress Notes by Bill Woodruff MD (10/10/2023 14:00)     Vital Signs:   /70   Pulse 72   Temp 98.4 °F (36.9 °C)   Ht 177.8 cm (70\")   Wt 87.2 kg (192 lb 4.8 oz)   SpO2 97%   BMI 27.59 kg/m²     Body mass index is 27.59 kg/m².     Physical Exam  Abdominal:      Palpations: Abdomen is soft.   Neurological:      Mental Status: He is alert.   Psychiatric:         Behavior: Behavior normal.           Assessment and Plan    Diagnoses and all orders for this visit:    1. Hemorrhoids, unspecified hemorrhoid type (Primary)    2. Bloating    3. Change in bowel habits         BRIEF SUMMARY  Patient today for follow-up.  He is still having issues with abdominal discomfort and feeling of bloating and some incomplete evacuation and difficulty with bowel movements.  I think he would benefit from initiation of low-dose Linzess to help have more complete and easy bowel movements.  I also am going to assess him for bacterial overgrowth due to his bloating and the fact the probiotics have helped somewhat.  Will also send him in a new prescription for Analpram to help relieve any discomfort from the hemorrhoids especially when he is lifting weights.  Will follow-up after results of " hydrogen breath testing available.  Follow with nurse practitioner in 8 weeks.    I have reviewed and confirmed the accuracy of the HPI and Assessment and Plan as documented by the APRN Bill Woodruff MD        Follow Up   No follow-ups on file.    There are no Patient Instructions on file for this visit.           Walk in Private Auto

## 2024-04-26 ENCOUNTER — OFFICE (OUTPATIENT)
Facility: LOCATION | Age: 89
Setting detail: OPHTHALMOLOGY
End: 2024-04-26
Payer: MEDICARE

## 2024-04-26 DIAGNOSIS — H04.123: ICD-10-CM

## 2024-04-26 DIAGNOSIS — H53.2: ICD-10-CM

## 2024-04-26 DIAGNOSIS — E11.9: ICD-10-CM

## 2024-04-26 DIAGNOSIS — H26.491: ICD-10-CM

## 2024-04-26 DIAGNOSIS — H40.1133: ICD-10-CM

## 2024-04-26 DIAGNOSIS — H52.7: ICD-10-CM

## 2024-04-26 PROCEDURE — 92014 COMPRE OPH EXAM EST PT 1/>: CPT | Performed by: OPHTHALMOLOGY

## 2024-04-26 PROCEDURE — 92133 CPTRZD OPH DX IMG PST SGM ON: CPT | Performed by: OPHTHALMOLOGY

## 2024-04-29 ENCOUNTER — APPOINTMENT (OUTPATIENT)
Dept: ELECTROPHYSIOLOGY | Facility: CLINIC | Age: 89
End: 2024-04-29
Payer: MEDICARE

## 2024-04-29 ENCOUNTER — APPOINTMENT (OUTPATIENT)
Dept: PULMONOLOGY | Facility: CLINIC | Age: 89
End: 2024-04-29
Payer: MEDICARE

## 2024-04-29 ENCOUNTER — NON-APPOINTMENT (OUTPATIENT)
Age: 89
End: 2024-04-29

## 2024-04-29 VITALS
BODY MASS INDEX: 27.11 KG/M2 | HEART RATE: 71 BPM | OXYGEN SATURATION: 98 % | SYSTOLIC BLOOD PRESSURE: 108 MMHG | WEIGHT: 183 LBS | DIASTOLIC BLOOD PRESSURE: 62 MMHG | HEIGHT: 69 IN

## 2024-04-29 DIAGNOSIS — R93.89 ABNORMAL FINDINGS ON DIAGNOSTIC IMAGING OF OTHER SPECIFIED BODY STRUCTURES: ICD-10-CM

## 2024-04-29 DIAGNOSIS — I26.99 OTHER PULMONARY EMBOLISM W/OUT ACUTE COR PULMONALE: ICD-10-CM

## 2024-04-29 DIAGNOSIS — J44.9 CHRONIC OBSTRUCTIVE PULMONARY DISEASE, UNSPECIFIED: ICD-10-CM

## 2024-04-29 PROCEDURE — 93298 REM INTERROG DEV EVAL SCRMS: CPT

## 2024-04-29 PROCEDURE — 99214 OFFICE O/P EST MOD 30 MIN: CPT

## 2024-04-29 RX ORDER — ALBUTEROL SULFATE 90 UG/1
108 (90 BASE) INHALANT RESPIRATORY (INHALATION) EVERY 4 HOURS
Qty: 1 | Refills: 5 | Status: ACTIVE | COMMUNITY
Start: 2024-03-25 | End: 1900-01-01

## 2024-04-29 RX ORDER — TIOTROPIUM BROMIDE INHALATION SPRAY 1.56 UG/1
1.25 SPRAY, METERED RESPIRATORY (INHALATION) DAILY
Qty: 1 | Refills: 5 | Status: ACTIVE | COMMUNITY
Start: 2024-03-25 | End: 1900-01-01

## 2024-04-29 NOTE — PHYSICAL EXAM
[No Acute Distress] : no acute distress [Normal Oropharynx] : normal oropharynx [Normal Appearance] : normal appearance [Normal Rate/Rhythm] : normal rate/rhythm [No Resp Distress] : no resp distress [TextBox_68] : Coarse breath sounds bilaterally [TextBox_140] : Ambulates slowly with a walker.  Awake alert and answers questions

## 2024-04-29 NOTE — ASSESSMENT
[FreeTextEntry1] :  89-year-old man with a past medical history of BPH, diabetes type 2, former smoker who presents for follow-up.  At first visit main concern was to discuss duration of Eliquis treatment.  He had a provoked PE 2 years prior.  V/Q was intermediate probability.  Unable to undergo CT angio due to renal function.  Based on risk-benefit discussion with patient and his family decision was made to discontinue Eliquis especially given high risk of complications with frequent falls.  He is doing well since.  He has a significant smoking history but is out of the window for lung cancer screening.  PFTs do show COPD.  I recommended Spiriva and albuterol as needed at last visit.  Inhalers were not picked up yet at pharmacy.  Will represcribe today.  -Trial of Spiriva and albuterol.  Can use spacer.  If not helpful would consider nebulizers -X-ray done previously without significant consolidation but to my read has increased reticulation.  PFT does have some evidence of mild restrictive disease.  However this has been present since 2021.  Per daughter he is unable to get CAT scans.  Has?  Spinal stimulator and they are unclear if it is compatible. -Discussed that he may have some element of old scarring/fibrosis in the lungs however only mild restrictive impairment with TLC 79.  Will continue to monitor by PFTs if continues to deteriorate will request CT   I spent 38minutes during this encounter. Total time excludes separate billing services.

## 2024-04-29 NOTE — HISTORY OF PRESENT ILLNESS
[TextBox_4] :  89-year-old man with a past medical history of BPH, diabetes type 2, former smoker who presents to hospitals care.  He initially presented to discuss need for Eliquis. He was started on full dose AC after being admitted to the hospital for COVID-19 in April 2022.  During that hospitalization he developed acute hypoxic respiratory failure.  During the April 2022 admission he was started on Eliquis for possible PE.  VQ scan as below with indeterminate probability.  He has been on Eliquis since.  No other known pulmonary emboli.  No other known DVTs.  He has no known malignancy or immobility.  Of note family is concerned as he has frequent falls and an unsteady gait. Patient has been on anticoagulation for 2 years. If he did have a PE was likely provoked in the setting of COVID. Though of note it was a VQ scan with only indeterminate probability. Not able to undergo CT angio due to renal function. Given risks and benefits -patient with no other known history of thromboembolic disease, no malignancy no other risk factors and his high risk of bleeding and injury due to unsteady gait and frequent falls.  Based on risk-benefit discussion decision was made to discontinue Eliquis.  He has been doing well since that time.  He is now only on Plavix.  Regarding smoking history he smoked a pack a day since he was 13 and quit 40 years ago.  Approximately 37-pack-year history.  He has no known history of COPD.  He does not have any shortness of breath, wheezing, never hospitalized for COPD.  Never intubated.  Not on oxygen.  No complaint of cough.  VQ scan 4/2022 COMPARISON: Previous lung V/Q scan 12/14/2021 FINDINGS: The study demonstrates segmental perfusion defect in the superior segment of the right lower lobe, new since the previous study. There is heterogenous tracer distribution in both lungs in the remainder of the lungs. IMPRESSION: Abnormal perfusion scan. Indeterminate probability of pulmonary embolus.  VQ scan 12/2021 FINDINGS: There is heterogeneous radiotracer distribution in the lungs on both the ventilation and the perfusion images. There are no segmental perfusion defects. IMPRESSION: Very low probability of pulmonary embolus.  At our last visit I prescribed Spiriva and albuterol.  They have not received the inhalers.  Daughter who accompanies him is RN.  She notes that he does occasionally wheeze and has a chronic cough with sputum production.  However no significant dyspnea or respiratory distress.  He has never needed to go to the emergency room or hospital for breathing problems.

## 2024-05-11 ENCOUNTER — INPATIENT (INPATIENT)
Facility: HOSPITAL | Age: 89
LOS: 1 days | Discharge: HOME | End: 2024-05-13
Attending: INTERNAL MEDICINE | Admitting: INTERNAL MEDICINE
Payer: MEDICARE

## 2024-05-11 VITALS
RESPIRATION RATE: 16 BRPM | HEART RATE: 103 BPM | DIASTOLIC BLOOD PRESSURE: 78 MMHG | TEMPERATURE: 97 F | WEIGHT: 179.9 LBS | HEIGHT: 69 IN | SYSTOLIC BLOOD PRESSURE: 162 MMHG | OXYGEN SATURATION: 98 %

## 2024-05-11 DIAGNOSIS — Z98.890 OTHER SPECIFIED POSTPROCEDURAL STATES: Chronic | ICD-10-CM

## 2024-05-11 LAB
ALBUMIN SERPL ELPH-MCNC: 3.1 G/DL — LOW (ref 3.3–5)
ALP SERPL-CCNC: 70 U/L — SIGNIFICANT CHANGE UP (ref 40–120)
ALT FLD-CCNC: 23 U/L — SIGNIFICANT CHANGE UP (ref 12–78)
ANION GAP SERPL CALC-SCNC: 6 MMOL/L — SIGNIFICANT CHANGE UP (ref 5–17)
APTT BLD: 29.6 SEC — SIGNIFICANT CHANGE UP (ref 24.5–35.6)
AST SERPL-CCNC: 14 U/L — LOW (ref 15–37)
BASOPHILS # BLD AUTO: 0.03 K/UL — SIGNIFICANT CHANGE UP (ref 0–0.2)
BASOPHILS NFR BLD AUTO: 0.5 % — SIGNIFICANT CHANGE UP (ref 0–2)
BILIRUB SERPL-MCNC: 0.3 MG/DL — SIGNIFICANT CHANGE UP (ref 0.2–1.2)
BUN SERPL-MCNC: 36 MG/DL — HIGH (ref 7–23)
CALCIUM SERPL-MCNC: 8.3 MG/DL — LOW (ref 8.5–10.1)
CHLORIDE SERPL-SCNC: 113 MMOL/L — HIGH (ref 96–108)
CO2 SERPL-SCNC: 22 MMOL/L — SIGNIFICANT CHANGE UP (ref 22–31)
CREAT SERPL-MCNC: 2.04 MG/DL — HIGH (ref 0.5–1.3)
EGFR: 30 ML/MIN/1.73M2 — LOW
EOSINOPHIL # BLD AUTO: 0.27 K/UL — SIGNIFICANT CHANGE UP (ref 0–0.5)
EOSINOPHIL NFR BLD AUTO: 4.8 % — SIGNIFICANT CHANGE UP (ref 0–6)
GLUCOSE BLDC GLUCOMTR-MCNC: 205 MG/DL — HIGH (ref 70–99)
GLUCOSE SERPL-MCNC: 164 MG/DL — HIGH (ref 70–99)
HCT VFR BLD CALC: 32.3 % — LOW (ref 39–50)
HGB BLD-MCNC: 10.6 G/DL — LOW (ref 13–17)
IMM GRANULOCYTES NFR BLD AUTO: 0.4 % — SIGNIFICANT CHANGE UP (ref 0–0.9)
INR BLD: 1.03 RATIO — SIGNIFICANT CHANGE UP (ref 0.85–1.18)
LACTATE SERPL-SCNC: 1.5 MMOL/L — SIGNIFICANT CHANGE UP (ref 0.7–2)
LYMPHOCYTES # BLD AUTO: 1.38 K/UL — SIGNIFICANT CHANGE UP (ref 1–3.3)
LYMPHOCYTES # BLD AUTO: 24.4 % — SIGNIFICANT CHANGE UP (ref 13–44)
MAGNESIUM SERPL-MCNC: 1.9 MG/DL — SIGNIFICANT CHANGE UP (ref 1.6–2.6)
MCHC RBC-ENTMCNC: 29.6 PG — SIGNIFICANT CHANGE UP (ref 27–34)
MCHC RBC-ENTMCNC: 32.8 G/DL — SIGNIFICANT CHANGE UP (ref 32–36)
MCV RBC AUTO: 90.2 FL — SIGNIFICANT CHANGE UP (ref 80–100)
MONOCYTES # BLD AUTO: 0.46 K/UL — SIGNIFICANT CHANGE UP (ref 0–0.9)
MONOCYTES NFR BLD AUTO: 8.1 % — SIGNIFICANT CHANGE UP (ref 2–14)
NEUTROPHILS # BLD AUTO: 3.5 K/UL — SIGNIFICANT CHANGE UP (ref 1.8–7.4)
NEUTROPHILS NFR BLD AUTO: 61.8 % — SIGNIFICANT CHANGE UP (ref 43–77)
NRBC # BLD: 0 /100 WBCS — SIGNIFICANT CHANGE UP (ref 0–0)
NT-PROBNP SERPL-SCNC: 171 PG/ML — SIGNIFICANT CHANGE UP (ref 0–450)
PHOSPHATE SERPL-MCNC: 3.2 MG/DL — SIGNIFICANT CHANGE UP (ref 2.5–4.5)
PLATELET # BLD AUTO: 159 K/UL — SIGNIFICANT CHANGE UP (ref 150–400)
POTASSIUM SERPL-MCNC: 4.5 MMOL/L — SIGNIFICANT CHANGE UP (ref 3.5–5.3)
POTASSIUM SERPL-SCNC: 4.5 MMOL/L — SIGNIFICANT CHANGE UP (ref 3.5–5.3)
PROT SERPL-MCNC: 7 GM/DL — SIGNIFICANT CHANGE UP (ref 6–8.3)
PROTHROM AB SERPL-ACNC: 12.4 SEC — SIGNIFICANT CHANGE UP (ref 9.5–13)
RBC # BLD: 3.58 M/UL — LOW (ref 4.2–5.8)
RBC # FLD: 14.2 % — SIGNIFICANT CHANGE UP (ref 10.3–14.5)
SODIUM SERPL-SCNC: 141 MMOL/L — SIGNIFICANT CHANGE UP (ref 135–145)
TROPONIN I, HIGH SENSITIVITY RESULT: 10 NG/L — SIGNIFICANT CHANGE UP
TROPONIN I, HIGH SENSITIVITY RESULT: 9.7 NG/L — SIGNIFICANT CHANGE UP
TSH SERPL-MCNC: 1.4 UU/ML — SIGNIFICANT CHANGE UP (ref 0.36–3.74)
WBC # BLD: 5.66 K/UL — SIGNIFICANT CHANGE UP (ref 3.8–10.5)
WBC # FLD AUTO: 5.66 K/UL — SIGNIFICANT CHANGE UP (ref 3.8–10.5)

## 2024-05-11 PROCEDURE — 99285 EMERGENCY DEPT VISIT HI MDM: CPT

## 2024-05-11 PROCEDURE — 99222 1ST HOSP IP/OBS MODERATE 55: CPT

## 2024-05-11 PROCEDURE — 71275 CT ANGIOGRAPHY CHEST: CPT | Mod: 26,MC

## 2024-05-11 PROCEDURE — 93010 ELECTROCARDIOGRAM REPORT: CPT

## 2024-05-11 PROCEDURE — 71045 X-RAY EXAM CHEST 1 VIEW: CPT | Mod: 26

## 2024-05-11 RX ORDER — SODIUM CHLORIDE 9 MG/ML
1000 INJECTION INTRAMUSCULAR; INTRAVENOUS; SUBCUTANEOUS
Refills: 0 | Status: DISCONTINUED | OUTPATIENT
Start: 2024-05-11 | End: 2024-05-13

## 2024-05-11 RX ORDER — AZITHROMYCIN 500 MG/1
500 TABLET, FILM COATED ORAL ONCE
Refills: 0 | Status: COMPLETED | OUTPATIENT
Start: 2024-05-11 | End: 2024-05-11

## 2024-05-11 RX ORDER — TIOTROPIUM BROMIDE 18 UG/1
0 CAPSULE ORAL; RESPIRATORY (INHALATION)
Qty: 0 | Refills: 0 | DISCHARGE

## 2024-05-11 RX ORDER — INSULIN LISPRO 100/ML
VIAL (ML) SUBCUTANEOUS
Refills: 0 | Status: DISCONTINUED | OUTPATIENT
Start: 2024-05-11 | End: 2024-05-13

## 2024-05-11 RX ORDER — FINASTERIDE 5 MG/1
5 TABLET, FILM COATED ORAL DAILY
Refills: 0 | Status: DISCONTINUED | OUTPATIENT
Start: 2024-05-11 | End: 2024-05-13

## 2024-05-11 RX ORDER — DEXTROSE 10 % IN WATER 10 %
125 INTRAVENOUS SOLUTION INTRAVENOUS ONCE
Refills: 0 | Status: DISCONTINUED | OUTPATIENT
Start: 2024-05-11 | End: 2024-05-13

## 2024-05-11 RX ORDER — ONDANSETRON 8 MG/1
4 TABLET, FILM COATED ORAL EVERY 8 HOURS
Refills: 0 | Status: DISCONTINUED | OUTPATIENT
Start: 2024-05-11 | End: 2024-05-13

## 2024-05-11 RX ORDER — ACETAMINOPHEN 500 MG
650 TABLET ORAL EVERY 6 HOURS
Refills: 0 | Status: DISCONTINUED | OUTPATIENT
Start: 2024-05-11 | End: 2024-05-13

## 2024-05-11 RX ORDER — SODIUM CHLORIDE 9 MG/ML
1000 INJECTION, SOLUTION INTRAVENOUS
Refills: 0 | Status: DISCONTINUED | OUTPATIENT
Start: 2024-05-11 | End: 2024-05-13

## 2024-05-11 RX ORDER — TAMSULOSIN HYDROCHLORIDE 0.4 MG/1
0.4 CAPSULE ORAL AT BEDTIME
Refills: 0 | Status: DISCONTINUED | OUTPATIENT
Start: 2024-05-11 | End: 2024-05-13

## 2024-05-11 RX ORDER — IBUPROFEN 200 MG
400 TABLET ORAL ONCE
Refills: 0 | Status: DISCONTINUED | OUTPATIENT
Start: 2024-05-11 | End: 2024-05-13

## 2024-05-11 RX ORDER — ALBUTEROL 90 UG/1
2 AEROSOL, METERED ORAL EVERY 6 HOURS
Refills: 0 | Status: DISCONTINUED | OUTPATIENT
Start: 2024-05-11 | End: 2024-05-13

## 2024-05-11 RX ORDER — TIOTROPIUM BROMIDE 18 UG/1
2 CAPSULE ORAL; RESPIRATORY (INHALATION) DAILY
Refills: 0 | Status: DISCONTINUED | OUTPATIENT
Start: 2024-05-11 | End: 2024-05-13

## 2024-05-11 RX ORDER — DEXTROSE 50 % IN WATER 50 %
25 SYRINGE (ML) INTRAVENOUS ONCE
Refills: 0 | Status: DISCONTINUED | OUTPATIENT
Start: 2024-05-11 | End: 2024-05-13

## 2024-05-11 RX ORDER — DEXTROSE 50 % IN WATER 50 %
15 SYRINGE (ML) INTRAVENOUS ONCE
Refills: 0 | Status: DISCONTINUED | OUTPATIENT
Start: 2024-05-11 | End: 2024-05-13

## 2024-05-11 RX ORDER — CEFTRIAXONE 500 MG/1
1000 INJECTION, POWDER, FOR SOLUTION INTRAMUSCULAR; INTRAVENOUS EVERY 24 HOURS
Refills: 0 | Status: DISCONTINUED | OUTPATIENT
Start: 2024-05-11 | End: 2024-05-13

## 2024-05-11 RX ORDER — AZITHROMYCIN 500 MG/1
TABLET, FILM COATED ORAL
Refills: 0 | Status: DISCONTINUED | OUTPATIENT
Start: 2024-05-11 | End: 2024-05-13

## 2024-05-11 RX ORDER — LANOLIN ALCOHOL/MO/W.PET/CERES
3 CREAM (GRAM) TOPICAL AT BEDTIME
Refills: 0 | Status: DISCONTINUED | OUTPATIENT
Start: 2024-05-11 | End: 2024-05-13

## 2024-05-11 RX ORDER — AZITHROMYCIN 500 MG/1
500 TABLET, FILM COATED ORAL EVERY 24 HOURS
Refills: 0 | Status: DISCONTINUED | OUTPATIENT
Start: 2024-05-12 | End: 2024-05-13

## 2024-05-11 RX ORDER — GABAPENTIN 400 MG/1
300 CAPSULE ORAL THREE TIMES A DAY
Refills: 0 | Status: DISCONTINUED | OUTPATIENT
Start: 2024-05-11 | End: 2024-05-13

## 2024-05-11 RX ORDER — ATORVASTATIN CALCIUM 80 MG/1
40 TABLET, FILM COATED ORAL AT BEDTIME
Refills: 0 | Status: DISCONTINUED | OUTPATIENT
Start: 2024-05-11 | End: 2024-05-13

## 2024-05-11 RX ORDER — SODIUM CHLORIDE 9 MG/ML
500 INJECTION INTRAMUSCULAR; INTRAVENOUS; SUBCUTANEOUS ONCE
Refills: 0 | Status: COMPLETED | OUTPATIENT
Start: 2024-05-11 | End: 2024-05-11

## 2024-05-11 RX ORDER — ALBUTEROL 90 UG/1
0 AEROSOL, METERED ORAL
Qty: 0 | Refills: 0 | DISCHARGE

## 2024-05-11 RX ORDER — NITROGLYCERIN 6.5 MG
0.4 CAPSULE, EXTENDED RELEASE ORAL ONCE
Refills: 0 | Status: COMPLETED | OUTPATIENT
Start: 2024-05-11 | End: 2024-05-11

## 2024-05-11 RX ORDER — GLUCAGON INJECTION, SOLUTION 0.5 MG/.1ML
1 INJECTION, SOLUTION SUBCUTANEOUS ONCE
Refills: 0 | Status: DISCONTINUED | OUTPATIENT
Start: 2024-05-11 | End: 2024-05-13

## 2024-05-11 RX ORDER — ASPIRIN/CALCIUM CARB/MAGNESIUM 324 MG
81 TABLET ORAL DAILY
Refills: 0 | Status: DISCONTINUED | OUTPATIENT
Start: 2024-05-11 | End: 2024-05-13

## 2024-05-11 RX ORDER — LATANOPROST 0.05 MG/ML
1 SOLUTION/ DROPS OPHTHALMIC; TOPICAL AT BEDTIME
Refills: 0 | Status: DISCONTINUED | OUTPATIENT
Start: 2024-05-11 | End: 2024-05-13

## 2024-05-11 RX ORDER — DORZOLAMIDE HYDROCHLORIDE TIMOLOL MALEATE 20; 5 MG/ML; MG/ML
1 SOLUTION/ DROPS OPHTHALMIC
Refills: 0 | Status: DISCONTINUED | OUTPATIENT
Start: 2024-05-11 | End: 2024-05-13

## 2024-05-11 RX ORDER — INSULIN LISPRO 100/ML
VIAL (ML) SUBCUTANEOUS AT BEDTIME
Refills: 0 | Status: DISCONTINUED | OUTPATIENT
Start: 2024-05-11 | End: 2024-05-13

## 2024-05-11 RX ORDER — ATORVASTATIN CALCIUM 80 MG/1
0 TABLET, FILM COATED ORAL
Qty: 0 | Refills: 0 | DISCHARGE

## 2024-05-11 RX ORDER — DEXTROSE 50 % IN WATER 50 %
12.5 SYRINGE (ML) INTRAVENOUS ONCE
Refills: 0 | Status: DISCONTINUED | OUTPATIENT
Start: 2024-05-11 | End: 2024-05-13

## 2024-05-11 RX ADMIN — SODIUM CHLORIDE 500 MILLILITER(S): 9 INJECTION INTRAMUSCULAR; INTRAVENOUS; SUBCUTANEOUS at 18:00

## 2024-05-11 RX ADMIN — TAMSULOSIN HYDROCHLORIDE 0.4 MILLIGRAM(S): 0.4 CAPSULE ORAL at 22:18

## 2024-05-11 RX ADMIN — SODIUM CHLORIDE 500 MILLILITER(S): 9 INJECTION INTRAMUSCULAR; INTRAVENOUS; SUBCUTANEOUS at 17:04

## 2024-05-11 RX ADMIN — LATANOPROST 1 DROP(S): 0.05 SOLUTION/ DROPS OPHTHALMIC; TOPICAL at 22:25

## 2024-05-11 RX ADMIN — Medication 0.4 MILLIGRAM(S): at 23:37

## 2024-05-11 RX ADMIN — ATORVASTATIN CALCIUM 40 MILLIGRAM(S): 80 TABLET, FILM COATED ORAL at 22:18

## 2024-05-11 RX ADMIN — SODIUM CHLORIDE 500 MILLILITER(S): 9 INJECTION INTRAMUSCULAR; INTRAVENOUS; SUBCUTANEOUS at 18:50

## 2024-05-11 RX ADMIN — AZITHROMYCIN 255 MILLIGRAM(S): 500 TABLET, FILM COATED ORAL at 21:02

## 2024-05-11 RX ADMIN — SODIUM CHLORIDE 500 MILLILITER(S): 9 INJECTION INTRAMUSCULAR; INTRAVENOUS; SUBCUTANEOUS at 17:47

## 2024-05-11 NOTE — ED ADULT NURSE NOTE - OBJECTIVE STATEMENT
Patient a/o x4 c/o sharp left sided chest pains at about 1pm, pain went away and at 2pm c/o chest pressure radiating to shoulder, ear and arm.  Currently denies any chest pains .

## 2024-05-11 NOTE — H&P ADULT - NSHPPHYSICALEXAM_GEN_ALL_CORE
VITALS:   T(C): 36.7 (05-12-24 @ 03:04), Max: 36.7 (05-11-24 @ 15:27)  HR: 63 (05-12-24 @ 03:04) (59 - 103)  BP: 143/65 (05-11-24 @ 23:32) (111/57 - 162/78)  RR: 22 (05-12-24 @ 03:04) (15 - 22)  SpO2: 95% (05-12-24 @ 03:04) (95% - 100%)    GENERAL: NAD, lying in bed comfortably  HEAD:  Atraumatic, Normocephalic  EYES:  conjunctiva and sclera clearLeft eye mild droop noted but is baseline per son.   ENT: Moist mucous membranes  NECK: Supple, No JVD  CHEST/LUNG: Clear to auscultation bilaterally; . Unlabored respirations  HEART: Regular rate and rhythm; No murmurs, rubs, or gallops  ABDOMEN: BSx4; Soft, nontender, nondistended  EXTREMITIES:  No clubbing, cyanosis, or edema. Shoulder pain with ROM with crepitus felt.   NERVOUS SYSTEM:  A&Ox3, no focal deficits   SKIN: No rashes or lesions

## 2024-05-11 NOTE — ED PROVIDER NOTE - OBJECTIVE STATEMENT
89 year old male with hx of DM, hx of PE, off eliquis recently here with chest pressure that started earlier today, pain and pressure in left chest radiating to left arm. Patient stated EMS was called was given ASA and nitro. Patient with pain relief after nitro. Per patient and son has no hx of MI or CAD no hx of cath in past. Has been having some chest pain over the past 2 months however has not been to a doctor regarding this pain.

## 2024-05-11 NOTE — ED PROVIDER NOTE - CARE PLAN
1 Principal Discharge DX:	Pneumonia  Secondary Diagnosis:	Chronic chest pain with high risk for CAD

## 2024-05-11 NOTE — H&P ADULT - PROBLEM SELECTOR PROBLEM 3
DM (diabetes mellitus) ,[email or web address]

## 2024-05-11 NOTE — H&P ADULT - ASSESSMENT
89 year old male with hx of DM, hx of PE, off eliquis recently here with chest pressure that started earlier today, pain and pressure in left chest radiating to left arm while pt was sitting and watching TV.  Has been having some chest pain over the past 2 months no specific relation to activity. In ED CTA noted PNA. Admit for r/o ACS. PNA .

## 2024-05-11 NOTE — ED ADULT NURSE NOTE - NSFALLHARMRISKINTERV_ED_ALL_ED

## 2024-05-11 NOTE — H&P ADULT - NSHPREVIEWOFSYSTEMS_GEN_ALL_CORE
REVIEW OF SYSTEMS:    CONSTITUTIONAL: No weakness, fevers or chills  EYES/ENT: No visual changes;  No vertigo or throat pain   NECK: No pain or stiffness  RESPIRATORY: No cough, wheezing, hemoptysis; No shortness of breath  CARDIOVASCULAR: +  chest pain/heaviness  - palpitations, +left shoulder pain   GASTROINTESTINAL: No abdominal or epigastric pain. No nausea, vomiting, or hematemesis; No diarrhea or constipation. No melena or hematochezia.  GENITOURINARY: No dysuria, frequency or hematuria  NEUROLOGICAL: +numbness and tingling - weakness  SKIN: No itching, rashes

## 2024-05-11 NOTE — H&P ADULT - NSHPLABSRESULTS_GEN_ALL_CORE
10.6   5.66  )-----------( 159              32.3       141  |  113<H>  |  36<H>  ----------------------------<  164<H>  4.5   |  22  |  2.04<H>    Ca    8.3<L>      11 May 2024 15:45  Phos  3.2     05-11  Mg     1.9     05-11    TPro  7.0  /  Alb  3.1<L>  /  TBili  0.3  /  DBili  x   /  AST  14<L>  /  ALT  23  /  AlkPhos  70  05-11    WBC Count: 5.66 K/uL (05-11-24 @ 15:45)        Alkaline Phosphatase: 70 U/L (05-11-24 @ 15:45)  Alanine Aminotransferase (ALT/SGPT): 23 U/L (05-11-24 @ 15:45)  Aspartate Aminotransferase (AST/SGOT): 14 U/L (05-11-24 @ 15:45)  Bilirubin Total: 0.3 mg/dL (05-11-24 @ 15:45)      < from: CT Angio Chest PE Protocol w/ IV Cont (05.11.24 @ 17:22) >      LUNGS AND AIRWAYS: Patent central airways.  There is bilateral lower lobe   bronchial wall thickening and ill-defined ground glass opacities   throughout the lung bases with consolidative components within the left   lower lobe.  PLEURA: No pleural effusion.  MEDIASTINUM AND [place]: No lymphadenopathy. Is obvious is thick-walled.  VESSELS: The pulmonary arteries are well opacified without evidence of   filling defect. There are extensive calcifications of the thoracic aorta   which is tortuous. No evidence of aneurysm or dissection.  HEART: Heart size is normal. No pericardial effusion. Mild coronary   artery calcificationsare present.  CHEST WALL AND LOWER NECK: A cardiac device is present within the left   anterior chest wall.  VISUALIZED UPPER ABDOMEN: Within normal limits.  BONES: Within normal limits.    IMPRESSION:  No evidence of pulmonary embolism.    There is lower lung bronchial wall thickening with bilateral lower lung   predominant ill-defined ground glass opacities and superimposed   consolidative opacities within the left lower lobe, either representing   infection or less likely pulmonary edema.    Esophagus is thick-walled, suggestive of esophagitis.        --- End of Report ---

## 2024-05-11 NOTE — H&P ADULT - NSHPADDITIONALINFOADULT_GEN_ALL_CORE
ALISON on CKD.   Cr 2.0 from 1.7   FU repeat BMP    Shoulder pain  possible arthritic pain   Motrin for pain control

## 2024-05-11 NOTE — ED PROVIDER NOTE - CLINICAL SUMMARY MEDICAL DECISION MAKING FREE TEXT BOX
HPI and PMH as above    EKG   Sinus diane 55  RBBB  LPFB  No st elevation or depressions  No significant change since prior    Given recent PE, chest pain patient is high risk for PE with high wells score will proceed straight to CT  Patient with negative initial troponin  Patient CT shows signs of possible pneumonia  Will order Bcx and abx  Will admit patient for pneumonia and high risk chest pain. HPI and PMH as above    EKG   Sinus diane 55  RBBB  LPFB  No st elevation or depressions  No significant change since prior    Given recent PE, chest pain patient is high risk for PE with high wells score will proceed straight to CT  Patient with negative initial troponin  Patient CT shows signs of possible pneumonia  Will order Bcx and abx  Will admit patient for pneumonia and high risk chest pain.    -Delmi Flower MD

## 2024-05-11 NOTE — H&P ADULT - PROBLEM SELECTOR PLAN 2
cxr with findings similar to previous raising question of chronic changes.   CT with consolidations also noted.   Azithro +ceftriaxone  - FU legionella, Strep Pneumo, MRSA, Sputum cx  -

## 2024-05-11 NOTE — ED ADULT TRIAGE NOTE - CHIEF COMPLAINT QUOTE
chest pain radiating to the shoulder since one am . h/o DM. med lock 16 gauge on the left hand  by ems .

## 2024-05-11 NOTE — H&P ADULT - HISTORY OF PRESENT ILLNESS
89 year old male with hx of DM, hx of PE, off eliquis recently here with chest pressure that started earlier today, pain and pressure in left chest radiating to left arm. Started while pt was sitting and watching TV. Patient stated EMS was called was given ASA and nitro. Patient with pain relief after nitro. Per patient and son has no hx of MI or CAD no hx of cath in past. Has been having some chest pain over the past 2 months however has not been to a doctor regarding this pain.    Pt does endorse slipping the other day and being caught by his aid by his arm holding him up unsure which arm. 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 Denies any SOB

## 2024-05-12 DIAGNOSIS — I24.9 ACUTE ISCHEMIC HEART DISEASE, UNSPECIFIED: ICD-10-CM

## 2024-05-12 DIAGNOSIS — E11.9 TYPE 2 DIABETES MELLITUS WITHOUT COMPLICATIONS: ICD-10-CM

## 2024-05-12 DIAGNOSIS — K22.89 OTHER SPECIFIED DISEASE OF ESOPHAGUS: ICD-10-CM

## 2024-05-12 DIAGNOSIS — N40.0 BENIGN PROSTATIC HYPERPLASIA WITHOUT LOWER URINARY TRACT SYMPTOMS: ICD-10-CM

## 2024-05-12 DIAGNOSIS — J18.9 PNEUMONIA, UNSPECIFIED ORGANISM: ICD-10-CM

## 2024-05-12 DIAGNOSIS — E11.40 TYPE 2 DIABETES MELLITUS WITH DIABETIC NEUROPATHY, UNSPECIFIED: ICD-10-CM

## 2024-05-12 DIAGNOSIS — R07.9 CHEST PAIN, UNSPECIFIED: ICD-10-CM

## 2024-05-12 LAB
A1C WITH ESTIMATED AVERAGE GLUCOSE RESULT: 6.7 % — HIGH (ref 4–5.6)
ANION GAP SERPL CALC-SCNC: 8 MMOL/L — SIGNIFICANT CHANGE UP (ref 5–17)
BUN SERPL-MCNC: 31 MG/DL — HIGH (ref 7–23)
CALCIUM SERPL-MCNC: 8.5 MG/DL — SIGNIFICANT CHANGE UP (ref 8.5–10.1)
CHLORIDE SERPL-SCNC: 113 MMOL/L — HIGH (ref 96–108)
CO2 SERPL-SCNC: 21 MMOL/L — LOW (ref 22–31)
CREAT SERPL-MCNC: 1.87 MG/DL — HIGH (ref 0.5–1.3)
EGFR: 34 ML/MIN/1.73M2 — LOW
ESTIMATED AVERAGE GLUCOSE: 146 MG/DL — HIGH (ref 68–114)
GLUCOSE BLDC GLUCOMTR-MCNC: 169 MG/DL — HIGH (ref 70–99)
GLUCOSE BLDC GLUCOMTR-MCNC: 173 MG/DL — HIGH (ref 70–99)
GLUCOSE BLDC GLUCOMTR-MCNC: 197 MG/DL — HIGH (ref 70–99)
GLUCOSE BLDC GLUCOMTR-MCNC: 83 MG/DL — SIGNIFICANT CHANGE UP (ref 70–99)
GLUCOSE SERPL-MCNC: 99 MG/DL — SIGNIFICANT CHANGE UP (ref 70–99)
POTASSIUM SERPL-MCNC: 3.9 MMOL/L — SIGNIFICANT CHANGE UP (ref 3.5–5.3)
POTASSIUM SERPL-SCNC: 3.9 MMOL/L — SIGNIFICANT CHANGE UP (ref 3.5–5.3)
SODIUM SERPL-SCNC: 142 MMOL/L — SIGNIFICANT CHANGE UP (ref 135–145)

## 2024-05-12 PROCEDURE — 99233 SBSQ HOSP IP/OBS HIGH 50: CPT

## 2024-05-12 RX ORDER — ENOXAPARIN SODIUM 100 MG/ML
40 INJECTION SUBCUTANEOUS EVERY 24 HOURS
Refills: 0 | Status: DISCONTINUED | OUTPATIENT
Start: 2024-05-12 | End: 2024-05-13

## 2024-05-12 RX ADMIN — Medication 1: at 13:06

## 2024-05-12 RX ADMIN — TAMSULOSIN HYDROCHLORIDE 0.4 MILLIGRAM(S): 0.4 CAPSULE ORAL at 21:54

## 2024-05-12 RX ADMIN — GABAPENTIN 300 MILLIGRAM(S): 400 CAPSULE ORAL at 21:54

## 2024-05-12 RX ADMIN — ENOXAPARIN SODIUM 40 MILLIGRAM(S): 100 INJECTION SUBCUTANEOUS at 06:13

## 2024-05-12 RX ADMIN — ATORVASTATIN CALCIUM 40 MILLIGRAM(S): 80 TABLET, FILM COATED ORAL at 21:54

## 2024-05-12 RX ADMIN — CEFTRIAXONE 100 MILLIGRAM(S): 500 INJECTION, POWDER, FOR SOLUTION INTRAMUSCULAR; INTRAVENOUS at 21:55

## 2024-05-12 RX ADMIN — TIOTROPIUM BROMIDE 2 PUFF(S): 18 CAPSULE ORAL; RESPIRATORY (INHALATION) at 12:28

## 2024-05-12 RX ADMIN — GABAPENTIN 300 MILLIGRAM(S): 400 CAPSULE ORAL at 15:14

## 2024-05-12 RX ADMIN — GABAPENTIN 300 MILLIGRAM(S): 400 CAPSULE ORAL at 06:09

## 2024-05-12 RX ADMIN — Medication 1: at 15:38

## 2024-05-12 RX ADMIN — DORZOLAMIDE HYDROCHLORIDE TIMOLOL MALEATE 1 DROP(S): 20; 5 SOLUTION/ DROPS OPHTHALMIC at 06:08

## 2024-05-12 RX ADMIN — Medication 81 MILLIGRAM(S): at 12:27

## 2024-05-12 RX ADMIN — Medication 3 MILLIGRAM(S): at 21:54

## 2024-05-12 RX ADMIN — FINASTERIDE 5 MILLIGRAM(S): 5 TABLET, FILM COATED ORAL at 12:27

## 2024-05-12 RX ADMIN — AZITHROMYCIN 255 MILLIGRAM(S): 500 TABLET, FILM COATED ORAL at 21:55

## 2024-05-12 NOTE — PATIENT PROFILE ADULT - SURGICAL SITE INCISION
no I have personally seen and examined this patient. I have fully participated in the care of this patient. I have reviewed all pertinent clinical information, including history physical exam, plan and the Resident's note and agree except as noted

## 2024-05-12 NOTE — PROGRESS NOTE ADULT - PROBLEM SELECTOR PLAN 3
sliding scale Pt noted to have esophageal thickening of CT scan, also with hx of throat pain  -GI consulted for possible EGD/bx on Monday   -could be contributing to reported non-specific chest pain

## 2024-05-12 NOTE — PROGRESS NOTE ADULT - PROBLEM SELECTOR PLAN 2
cxr with findings similar to previous raising question of chronic changes.   CT with consolidations also noted.   Azithro +ceftriaxone  - FU legionella, Strep Pneumo, MRSA, Sputum cx  - cxr with findings similar to previous raising question of chronic changes.   CT with consolidations also noted.   Azithro +ceftriaxone  - FU legionella, Strep Pneumo, MRSA, Sputum cx

## 2024-05-12 NOTE — PATIENT PROFILE ADULT - FALL HARM RISK - HARM RISK INTERVENTIONS
Assistance with ambulation/Assistance OOB with selected safe patient handling equipment/Communicate Risk of Fall with Harm to all staff/Discuss with provider need for PT consult/Monitor gait and stability/Reinforce activity limits and safety measures with patient and family/Tailored Fall Risk Interventions/Visual Cue: Yellow wristband and red socks/Bed in lowest position, wheels locked, appropriate side rails in place/Call bell, personal items and telephone in reach/Instruct patient to call for assistance before getting out of bed or chair/Non-slip footwear when patient is out of bed/[place] to call system/Physically safe environment - no spills, clutter or unnecessary equipment/Purposeful Proactive Rounding/Room/bathroom lighting operational, light cord in reach

## 2024-05-12 NOTE — PROGRESS NOTE ADULT - PROBLEM SELECTOR PLAN 1
Trend trop  ECHO in AM   tele monitoring  Cardio in AM   ASA Patient with non-specific chest pain, not localized to any one area and without obvious triggers. Also with associated throat pain  -CTA chest without PE, but with PNA as well as esophageal thickening concerning for esophagitis  -EKG without acute findings, troponin wnl, thus less likely cardiac however will obtain echo and continue with tele monitoring  -c/w ASA  -if chest pain worsens, repeat EKG and trops and consider cardiology consult

## 2024-05-12 NOTE — PROGRESS NOTE ADULT - SUBJECTIVE AND OBJECTIVE BOX
****************************************  Roman Candelario MD  Available on [employer] Teams  ****************************************  SUBJECTIVE    Patient seen and examined at bedside. No acute events overnight  Reported  Denied HA, CP, SOB, n/v/d/c , fevers, chills    OBJECTIVE     Vital Signs Last 24 Hrs  T(C): 36.8 (12 May 2024 05:47), Max: 36.8 (12 May 2024 05:47)  T(F): 98.2 (12 May 2024 05:47), Max: 98.2 (12 May 2024 05:47)  HR: 57 (12 May 2024 05:47) (57 - 103)  BP: 146/77 (12 May 2024 05:47) (111/57 - 162/78)  BP(mean): --  RR: 17 (12 May 2024 05:47) (15 - 22)  SpO2: 100% (12 May 2024 05:47) (95% - 100%)    Parameters below as of 12 May 2024 05:47  Patient On (Oxygen Delivery Method): room air        PHYSICAL EXAM:  Constitutional: non-toxic, no distress  HEAD/EYES: anicteric, no conjunctival injection  ENT:  supple, no thrush  Cardiovascular:   normal S1, S2, no murmur, no edema  Respiratory:  clear BS bilaterally, no wheezes, no rales  GI:  soft, non-tender, normal bowel sounds  :  no jaramillo, no CVA tenderness  Musculoskeletal:  no synovitis, normal ROM  Neurologic: awake and alert, normal strength, no focal findings  Skin:  no rash, no erythema, no phlebitis  Heme/Onc: no lymphadenopathy   Psychiatric:  awake, alert, appropriate mood          HOSPITAL MEDICATIONS  aspirin enteric coated 81 milliGRAM(s) Oral daily  enoxaparin Injectable 40 milliGRAM(s) SubCutaneous every 24 hours    azithromycin  IVPB 500 milliGRAM(s) IV Intermittent every 24 hours  azithromycin  IVPB      cefTRIAXone   IVPB 1000 milliGRAM(s) IV Intermittent every 24 hours      atorvastatin 40 milliGRAM(s) Oral at bedtime  dextrose 50% Injectable 25 Gram(s) IV Push once  dextrose 50% Injectable 12.5 Gram(s) IV Push once  dextrose Oral Gel 15 Gram(s) Oral once PRN  finasteride 5 milliGRAM(s) Oral daily  glucagon  Injectable 1 milliGRAM(s) IntraMuscular once  insulin lispro (ADMELOG) corrective regimen sliding scale   SubCutaneous three times a day before meals  insulin lispro (ADMELOG) corrective regimen sliding scale   SubCutaneous at bedtime    albuterol    90 MICROgram(s) HFA Inhaler 2 Puff(s) Inhalation every 6 hours PRN  tiotropium 2.5 MICROgram(s) Inhaler 2 Puff(s) Inhalation daily    acetaminophen     Tablet .. 650 milliGRAM(s) Oral every 6 hours PRN  gabapentin 300 milliGRAM(s) Oral three times a day  ibuprofen  Tablet. 400 milliGRAM(s) Oral once  melatonin 3 milliGRAM(s) Oral at bedtime PRN  ondansetron Injectable 4 milliGRAM(s) IV Push every 8 hours PRN    aluminum hydroxide/magnesium hydroxide/simethicone Suspension 30 milliLiter(s) Oral every 4 hours PRN      tamsulosin 0.4 milliGRAM(s) Oral at bedtime    dextrose 10% Bolus 125 milliLiter(s) IV Bolus once  dextrose 5%. 1000 milliLiter(s) IV Continuous <Continuous>  dextrose 5%. 1000 milliLiter(s) IV Continuous <Continuous>  sodium chloride 0.9%. 1000 milliLiter(s) IV Continuous <Continuous>      dorzolamide 2%/timolol 0.5% Ophthalmic Solution 1 Drop(s) Both EYES two times a day  latanoprost 0.005% Ophthalmic Solution 1 Drop(s) Both EYES at bedtime        LABS                        10.6   5.66  )-----------( 159      ( 11 May 2024 15:45 )             32.3       05-11    141  |  113<H>  |  36<H>  ----------------------------<  164<H>  4.5   |  22  |  2.04<H>    Ca    8.3<L>      11 May 2024 15:45  Phos  3.2     05-11  Mg     1.9     05-11    TPro  7.0  /  Alb  3.1<L>  /  TBili  0.3  /  DBili  x   /  AST  14<L>  /  ALT  23  /  AlkPhos  70  05-11      Urinalysis Basic - ( 11 May 2024 15:45 )    Color: x / Appearance: x / SG: x / pH: x  Gluc: 164 mg/dL / Ketone: x  / Bili: x / Urobili: x   Blood: x / Protein: x / Nitrite: x   Leuk Esterase: x / RBC: x / WBC x   Sq Epi: x / Non Sq Epi: x / Bacteria: x        Follow Up:      RADIOLOGY: ****************************************  Roman Candelario MD  Available on [employer] Teams  ****************************************  SUBJECTIVE  Patient seen and examined at bedside. No acute events overnight  Denied HA, CP (no chest pain since admission), SOB, n/v/d/c , fevers, chills    OBJECTIVE     Vital Signs Last 24 Hrs  T(C): 36.8 (12 May 2024 05:47), Max: 36.8 (12 May 2024 05:47)  T(F): 98.2 (12 May 2024 05:47), Max: 98.2 (12 May 2024 05:47)  HR: 57 (12 May 2024 05:47) (57 - 103)  BP: 146/77 (12 May 2024 05:47) (111/57 - 162/78)  BP(mean): --  RR: 17 (12 May 2024 05:47) (15 - 22)  SpO2: 100% (12 May 2024 05:47) (95% - 100%)    Parameters below as of 12 May 2024 05:47  Patient On (Oxygen Delivery Method): room air        PHYSICAL EXAM:  GENERAL: NAD, lying in bed comfortably  HEAD:  Atraumatic, Normocephalic  EYES:  conjunctiva and sclera clearLeft eye mild droop noted but is baseline per son.   ENT: Moist mucous membranes  NECK: Supple, No JVD  CHEST/LUNG: Clear to auscultation bilaterally; . Unlabored respirations  HEART: Regular rate and rhythm; No murmurs, rubs, or gallops  ABDOMEN: BSx4; Soft, nontender, nondistended  EXTREMITIES:  No clubbing, cyanosis, or edema. Shoulder pain with ROM with crepitus felt.   NERVOUS SYSTEM:  A&Ox3, no focal deficits   SKIN: No rashes or lesions          HOSPITAL MEDICATIONS  aspirin enteric coated 81 milliGRAM(s) Oral daily  enoxaparin Injectable 40 milliGRAM(s) SubCutaneous every 24 hours    azithromycin  IVPB 500 milliGRAM(s) IV Intermittent every 24 hours  azithromycin  IVPB      cefTRIAXone   IVPB 1000 milliGRAM(s) IV Intermittent every 24 hours      atorvastatin 40 milliGRAM(s) Oral at bedtime  dextrose 50% Injectable 25 Gram(s) IV Push once  dextrose 50% Injectable 12.5 Gram(s) IV Push once  dextrose Oral Gel 15 Gram(s) Oral once PRN  finasteride 5 milliGRAM(s) Oral daily  glucagon  Injectable 1 milliGRAM(s) IntraMuscular once  insulin lispro (ADMELOG) corrective regimen sliding scale   SubCutaneous three times a day before meals  insulin lispro (ADMELOG) corrective regimen sliding scale   SubCutaneous at bedtime    albuterol    90 MICROgram(s) HFA Inhaler 2 Puff(s) Inhalation every 6 hours PRN  tiotropium 2.5 MICROgram(s) Inhaler 2 Puff(s) Inhalation daily    acetaminophen     Tablet .. 650 milliGRAM(s) Oral every 6 hours PRN  gabapentin 300 milliGRAM(s) Oral three times a day  ibuprofen  Tablet. 400 milliGRAM(s) Oral once  melatonin 3 milliGRAM(s) Oral at bedtime PRN  ondansetron Injectable 4 milliGRAM(s) IV Push every 8 hours PRN    aluminum hydroxide/magnesium hydroxide/simethicone Suspension 30 milliLiter(s) Oral every 4 hours PRN      tamsulosin 0.4 milliGRAM(s) Oral at bedtime    dextrose 10% Bolus 125 milliLiter(s) IV Bolus once  dextrose 5%. 1000 milliLiter(s) IV Continuous <Continuous>  dextrose 5%. 1000 milliLiter(s) IV Continuous <Continuous>  sodium chloride 0.9%. 1000 milliLiter(s) IV Continuous <Continuous>      dorzolamide 2%/timolol 0.5% Ophthalmic Solution 1 Drop(s) Both EYES two times a day  latanoprost 0.005% Ophthalmic Solution 1 Drop(s) Both EYES at bedtime        LABS                        10.6   5.66  )-----------( 159      ( 11 May 2024 15:45 )             32.3       05-11    141  |  113<H>  |  36<H>  ----------------------------<  164<H>  4.5   |  22  |  2.04<H>    Ca    8.3<L>      11 May 2024 15:45  Phos  3.2     05-11  Mg     1.9     05-11    TPro  7.0  /  Alb  3.1<L>  /  TBili  0.3  /  DBili  x   /  AST  14<L>  /  ALT  23  /  AlkPhos  70  05-11      Urinalysis Basic - ( 11 May 2024 15:45 )    Color: x / Appearance: x / SG: x / pH: x  Gluc: 164 mg/dL / Ketone: x  / Bili: x / Urobili: x   Blood: x / Protein: x / Nitrite: x   Leuk Esterase: x / RBC: x / WBC x   Sq Epi: x / Non Sq Epi: x / Bacteria: x        Follow Up:      RADIOLOGY:    < from: CT Angio Chest PE Protocol w/ IV Cont (05.11.24 @ 17:22) >  IMPRESSION:  No evidence of pulmonary embolism.    There is lower lung bronchial wall thickening with bilateral lower lung   predominant ill-defined ground glass opacities and superimposed   consolidative opacities within the left lower lobe, either representing   infection or less likely pulmonary edema.    Esophagus is thick-walled, suggestive of esophagitis.        < end of copied text >

## 2024-05-13 ENCOUNTER — TRANSCRIPTION ENCOUNTER (OUTPATIENT)
Age: 89
End: 2024-05-13

## 2024-05-13 VITALS
RESPIRATION RATE: 17 BRPM | HEART RATE: 77 BPM | TEMPERATURE: 97 F | SYSTOLIC BLOOD PRESSURE: 126 MMHG | DIASTOLIC BLOOD PRESSURE: 71 MMHG | OXYGEN SATURATION: 100 %

## 2024-05-13 LAB
ALBUMIN SERPL ELPH-MCNC: 2.7 G/DL — LOW (ref 3.3–5)
ALP SERPL-CCNC: 70 U/L — SIGNIFICANT CHANGE UP (ref 40–120)
ALT FLD-CCNC: 30 U/L — SIGNIFICANT CHANGE UP (ref 12–78)
ANION GAP SERPL CALC-SCNC: 8 MMOL/L — SIGNIFICANT CHANGE UP (ref 5–17)
AST SERPL-CCNC: 20 U/L — SIGNIFICANT CHANGE UP (ref 15–37)
BILIRUB SERPL-MCNC: 0.2 MG/DL — SIGNIFICANT CHANGE UP (ref 0.2–1.2)
BUN SERPL-MCNC: 35 MG/DL — HIGH (ref 7–23)
CALCIUM SERPL-MCNC: 8.4 MG/DL — LOW (ref 8.5–10.1)
CHLORIDE SERPL-SCNC: 113 MMOL/L — HIGH (ref 96–108)
CO2 SERPL-SCNC: 18 MMOL/L — LOW (ref 22–31)
CREAT SERPL-MCNC: 1.94 MG/DL — HIGH (ref 0.5–1.3)
EGFR: 32 ML/MIN/1.73M2 — LOW
GLUCOSE BLDC GLUCOMTR-MCNC: 106 MG/DL — HIGH (ref 70–99)
GLUCOSE BLDC GLUCOMTR-MCNC: 222 MG/DL — HIGH (ref 70–99)
GLUCOSE SERPL-MCNC: 113 MG/DL — HIGH (ref 70–99)
HCT VFR BLD CALC: 30.8 % — LOW (ref 39–50)
HGB BLD-MCNC: 10.1 G/DL — LOW (ref 13–17)
MAGNESIUM SERPL-MCNC: 1.9 MG/DL — SIGNIFICANT CHANGE UP (ref 1.6–2.6)
MCHC RBC-ENTMCNC: 28.5 PG — SIGNIFICANT CHANGE UP (ref 27–34)
MCHC RBC-ENTMCNC: 32.8 G/DL — SIGNIFICANT CHANGE UP (ref 32–36)
MCV RBC AUTO: 86.8 FL — SIGNIFICANT CHANGE UP (ref 80–100)
NRBC # BLD: 0 /100 WBCS — SIGNIFICANT CHANGE UP (ref 0–0)
PHOSPHATE SERPL-MCNC: 3.4 MG/DL — SIGNIFICANT CHANGE UP (ref 2.5–4.5)
PLATELET # BLD AUTO: 167 K/UL — SIGNIFICANT CHANGE UP (ref 150–400)
POTASSIUM SERPL-MCNC: 3.8 MMOL/L — SIGNIFICANT CHANGE UP (ref 3.5–5.3)
POTASSIUM SERPL-SCNC: 3.8 MMOL/L — SIGNIFICANT CHANGE UP (ref 3.5–5.3)
PROT SERPL-MCNC: 6.6 GM/DL — SIGNIFICANT CHANGE UP (ref 6–8.3)
RBC # BLD: 3.55 M/UL — LOW (ref 4.2–5.8)
RBC # FLD: 14.2 % — SIGNIFICANT CHANGE UP (ref 10.3–14.5)
SODIUM SERPL-SCNC: 139 MMOL/L — SIGNIFICANT CHANGE UP (ref 135–145)
TROPONIN I, HIGH SENSITIVITY RESULT: 11.7 NG/L — SIGNIFICANT CHANGE UP
WBC # BLD: 6.05 K/UL — SIGNIFICANT CHANGE UP (ref 3.8–10.5)
WBC # FLD AUTO: 6.05 K/UL — SIGNIFICANT CHANGE UP (ref 3.8–10.5)

## 2024-05-13 PROCEDURE — 99239 HOSP IP/OBS DSCHRG MGMT >30: CPT

## 2024-05-13 RX ORDER — AZITHROMYCIN 500 MG/1
1 TABLET, FILM COATED ORAL
Qty: 4 | Refills: 0
Start: 2024-05-13 | End: 2024-05-16

## 2024-05-13 RX ADMIN — Medication 2: at 12:32

## 2024-05-13 RX ADMIN — Medication 81 MILLIGRAM(S): at 11:50

## 2024-05-13 RX ADMIN — FINASTERIDE 5 MILLIGRAM(S): 5 TABLET, FILM COATED ORAL at 11:50

## 2024-05-13 RX ADMIN — TIOTROPIUM BROMIDE 2 PUFF(S): 18 CAPSULE ORAL; RESPIRATORY (INHALATION) at 12:00

## 2024-05-13 RX ADMIN — GABAPENTIN 300 MILLIGRAM(S): 400 CAPSULE ORAL at 05:41

## 2024-05-13 RX ADMIN — ENOXAPARIN SODIUM 40 MILLIGRAM(S): 100 INJECTION SUBCUTANEOUS at 05:41

## 2024-05-13 NOTE — DISCHARGE NOTE PROVIDER - NSDCFUSCHEDAPPT_GEN_ALL_CORE_FT
Tyra Louise  [facility]  [facility] [address]   Scheduled Appointment: 05/14/2024    [facility]  E[address]OP[id number]  Scheduled Appointment: 05/31/2024

## 2024-05-13 NOTE — DISCHARGE NOTE NURSING/CASE MANAGEMENT/SOCIAL WORK - NSDCVIVACCINE_GEN_ALL_CORE_FT
Tdap; 02-Sep-2023 16:52; Bobby Durant (RN); [employer]; [id number] (Exp. Date: 06-Jun-2025); IntraMuscular; Deltoid Left.; 0.5 milliLiter(s); VIS (VIS Published: 09-May-2013, VIS Presented: 02-Sep-2023);

## 2024-05-13 NOTE — DISCHARGE NOTE NURSING/CASE MANAGEMENT/SOCIAL WORK - NSDCPEFALRISK_GEN_ALL_CORE
For information on Fall & Injury Prevention, visit: [email or web address] OR  [email or web address] OR  https://www.cdc.gov/steadi/patient.html

## 2024-05-13 NOTE — DISCHARGE NOTE PROVIDER - DISCHARGE DIET
Description of chest pains are very atypical however he does have some shortness of breath to be investigated as well as a strong family history of premature coronary disease diabetes and dyslipidemia   Consistent Carbohydrate Diabetic Diets

## 2024-05-13 NOTE — DISCHARGE NOTE PROVIDER - ATTENDING DISCHARGE PHYSICAL EXAMINATION:
Vital Signs Last 24 Hrs  T(C): 37 (13 May 2024 10:14), Max: 37 (13 May 2024 10:14)  T(F): 98.6 (13 May 2024 10:14), Max: 98.6 (13 May 2024 10:14)  HR: 67 (13 May 2024 10:14) (64 - 80)  BP: 97/57 (13 May 2024 10:14) (97/57 - 145/80)  BP(mean): --  RR: 17 (13 May 2024 04:43) (17 - 17)  SpO2: 95% (13 May 2024 10:14) (95% - 100%)    Parameters below as of 13 May 2024 04:43  Patient On (Oxygen Delivery Method): room air    GENERAL: NAD, well-groomed, well-developed  HEAD:  Atraumatic, Normocephalic  EYES: EOMI, PERRLA, conjunctiva and sclera clear  ENMT: No tonsillar erythema, exudates, or enlargement; Moist mucous membranes, Good dentition, No lesions  NECK: Supple, No JVD, Normal thyroid  NERVOUS SYSTEM: Awake, Alert   CHEST/LUNG: Clear to percussion bilaterally; No rales, rhonchi, wheezing, or rubs  HEART: Regular rate and rhythm; No murmurs, rubs, or gallops  ABDOMEN: Soft, Nontender, Nondistended; Bowel sounds present  EXTREMITIES:  2+ Peripheral Pulses, No clubbing, cyanosis, or edema  LYMPH: No lymphadenopathy noted  SKIN: No rashes or lesions

## 2024-05-13 NOTE — DISCHARGE NOTE PROVIDER - CARE PROVIDER_API CALL
pcp,   Phone: (   )    -  Fax: (   )    -  Follow Up Time:     Isaias Alanis  Gastroenterology  [address] [address]  Phone: [phone]  Fax: [phone]  Follow Up Time:     Stephen Soto  Cardiology  [address] [place] [address]  Phone: [phone]  Fax: [phone]  Follow Up Time:

## 2024-05-13 NOTE — DISCHARGE NOTE NURSING/CASE MANAGEMENT/SOCIAL WORK - PATIENT PORTAL LINK FT
You can access the FollowMyHealth Patient Portal offered by [facility] by registering at the following website: [email or web address]. By joining [facility]’s FollowMyHealth portal, you will also be able to view your health information using other applications (apps) compatible with our system.

## 2024-05-13 NOTE — PHARMACOTHERAPY INTERVENTION NOTE - COMMENTS
Modified allergy header to state that pt tolerated ceftriaxone during this admission with no documented reaction.

## 2024-05-13 NOTE — DISCHARGE NOTE PROVIDER - NSDCCPCAREPLAN_GEN_ALL_CORE_FT
PRINCIPAL DISCHARGE DIAGNOSIS  Diagnosis: Pneumonia  Assessment and Plan of Treatment: 89 year old male with hx of DM, hx of PE, off eliquis recently here with chest pressure that started earlier today, pain and pressure in left chest radiating to left arm while pt was sitting and watching TV.  Has been having some chest pain over the past 2 months no specific relation to activity. In ED CTA noted PNA. Admit for r/o ACS. PNA .    Problem/Plan - 1:  ·  Problem: Chest pain.  ·  Plan: Patient with non-specific chest pain, not localized to any one area and without obvious triggers. Also with associated throat pain  -CTA chest without PE, but with PNA as well as esophageal thickening concerning for esophagitis  -EKG without acute findings, troponin wnl, thus less likely cardiac   -c/w ASA  - Pt will follow up with cardiologist outpt   Problem/Plan - 2:  ·  Problem: PNA (pneumonia).   ·  Plan: cxr with findings similar to previous raising question of chronic changes.   CT with consolidations also noted.   S/P Azithro +ceftriaxone, dc on azithro and Augmentin 4 days   Problem/Plan - 3:  ·  Problem: Esophageal thickening.  ·  Plan: Pt noted to have esophageal thickening of CT scan, also with hx of throat pain  -could be contributing to reported non-specific chest pain.  - will see GI outpt   Problem/Plan - 4:  ·  Problem: DM (diabetes mellitus).  ·  Plan: Resume home med   Problem/Plan - 5:  ·  Problem: Diabetic neuropathy.  ·  Plan: Gabapentin 300 TID.   Problem/Plan - 6:  ·  Problem: BPH (benign prostatic hyperplasia).   ·  Plan: flomax.        SECONDARY DISCHARGE DIAGNOSES  Diagnosis: Chronic chest pain with high risk for CAD  Assessment and Plan of Treatment:

## 2024-05-13 NOTE — DISCHARGE NOTE PROVIDER - CARE PROVIDERS DIRECT ADDRESSES
,DirectAddress_Unknown,[email or web address]

## 2024-05-13 NOTE — DISCHARGE NOTE PROVIDER - PROVIDER TOKENS
FREE:[LAST:[pcp],PHONE:[(   )    -],FAX:[(   )    -]],PROVIDER:[TOKEN:[[id number]]],PROVIDER:[TOKEN:[[id number]]]

## 2024-05-13 NOTE — DISCHARGE NOTE PROVIDER - NSDCMRMEDTOKEN_GEN_ALL_CORE_FT
ALBUTEROL HFA 90 MCG INHALER:   amoxicillin-clavulanate 875 mg-125 mg oral tablet: 1 tab(s) orally 2 times a day  ATORVASTATIN 40 MG TABLET:   azithromycin 500 mg oral tablet: 1 tab(s) orally once a day  benzonatate 100 mg oral capsule: 1 cap(s) orally 3 times a day  dorzolamide-timolol 2%-0.5% preservative-free ophthalmic solution: 1 drop(s) to each affected eye 2 times a day  finasteride 5 mg oral tablet: 1 tab(s) orally once a day  glyBURIDE 2.5 mg oral tablet: 1 tab(s) orally once a day  guaiFENesin 600 mg oral tablet, extended release: 1 tab(s) orally every 12 hours  latanoprost 0.005% ophthalmic solution: 1 drop(s) to each affected eye once a day (at bedtime)  MiraLax oral powder for reconstitution: 17 gram(s) orally once a day  Neurontin 300 mg oral capsule: 1 cap(s) orally 2 times a day  SPIRIVA RESPIMAT 1.25 MCG INH:   tamsulosin 0.4 mg oral capsule: 1 cap(s) orally once a day (at bedtime)

## 2024-05-13 NOTE — DISCHARGE NOTE PROVIDER - HOSPITAL COURSE
89 year old male with hx of DM, hx of PE, off eliquis recently here with chest pressure that started earlier today, pain and pressure in left chest radiating to left arm while pt was sitting and watching TV.  Has been having some chest pain over the past 2 months no specific relation to activity. In ED CTA noted PNA. Admit for r/o ACS. PNA .        Problem/Plan - 1:  ·  Problem: Chest pain.  ·  Plan: Patient with non-specific chest pain, not localized to any one area and without obvious triggers. Also with associated throat pain  -CTA chest without PE, but with PNA as well as esophageal thickening concerning for esophagitis  -EKG without acute findings, troponin wnl, thus less likely cardiac   -c/w ASA  - Pt will follow up with cardiologist outpt     Problem/Plan - 2:  ·  Problem: PNA (pneumonia).   ·  Plan: cxr with findings similar to previous raising question of chronic changes.   CT with consolidations also noted.   S/P Azithro +ceftriaxone, dc on azithro and Augmentin 4 days     Problem/Plan - 3:  ·  Problem: Esophageal thickening.  ·  Plan: Pt noted to have esophageal thickening of CT scan, also with hx of throat pain  -could be contributing to reported non-specific chest pain.  -Pt's daughter want to take pt home and will see GI outpt     Problem/Plan - 4:  ·  Problem: DM (diabetes mellitus).  ·  Plan: Resume home med     Problem/Plan - 5:  ·  Problem: Diabetic neuropathy.  ·  Plan: Gabapentin 300 TID.     Problem/Plan - 6:  ·  Problem: BPH (benign prostatic hyperplasia).   ·  Plan: flomax.

## 2024-05-14 ENCOUNTER — APPOINTMENT (OUTPATIENT)
Dept: FAMILY MEDICINE | Facility: CLINIC | Age: 89
End: 2024-05-14

## 2024-05-20 DIAGNOSIS — E11.40 TYPE 2 DIABETES MELLITUS WITH DIABETIC NEUROPATHY, UNSPECIFIED: ICD-10-CM

## 2024-05-20 DIAGNOSIS — K22.89 OTHER SPECIFIED DISEASE OF ESOPHAGUS: ICD-10-CM

## 2024-05-20 DIAGNOSIS — N17.9 ACUTE KIDNEY FAILURE, UNSPECIFIED: ICD-10-CM

## 2024-05-20 DIAGNOSIS — H35.30 UNSPECIFIED MACULAR DEGENERATION: ICD-10-CM

## 2024-05-20 DIAGNOSIS — Z86.711 PERSONAL HISTORY OF PULMONARY EMBOLISM: ICD-10-CM

## 2024-05-20 DIAGNOSIS — J18.9 PNEUMONIA, UNSPECIFIED ORGANISM: ICD-10-CM

## 2024-05-20 DIAGNOSIS — Z88.0 ALLERGY STATUS TO PENICILLIN: ICD-10-CM

## 2024-05-20 DIAGNOSIS — R07.9 CHEST PAIN, UNSPECIFIED: ICD-10-CM

## 2024-05-20 DIAGNOSIS — I10 ESSENTIAL (PRIMARY) HYPERTENSION: ICD-10-CM

## 2024-05-31 ENCOUNTER — APPOINTMENT (OUTPATIENT)
Dept: ELECTROPHYSIOLOGY | Facility: CLINIC | Age: 89
End: 2024-05-31
Payer: MEDICARE

## 2024-05-31 ENCOUNTER — APPOINTMENT (OUTPATIENT)
Dept: FAMILY MEDICINE | Facility: CLINIC | Age: 89
End: 2024-05-31
Payer: MEDICARE

## 2024-05-31 ENCOUNTER — NON-APPOINTMENT (OUTPATIENT)
Age: 89
End: 2024-05-31

## 2024-05-31 VITALS
BODY MASS INDEX: 27.11 KG/M2 | OXYGEN SATURATION: 99 % | HEIGHT: 69 IN | SYSTOLIC BLOOD PRESSURE: 111 MMHG | DIASTOLIC BLOOD PRESSURE: 63 MMHG | HEART RATE: 86 BPM | WEIGHT: 183 LBS | RESPIRATION RATE: 16 BRPM | TEMPERATURE: 97.7 F

## 2024-05-31 DIAGNOSIS — K21.9 GASTRO-ESOPHAGEAL REFLUX DISEASE W/OUT ESOPHAGITIS: ICD-10-CM
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
## 2024-05-31 DIAGNOSIS — Z09 ENCOUNTER FOR FOLLOW-UP EXAMINATION AFTER COMPLETED TREATMENT FOR CONDITIONS OTHER THAN MALIGNANT NEOPLASM: ICD-10-CM

## 2024-05-31 PROCEDURE — 99495 TRANSJ CARE MGMT MOD F2F 14D: CPT

## 2024-05-31 PROCEDURE — 93298 REM INTERROG DEV EVAL SCRMS: CPT

## 2024-05-31 PROCEDURE — 36415 COLL VENOUS BLD VENIPUNCTURE: CPT

## 2024-05-31 RX ORDER — APIXABAN 2.5 MG/1
2.5 TABLET, FILM COATED ORAL
Qty: 60 | Refills: 2 | Status: DISCONTINUED | COMMUNITY
Start: 2022-05-19 | End: 2024-05-31

## 2024-05-31 RX ORDER — ASPIRIN 81 MG/1
81 TABLET ORAL DAILY
Qty: 90 | Refills: 1 | Status: DISCONTINUED | COMMUNITY
Start: 2021-12-20 | End: 2024-05-31

## 2024-05-31 NOTE — REVIEW OF SYSTEMS
[Sore Throat] : sore throat [Cough] : cough [Heartburn] : heartburn [Fever] : no fever [Chills] : no chills [Chest Pain] : no chest pain [Shortness Of Breath] : no shortness of breath [Constipation] : no constipation [Dysuria] : no dysuria [FreeTextEntry6] : not needing rescue inhaler  [de-identified] : difficulty with sleep up at night time; takes flexeril at night time

## 2024-05-31 NOTE — PHYSICAL EXAM
[No Acute Distress] : no acute distress [Normal Oropharynx] : the oropharynx was normal [No Lymphadenopathy] : no lymphadenopathy [No Edema] : there was no peripheral edema [No Extremity Clubbing/Cyanosis] : no extremity clubbing/cyanosis [Normal] : affect was normal and insight and judgment were intact [de-identified] : GUY [de-identified] : ptosis left upper eyelid

## 2024-05-31 NOTE — HISTORY OF PRESENT ILLNESS
[FreeTextEntry1] : U [de-identified] : HFU Was admitted for PNA, found to have esophageal thickening on imaging. Here with daughter Nimo. Daughter Regina on phone. Earlier this week. Was using walker and fell down.  Son helped him up.  Lives with son and granddaughter.  Walker flipped over.  Bumped head.  No bleeding.  No headaches.   No pain after. Took hot bath.  No longer on any blood thinners.  Speech Therapy and PT coming to the house.   Has been having a lot of mucus. Possible reflux component- Daughter has been giving OTC-40mg which is helping. Coughing no gas or belching.   No bitter taste in mouth.  Throat is more irritated and with phlegm.    Has trouble sleeping through the night.

## 2024-05-31 NOTE — PLAN
[FreeTextEntry1] : Hospital DC reviewed. Advising Cardio and Gastro follow-up.  GERD-continue omeprazole pending GI follow-up.  Multiple Falls-no longer on blood thinners.  PT referral placed.   Difficulty sleeping-discussed sunlight exposure during daytime. On Gabapentin.  Recheck labs.   DM-A1c 6.7 in hospital.  Will adjust meds based on labs.  Patient expressed understanding of plan.

## 2024-06-03 ENCOUNTER — APPOINTMENT (OUTPATIENT)
Dept: GASTROENTEROLOGY | Facility: CLINIC | Age: 89
End: 2024-06-03

## 2024-06-03 LAB
ALBUMIN SERPL ELPH-MCNC: 4 G/DL
ALP BLD-CCNC: 98 U/L
ALT SERPL-CCNC: 46 U/L
ANION GAP SERPL CALC-SCNC: 13 MMOL/L
AST SERPL-CCNC: 33 U/L
BASOPHILS # BLD AUTO: 0.04 K/UL
BASOPHILS NFR BLD AUTO: 0.7 %
BILIRUB SERPL-MCNC: 0.3 MG/DL
BUN SERPL-MCNC: 38 MG/DL
CALCIUM SERPL-MCNC: 8.9 MG/DL
CHLORIDE SERPL-SCNC: 106 MMOL/L
CO2 SERPL-SCNC: 19 MMOL/L
CREAT SERPL-MCNC: 1.9 MG/DL
EGFR: 33 ML/MIN/1.73M2
EOSINOPHIL # BLD AUTO: 0.28 K/UL
EOSINOPHIL NFR BLD AUTO: 4.8 %
FOLATE SERPL-MCNC: 8.2 NG/ML
GLUCOSE SERPL-MCNC: 226 MG/DL
HCT VFR BLD CALC: 35.1 %
HGB BLD-MCNC: 11.4 G/DL
IMM GRANULOCYTES NFR BLD AUTO: 0.3 %
LYMPHOCYTES # BLD AUTO: 1.73 K/UL
LYMPHOCYTES NFR BLD AUTO: 29.6 %
MAN DIFF?: NORMAL
MCHC RBC-ENTMCNC: 29.4 PG
MCHC RBC-ENTMCNC: 32.5 GM/DL
MCV RBC AUTO: 90.5 FL
MONOCYTES # BLD AUTO: 0.56 K/UL
MONOCYTES NFR BLD AUTO: 9.6 %
NEUTROPHILS # BLD AUTO: 3.22 K/UL
NEUTROPHILS NFR BLD AUTO: 55 %
PLATELET # BLD AUTO: 200 K/UL
POTASSIUM SERPL-SCNC: 4.5 MMOL/L
PROT SERPL-MCNC: 7.3 G/DL
RBC # BLD: 3.88 M/UL
RBC # FLD: 14.2 %
SODIUM SERPL-SCNC: 138 MMOL/L
VIT B12 SERPL-MCNC: 716 PG/ML
WBC # FLD AUTO: 5.85 K/UL

## 2024-06-28 ENCOUNTER — RX RENEWAL (OUTPATIENT)
Age: 89
End: 2024-06-28

## 2024-07-05 ENCOUNTER — APPOINTMENT (OUTPATIENT)
Dept: ELECTROPHYSIOLOGY | Facility: CLINIC | Age: 89
End: 2024-07-05
Payer: MEDICARE

## 2024-07-05 ENCOUNTER — NON-APPOINTMENT (OUTPATIENT)
Age: 89
End: 2024-07-05

## 2024-07-05 PROCEDURE — 93298 REM INTERROG DEV EVAL SCRMS: CPT

## 2024-08-09 ENCOUNTER — NON-APPOINTMENT (OUTPATIENT)
Age: 89
End: 2024-08-09

## 2024-08-09 ENCOUNTER — APPOINTMENT (OUTPATIENT)
Dept: ELECTROPHYSIOLOGY | Facility: CLINIC | Age: 89
End: 2024-08-09

## 2024-08-09 PROCEDURE — 93298 REM INTERROG DEV EVAL SCRMS: CPT

## 2024-08-14 ENCOUNTER — APPOINTMENT (OUTPATIENT)
Dept: FAMILY MEDICINE | Facility: CLINIC | Age: 89
End: 2024-08-14

## 2024-08-25 RX ADMIN — OXYCODONE HYDROCHLORIDE 5 MILLIGRAM(S): 5 TABLET ORAL at 23:20

## 2024-08-27 ENCOUNTER — TRANSCRIPTION ENCOUNTER (OUTPATIENT)
Age: 89
End: 2024-08-27

## 2024-08-28 ENCOUNTER — INPATIENT (INPATIENT)
Facility: HOSPITAL | Age: 89
LOS: 5 days | DRG: 84 | End: 2024-09-03
Attending: STUDENT IN AN ORGANIZED HEALTH CARE EDUCATION/TRAINING PROGRAM | Admitting: STUDENT IN AN ORGANIZED HEALTH CARE EDUCATION/TRAINING PROGRAM
Payer: MEDICARE

## 2024-08-28 VITALS
SYSTOLIC BLOOD PRESSURE: 120 MMHG | RESPIRATION RATE: 20 BRPM | OXYGEN SATURATION: 99 % | WEIGHT: 179.9 LBS | DIASTOLIC BLOOD PRESSURE: 62 MMHG | HEART RATE: 62 BPM | HEIGHT: 69 IN | TEMPERATURE: 98 F

## 2024-08-28 DIAGNOSIS — S06.5XAA TRAUMATIC SUBDURAL HEMORRHAGE WITH LOSS OF CONSCIOUSNESS STATUS UNKNOWN, INITIAL ENCOUNTER: ICD-10-CM

## 2024-08-28 DIAGNOSIS — Z98.890 OTHER SPECIFIED POSTPROCEDURAL STATES: Chronic | ICD-10-CM

## 2024-08-28 LAB
ALBUMIN SERPL ELPH-MCNC: 3.2 G/DL — LOW (ref 3.3–5)
ALBUMIN SERPL ELPH-MCNC: 3.5 G/DL — SIGNIFICANT CHANGE UP (ref 3.3–5)
ALP SERPL-CCNC: 100 U/L — SIGNIFICANT CHANGE UP (ref 40–120)
ALP SERPL-CCNC: 100 U/L — SIGNIFICANT CHANGE UP (ref 40–120)
ALT FLD-CCNC: 18 U/L — SIGNIFICANT CHANGE UP (ref 10–45)
ALT FLD-CCNC: 20 U/L — SIGNIFICANT CHANGE UP (ref 10–45)
ANION GAP SERPL CALC-SCNC: 10 MMOL/L — SIGNIFICANT CHANGE UP (ref 5–17)
ANION GAP SERPL CALC-SCNC: 9 MMOL/L — SIGNIFICANT CHANGE UP (ref 5–17)
APPEARANCE UR: CLEAR — SIGNIFICANT CHANGE UP
APTT BLD: 28 SEC — SIGNIFICANT CHANGE UP (ref 24.5–35.6)
AST SERPL-CCNC: 13 U/L — SIGNIFICANT CHANGE UP (ref 10–40)
AST SERPL-CCNC: 17 U/L — SIGNIFICANT CHANGE UP (ref 10–40)
BACTERIA # UR AUTO: NEGATIVE /HPF — SIGNIFICANT CHANGE UP
BASOPHILS # BLD AUTO: 0.05 K/UL — SIGNIFICANT CHANGE UP (ref 0–0.2)
BASOPHILS NFR BLD AUTO: 0.8 % — SIGNIFICANT CHANGE UP (ref 0–2)
BILIRUB SERPL-MCNC: 0.2 MG/DL — SIGNIFICANT CHANGE UP (ref 0.2–1.2)
BILIRUB SERPL-MCNC: 0.3 MG/DL — SIGNIFICANT CHANGE UP (ref 0.2–1.2)
BILIRUB UR-MCNC: NEGATIVE — SIGNIFICANT CHANGE UP
BUN SERPL-MCNC: 24 MG/DL — HIGH (ref 7–23)
BUN SERPL-MCNC: 28 MG/DL — HIGH (ref 7–23)
CALCIUM SERPL-MCNC: 8.5 MG/DL — SIGNIFICANT CHANGE UP (ref 8.4–10.5)
CALCIUM SERPL-MCNC: 8.8 MG/DL — SIGNIFICANT CHANGE UP (ref 8.4–10.5)
CAST: 3 /LPF — SIGNIFICANT CHANGE UP (ref 0–4)
CHLORIDE SERPL-SCNC: 103 MMOL/L — SIGNIFICANT CHANGE UP (ref 96–108)
CHLORIDE SERPL-SCNC: 106 MMOL/L — SIGNIFICANT CHANGE UP (ref 96–108)
CO2 SERPL-SCNC: 20 MMOL/L — LOW (ref 22–31)
CO2 SERPL-SCNC: 22 MMOL/L — SIGNIFICANT CHANGE UP (ref 22–31)
COLOR SPEC: YELLOW — SIGNIFICANT CHANGE UP
CREAT ?TM UR-MCNC: 31 MG/DL — SIGNIFICANT CHANGE UP
CREAT SERPL-MCNC: 1.63 MG/DL — HIGH (ref 0.5–1.3)
CREAT SERPL-MCNC: 1.95 MG/DL — HIGH (ref 0.5–1.3)
DIFF PNL FLD: ABNORMAL
EGFR: 32 ML/MIN/1.73M2 — LOW
EGFR: 40 ML/MIN/1.73M2 — LOW
EOSINOPHIL # BLD AUTO: 0.31 K/UL — SIGNIFICANT CHANGE UP (ref 0–0.5)
EOSINOPHIL NFR BLD AUTO: 5.1 % — SIGNIFICANT CHANGE UP (ref 0–6)
GAS PNL BLDV: SIGNIFICANT CHANGE UP
GLUCOSE BLDC GLUCOMTR-MCNC: 163 MG/DL — HIGH (ref 70–99)
GLUCOSE BLDC GLUCOMTR-MCNC: 191 MG/DL — HIGH (ref 70–99)
GLUCOSE SERPL-MCNC: 113 MG/DL — HIGH (ref 70–99)
GLUCOSE SERPL-MCNC: 252 MG/DL — HIGH (ref 70–99)
GLUCOSE UR QL: NEGATIVE MG/DL — SIGNIFICANT CHANGE UP
HCT VFR BLD CALC: 30.7 % — LOW (ref 39–50)
HCT VFR BLD CALC: 31.6 % — LOW (ref 39–50)
HGB BLD-MCNC: 10 G/DL — LOW (ref 13–17)
HGB BLD-MCNC: 10 G/DL — LOW (ref 13–17)
INR BLD: 1.21 RATIO — HIGH (ref 0.85–1.18)
KETONES UR-MCNC: NEGATIVE MG/DL — SIGNIFICANT CHANGE UP
LEUKOCYTE ESTERASE UR-ACNC: NEGATIVE — SIGNIFICANT CHANGE UP
LYMPHOCYTES # BLD AUTO: 0.77 K/UL — LOW (ref 1–3.3)
LYMPHOCYTES # BLD AUTO: 12.7 % — LOW (ref 13–44)
MAGNESIUM SERPL-MCNC: 1.9 MG/DL — SIGNIFICANT CHANGE UP (ref 1.6–2.6)
MAGNESIUM SERPL-MCNC: 1.9 MG/DL — SIGNIFICANT CHANGE UP (ref 1.6–2.6)
MANUAL SMEAR VERIFICATION: SIGNIFICANT CHANGE UP
MCHC RBC-ENTMCNC: 28.7 PG — SIGNIFICANT CHANGE UP (ref 27–34)
MCHC RBC-ENTMCNC: 28.9 PG — SIGNIFICANT CHANGE UP (ref 27–34)
MCHC RBC-ENTMCNC: 31.6 GM/DL — LOW (ref 32–36)
MCHC RBC-ENTMCNC: 32.6 GM/DL — SIGNIFICANT CHANGE UP (ref 32–36)
MCV RBC AUTO: 88.7 FL — SIGNIFICANT CHANGE UP (ref 80–100)
MCV RBC AUTO: 90.8 FL — SIGNIFICANT CHANGE UP (ref 80–100)
MONOCYTES # BLD AUTO: 0.41 K/UL — SIGNIFICANT CHANGE UP (ref 0–0.9)
MONOCYTES NFR BLD AUTO: 6.8 % — SIGNIFICANT CHANGE UP (ref 2–14)
NEUTROPHILS # BLD AUTO: 4.51 K/UL — SIGNIFICANT CHANGE UP (ref 1.8–7.4)
NEUTROPHILS NFR BLD AUTO: 74.6 % — SIGNIFICANT CHANGE UP (ref 43–77)
NITRITE UR-MCNC: NEGATIVE — SIGNIFICANT CHANGE UP
NRBC # BLD: 0 /100 WBCS — SIGNIFICANT CHANGE UP (ref 0–0)
OSMOLALITY SERPL: 294 MOSMOL/KG — SIGNIFICANT CHANGE UP (ref 280–301)
OSMOLALITY UR: 254 MOS/KG — LOW (ref 300–900)
PH UR: 5.5 — SIGNIFICANT CHANGE UP (ref 5–8)
PHOSPHATE SERPL-MCNC: 2.5 MG/DL — SIGNIFICANT CHANGE UP (ref 2.5–4.5)
PHOSPHATE SERPL-MCNC: 2.8 MG/DL — SIGNIFICANT CHANGE UP (ref 2.5–4.5)
PLAT MORPH BLD: NORMAL — SIGNIFICANT CHANGE UP
PLATELET # BLD AUTO: 173 K/UL — SIGNIFICANT CHANGE UP (ref 150–400)
PLATELET # BLD AUTO: 185 K/UL — SIGNIFICANT CHANGE UP (ref 150–400)
POTASSIUM SERPL-MCNC: 4 MMOL/L — SIGNIFICANT CHANGE UP (ref 3.5–5.3)
POTASSIUM SERPL-MCNC: 4.2 MMOL/L — SIGNIFICANT CHANGE UP (ref 3.5–5.3)
POTASSIUM SERPL-SCNC: 4 MMOL/L — SIGNIFICANT CHANGE UP (ref 3.5–5.3)
POTASSIUM SERPL-SCNC: 4.2 MMOL/L — SIGNIFICANT CHANGE UP (ref 3.5–5.3)
POTASSIUM UR-SCNC: 16 MMOL/L — SIGNIFICANT CHANGE UP
PROT ?TM UR-MCNC: 16 MG/DL — HIGH (ref 0–12)
PROT SERPL-MCNC: 6.7 G/DL — SIGNIFICANT CHANGE UP (ref 6–8.3)
PROT SERPL-MCNC: 6.9 G/DL — SIGNIFICANT CHANGE UP (ref 6–8.3)
PROT UR-MCNC: SIGNIFICANT CHANGE UP MG/DL
PROT/CREAT UR-RTO: 0.5 RATIO — HIGH (ref 0–0.2)
PROTHROM AB SERPL-ACNC: 12.6 SEC — SIGNIFICANT CHANGE UP (ref 9.5–13)
RBC # BLD: 3.46 M/UL — LOW (ref 4.2–5.8)
RBC # BLD: 3.48 M/UL — LOW (ref 4.2–5.8)
RBC # FLD: 13.4 % — SIGNIFICANT CHANGE UP (ref 10.3–14.5)
RBC # FLD: 13.6 % — SIGNIFICANT CHANGE UP (ref 10.3–14.5)
RBC BLD AUTO: SIGNIFICANT CHANGE UP
RBC CASTS # UR COMP ASSIST: 170 /HPF — HIGH (ref 0–4)
SODIUM SERPL-SCNC: 134 MMOL/L — LOW (ref 135–145)
SODIUM SERPL-SCNC: 136 MMOL/L — SIGNIFICANT CHANGE UP (ref 135–145)
SODIUM UR-SCNC: 40 MMOL/L — SIGNIFICANT CHANGE UP
SP GR SPEC: 1.02 — SIGNIFICANT CHANGE UP (ref 1–1.03)
SQUAMOUS # UR AUTO: 1 /HPF — SIGNIFICANT CHANGE UP (ref 0–5)
UROBILINOGEN FLD QL: 0.2 MG/DL — SIGNIFICANT CHANGE UP (ref 0.2–1)
WBC # BLD: 4.91 K/UL — SIGNIFICANT CHANGE UP (ref 3.8–10.5)
WBC # BLD: 6.05 K/UL — SIGNIFICANT CHANGE UP (ref 3.8–10.5)
WBC # FLD AUTO: 4.91 K/UL — SIGNIFICANT CHANGE UP (ref 3.8–10.5)
WBC # FLD AUTO: 6.05 K/UL — SIGNIFICANT CHANGE UP (ref 3.8–10.5)
WBC UR QL: 1 /HPF — SIGNIFICANT CHANGE UP (ref 0–5)

## 2024-08-28 PROCEDURE — 70450 CT HEAD/BRAIN W/O DYE: CPT | Mod: 26,MC

## 2024-08-28 PROCEDURE — 99291 CRITICAL CARE FIRST HOUR: CPT

## 2024-08-28 PROCEDURE — 61781 SCAN PROC CRANIAL INTRA: CPT

## 2024-08-28 PROCEDURE — 99222 1ST HOSP IP/OBS MODERATE 55: CPT | Mod: 57

## 2024-08-28 PROCEDURE — 71045 X-RAY EXAM CHEST 1 VIEW: CPT | Mod: 26

## 2024-08-28 PROCEDURE — 93010 ELECTROCARDIOGRAM REPORT: CPT

## 2024-08-28 PROCEDURE — 61154 BURR HOLE W/EVAC&/DRG HMTMA: CPT | Mod: LT

## 2024-08-28 DEVICE — SURGICEL 2 X 14": Type: IMPLANTABLE DEVICE | Site: LEFT | Status: FUNCTIONAL

## 2024-08-28 DEVICE — SURGIFLO MATRIX WITH THROMBIN KIT: Type: IMPLANTABLE DEVICE | Site: LEFT | Status: FUNCTIONAL

## 2024-08-28 DEVICE — SURGIFOAM PAD 8CM X 12.5CM X 10MM (100): Type: IMPLANTABLE DEVICE | Site: LEFT | Status: FUNCTIONAL

## 2024-08-28 DEVICE — KIT A-LINE 1LUM 20G X 12CM SAFE KIT: Type: IMPLANTABLE DEVICE | Site: LEFT | Status: FUNCTIONAL

## 2024-08-28 DEVICE — PLATE COVER BURRHOLE UN3 W/TAB 10MM: Type: IMPLANTABLE DEVICE | Site: LEFT | Status: FUNCTIONAL

## 2024-08-28 DEVICE — SCREW UN3 AXS SELF DRILL 1.5X4MM: Type: IMPLANTABLE DEVICE | Site: LEFT | Status: FUNCTIONAL

## 2024-08-28 RX ORDER — DEXMEDETOMIDINE HYDROCHLORIDE IN 0.9% SODIUM CHLORIDE 4 UG/ML
0.2 INJECTION INTRAVENOUS
Qty: 200 | Refills: 0 | Status: DISCONTINUED | OUTPATIENT
Start: 2024-08-28 | End: 2024-08-29

## 2024-08-28 RX ORDER — BRIVARACETAM 100 MG/1
50 TABLET, FILM COATED ORAL EVERY 12 HOURS
Refills: 0 | Status: DISCONTINUED | OUTPATIENT
Start: 2024-08-28 | End: 2024-08-28

## 2024-08-28 RX ORDER — CYCLOBENZAPRINE HCL 10 MG
5 TABLET ORAL ONCE
Refills: 0 | Status: DISCONTINUED | OUTPATIENT
Start: 2024-08-28 | End: 2024-08-28

## 2024-08-28 RX ORDER — TIOTROPIUM BROMIDE INHALATION SPRAY 3.12 UG/1
2 SPRAY, METERED RESPIRATORY (INHALATION) DAILY
Refills: 0 | Status: DISCONTINUED | OUTPATIENT
Start: 2024-08-28 | End: 2024-09-03

## 2024-08-28 RX ORDER — GLUCAGON INJECTION, SOLUTION 1 MG/.2ML
1 INJECTION, SOLUTION SUBCUTANEOUS ONCE
Refills: 0 | Status: DISCONTINUED | OUTPATIENT
Start: 2024-08-28 | End: 2024-08-30

## 2024-08-28 RX ORDER — SODIUM CHLORIDE 9 MG/ML
1000 INJECTION INTRAMUSCULAR; INTRAVENOUS; SUBCUTANEOUS
Refills: 0 | Status: DISCONTINUED | OUTPATIENT
Start: 2024-08-28 | End: 2024-08-30

## 2024-08-28 RX ORDER — ACETAMINOPHEN 325 MG/1
1000 TABLET ORAL EVERY 6 HOURS
Refills: 0 | Status: DISCONTINUED | OUTPATIENT
Start: 2024-08-28 | End: 2024-08-28

## 2024-08-28 RX ORDER — CHLORHEXIDINE GLUCONATE 40 MG/ML
1 SOLUTION TOPICAL DAILY
Refills: 0 | Status: DISCONTINUED | OUTPATIENT
Start: 2024-08-28 | End: 2024-08-28

## 2024-08-28 RX ORDER — TAMSULOSIN HYDROCHLORIDE 0.4 MG/1
0.4 CAPSULE ORAL AT BEDTIME
Refills: 0 | Status: DISCONTINUED | OUTPATIENT
Start: 2024-08-28 | End: 2024-08-30

## 2024-08-28 RX ORDER — DORZOLAMIDE HCL/TIMOLOL MALEAT 22.3-6.8/1
1 DROPS OPHTHALMIC (EYE)
Refills: 0 | DISCHARGE

## 2024-08-28 RX ORDER — TIOTROPIUM BROMIDE INHALATION SPRAY 3.12 UG/1
2 SPRAY, METERED RESPIRATORY (INHALATION) DAILY
Refills: 0 | Status: DISCONTINUED | OUTPATIENT
Start: 2024-08-28 | End: 2024-08-28

## 2024-08-28 RX ORDER — OMEPRAZOLE 40 MG/1
1 CAPSULE, DELAYED RELEASE ORAL
Refills: 0 | DISCHARGE

## 2024-08-28 RX ORDER — GABAPENTIN 100 MG
300 CAPSULE ORAL ONCE
Refills: 0 | Status: COMPLETED | OUTPATIENT
Start: 2024-08-28 | End: 2024-08-28

## 2024-08-28 RX ORDER — SODIUM PHOSPHATE, MONOBASIC, MONOHYDRATE AND SODIUM PHOSPHATE, DIBASIC ANHYDROUS 276; 142 MG/ML; MG/ML
15 INJECTION, SOLUTION INTRAVENOUS ONCE
Refills: 0 | Status: COMPLETED | OUTPATIENT
Start: 2024-08-28 | End: 2024-08-29

## 2024-08-28 RX ORDER — SENNA 187 MG
2 TABLET ORAL AT BEDTIME
Refills: 0 | Status: DISCONTINUED | OUTPATIENT
Start: 2024-08-28 | End: 2024-08-28

## 2024-08-28 RX ORDER — POLYETHYLENE GLYCOL 3350 17 G/17G
17 POWDER, FOR SOLUTION ORAL DAILY
Refills: 0 | Status: DISCONTINUED | OUTPATIENT
Start: 2024-08-28 | End: 2024-08-28

## 2024-08-28 RX ORDER — CHLORHEXIDINE GLUCONATE 40 MG/ML
15 SOLUTION TOPICAL EVERY 12 HOURS
Refills: 0 | Status: DISCONTINUED | OUTPATIENT
Start: 2024-08-28 | End: 2024-08-29

## 2024-08-28 RX ORDER — LATANOPROST 50 UG/ML
1 SOLUTION OPHTHALMIC AT BEDTIME
Refills: 0 | Status: DISCONTINUED | OUTPATIENT
Start: 2024-08-28 | End: 2024-08-28

## 2024-08-28 RX ORDER — CYCLOBENZAPRINE HCL 10 MG
1 TABLET ORAL
Refills: 0 | DISCHARGE

## 2024-08-28 RX ORDER — PANTOPRAZOLE SODIUM 40 MG
40 TABLET, DELAYED RELEASE (ENTERIC COATED) ORAL DAILY
Refills: 0 | Status: DISCONTINUED | OUTPATIENT
Start: 2024-08-28 | End: 2024-08-29

## 2024-08-28 RX ORDER — PANTOPRAZOLE SODIUM 40 MG
40 TABLET, DELAYED RELEASE (ENTERIC COATED) ORAL
Refills: 0 | Status: DISCONTINUED | OUTPATIENT
Start: 2024-08-28 | End: 2024-08-28

## 2024-08-28 RX ORDER — SENNA 187 MG
2 TABLET ORAL AT BEDTIME
Refills: 0 | Status: DISCONTINUED | OUTPATIENT
Start: 2024-08-28 | End: 2024-08-30

## 2024-08-28 RX ORDER — OXYCODONE HYDROCHLORIDE 5 MG/1
5 TABLET ORAL EVERY 4 HOURS
Refills: 0 | Status: DISCONTINUED | OUTPATIENT
Start: 2024-08-28 | End: 2024-08-30

## 2024-08-28 RX ORDER — SODIUM CHLORIDE 9 MG/ML
1000 INJECTION INTRAMUSCULAR; INTRAVENOUS; SUBCUTANEOUS
Refills: 0 | Status: DISCONTINUED | OUTPATIENT
Start: 2024-08-28 | End: 2024-08-28

## 2024-08-28 RX ORDER — FINASTERIDE 1 MG/1
5 TABLET, FILM COATED ORAL DAILY
Refills: 0 | Status: DISCONTINUED | OUTPATIENT
Start: 2024-08-28 | End: 2024-08-28

## 2024-08-28 RX ORDER — LEVETIRACETAM 1000 MG/1
500 TABLET ORAL EVERY 12 HOURS
Refills: 0 | Status: DISCONTINUED | OUTPATIENT
Start: 2024-08-28 | End: 2024-08-28

## 2024-08-28 RX ORDER — ACETAMINOPHEN 325 MG/1
650 TABLET ORAL EVERY 6 HOURS
Refills: 0 | Status: DISCONTINUED | OUTPATIENT
Start: 2024-08-28 | End: 2024-08-28

## 2024-08-28 RX ORDER — SODIUM CHLORIDE 9 MG/ML
500 INJECTION INTRAMUSCULAR; INTRAVENOUS; SUBCUTANEOUS ONCE
Refills: 0 | Status: COMPLETED | OUTPATIENT
Start: 2024-08-28 | End: 2024-08-28

## 2024-08-28 RX ORDER — OXYCODONE HYDROCHLORIDE 5 MG/1
5 TABLET ORAL EVERY 4 HOURS
Refills: 0 | Status: DISCONTINUED | OUTPATIENT
Start: 2024-08-28 | End: 2024-08-28

## 2024-08-28 RX ORDER — ACETAMINOPHEN 325 MG/1
650 TABLET ORAL EVERY 6 HOURS
Refills: 0 | Status: DISCONTINUED | OUTPATIENT
Start: 2024-08-28 | End: 2024-08-30

## 2024-08-28 RX ORDER — LATANOPROST 50 UG/ML
1 SOLUTION OPHTHALMIC
Refills: 0 | DISCHARGE

## 2024-08-28 RX ORDER — TAMSULOSIN HYDROCHLORIDE 0.4 MG/1
0.4 CAPSULE ORAL AT BEDTIME
Refills: 0 | Status: DISCONTINUED | OUTPATIENT
Start: 2024-08-28 | End: 2024-08-28

## 2024-08-28 RX ORDER — ONDANSETRON 2 MG/ML
4 INJECTION, SOLUTION INTRAMUSCULAR; INTRAVENOUS EVERY 6 HOURS
Refills: 0 | Status: DISCONTINUED | OUTPATIENT
Start: 2024-08-28 | End: 2024-08-28

## 2024-08-28 RX ORDER — DEXTROSE 15 G/33 G
15 GEL IN PACKET (GRAM) ORAL ONCE
Refills: 0 | Status: DISCONTINUED | OUTPATIENT
Start: 2024-08-28 | End: 2024-08-28

## 2024-08-28 RX ORDER — CHLORHEXIDINE GLUCONATE 40 MG/ML
1 SOLUTION TOPICAL DAILY
Refills: 0 | Status: DISCONTINUED | OUTPATIENT
Start: 2024-08-28 | End: 2024-09-03

## 2024-08-28 RX ORDER — DEXTROSE 15 G/33 G
25 GEL IN PACKET (GRAM) ORAL ONCE
Refills: 0 | Status: DISCONTINUED | OUTPATIENT
Start: 2024-08-28 | End: 2024-08-30

## 2024-08-28 RX ORDER — FINASTERIDE 1 MG/1
1 TABLET, FILM COATED ORAL
Refills: 0 | DISCHARGE

## 2024-08-28 RX ORDER — TIOTROPIUM BROMIDE INHALATION SPRAY 3.12 UG/1
2 SPRAY, METERED RESPIRATORY (INHALATION)
Refills: 0 | DISCHARGE

## 2024-08-28 RX ORDER — BRIVARACETAM 100 MG/1
50 TABLET, FILM COATED ORAL
Refills: 0 | Status: DISCONTINUED | OUTPATIENT
Start: 2024-08-28 | End: 2024-09-03

## 2024-08-28 RX ORDER — GABAPENTIN 100 MG
1 CAPSULE ORAL
Refills: 0 | DISCHARGE

## 2024-08-28 RX ORDER — FINASTERIDE 1 MG/1
5 TABLET, FILM COATED ORAL DAILY
Refills: 0 | Status: DISCONTINUED | OUTPATIENT
Start: 2024-08-28 | End: 2024-08-30

## 2024-08-28 RX ORDER — POLYETHYLENE GLYCOL 3350 17 G/17G
17 POWDER, FOR SOLUTION ORAL DAILY
Refills: 0 | Status: DISCONTINUED | OUTPATIENT
Start: 2024-08-28 | End: 2024-08-30

## 2024-08-28 RX ORDER — NICARDIPINE HCL 20 MG
5 CAPSULE ORAL
Qty: 40 | Refills: 0 | Status: DISCONTINUED | OUTPATIENT
Start: 2024-08-28 | End: 2024-08-29

## 2024-08-28 RX ORDER — DEXTROSE 15 G/33 G
12.5 GEL IN PACKET (GRAM) ORAL ONCE
Refills: 0 | Status: DISCONTINUED | OUTPATIENT
Start: 2024-08-28 | End: 2024-08-28

## 2024-08-28 RX ORDER — DEXTROSE 15 G/33 G
25 GEL IN PACKET (GRAM) ORAL ONCE
Refills: 0 | Status: DISCONTINUED | OUTPATIENT
Start: 2024-08-28 | End: 2024-08-28

## 2024-08-28 RX ORDER — GLYBURIDE 5 MG
1 TABLET ORAL
Refills: 0 | DISCHARGE

## 2024-08-28 RX ORDER — VANCOMYCIN/0.9 % SOD CHLORIDE 1.75G/25
1250 PLASTIC BAG, INJECTION (ML) INTRAVENOUS EVERY 12 HOURS
Refills: 0 | Status: DISCONTINUED | OUTPATIENT
Start: 2024-08-28 | End: 2024-08-29

## 2024-08-28 RX ORDER — DORZOLAMIDE HCL/TIMOLOL MALEAT 22.3-6.8/1
1 DROPS OPHTHALMIC (EYE) ONCE
Refills: 0 | Status: COMPLETED | OUTPATIENT
Start: 2024-08-28 | End: 2024-08-28

## 2024-08-28 RX ORDER — DEXTROSE 15 G/33 G
15 GEL IN PACKET (GRAM) ORAL ONCE
Refills: 0 | Status: DISCONTINUED | OUTPATIENT
Start: 2024-08-28 | End: 2024-08-30

## 2024-08-28 RX ORDER — OXYCODONE HYDROCHLORIDE 5 MG/1
10 TABLET ORAL EVERY 4 HOURS
Refills: 0 | Status: DISCONTINUED | OUTPATIENT
Start: 2024-08-28 | End: 2024-08-28

## 2024-08-28 RX ORDER — LATANOPROST 50 UG/ML
1 SOLUTION OPHTHALMIC AT BEDTIME
Refills: 0 | Status: DISCONTINUED | OUTPATIENT
Start: 2024-08-28 | End: 2024-09-03

## 2024-08-28 RX ORDER — GLYBURIDE 5 MG
2.5 TABLET ORAL
Refills: 0 | Status: DISCONTINUED | OUTPATIENT
Start: 2024-08-28 | End: 2024-08-28

## 2024-08-28 RX ORDER — TAMSULOSIN HYDROCHLORIDE 0.4 MG/1
1 CAPSULE ORAL
Refills: 0 | DISCHARGE

## 2024-08-28 RX ORDER — GLUCAGON INJECTION, SOLUTION 1 MG/.2ML
1 INJECTION, SOLUTION SUBCUTANEOUS ONCE
Refills: 0 | Status: DISCONTINUED | OUTPATIENT
Start: 2024-08-28 | End: 2024-08-28

## 2024-08-28 RX ADMIN — Medication 300 MILLIGRAM(S): at 17:24

## 2024-08-28 RX ADMIN — SODIUM CHLORIDE 1000 MILLILITER(S): 9 INJECTION INTRAMUSCULAR; INTRAVENOUS; SUBCUTANEOUS at 16:49

## 2024-08-28 RX ADMIN — DEXMEDETOMIDINE HYDROCHLORIDE IN 0.9% SODIUM CHLORIDE 4.08 MICROGRAM(S)/KG/HR: 4 INJECTION INTRAVENOUS at 22:28

## 2024-08-28 RX ADMIN — Medication 1 DROP(S): at 17:24

## 2024-08-28 RX ADMIN — CHLORHEXIDINE GLUCONATE 1 APPLICATION(S): 40 SOLUTION TOPICAL at 17:25

## 2024-08-28 RX ADMIN — BRIVARACETAM 50 MILLIGRAM(S): 100 TABLET, FILM COATED ORAL at 17:24

## 2024-08-28 RX ADMIN — SODIUM CHLORIDE 70 MILLILITER(S): 9 INJECTION INTRAMUSCULAR; INTRAVENOUS; SUBCUTANEOUS at 13:02

## 2024-08-28 RX ADMIN — FINASTERIDE 5 MILLIGRAM(S): 1 TABLET, FILM COATED ORAL at 17:24

## 2024-08-28 RX ADMIN — Medication 166.67 MILLIGRAM(S): at 22:00

## 2024-08-28 RX ADMIN — Medication 2: at 17:27

## 2024-08-28 RX ADMIN — Medication 25 MG/HR: at 22:28

## 2024-08-28 NOTE — ED ADULT NURSE NOTE - CHIEF COMPLAINT QUOTE
Slurred speech and unsteady gait starting this morning at approximately 8am. Found to have SDH at [facility].

## 2024-08-28 NOTE — PROGRESS NOTE ADULT - SUBJECTIVE AND OBJECTIVE BOX
SUMMARY:    HOSPITAL COURSE:    24 HOUR EVENTS:     ADMISSION SCORES:   GCS: HH: MF: NIHSS: ICH Score:    SEDATION SCORES:  RASS: CAM-ICU:     REVIEW OF SYSTEMS:    ALLERGIES: Allergies    Tolerated ceftriaxone 5/2024 (Other)  penicillins (Rash)    Intolerances        VITALS/DATA/ORDERS: [] Reviewed    DEVICES:   [] Restraints [] PIVs [] ET tube [] central line [] PICC [] arterial line [] jaramillo [] NGT/OGT [] EVD [] LD [] OFELIA/HMV [] LiCOX [] ICP monitor [] Trach [] PEG [] Chest Tube [] other:    EXAMINATION:  General: No acute distress  HEENT: Anicteric sclerae  Cardiac: [id number]  Lungs: Clear  Abdomen: Soft, non-tender, +BS  Extremities: No c/c/e  Skin/Incision Site: Clean, dry and intact  Neurologic: Awake, alert, fully oriented, follows commands, PERRL, VFFtc, EOMI, face symmetric, tongue midline, no drift, full strength

## 2024-08-28 NOTE — PROGRESS NOTE ADULT - ASSESSMENT
ASSESSMENT/PLAN:  89M, admitted for traumatic subacute on chronic SDH.    NEURO:  -Preop mode for possible Left crani for SDH evac.  -Q1H neurochecks.  -Briviact 50BID.  Activity: [X] mobilize as tolerated [] Bedrest [X] PT [X] OT [] PMNR    PULM:  SpO2 goal >92%.    CV:  SBP goal 100-160.    RENAL:  -Fluids: IV NS at 70cc/hr.  -Tamsulosin for BPH.    GI:  Diet: NPO for OR  GI prophylaxis [X] not indicated [] PPI [] other:  Bowel regimen [] colace [X] senna [] other:    ENDO:   Goal euglycemia (-180), on SSI.    HEME/ONC:  VTE prophylaxis: SCDs    ID:  Monitor for fevers.    MISC:    SOCIAL/FAMILY:  [X] awaiting [] updated at bedside [] family meeting    CODE STATUS:  [X] Full Code [] DNR [] DNI [] Palliative/Comfort Care    DISPOSITION:  [X] ICU [] Stroke Unit [] Floor [] EMU [] RCU [] PCU

## 2024-08-28 NOTE — PROGRESS NOTE ADULT - SUBJECTIVE AND OBJECTIVE BOX
SUMMARY:  89M, PMH PE not currently on AC/AP, T2DM, macular degeneration, presenting with multiple falls over the plast week and dysarthria. 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     HOSPITAL COURSE:  8/28: admitted to [facility].    ADMISSION SCORES:   GCS: 15.    REVIEW OF SYSTEMS: None other than stated in HPI.    ALLERGIES: Allergies    Tolerated ceftriaxone 5/2024 (Other)  penicillins (Rash)    Intolerances                          10.0   6.05  )-----------( 185      ( 28 Aug 2024 11:41 )             31.6     08-28    134<L>  |  103  |  28<H>  ----------------------------<  252<H>  4.0   |  22  |  1.95<H>    Ca    8.8      28 Aug 2024 11:41  Phos  2.5     08-28  Mg     1.9     08-28    TPro  6.9  /  Alb  3.5  /  TBili  0.3  /  DBili  x   /  AST  17  /  ALT  20  /  AlkPhos  100  08-28      MEDICATIONS  (STANDING):  atorvastatin 40 milliGRAM(s) Oral at bedtime  brivaracetam 50 milliGRAM(s) Oral every 12 hours  chlorhexidine 4% Liquid 1 Application(s) Topical daily  dorzolamide 2%/timolol 0.5% Ophthalmic Solution 1 Drop(s) Both EYES Once  finasteride 5 milliGRAM(s) Oral daily  gabapentin 300 milliGRAM(s) Oral Once  insulin lispro (ADMELOG) corrective regimen sliding scale   SubCutaneous Before meals and at bedtime  latanoprost 0.005% Ophthalmic Solution 1 Drop(s) Both EYES at bedtime  pantoprazole    Tablet 40 milliGRAM(s) Oral before breakfast  polyethylene glycol 3350 17 Gram(s) Oral daily  senna 2 Tablet(s) Oral at bedtime  sodium chloride 0.9% Bolus 500 milliLiter(s) IV Bolus once  sodium chloride 0.9%. 1000 milliLiter(s) (70 mL/Hr) IV Continuous <Continuous>  tamsulosin 0.4 milliGRAM(s) Oral at bedtime  tiotropium 2.5 MICROgram(s) Inhaler 2 Puff(s) Inhalation daily    MEDICATIONS  (PRN):  acetaminophen     Tablet .. 650 milliGRAM(s) Oral every 6 hours PRN Temp greater or equal to 38.5C (101.3F), Mild Pain (1 - 3)  bisacodyl 5 milliGRAM(s) Oral daily PRN Constipation  ondansetron Injectable 4 milliGRAM(s) IV Push every 6 hours PRN Nausea and/or Vomiting  oxyCODONE    IR 5 milliGRAM(s) Oral every 4 hours PRN Moderate Pain (4 - 6)  oxyCODONE    IR 10 milliGRAM(s) Oral every 4 hours PRN Severe Pain (7 - 10)    EXAMINATION:  General: No acute distress  HEENT: Anicteric sclerae  Cardiac: [id number]  Lungs: Clear  Abdomen: Soft, non-tender, +BS  Extremities: No c/c/e  Skin/Incision Site: Clean, dry and intact  Neurologic: AOx2, Left eye cataracts, Left facial (baseline from Left eye glaucoma surgery) dysarthric, FC, Lt side 5/5, Rt side 4+/5, 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

## 2024-08-28 NOTE — BRIEF OPERATIVE NOTE - NSICDXBRIEFPROCEDURE_GEN_ALL_CORE_FT
PROCEDURES:  Creation, cranial blank hole, or craniotomy, for subdural hematoma 28-Aug-2024 21:24:07  Ryan Doyle

## 2024-08-28 NOTE — PRE-ANESTHESIA EVALUATION ADULT - NSANTHPMHFT_GEN_ALL_CORE
89M, PMH PE in the past not currently on AC/AP, T2DM, macular degeneration, presenting with multiple falls over the plast week and dysarthria. 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

## 2024-08-28 NOTE — ED ADULT NURSE NOTE - NSFALLRISKINTERV_ED_ALL_ED

## 2024-08-28 NOTE — PROGRESS NOTE ADULT - SUBJECTIVE AND OBJECTIVE BOX
SUMMARY:  89M, PMH PE not currently on AC/AP, T2DM, macular degeneration, presenting with multiple falls over the plast week and dysarthria. 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     HOSPITAL COURSE:  8/28: admitted to [facility].    ADMISSION SCORES:   GCS: 15.    REVIEW OF SYSTEMS: None other than stated in HPI.    ALLERGIES: Allergies    Tolerated ceftriaxone 5/2024 (Other)  penicillins (Rash)    Intolerances                          10.0   6.05  )-----------( 185      ( 28 Aug 2024 11:41 )             31.6     08-28    134<L>  |  103  |  28<H>  ----------------------------<  252<H>  4.0   |  22  |  1.95<H>    Ca    8.8      28 Aug 2024 11:41  Phos  2.5     08-28  Mg     1.9     08-28    TPro  6.9  /  Alb  3.5  /  TBili  0.3  /  DBili  x   /  AST  17  /  ALT  20  /  AlkPhos  100  08-28      MEDICATIONS  (STANDING):  atorvastatin 40 milliGRAM(s) Oral at bedtime  brivaracetam 50 milliGRAM(s) Oral every 12 hours  chlorhexidine 4% Liquid 1 Application(s) Topical daily  dorzolamide 2%/timolol 0.5% Ophthalmic Solution 1 Drop(s) Both EYES Once  finasteride 5 milliGRAM(s) Oral daily  gabapentin 300 milliGRAM(s) Oral Once  insulin lispro (ADMELOG) corrective regimen sliding scale   SubCutaneous Before meals and at bedtime  latanoprost 0.005% Ophthalmic Solution 1 Drop(s) Both EYES at bedtime  pantoprazole    Tablet 40 milliGRAM(s) Oral before breakfast  polyethylene glycol 3350 17 Gram(s) Oral daily  senna 2 Tablet(s) Oral at bedtime  sodium chloride 0.9% Bolus 500 milliLiter(s) IV Bolus once  sodium chloride 0.9%. 1000 milliLiter(s) (70 mL/Hr) IV Continuous <Continuous>  tamsulosin 0.4 milliGRAM(s) Oral at bedtime  tiotropium 2.5 MICROgram(s) Inhaler 2 Puff(s) Inhalation daily    MEDICATIONS  (PRN):  acetaminophen     Tablet .. 650 milliGRAM(s) Oral every 6 hours PRN Temp greater or equal to 38.5C (101.3F), Mild Pain (1 - 3)  bisacodyl 5 milliGRAM(s) Oral daily PRN Constipation  ondansetron Injectable 4 milliGRAM(s) IV Push every 6 hours PRN Nausea and/or Vomiting  oxyCODONE    IR 5 milliGRAM(s) Oral every 4 hours PRN Moderate Pain (4 - 6)  oxyCODONE    IR 10 milliGRAM(s) Oral every 4 hours PRN Severe Pain (7 - 10)    EXAMINATION:  General: No acute distress  HEENT: Anicteric sclerae  Cardiac: [id number]  Lungs: Clear  Abdomen: Soft, non-tender, +BS  Extremities: No c/c/e  Skin/Incision Site: Clean, dry and intact  Neurologic: AOx2, Left eye cataracts, Left facial (baseline from Left eye glaucoma surgery) dysarthric, FC, Lt side 5/5, Rt side 4+/5, 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  SUMMARY:  89M, PMH PE not currently on AC/AP, T2DM, macular degeneration, presenting with multiple falls over the plast week and dysarthria. 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     HOSPITAL COURSE:  8/28: admitted to [facility].  08/28: s/p 2 blank holes on the left side on SG drain placed.    ADMISSION SCORES:   GCS: 15.    REVIEW OF SYSTEMS: None other than stated in HPI.    ALLERGIES: Allergies    Tolerated ceftriaxone 5/2024 (Other)  penicillins (Rash)    Intolerances                          10.0   6.05  )-----------( 185      ( 28 Aug 2024 11:41 )             31.6     08-28    134<L>  |  103  |  28<H>  ----------------------------<  252<H>  4.0   |  22  |  1.95<H>    Ca    8.8      28 Aug 2024 11:41  Phos  2.5     08-28  Mg     1.9     08-28    TPro  6.9  /  Alb  3.5  /  TBili  0.3  /  DBili  x   /  AST  17  /  ALT  20  /  AlkPhos  100  08-28      MEDICATIONS  (STANDING):  atorvastatin 40 milliGRAM(s) Oral at bedtime  brivaracetam 50 milliGRAM(s) Oral every 12 hours  chlorhexidine 4% Liquid 1 Application(s) Topical daily  dorzolamide 2%/timolol 0.5% Ophthalmic Solution 1 Drop(s) Both EYES Once  finasteride 5 milliGRAM(s) Oral daily  gabapentin 300 milliGRAM(s) Oral Once  insulin lispro (ADMELOG) corrective regimen sliding scale   SubCutaneous Before meals and at bedtime  latanoprost 0.005% Ophthalmic Solution 1 Drop(s) Both EYES at bedtime  pantoprazole    Tablet 40 milliGRAM(s) Oral before breakfast  polyethylene glycol 3350 17 Gram(s) Oral daily  senna 2 Tablet(s) Oral at bedtime  sodium chloride 0.9% Bolus 500 milliLiter(s) IV Bolus once  sodium chloride 0.9%. 1000 milliLiter(s) (70 mL/Hr) IV Continuous <Continuous>  tamsulosin 0.4 milliGRAM(s) Oral at bedtime  tiotropium 2.5 MICROgram(s) Inhaler 2 Puff(s) Inhalation daily    MEDICATIONS  (PRN):  acetaminophen     Tablet .. 650 milliGRAM(s) Oral every 6 hours PRN Temp greater or equal to 38.5C (101.3F), Mild Pain (1 - 3)  bisacodyl 5 milliGRAM(s) Oral daily PRN Constipation  ondansetron Injectable 4 milliGRAM(s) IV Push every 6 hours PRN Nausea and/or Vomiting  oxyCODONE    IR 5 milliGRAM(s) Oral every 4 hours PRN Moderate Pain (4 - 6)  oxyCODONE    IR 10 milliGRAM(s) Oral every 4 hours PRN Severe Pain (7 - 10)    EXAMINATION:  General: No acute distress  HEENT: Anicteric sclerae  Cardiac: [id number]  Lungs: Clear  Abdomen: Soft, non-tender, +BS  Extremities: No c/c/e  Skin/Incision Site: Clean, dry and intact  Neurologic: AOx2, Left eye cataracts, Left facial (baseline from Left eye glaucoma surgery) dysarthric, FC, Lt side 5/5, Rt side 4+/5, 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

## 2024-08-28 NOTE — CONSULT NOTE ADULT - SUBJECTIVE AND OBJECTIVE BOX
p ([id number])     HPI:  Yaw Garland  89M, no recent AC/AP, w DM cb neuropathy, macular degeneration, PE (off Eliquis for 6 months), BPH, pw slurred speech and difficulty swallowing breakfast since 8AM. Per daughter has been increasingly confused, w gait instability, and 6 falls over the past week. Falls have been unwitnessed, unknown head strikes. Tx from [place]. Exam there per ED: Dysarthric but AOx3, FC, [place] AG. 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 Exam here: [place], sleepy but easily arousable, Ox2, slurred speech, FC, R eye 3R, L eye 3NR (baseline from cataracts, sp glaucoma surgery), L side 5/5, R side 4+/5, 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 PTT 29, INR 1.2, platelets 169.    Imaging:    Exam:    --Anticoagulation:    =====================  PAST MEDICAL HISTORY     PAST SURGICAL HISTORY     No Known Allergies      MEDICATIONS:  Antibiotics:    Neuro:    Other:      SOCIAL HISTORY:   Occupation:   Marital Status:     FAMILY HISTORY:      ROS: Negative except per HPI    LABS:  PT/INR - ( 28 Aug 2024 11:41 )   PT: 12.6 sec;   INR: 1.21 ratio         PTT - ( 28 Aug 2024 11:41 )  PTT:28.0 sec                        10.0   6.05  )-----------( 185      ( 28 Aug 2024 11:41 )             31.6

## 2024-08-28 NOTE — PROGRESS NOTE ADULT - ASSESSMENT
ASSESSMENT/PLAN:    NEURO:  Activity: [] mobilize as tolerated [] Bedrest [] PT [] OT [] PMNR    PULM:    CV:  SBP goal    RENAL:  Fluids:    GI:  Diet:  GI prophylaxis [] not indicated [] PPI [] other:  Bowel regimen [] colace [] senna [] other:    ENDO:   Goal euglycemia (-180)    HEME/ONC:  VTE prophylaxis: [] SCDs [] chemoprophylaxis [] hold chemoprophylaxis due to: [] high risk of DVT/PE on admission due to:    ID:    MISC:    SOCIAL/FAMILY:  [] awaiting [] updated at bedside [] family meeting    CODE STATUS:  [] Full Code [] DNR [] DNI [] Palliative/Comfort Care    DISPOSITION:  [] ICU [] Stroke Unit [] Floor [] EMU [] RCU [] PCU    [] Patient is at high risk of neurologic deterioration/death due to:     Time seen:  Time spent: ___ [] critical care minutes    Contact: [phone]

## 2024-08-28 NOTE — OCCUPATIONAL THERAPY INITIAL EVALUATION ADULT - PERTINENT HX OF CURRENT PROBLEM, REHAB EVAL
· HPI Objective Statement: Patient alert and verbally responsive, was brought to ED due to slurred speech and left hand weakness that started around 0800 this morning, Pt noted with some AMS and unsteady gait.  CT HEAD: Heterogeneous increased density to the left frontal parietal subacute subdural hematoma since 9:33 AM likely is related to contrast enhancement rather than hemorrhage.CTA brain: No hemodynamically significant stenosisCTA carotid/vertebral artery circulation: No hemodynamically significant stenosisCT Perfusion: Perfusion data appears likely artifactual as above.

## 2024-08-28 NOTE — CHART NOTE - NSCHARTNOTEFT_GEN_A_CORE
CAPRINI SCORE [CLOT] Score on Admission for     AGE RELATED RISK FACTORS                                                       MOBILITY RELATED FACTORS  [ ] Age 41-60 years                                            (1 Point)                  [ ] Bed rest                                                        (1 Point)  [ ] Age: 61-74 years                                           (2 Points)                 [ ] Plaster cast                                                   (2 Points)  [ x] Age= 75 years                                              (3 Points)                 [ ] Bed bound for more than 72 hours                 (2 Points)    DISEASE RELATED RISK FACTORS                                               GENDER SPECIFIC FACTORS  [ ] Edema in the lower extremities                       (1 Point)                  [ ] Pregnancy                                                     (1 Point)  [ ] Varicose veins                                               (1 Point)                  [ ] Post-partum < 6 weeks                                   (1 Point)             [x ] BMI > 25 Kg/m2                                            (1 Point)                  [ ] Hormonal therapy  or oral contraception          (1 Point)                 [ ] Sepsis (in the previous month)                        (1 Point)                  [ ] History of pregnancy complications                 (1 point)  [ ] Pneumonia or serious lung disease                                               [ ] Unexplained or recurrent                     (1 Point)           (in the previous month)                               (1 Point)  [ ] Abnormal pulmonary function test                     (1 Point)                 SURGERY RELATED RISK FACTORS (include planned surgeries)  [ ] Acute myocardial infarction                              (1 Point)                 [ ]  Section                                             (1 Point)  [ ] Congestive heart failure (in the previous month)  (1 Point)         [ ] Minor surgery                                                  (1 Point)   [ ] Inflammatory bowel disease                             (1 Point)                 [ ] Arthroscopic surgery                                        (2 Points)  [ ] Central venous access                                      (2 Points)                [ ] General surgery lasting more than 45 minutes   (2 Points)       [ ] Stroke (in the previous month)                          (5 Points)               [ ] Elective arthroplasty                                         (5 Points)            [ ] current or past malignancy                              (2 Points)                                                                                                       HEMATOLOGY RELATED FACTORS                                                 TRAUMA RELATED RISK FACTORS  [ ] Prior episodes of VTE                                     (3 Points)                [ ] Fracture of the hip, pelvis, or leg                       (5 Points)  [ ] Positive family history for VTE                         (3 Points)                 [ ] Acute spinal cord injury (in the previous month)  (5 Points)  [ ] Prothrombin 45322 A                                     (3 Points)                 [ ] Paralysis  (less than 1 month)                             (5 Points)  [ ] Factor V Leiden                                             (3 Points)                  [ ] Multiple Trauma within 1 month                        (5 Points)  [ ] Lupus anticoagulants                                     (3 Points)                                                           [ ] Anticardiolipin antibodies                               (3 Points)                                                       [ ] High homocysteine in the blood                      (3 Points)                                             [ ] Other congenital or acquired thrombophilia      (3 Points)                                                [ ] Heparin induced thrombocytopenia                  (3 Points)                                          Total Score [     3     ]    Risk:  Very low 0   Low 1 to 2   Moderate 3 to 4   High =5       VTE Prophylasix Recommednations:  [x ] mechanical pneumatic compression devices                                      [ ] contraindicated: _____________________  [ ] chemo prophylasix                                                                                   [x ] contraindicated _____________________    **** HIGH LIKELIHOOD DVT PRESENT ON ADMISSION  [ ] (please order LE dopplers within 24 hours of admission)

## 2024-08-28 NOTE — ED ADULT TRIAGE NOTE - CHIEF COMPLAINT QUOTE
Slurred speech and unsteady gait starting this morning at approximately 8am. Found to have SDH at [facility].

## 2024-08-28 NOTE — H&P ADULT - NSHPPHYSICALEXAM_GEN_ALL_CORE
Exam here: [place], sleepy but easily arousable, Ox2, slurred speech, FC, R eye 3R, L eye 3NR (baseline from cataracts, sp glaucoma surgery), L side 5/5, R side 4+/5, 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

## 2024-08-28 NOTE — ED ADULT NURSE NOTE - OBJECTIVE STATEMENT
Pt is a 89 yo M BIB EMS w/ PMHx of PE, COPD, DM, and BPH complaining of weakness. Pt is coming from [place]. Per EMS patient is transferred due to subdural hematoma. Family stated pt began to experience slurred speech at 8 am along w/ right sided weakness. Family also reports pt has a history of falls; last one being a week ago which was unwitnessed. Pt is aox4, airway clear and patent, equal chest rise and fall, L/ sided facial droop secondary to past surgery per family, Pupils round and sluggish; family reports pt has cataract and eye surgery, motor and sensory skills intact, NSR. Pt denies LOC, SOB, chest pain, D/N/V. Pt placed in locked lowered stretcher w/ rails raised. Pt on continuos pulse ox; SEGUNDO notified.

## 2024-08-28 NOTE — CONSULT NOTE ADULT - ATTENDING COMMENTS
89 year old male with 3 cm left chronic subdural hematoma with mass effect. Right arm pronator drift on exam, slurred speech, right sided facial weakness.    Plan for left blank holes for evacuation of subdural hematoma.    Risks and benefits of surgery were discussed, and the patient and family wish to proceed.

## 2024-08-28 NOTE — PROGRESS NOTE ADULT - ASSESSMENT
ASSESSMENT/PLAN:  89M, admitted for traumatic subacute on chronic SDH.    NEURO:  -Preop mode for possible Left crani for SDH evac.  -Q1H neurochecks.  -Briviact 50BID.  Activity: [X] mobilize as tolerated [] Bedrest [X] PT [X] OT [] PMNR    PULM:  SpO2 goal >92%.    CV:  SBP goal 100-160.    RENAL:  -Fluids: IV NS at 70cc/hr.  -Tamsulosin for BPH.    GI:  Diet: NPO for OR  GI prophylaxis [X] not indicated [] PPI [] other:  Bowel regimen [] colace [X] senna [] other:    ENDO:   Goal euglycemia (-180), on SSI.    HEME/ONC:  VTE prophylaxis: SCDs    ID:  Monitor for fevers.    MISC:    SOCIAL/FAMILY:  [X] awaiting [] updated at bedside [] family meeting    CODE STATUS:  [X] Full Code [] DNR [] DNI [] Palliative/Comfort Care    DISPOSITION:  [X] ICU [] Stroke Unit [] Floor [] EMU [] RCU [] PCU   ASSESSMENT/PLAN:  89M, admitted for traumatic subacute on chronic SDH.  POD: 0  08/28: s/p 2 blank holes on the left side on SG drain placed.    NEURO:  -Q1H neurochecks.  -Briviact 50 mg BID  -CTH in the AM  Activity: [X] mobilize as tolerated [] Bedrest [X] PT [X] OT [] PMNR    PULM:  SpO2 goal >92%.  Intubated post-op trial of CPAP and potential extubation post MMAE in the Am.    CV:  SBP goal 100-140  Cardene ggt, plan to titrate off, tx pain  No anti HTN meds at home  F/U TTE    RENAL:  -Fluids: IV NS at 70cc/hr.  -Tamsulosin + finasteride for BPH.  - CKD vs ALISON on CKD, f/u urine lytes and uCr    GI:  Diet: NPO for MMAE embo in the AM  GI prophylaxis [X] not indicated [] PPI [] other:  Bowel regimen [] colace [X] senna [] other:    ENDO:   Goal euglycemia (-180), on SSI.  F/U A1C    HEME/ONC:  VTE prophylaxis: SCDs    ID:  Monitor for fevers.    MISC:    SOCIAL/FAMILY:  [X] awaiting [] updated at bedside [] family meeting    CODE STATUS:  [X] Full Code [] DNR [] DNI [] Palliative/Comfort Care    DISPOSITION:  [X] ICU [] Stroke Unit [] Floor [] EMU [] RCU [] PCU

## 2024-08-28 NOTE — CONSULT NOTE ADULT - ASSESSMENT
Yaw Garland  89M, no recent AC/AP, w DM cb neuropathy, macular degeneration, PE (off Eliquis for 6 months), BPH, pw slurred speech and difficulty swallowing breakfast since 8AM. Per daughter has been increasingly confused, w gait instability, and 6 falls over the past week. Falls have been unwitnessed, unknown head strikes. Tx from [place]. Exam there per ED: Dysarthric but AOx3, FC, [place]HOU[place] AG. 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 Exam here: [place], sleepy but easily arousable, Ox2, slurred speech, FC, R eye 3R, L eye 3NR (baseline from cataracts, sp glaucoma surgery), L side 5/5, R side 4+/5, 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 PTT 29, INR 1.2, platelets 169.  - Admit to NSCU w q1 neurochecks  - No AC/AP  - 4hr scan on the way up to ICU @~1pm (stereo scan)  - Preop for SDH evac and MMAE sometime this week  - 2 MRNs ([id number]/ [id number]) Yaw Garland  89M, no recent AC/AP, w DM cb neuropathy, macular degeneration, PE (off Eliquis for 6 months), BPH, pw slurred speech and difficulty swallowing breakfast since 8AM. Per daughter has been increasingly confused, w gait instability, and 6 falls over the past week. Falls have been unwitnessed, unknown head strikes. Tx from [place]. Exam there per ED: Dysarthric but AOx3, FC, [place] AG. 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 Exam here: [place], sleepy but easily arousable, Ox2, slurred speech, FC, R eye 3R, L eye 3NR (baseline from cataracts, sp glaucoma surgery), L side 5/5, R side 4+/5, 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 PTT 29, INR 1.2, platelets 169.  - Admit to NSCU w q1 neurochecks under Dr. Oviedo  - No AC/AP  - 4hr scan on the way up to ICU @~1pm (stereo scan)  - Preop for SDH evac and MMAE sometime this week  - 2 MRNs ([id number]/ [id number])

## 2024-08-28 NOTE — ED PROVIDER NOTE - PROGRESS NOTE DETAILS
Kuldip Spears MD:  Discussed case with NSG, aware of VS abnormality with VS /165 HR 52. Will evaluate patient, no recommendation for MAP goals at this time.

## 2024-08-28 NOTE — H&P ADULT - HISTORY OF PRESENT ILLNESS
89M, no recent AC/AP, but hasn't for the past week, w DM cb neuropathy, macular degeneration, PE (off Eliquis for 6 months), BPH, pw slurred speech and difficulty swallowing breakfast since 8AM. Per daughter has been increasingly confused, w gait instability, and 6 falls over the past week. Falls have been unwitnessed, unknown head strikes. Tx from [place]. Exam there per ED: Dysarthric but AOx3, FC, [place] AG. 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

## 2024-08-28 NOTE — ED PROVIDER NOTE - ATTENDING CONTRIBUTION TO CARE
Sofiya Estes DO. EM Attending Physician.    89-year-old male with history of PE not on Eliquis, HTN, on asa though denies taking today, presents to the ER as a transfer from [facility] for waknig up at 7a with slurred speech, noticed to have right sided weakness. Evaluated in OSH found to have large left holohemispheric subdural hematoma, mostly chronic, with a small amount of acute hemorrhagic blood products. This measures up to 2.9 cm in greatest width. There is significant mass effect on the underlying parenchyma. There is minimal left to right midline shift of approximately 1 to 2 mm. Rest of the chronic findings as above.    Arrived bradycardic HR 50s, normotensive. AO3.     PHYSICAL EXAM:  CONSTITUTIONAL: Awake, alert, with slurred speech.  HEAD: Atraumatic, no step offs.  NECK: Supple, no meningismus.   EYES: Clear bilaterally. PERRLA, 3 mm bilaterally.  ENMT: Airway patent, Mouth with normal mucosa. Uvula is midline.   CARDIAC: Bradycardic rate, regular rhythm.  RESPIRATORY: Breathing unlabored. Breath sounds clear and equal bilaterally.  ABDOMEN:  Soft, nontender, nondistended. No rebound tenderness or guarding.  NEUROLOGICAL: Alert and oriented. Slurred speech. Strength 3/5 of right upper and lower extremity. Strength 4/5 of LUE and LLE.   MSK: No clubbing, cyanosis, or edema.   SKIN: Skin warm and dry.     MDM  89 year old male transferred from OSH for SDH with mass effect.   Arrived bradycardic, normotensive. Received 500 mg keppra at OSH for seizure ppx.   NSGY notified upon pt arrival to the ER.   Plan for labs, interval CT scan, and pt will require admission.    Attending Contribution to Care:  I performed a history and physical exam of the patient and discussed their management with the fellow . I reviewed the fellow note and agree with the documented findings and plan of care. I was present and available for all procedures.

## 2024-08-29 ENCOUNTER — APPOINTMENT (OUTPATIENT)
Dept: NEUROSURGERY | Facility: HOSPITAL | Age: 89
End: 2024-08-29

## 2024-08-29 LAB
A1C WITH ESTIMATED AVERAGE GLUCOSE RESULT: 7.2 % — HIGH (ref 4–5.6)
ANION GAP SERPL CALC-SCNC: 12 MMOL/L — SIGNIFICANT CHANGE UP (ref 5–17)
BASOPHILS # BLD AUTO: 0.01 K/UL — SIGNIFICANT CHANGE UP (ref 0–0.2)
BASOPHILS NFR BLD AUTO: 0.1 % — SIGNIFICANT CHANGE UP (ref 0–2)
BUN SERPL-MCNC: 25 MG/DL — HIGH (ref 7–23)
CALCIUM SERPL-MCNC: 8 MG/DL — LOW (ref 8.4–10.5)
CHLORIDE SERPL-SCNC: 108 MMOL/L — SIGNIFICANT CHANGE UP (ref 96–108)
CHOLEST SERPL-MCNC: 90 MG/DL — SIGNIFICANT CHANGE UP
CO2 SERPL-SCNC: 15 MMOL/L — LOW (ref 22–31)
CREAT SERPL-MCNC: 1.9 MG/DL — HIGH (ref 0.5–1.3)
EGFR: 33 ML/MIN/1.73M2 — LOW
EOSINOPHIL # BLD AUTO: 0 K/UL — SIGNIFICANT CHANGE UP (ref 0–0.5)
EOSINOPHIL NFR BLD AUTO: 0 % — SIGNIFICANT CHANGE UP (ref 0–6)
ESTIMATED AVERAGE GLUCOSE: 160 MG/DL — HIGH (ref 68–114)
GLUCOSE BLDC GLUCOMTR-MCNC: 101 MG/DL — HIGH (ref 70–99)
GLUCOSE BLDC GLUCOMTR-MCNC: 121 MG/DL — HIGH (ref 70–99)
GLUCOSE BLDC GLUCOMTR-MCNC: 126 MG/DL — HIGH (ref 70–99)
GLUCOSE BLDC GLUCOMTR-MCNC: 168 MG/DL — HIGH (ref 70–99)
GLUCOSE BLDC GLUCOMTR-MCNC: 183 MG/DL — HIGH (ref 70–99)
GLUCOSE BLDC GLUCOMTR-MCNC: 189 MG/DL — HIGH (ref 70–99)
GLUCOSE BLDC GLUCOMTR-MCNC: 195 MG/DL — HIGH (ref 70–99)
GLUCOSE BLDC GLUCOMTR-MCNC: 211 MG/DL — HIGH (ref 70–99)
GLUCOSE BLDC GLUCOMTR-MCNC: 214 MG/DL — HIGH (ref 70–99)
GLUCOSE BLDC GLUCOMTR-MCNC: 232 MG/DL — HIGH (ref 70–99)
GLUCOSE BLDC GLUCOMTR-MCNC: 237 MG/DL — HIGH (ref 70–99)
GLUCOSE BLDC GLUCOMTR-MCNC: 266 MG/DL — HIGH (ref 70–99)
GLUCOSE BLDC GLUCOMTR-MCNC: 304 MG/DL — HIGH (ref 70–99)
GLUCOSE SERPL-MCNC: 100 MG/DL — HIGH (ref 70–99)
HCT VFR BLD CALC: 28.9 % — LOW (ref 39–50)
HDLC SERPL-MCNC: 37 MG/DL — LOW
HGB BLD-MCNC: 9.4 G/DL — LOW (ref 13–17)
IMM GRANULOCYTES NFR BLD AUTO: 0.7 % — SIGNIFICANT CHANGE UP (ref 0–0.9)
LIPID PNL WITH DIRECT LDL SERPL: 36 MG/DL — SIGNIFICANT CHANGE UP
LYMPHOCYTES # BLD AUTO: 0.9 K/UL — LOW (ref 1–3.3)
LYMPHOCYTES # BLD AUTO: 8.5 % — LOW (ref 13–44)
MAGNESIUM SERPL-MCNC: 2 MG/DL — SIGNIFICANT CHANGE UP (ref 1.6–2.6)
MCHC RBC-ENTMCNC: 28.8 PG — SIGNIFICANT CHANGE UP (ref 27–34)
MCHC RBC-ENTMCNC: 32.5 GM/DL — SIGNIFICANT CHANGE UP (ref 32–36)
MCV RBC AUTO: 88.7 FL — SIGNIFICANT CHANGE UP (ref 80–100)
MONOCYTES # BLD AUTO: 1.11 K/UL — HIGH (ref 0–0.9)
MONOCYTES NFR BLD AUTO: 10.5 % — SIGNIFICANT CHANGE UP (ref 2–14)
MRSA PCR RESULT.: SIGNIFICANT CHANGE UP
NEUTROPHILS # BLD AUTO: 8.53 K/UL — HIGH (ref 1.8–7.4)
NEUTROPHILS NFR BLD AUTO: 80.2 % — HIGH (ref 43–77)
NON HDL CHOLESTEROL: 53 MG/DL — SIGNIFICANT CHANGE UP
NRBC # BLD: 0 /100 WBCS — SIGNIFICANT CHANGE UP (ref 0–0)
PHOSPHATE SERPL-MCNC: 3.8 MG/DL — SIGNIFICANT CHANGE UP (ref 2.5–4.5)
PLATELET # BLD AUTO: 179 K/UL — SIGNIFICANT CHANGE UP (ref 150–400)
POTASSIUM SERPL-MCNC: 4.2 MMOL/L — SIGNIFICANT CHANGE UP (ref 3.5–5.3)
POTASSIUM SERPL-SCNC: 4.2 MMOL/L — SIGNIFICANT CHANGE UP (ref 3.5–5.3)
RBC # BLD: 3.26 M/UL — LOW (ref 4.2–5.8)
RBC # FLD: 13.3 % — SIGNIFICANT CHANGE UP (ref 10.3–14.5)
S AUREUS DNA NOSE QL NAA+PROBE: DETECTED
SODIUM SERPL-SCNC: 135 MMOL/L — SIGNIFICANT CHANGE UP (ref 135–145)
T3 SERPL-MCNC: 75 NG/DL — LOW (ref 80–200)
T4 AB SER-ACNC: 5.9 UG/DL — SIGNIFICANT CHANGE UP (ref 4.6–12)
T4 FREE SERPL-MCNC: 1.4 NG/DL — SIGNIFICANT CHANGE UP (ref 0.9–1.8)
TRIGL SERPL-MCNC: 85 MG/DL — SIGNIFICANT CHANGE UP
TSH SERPL-MCNC: 3.1 UIU/ML — SIGNIFICANT CHANGE UP (ref 0.27–4.2)
UUN UR-MCNC: 200 MG/DL — SIGNIFICANT CHANGE UP
WBC # BLD: 10.62 K/UL — HIGH (ref 3.8–10.5)
WBC # FLD AUTO: 10.62 K/UL — HIGH (ref 3.8–10.5)

## 2024-08-29 PROCEDURE — 36227 PLACE CATH XTRNL CAROTID: CPT | Mod: LT

## 2024-08-29 PROCEDURE — 75894 X-RAYS TRANSCATH THERAPY: CPT | Mod: 26

## 2024-08-29 PROCEDURE — 93970 EXTREMITY STUDY: CPT | Mod: 26

## 2024-08-29 PROCEDURE — 75898 FOLLOW-UP ANGIOGRAPHY: CPT | Mod: 26

## 2024-08-29 PROCEDURE — 36224 PLACE CATH CAROTD ART: CPT | Mod: 78,LT

## 2024-08-29 PROCEDURE — 36223 PLACE CATH CAROTID/INOM ART: CPT | Mod: 78,59,RT

## 2024-08-29 PROCEDURE — 99291 CRITICAL CARE FIRST HOUR: CPT

## 2024-08-29 PROCEDURE — 70450 CT HEAD/BRAIN W/O DYE: CPT | Mod: 26

## 2024-08-29 PROCEDURE — 93010 ELECTROCARDIOGRAM REPORT: CPT

## 2024-08-29 PROCEDURE — 61624 TCAT PERM OCCLS/EMBOLJ CNS: CPT | Mod: 78

## 2024-08-29 RX ORDER — AZTREONAM 500 MG
1000 VIAL (EA) INJECTION EVERY 8 HOURS
Refills: 0 | Status: DISCONTINUED | OUTPATIENT
Start: 2024-08-29 | End: 2024-08-30

## 2024-08-29 RX ORDER — FENTANYL CITRATE 50 UG/ML
50 INJECTION INTRAMUSCULAR; INTRAVENOUS ONCE
Refills: 0 | Status: DISCONTINUED | OUTPATIENT
Start: 2024-08-29 | End: 2024-08-29

## 2024-08-29 RX ORDER — VANCOMYCIN/0.9 % SOD CHLORIDE 1.75G/25
1000 PLASTIC BAG, INJECTION (ML) INTRAVENOUS EVERY 12 HOURS
Refills: 0 | Status: DISCONTINUED | OUTPATIENT
Start: 2024-08-29 | End: 2024-08-30

## 2024-08-29 RX ORDER — SUGAMMADEX 100 MG/ML
150 INJECTION, SOLUTION INTRAVENOUS ONCE
Refills: 0 | Status: DISCONTINUED | OUTPATIENT
Start: 2024-08-29 | End: 2024-08-29

## 2024-08-29 RX ORDER — ROPIVACAINE IN 0.9% SOD CHL/PF 0.1 %
0.05 PLASTIC BAG, INJECTION (ML) EPIDURAL
Qty: 8 | Refills: 0 | Status: DISCONTINUED | OUTPATIENT
Start: 2024-08-29 | End: 2024-08-30

## 2024-08-29 RX ORDER — MUPIROCIN 2 %
1 OINTMENT (GRAM) TOPICAL
Refills: 0 | Status: DISCONTINUED | OUTPATIENT
Start: 2024-08-29 | End: 2024-08-29

## 2024-08-29 RX ORDER — DEXMEDETOMIDINE HYDROCHLORIDE IN 0.9% SODIUM CHLORIDE 4 UG/ML
0.2 INJECTION INTRAVENOUS
Qty: 200 | Refills: 0 | Status: DISCONTINUED | OUTPATIENT
Start: 2024-08-29 | End: 2024-08-30

## 2024-08-29 RX ORDER — ACETAMINOPHEN 325 MG/1
1000 TABLET ORAL ONCE
Refills: 0 | Status: COMPLETED | OUTPATIENT
Start: 2024-08-29 | End: 2024-08-29

## 2024-08-29 RX ORDER — MUPIROCIN 2 %
1 OINTMENT (GRAM) TOPICAL
Refills: 0 | Status: COMPLETED | OUTPATIENT
Start: 2024-08-29 | End: 2024-09-03

## 2024-08-29 RX ORDER — VANCOMYCIN/0.9 % SOD CHLORIDE 1.75G/25
1250 PLASTIC BAG, INJECTION (ML) INTRAVENOUS EVERY 12 HOURS
Refills: 0 | Status: COMPLETED | OUTPATIENT
Start: 2024-08-29 | End: 2024-08-29

## 2024-08-29 RX ORDER — SUGAMMADEX 100 MG/ML
300 INJECTION, SOLUTION INTRAVENOUS ONCE
Refills: 0 | Status: COMPLETED | OUTPATIENT
Start: 2024-08-29 | End: 2024-08-29

## 2024-08-29 RX ORDER — SODIUM CHLORIDE 9 MG/ML
500 INJECTION INTRAMUSCULAR; INTRAVENOUS; SUBCUTANEOUS ONCE
Refills: 0 | Status: COMPLETED | OUTPATIENT
Start: 2024-08-29 | End: 2024-08-29

## 2024-08-29 RX ORDER — PROPOFOL 10 MG/ML
20 INJECTION, EMULSION INTRAVENOUS
Qty: 1000 | Refills: 0 | Status: DISCONTINUED | OUTPATIENT
Start: 2024-08-29 | End: 2024-08-29

## 2024-08-29 RX ORDER — ROPIVACAINE IN 0.9% SOD CHL/PF 0.1 %
0.05 PLASTIC BAG, INJECTION (ML) EPIDURAL
Qty: 8 | Refills: 0 | Status: DISCONTINUED | OUTPATIENT
Start: 2024-08-29 | End: 2024-08-29

## 2024-08-29 RX ORDER — INSULIN REGULAR, HUMAN 100/ML (3)
3 INSULIN PEN (ML) SUBCUTANEOUS
Qty: 100 | Refills: 0 | Status: DISCONTINUED | OUTPATIENT
Start: 2024-08-29 | End: 2024-08-30

## 2024-08-29 RX ADMIN — Medication 102 GRAM(S): at 00:00

## 2024-08-29 RX ADMIN — Medication 3 UNIT(S)/HR: at 19:00

## 2024-08-29 RX ADMIN — FENTANYL CITRATE 50 MICROGRAM(S): 50 INJECTION INTRAMUSCULAR; INTRAVENOUS at 03:35

## 2024-08-29 RX ADMIN — Medication 7.65 MICROGRAM(S)/KG/MIN: at 02:05

## 2024-08-29 RX ADMIN — CHLORHEXIDINE GLUCONATE 15 MILLILITER(S): 40 SOLUTION TOPICAL at 05:20

## 2024-08-29 RX ADMIN — Medication 1 APPLICATION(S): at 17:02

## 2024-08-29 RX ADMIN — FENTANYL CITRATE 50 MICROGRAM(S): 50 INJECTION INTRAMUSCULAR; INTRAVENOUS at 03:55

## 2024-08-29 RX ADMIN — LATANOPROST 1 DROP(S): 50 SOLUTION OPHTHALMIC at 00:08

## 2024-08-29 RX ADMIN — FENTANYL CITRATE 50 MICROGRAM(S): 50 INJECTION INTRAMUSCULAR; INTRAVENOUS at 03:50

## 2024-08-29 RX ADMIN — FENTANYL CITRATE 50 MICROGRAM(S): 50 INJECTION INTRAMUSCULAR; INTRAVENOUS at 01:30

## 2024-08-29 RX ADMIN — LATANOPROST 1 DROP(S): 50 SOLUTION OPHTHALMIC at 22:33

## 2024-08-29 RX ADMIN — BRIVARACETAM 220 MILLIGRAM(S): 100 TABLET, FILM COATED ORAL at 05:20

## 2024-08-29 RX ADMIN — Medication 7.65 MICROGRAM(S)/KG/MIN: at 18:09

## 2024-08-29 RX ADMIN — SODIUM CHLORIDE 70 MILLILITER(S): 9 INJECTION INTRAMUSCULAR; INTRAVENOUS; SUBCUTANEOUS at 19:00

## 2024-08-29 RX ADMIN — Medication 2: at 00:12

## 2024-08-29 RX ADMIN — FENTANYL CITRATE 50 MICROGRAM(S): 50 INJECTION INTRAMUSCULAR; INTRAVENOUS at 04:10

## 2024-08-29 RX ADMIN — Medication 7.65 MICROGRAM(S)/KG/MIN: at 07:33

## 2024-08-29 RX ADMIN — Medication 40 MILLIGRAM(S): at 11:55

## 2024-08-29 RX ADMIN — FENTANYL CITRATE 50 MICROGRAM(S): 50 INJECTION INTRAMUSCULAR; INTRAVENOUS at 01:45

## 2024-08-29 RX ADMIN — Medication 166.67 MILLIGRAM(S): at 17:19

## 2024-08-29 RX ADMIN — DEXMEDETOMIDINE HYDROCHLORIDE IN 0.9% SODIUM CHLORIDE 4.08 MICROGRAM(S)/KG/HR: 4 INJECTION INTRAVENOUS at 20:30

## 2024-08-29 RX ADMIN — SODIUM PHOSPHATE, MONOBASIC, MONOHYDRATE AND SODIUM PHOSPHATE, DIBASIC ANHYDROUS 63.75 MILLIMOLE(S): 276; 142 INJECTION, SOLUTION INTRAVENOUS at 00:02

## 2024-08-29 RX ADMIN — SODIUM CHLORIDE 1000 MILLILITER(S): 9 INJECTION INTRAMUSCULAR; INTRAVENOUS; SUBCUTANEOUS at 18:01

## 2024-08-29 RX ADMIN — CHLORHEXIDINE GLUCONATE 15 MILLILITER(S): 40 SOLUTION TOPICAL at 17:02

## 2024-08-29 RX ADMIN — Medication 4: at 11:58

## 2024-08-29 RX ADMIN — ACETAMINOPHEN 1000 MILLIGRAM(S): 325 TABLET ORAL at 20:58

## 2024-08-29 RX ADMIN — CHLORHEXIDINE GLUCONATE 1 APPLICATION(S): 40 SOLUTION TOPICAL at 11:56

## 2024-08-29 RX ADMIN — Medication 3 UNIT(S)/HR: at 12:00

## 2024-08-29 RX ADMIN — SODIUM CHLORIDE 70 MILLILITER(S): 9 INJECTION INTRAMUSCULAR; INTRAVENOUS; SUBCUTANEOUS at 07:33

## 2024-08-29 RX ADMIN — BRIVARACETAM 220 MILLIGRAM(S): 100 TABLET, FILM COATED ORAL at 18:03

## 2024-08-29 RX ADMIN — SODIUM CHLORIDE 70 MILLILITER(S): 9 INJECTION INTRAMUSCULAR; INTRAVENOUS; SUBCUTANEOUS at 00:01

## 2024-08-29 RX ADMIN — ACETAMINOPHEN 400 MILLIGRAM(S): 325 TABLET ORAL at 20:28

## 2024-08-29 RX ADMIN — SUGAMMADEX 300 MILLIGRAM(S): 100 INJECTION, SOLUTION INTRAVENOUS at 17:02

## 2024-08-29 RX ADMIN — DEXMEDETOMIDINE HYDROCHLORIDE IN 0.9% SODIUM CHLORIDE 4.08 MICROGRAM(S)/KG/HR: 4 INJECTION INTRAVENOUS at 07:33

## 2024-08-29 RX ADMIN — Medication 6: at 05:37

## 2024-08-29 RX ADMIN — Medication 7.65 MICROGRAM(S)/KG/MIN: at 19:00

## 2024-08-29 NOTE — DIETITIAN INITIAL EVALUATION ADULT - REASON FOR ADMISSION
per chart: "89M, no recent AC/AP, but hasn't for the past week, w DM cb neuropathy, macular degeneration, PE (off Eliquis for 6 months), BPH, pw slurred speech and difficulty swallowing breakfast since 8AM."

## 2024-08-29 NOTE — SWALLOW BEDSIDE ASSESSMENT ADULT - COMMENTS
8/28 Neurology consulted; pt s/p 2 blank holes on the left side; SG drain placed; possible embolization of middle meningeal artery. 8/29 pt presenting to neuro-IR  for catheter cerebral angiography w/ possible MMA embolization.  Imaging:  CT head: No acute infarct is identified. Large left holohemispheric subdural hematoma, mostly chronic, with a small amount of acute hemorrhagic blood products. This measures up to 2.9 cm in greatest width. There is significant mass effect on the underlying parenchyma. There is minimal left to right midline shift of approximately 1 to 2 mm. Rest of the chronic findings as above.  CTA brain: No hemodynamically significant stenosis  CTA carotid/vertebral artery circulation: No hemodynamically significant stenosis  CT Perfusion: Perfusion data appears likely artifactual as above.    *Patient with swallow hx beginning in 2021. Bedside swallow completed at [facility] (11/2021) and [facility] (5/2022) w/ functional brenna-pharyngeal swallow w/o overt s/s of laryngeal penetration/aspiration. Rx for regular/easy to chew solids/thin liquids diet.

## 2024-08-29 NOTE — PRE PROCEDURE NOTE - PRE PROCEDURE EVALUATION
Pt is s/p SDH evac w/ 2 blank holes 8/28. Pre-op for cerebral angiogram, possible embolization of middle meningeal artery.

## 2024-08-29 NOTE — PROGRESS NOTE ADULT - ASSESSMENT
- CT AM    - Preop for MMAE tomorrow    - LED-p    - TTE-p    - Keep flat while intubated, can sit up after extubated

## 2024-08-29 NOTE — SPEECH LANGUAGE PATHOLOGY EVALUATION - COMMENTS
/28 Neurology consulted; pt s/p 2 blank holes on the left side; SG drain placed; possible embolization of middle meningeal artery. 8/29 pt presenting to neuro-IR  for catheter cerebral angiography w/ possible MMA embolization.  Imaging:  CT head: No acute infarct is identified. Large left holohemispheric subdural hematoma, mostly chronic, with a small amount of acute hemorrhagic blood products. This measures up to 2.9 cm in greatest width. There is significant mass effect on the underlying parenchyma. There is minimal left to right midline shift of approximately 1 to 2 mm. Rest of the chronic findings as above.  CTA brain: No hemodynamically significant stenosis  CTA carotid/vertebral artery circulation: No hemodynamically significant stenosis  CT Perfusion: Perfusion data appears likely artifactual as above.    *Patient with swallow hx beginning in 2021. Bedside swallow completed at [facility] (11/2021) and [facility] (5/2022) w/ functional brenna-pharyngeal swallow w/o overt s/s of laryngeal penetration/aspiration. Rx for regular/easy to chew solids/thin liquids diet.

## 2024-08-29 NOTE — PRE-ANESTHESIA EVALUATION ADULT - NSANTHAIRWAYFT_ENT_ALL_CORE
Intubated in situ
Mouth opening: >2cm  Thyromental distance: >3 FBs  Upper lip bite: adequate  Cervical ROM: grossly intact

## 2024-08-29 NOTE — DIETITIAN INITIAL EVALUATION ADULT - NSFNSPHYEXAMSKINFT_GEN_A_CORE
Pressure Injury 1: sacral spine, Suspected deep tissue injury  Pressure Injury 2: none, none  Pressure Injury 3: none, none  Pressure Injury 4: none, none  Pressure Injury 5: none, none  Pressure Injury 6: none, none  Pressure Injury 7: none, none  Pressure Injury 8: none, none  Pressure Injury 9: none, none  Pressure Injury 10: none, none  Pressure Injury 11: none, none, Pressure Injury 1: sacrum, Suspected deep tissue injury  Pressure Injury 2: none, none  Pressure Injury 3: none, none  Pressure Injury 4: none, none  Pressure Injury 5: none, none  Pressure Injury 6: none, none  Pressure Injury 7: none, none  Pressure Injury 8: none, none  Pressure Injury 9: none, none  Pressure Injury 10: none, none  Pressure Injury 11: none, none

## 2024-08-29 NOTE — DIETITIAN INITIAL EVALUATION ADULT - OTHER CALCULATIONS
defer fluid needs to team  [place]: 1326kcal  needs above calculated with consideration for overweight status, intubation status and increased needs

## 2024-08-29 NOTE — CHART NOTE - NSCHARTNOTEFT_GEN_A_CORE
Interventional Neuro Radiology  Pre-Procedure Note    HPI: 89 year old male with no recent AC/AP, but hasn't for the past week, with DM c/b neuropathy, macular degeneration, PE (off Eliquis for 6 months), BPH, p/w slurred speech and difficulty swallowing breakfast since 8AM. Per daughter has been increasingly confused, with gait instability, and 6 falls over the past week. Falls have been unwitnessed, unknown head strikes. Tx from [place]. Admitted to [facility] Neuro ICU for traumatic subacute on chronic Left SDH.       Neuro Exam: A/Ox2, Left eye cataracts, Left facial (baseline from Left eye glaucoma surgery) dysarthric, FC, Lt side 5/5, Rt side 4+/5,    PAST MEDICAL & SURGICAL HISTORY:  DM (diabetes mellitus)      Diabetic neuropathy      BPH (benign prostatic hyperplasia)      Macular degeneration      Pulmonary embolism      H/O laminectomy  in his 60s          Social History:   Denies tobacco use    FAMILY HISTORY:  FH: type 2 diabetes      No pertinent family history    Allergies: Tolerated ceftriaxone 5/2024 (Other)  penicillins (Rash)      Current Medications: acetaminophen     Tablet .. 650 milliGRAM(s) Oral every 6 hours PRN  atorvastatin 40 milliGRAM(s) Oral at bedtime  bisacodyl 5 milliGRAM(s) Oral daily PRN  brivaracetam  IVPB 50 milliGRAM(s) IV Intermittent two times a day  chlorhexidine 0.12% Liquid 15 milliLiter(s) Oral Mucosa every 12 hours  chlorhexidine 4% Liquid 1 Application(s) Topical daily  dexMEDEtomidine Infusion 0.2 MICROgram(s)/kG/Hr IV Continuous <Continuous>  dextrose 5%. 1000 milliLiter(s) IV Continuous <Continuous>  dextrose 50% Injectable 25 Gram(s) IV Push once  dextrose Oral Gel 15 Gram(s) Oral once PRN  finasteride 5 milliGRAM(s) Oral daily  glucagon  Injectable 1 milliGRAM(s) IntraMuscular once  insulin lispro (ADMELOG) corrective regimen sliding scale   SubCutaneous every 6 hours  insulin regular Infusion 3 Unit(s)/Hr IV Continuous <Continuous>  latanoprost 0.005% Ophthalmic Solution 1 Drop(s) Both EYES at bedtime  norepinephrine Infusion 0.05 MICROgram(s)/kG/Min IV Continuous <Continuous>  oxyCODONE    IR 5 milliGRAM(s) Oral every 4 hours PRN  pantoprazole  Injectable 40 milliGRAM(s) IV Push daily  polyethylene glycol 3350 17 Gram(s) Oral daily  senna 2 Tablet(s) Oral at bedtime  sodium chloride 0.9%. 1000 milliLiter(s) IV Continuous <Continuous>  tamsulosin 0.4 milliGRAM(s) Oral at bedtime  tiotropium 2.5 MICROgram(s) Inhaler 2 Puff(s) Inhalation daily  vancomycin  IVPB 1250 milliGRAM(s) IV Intermittent every 12 hours      Labs:                         10.0   4.91  )-----------( 173      ( 28 Aug 2024 21:42 )             30.7       08-28    136  |  106  |  24<H>  ----------------------------<  113<H>  4.2   |  20<L>  |  1.63<H>    Ca    8.5      28 Aug 2024 21:42  Phos  2.8     08-28  Mg     1.9     08-28    TPro  6.7  /  Alb  3.2<L>  /  TBili  0.2  /  DBili  x   /  AST  13  /  ALT  18  /  AlkPhos  100  08-28      Blood Bank: 08-28-24  O  --  Positive      Assessment/Plan:   This is a 89y Male presents with Left SDH. Patient presents to neuro-IR for catheter cerebral angiography with possible MMA embo. Procedure/ risks/ benefits/ goals/ alternatives were explained. Risks include but are not limited to stroke/ vessel injury/ hemorrhage/ groin hematoma. All questions answered. Informed content obtained. Consent placed in chart. Interventional Neuro Radiology  Pre-Procedure Note    HPI: 89 year old male with no recent AC/AP, but hasn't for the past week, with DM c/b neuropathy, macular degeneration, PE (off Eliquis for 6 months), BPH, p/w slurred speech and difficulty swallowing breakfast since 8AM. Per daughter has been increasingly confused, with gait instability, and 6 falls over the past week. Falls have been unwitnessed, unknown head strikes. Tx from [place]. Admitted to [facility] Neuro ICU for traumatic subacute on chronic Left SDH.       Neuro Exam: Intubated and sedated    PAST MEDICAL & SURGICAL HISTORY:  DM (diabetes mellitus)      Diabetic neuropathy      BPH (benign prostatic hyperplasia)      Macular degeneration      Pulmonary embolism      H/O laminectomy  in his 60s          Social History:   Denies tobacco use    FAMILY HISTORY:  FH: type 2 diabetes      No pertinent family history    Allergies: Tolerated ceftriaxone 5/2024 (Other)  penicillins (Rash)      Current Medications: acetaminophen     Tablet .. 650 milliGRAM(s) Oral every 6 hours PRN  atorvastatin 40 milliGRAM(s) Oral at bedtime  bisacodyl 5 milliGRAM(s) Oral daily PRN  brivaracetam  IVPB 50 milliGRAM(s) IV Intermittent two times a day  chlorhexidine 0.12% Liquid 15 milliLiter(s) Oral Mucosa every 12 hours  chlorhexidine 4% Liquid 1 Application(s) Topical daily  dexMEDEtomidine Infusion 0.2 MICROgram(s)/kG/Hr IV Continuous <Continuous>  dextrose 5%. 1000 milliLiter(s) IV Continuous <Continuous>  dextrose 50% Injectable 25 Gram(s) IV Push once  dextrose Oral Gel 15 Gram(s) Oral once PRN  finasteride 5 milliGRAM(s) Oral daily  glucagon  Injectable 1 milliGRAM(s) IntraMuscular once  insulin lispro (ADMELOG) corrective regimen sliding scale   SubCutaneous every 6 hours  insulin regular Infusion 3 Unit(s)/Hr IV Continuous <Continuous>  latanoprost 0.005% Ophthalmic Solution 1 Drop(s) Both EYES at bedtime  norepinephrine Infusion 0.05 MICROgram(s)/kG/Min IV Continuous <Continuous>  oxyCODONE    IR 5 milliGRAM(s) Oral every 4 hours PRN  pantoprazole  Injectable 40 milliGRAM(s) IV Push daily  polyethylene glycol 3350 17 Gram(s) Oral daily  senna 2 Tablet(s) Oral at bedtime  sodium chloride 0.9%. 1000 milliLiter(s) IV Continuous <Continuous>  tamsulosin 0.4 milliGRAM(s) Oral at bedtime  tiotropium 2.5 MICROgram(s) Inhaler 2 Puff(s) Inhalation daily  vancomycin  IVPB 1250 milliGRAM(s) IV Intermittent every 12 hours      Labs:                         10.0   4.91  )-----------( 173      ( 28 Aug 2024 21:42 )             30.7       08-28    136  |  106  |  24<H>  ----------------------------<  113<H>  4.2   |  20<L>  |  1.63<H>    Ca    8.5      28 Aug 2024 21:42  Phos  2.8     08-28  Mg     1.9     08-28    TPro  6.7  /  Alb  3.2<L>  /  TBili  0.2  /  DBili  x   /  AST  13  /  ALT  18  /  AlkPhos  100  08-28      Blood Bank: 08-28-24  O  --  Positive      Assessment/Plan:   This is a 89y Male presents with Left SDH. Patient presents to neuro-IR for catheter cerebral angiography with possible MMA embo. Procedure/ risks/ benefits/ goals/ alternatives were explained. Risks include but are not limited to stroke/ vessel injury/ hemorrhage/ groin hematoma. All questions answered. Informed content obtained. Consent placed in chart.

## 2024-08-29 NOTE — DIETITIAN INITIAL EVALUATION ADULT - ORAL INTAKE PTA/DIET HISTORY
intubated at time of visit and unable to obtain diet recall at this time. per electronic medical record, no food allergies noted. per dietitian initial evaluation 11/30/23: atient endorses having [facility] PTA to prepare meals for him, expresses that he is not a big eater, fair appetite at times, but trying to follow 3 meals/daily.

## 2024-08-29 NOTE — PROGRESS NOTE ADULT - ASSESSMENT
ASSESSMENT/PLAN:  89M, admitted for traumatic subacute on chronic SDH.    NEURO:  -Preop mode for possible Left crani for SDH evac.  -Q1H neurochecks.  -Briviact 50BID.  Activity: [X] mobilize as tolerated [] Bedrest [X] PT [X] OT [] PMNR    PULM:  SpO2 goal >92%.    CV:  SBP goal 100-160.    RENAL:  -Fluids: IV NS at 70cc/hr.  -Tamsulosin for BPH.    GI:  Diet: NPO for OR  GI prophylaxis [X] not indicated [] PPI [] other:  Bowel regimen [] colace [X] senna [] other:    ENDO:   Goal euglycemia (-180), on SSI.    HEME/ONC:  VTE prophylaxis: SCDs    ID:  Monitor for fevers.    MISC:    SOCIAL/FAMILY:  [X] awaiting [] updated at bedside [] family meeting    CODE STATUS:  [X] Full Code [] DNR [] DNI [] Palliative/Comfort Care    DISPOSITION:  [X] ICU [] Stroke Unit [] Floor [] EMU [] RCU [] PCU   ASSESSMENT/PLAN:  89M, admitted for traumatic subacute on chronic SDH.    NEURO:  -POD1 Left crani for SDH evac.  -S/p Left MMAE w/ Flex  -Briviact 50BID.  -Q1H neurochecks.  -Briviact 50BID.  Activity: [X] mobilize as tolerated [] Bedrest [X] PT [X] OT [] PMNR    PULM:  -SpO2 goal >92%.  -Wean to extubate, CPAP as tolerated.    CV:  SBP goal 100-160.    RENAL:  -Fluids: IV NS at 70cc/hr.  -Tamsulosin for BPH.    GI:  Diet: NPO for extubation  GI prophylaxis [X] not indicated [] PPI [] other:  Bowel regimen [] colace [X] senna [] other:    ENDO:   Goal euglycemia (-180), on SSI.    HEME/ONC:  VTE prophylaxis: SCDs    ID:  Monitor for fevers.    MISC:    SOCIAL/FAMILY:  [] awaiting [X] updated at bedside [] family meeting    CODE STATUS:  [X] Full Code [] DNR [] DNI [] Palliative/Comfort Care    DISPOSITION:  [X] ICU [] Stroke Unit [] Floor [] EMU [] RCU [] PCU

## 2024-08-29 NOTE — DIETITIAN INITIAL EVALUATION ADULT - PHYSCIAL ASSESSMENT
per dietitian initial assessment 11/30/23:  Patient reported UBW ~ 180#/81.8kg months ago  .  7/31/23 [facility] HIE: 81.6kg  8/29/24 dosing weight this admission: 81.6kg  RD will continue to monitor trends.

## 2024-08-29 NOTE — PROGRESS NOTE ADULT - SUBJECTIVE AND OBJECTIVE BOX
Patient seen and examined at bedside.    --Anticoagulation--    T(C): 36.4 (08-28-24 @ 16:55), Max: 37.1 (08-28-24 @ 10:15)  HR: 79 (08-28-24 @ 23:00) (50 - 83)  BP: 109/59 (08-28-24 @ 23:00) (99/60 - 172/78)  RR: 17 (08-28-24 @ 23:00) (14 - 31)  SpO2: 99% (08-28-24 @ 23:00) (97% - 100%)  Wt(kg): --

## 2024-08-29 NOTE — PRE-ANESTHESIA EVALUATION ADULT - NSANTHPEFT_GEN_ALL_CORE
General: well appearing, appears stated age
gen: elderly man intubated and sedated  cv: rrr  resp: mech breath sounds

## 2024-08-29 NOTE — PROGRESS NOTE ADULT - SUBJECTIVE AND OBJECTIVE BOX
SUMMARY:  89M, PMH PE not currently on AC/AP, T2DM, macular degeneration, presenting with multiple falls over the plast week and dysarthria. 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     HOSPITAL COURSE:  8/28: admitted to NSCU. S/p Left crani for SDH evac.  8/29: s/p Left MMAE    ADMISSION SCORES:   GCS: 15.    REVIEW OF SYSTEMS: None other than stated in HPI.    ALLERGIES: Allergies    Tolerated ceftriaxone 5/2024 (Other)  penicillins (Rash)    Intolerances                                     10.0   4.91  )-----------( 173      ( 28 Aug 2024 21:42 )             30.7       08-28    136  |  106  |  24<H>  ----------------------------<  113<H>  4.2   |  20<L>  |  1.63<H>    Ca    8.5      28 Aug 2024 21:42  Phos  2.8     08-28  Mg     1.9     08-28    TPro  6.7  /  Alb  3.2<L>  /  TBili  0.2  /  DBili  x   /  AST  13  /  ALT  18  /  AlkPhos  100  08-28    I&O's Summary    28 Aug 2024 07:01  -  29 Aug 2024 07:00  --------------------------------------------------------  IN: 1616.6 mL / OUT: 415 mL / NET: 1201.6 mL      MEDICATIONS  (STANDING):  atorvastatin 40 milliGRAM(s) Oral at bedtime  brivaracetam  IVPB 50 milliGRAM(s) IV Intermittent two times a day  chlorhexidine 0.12% Liquid 15 milliLiter(s) Oral Mucosa every 12 hours  chlorhexidine 4% Liquid 1 Application(s) Topical daily  dexMEDEtomidine Infusion 0.2 MICROgram(s)/kG/Hr (4.08 mL/Hr) IV Continuous <Continuous>  dextrose 5%. 1000 milliLiter(s) (50 mL/Hr) IV Continuous <Continuous>  dextrose 50% Injectable 25 Gram(s) IV Push once  finasteride 5 milliGRAM(s) Oral daily  glucagon  Injectable 1 milliGRAM(s) IntraMuscular once  insulin lispro (ADMELOG) corrective regimen sliding scale   SubCutaneous every 6 hours  insulin regular Infusion 3 Unit(s)/Hr (3 mL/Hr) IV Continuous <Continuous>  latanoprost 0.005% Ophthalmic Solution 1 Drop(s) Both EYES at bedtime  norepinephrine Infusion 0.05 MICROgram(s)/kG/Min (7.65 mL/Hr) IV Continuous <Continuous>  pantoprazole  Injectable 40 milliGRAM(s) IV Push daily  polyethylene glycol 3350 17 Gram(s) Oral daily  senna 2 Tablet(s) Oral at bedtime  sodium chloride 0.9%. 1000 milliLiter(s) (70 mL/Hr) IV Continuous <Continuous>  tamsulosin 0.4 milliGRAM(s) Oral at bedtime  tiotropium 2.5 MICROgram(s) Inhaler 2 Puff(s) Inhalation daily  vancomycin  IVPB 1250 milliGRAM(s) IV Intermittent every 12 hours    MEDICATIONS  (PRN):  acetaminophen     Tablet .. 650 milliGRAM(s) Oral every 6 hours PRN Temp greater or equal to 38C (100.4F), Mild Pain (1 - 3)  bisacodyl 5 milliGRAM(s) Oral daily PRN Constipation  dextrose Oral Gel 15 Gram(s) Oral once PRN Blood Glucose LESS THAN 70 milliGRAM(s)/deciliter  oxyCODONE    IR 5 milliGRAM(s) Oral every 4 hours PRN Moderate Pain (4 - 6)      EXAMINATION:  General: No acute distress  HEENT: Anicteric sclerae  Cardiac: [id number]  Lungs: Clear  Abdomen: Soft, non-tender, +BS  Extremities: No c/c/e  Skin/Incision Site: Clean, dry and intact  Neurologic: got paralyzed for MMAE. Reversed with Sugammadex but still minimal movement and minimal cough/gag. However, starting to overbreathe ventilator. Pupils 3R. SUMMARY:  89M, PMH PE not currently on AC/AP, T2DM, macular degeneration, presenting with multiple falls over the plast week and dysarthria. 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     HOSPITAL COURSE:  8/28: admitted to NSCU. S/p Left craniostomy for SDH evac.  8/29: s/p Left MMAE    ADMISSION SCORES:   GCS: 15.    REVIEW OF SYSTEMS: None other than stated in HPI.    ALLERGIES: Allergies    Tolerated ceftriaxone 5/2024 (Other)  penicillins (Rash)    Intolerances                                     10.0   4.91  )-----------( 173      ( 28 Aug 2024 21:42 )             30.7       08-28    136  |  106  |  24<H>  ----------------------------<  113<H>  4.2   |  20<L>  |  1.63<H>    Ca    8.5      28 Aug 2024 21:42  Phos  2.8     08-28  Mg     1.9     08-28    TPro  6.7  /  Alb  3.2<L>  /  TBili  0.2  /  DBili  x   /  AST  13  /  ALT  18  /  AlkPhos  100  08-28    I&O's Summary    28 Aug 2024 07:01  -  29 Aug 2024 07:00  --------------------------------------------------------  IN: 1616.6 mL / OUT: 415 mL / NET: 1201.6 mL      MEDICATIONS  (STANDING):  atorvastatin 40 milliGRAM(s) Oral at bedtime  brivaracetam  IVPB 50 milliGRAM(s) IV Intermittent two times a day  chlorhexidine 0.12% Liquid 15 milliLiter(s) Oral Mucosa every 12 hours  chlorhexidine 4% Liquid 1 Application(s) Topical daily  dexMEDEtomidine Infusion 0.2 MICROgram(s)/kG/Hr (4.08 mL/Hr) IV Continuous <Continuous>  dextrose 5%. 1000 milliLiter(s) (50 mL/Hr) IV Continuous <Continuous>  dextrose 50% Injectable 25 Gram(s) IV Push once  finasteride 5 milliGRAM(s) Oral daily  glucagon  Injectable 1 milliGRAM(s) IntraMuscular once  insulin lispro (ADMELOG) corrective regimen sliding scale   SubCutaneous every 6 hours  insulin regular Infusion 3 Unit(s)/Hr (3 mL/Hr) IV Continuous <Continuous>  latanoprost 0.005% Ophthalmic Solution 1 Drop(s) Both EYES at bedtime  norepinephrine Infusion 0.05 MICROgram(s)/kG/Min (7.65 mL/Hr) IV Continuous <Continuous>  pantoprazole  Injectable 40 milliGRAM(s) IV Push daily  polyethylene glycol 3350 17 Gram(s) Oral daily  senna 2 Tablet(s) Oral at bedtime  sodium chloride 0.9%. 1000 milliLiter(s) (70 mL/Hr) IV Continuous <Continuous>  tamsulosin 0.4 milliGRAM(s) Oral at bedtime  tiotropium 2.5 MICROgram(s) Inhaler 2 Puff(s) Inhalation daily  vancomycin  IVPB 1250 milliGRAM(s) IV Intermittent every 12 hours    MEDICATIONS  (PRN):  acetaminophen     Tablet .. 650 milliGRAM(s) Oral every 6 hours PRN Temp greater or equal to 38C (100.4F), Mild Pain (1 - 3)  bisacodyl 5 milliGRAM(s) Oral daily PRN Constipation  dextrose Oral Gel 15 Gram(s) Oral once PRN Blood Glucose LESS THAN 70 milliGRAM(s)/deciliter  oxyCODONE    IR 5 milliGRAM(s) Oral every 4 hours PRN Moderate Pain (4 - 6)      EXAMINATION:  General: No acute distress  HEENT: Anicteric sclerae  Cardiac: [id number]  Lungs: Clear  Abdomen: Soft, non-tender, +BS  Extremities: No c/c/e  Skin/Incision Site: Clean, dry and intact  Neurologic: EOV, left eye cataracts, nods appropriately, shows 2 fingers on the right side, CALHOUN AG spont

## 2024-08-29 NOTE — PRE-ANESTHESIA EVALUATION ADULT - NSPROPOSEDPROCEDFT_GEN_ALL_CORE
89M, PMH PE not currently on AC/AP, T2DM, macular degeneration, presenting with multiple falls over the plast week and dysarthria. 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   MMA Embolization
blank hole, subdural evacuation

## 2024-08-29 NOTE — DIETITIAN INITIAL EVALUATION ADULT - OTHER INFO
-noted with PMHx of DM. glyburide noted per outpatient medications. most recent Hgb A1c 7.2% (8/28/24). blood glucose being monitored and insulin ordered to maintain glycemic control.   -BUN/Cr elevated. magnesium, potassium and phosphorus within normal limits.    -Precedex ordered  -Levophed ordered

## 2024-08-29 NOTE — DIETITIAN INITIAL EVALUATION ADULT - PERTINENT MEDS FT
MEDICATIONS  (STANDING):  atorvastatin 40 milliGRAM(s) Oral at bedtime  brivaracetam  IVPB 50 milliGRAM(s) IV Intermittent two times a day  chlorhexidine 0.12% Liquid 15 milliLiter(s) Oral Mucosa every 12 hours  chlorhexidine 4% Liquid 1 Application(s) Topical daily  dexMEDEtomidine Infusion 0.2 MICROgram(s)/kG/Hr (4.08 mL/Hr) IV Continuous <Continuous>  dextrose 5%. 1000 milliLiter(s) (50 mL/Hr) IV Continuous <Continuous>  dextrose 50% Injectable 25 Gram(s) IV Push once  finasteride 5 milliGRAM(s) Oral daily  glucagon  Injectable 1 milliGRAM(s) IntraMuscular once  insulin regular Infusion 3 Unit(s)/Hr (3 mL/Hr) IV Continuous <Continuous>  latanoprost 0.005% Ophthalmic Solution 1 Drop(s) Both EYES at bedtime  mupirocin 2% Nasal 1 Application(s) Both Nostrils two times a day  norepinephrine Infusion 0.05 MICROgram(s)/kG/Min (7.65 mL/Hr) IV Continuous <Continuous>  pantoprazole  Injectable 40 milliGRAM(s) IV Push daily  polyethylene glycol 3350 17 Gram(s) Oral daily  senna 2 Tablet(s) Oral at bedtime  sodium chloride 0.9%. 1000 milliLiter(s) (70 mL/Hr) IV Continuous <Continuous>  tamsulosin 0.4 milliGRAM(s) Oral at bedtime  tiotropium 2.5 MICROgram(s) Inhaler 2 Puff(s) Inhalation daily  vancomycin  IVPB 1250 milliGRAM(s) IV Intermittent every 12 hours    MEDICATIONS  (PRN):  acetaminophen     Tablet .. 650 milliGRAM(s) Oral every 6 hours PRN Temp greater or equal to 38C (100.4F), Mild Pain (1 - 3)  bisacodyl 5 milliGRAM(s) Oral daily PRN Constipation  dextrose Oral Gel 15 Gram(s) Oral once PRN Blood Glucose LESS THAN 70 milliGRAM(s)/deciliter  oxyCODONE    IR 5 milliGRAM(s) Oral every 4 hours PRN Moderate Pain (4 - 6)

## 2024-08-29 NOTE — DIETITIAN INITIAL EVALUATION ADULT - PERTINENT LABORATORY DATA
08-28    136  |  106  |  24<H>  ----------------------------<  113<H>  4.2   |  20<L>  |  1.63<H>    Ca    8.5      28 Aug 2024 21:42  Phos  2.8     08-28  Mg     1.9     08-28    TPro  6.7  /  Alb  3.2<L>  /  TBili  0.2  /  DBili  x   /  AST  13  /  ALT  18  /  AlkPhos  100  08-28  POCT Blood Glucose.: 237 mg/dL (08-29-24 @ 13:01)  A1C with Estimated Average Glucose Result: 7.2 % (08-28-24 @ 21:42)  A1C with Estimated Average Glucose Result: 6.7 % (05-12-24 @ 08:40)  A1C with Estimated Average Glucose Result: 6.7 % (11-28-23 @ 13:33)

## 2024-08-29 NOTE — DIETITIAN INITIAL EVALUATION ADULT - ENTERAL
Should pt remain intubated and feeding tube placed, recommend: Glucerna 1.5 to start at 10ml/hr and increase by 10ml every 4 hours until Goal volume met and tolerated of 50ml/hr x 24 hours. This will provide a total volume of 1200ml, 1800kcal (22kcal/kg) and 100g protein (1.2g/kg). defer free water flush to team.

## 2024-08-29 NOTE — CHART NOTE - NSCHARTNOTEFT_GEN_A_CORE
Interventional Neuro- Radiology   Procedure Note    Procedure: Selective Cerebral Angiography   Pre- Procedure Diagnosis: SDH  Post- Procedure Diagnosis: MMA embolization with Flex    : Dr. Caesar MD  Fellow:  Dr. Abhishek FOY, Dr. Gelacio FOY  Physician Assistant: Thelma Mayers NP, Jayme BRIGGS    RN: Dameon Durbin: Trip    Anesthesia: (general anesthesia)    I/Os:   Fluids: 250  Diez: 200  Contrast:  Estimated Blood Loss: <10cc      Preliminary Report:  Under general anesthesia, using a long sheath to the right femoral artery examination of left internal carotid artery/ left external carotid artery/ right common carotid artery via catheter cerebral angiography performed Left MMA embolization with Flex 0.8ml. Official dictated note to follow.    Patient tolerated procedure well, vital signs stable, hemodynamically stable, no change in neurological status compared to baseline. Results discussed with their family. Femoral sheath d/c'ed, hemostasis maintained, no bleeding or hematoma, quick clot femoral dressing placed at ___h. Patient transferred to NSCU for further care/ monitoring.     Vascular access site:  Ultrasound guidance- yes  Fluoroscopy before procedure- yes   Fluoroscopy to confirm correct needle position after puncture and before advancing sheath- yes  Femoral artery angiography before sheath removal- yes  Closure device used- Vascade  Closure device appropriately deployed- yes  Manual pressure- held for 10minutes until hemostasis, no active bleeding or hematoma  Safeguard dressing applied- yes, applied at ___h. Interventional Neuro- Radiology   Procedure Note    Procedure: Selective Cerebral Angiography   Pre- Procedure Diagnosis: SDH  Post- Procedure Diagnosis: SDH now s/p MMA embolization with [place]    : Dr. Caesar MD  Fellow:  Dr. Abhishek FOY, Dr. Gelacio FOY  Physician Assistant: Thelma Mayers NP, Jayme BRIGGS    RN: Dameon  Tech: Trip    Anesthesia: (general anesthesia) Dr. Matt FOY    I/Os:   Fluids: 250  Diez: 200  Contrast: 82cc  Estimated Blood Loss: <10cc      Preliminary Report:  Under general anesthesia, using a 5fr long sheath to the right femoral artery examination of left internal carotid artery/ left external carotid artery/ right common carotid artery via catheter cerebral angiography performed Left MMA embolization with Flex 0.8ml. Official dictated note to follow.    Patient tolerated procedure well, vital signs stable, hemodynamically stable, no change in neurological status compared to baseline. Results discussed with their family. Femoral sheath d/c'ed, hemostasis maintained, no bleeding or hematoma, quick clot femoral dressing placed at 1100. Patient transferred to NSCU for further care/ monitoring.     Vascular access site:  Ultrasound guidance- yes  Fluoroscopy before procedure- yes   Fluoroscopy to confirm correct needle position after puncture and before advancing sheath- yes  Femoral artery angiography before sheath removal- yes  Closure device used- Vascade  Closure device appropriately deployed- yes  Manual pressure- held for 10minutes until hemostasis, no active bleeding or hematoma  Safeguard dressing applied- yes, applied at 1100

## 2024-08-29 NOTE — PROGRESS NOTE ADULT - SUBJECTIVE AND OBJECTIVE BOX
SUMMARY:  89M, PMH PE not currently on AC/AP, T2DM, macular degeneration, presenting with multiple falls over the plast week and dysarthria. 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     HOSPITAL COURSE:  8/28: admitted to NSCU.    ADMISSION SCORES:   GCS: 15.    REVIEW OF SYSTEMS: None other than stated in HPI.    ALLERGIES: Allergies    Tolerated ceftriaxone 5/2024 (Other)  penicillins (Rash)    Intolerances                                     10.0   4.91  )-----------( 173      ( 28 Aug 2024 21:42 )             30.7       08-28    136  |  106  |  24<H>  ----------------------------<  113<H>  4.2   |  20<L>  |  1.63<H>    Ca    8.5      28 Aug 2024 21:42  Phos  2.8     08-28  Mg     1.9     08-28    TPro  6.7  /  Alb  3.2<L>  /  TBili  0.2  /  DBili  x   /  AST  13  /  ALT  18  /  AlkPhos  100  08-28    I&O's Summary    28 Aug 2024 07:01  -  29 Aug 2024 07:00  --------------------------------------------------------  IN: 1616.6 mL / OUT: 415 mL / NET: 1201.6 mL      MEDICATIONS  (STANDING):  atorvastatin 40 milliGRAM(s) Oral at bedtime  brivaracetam  IVPB 50 milliGRAM(s) IV Intermittent two times a day  chlorhexidine 0.12% Liquid 15 milliLiter(s) Oral Mucosa every 12 hours  chlorhexidine 4% Liquid 1 Application(s) Topical daily  dexMEDEtomidine Infusion 0.2 MICROgram(s)/kG/Hr (4.08 mL/Hr) IV Continuous <Continuous>  dextrose 5%. 1000 milliLiter(s) (50 mL/Hr) IV Continuous <Continuous>  dextrose 50% Injectable 25 Gram(s) IV Push once  finasteride 5 milliGRAM(s) Oral daily  glucagon  Injectable 1 milliGRAM(s) IntraMuscular once  insulin lispro (ADMELOG) corrective regimen sliding scale   SubCutaneous every 6 hours  insulin regular Infusion 3 Unit(s)/Hr (3 mL/Hr) IV Continuous <Continuous>  latanoprost 0.005% Ophthalmic Solution 1 Drop(s) Both EYES at bedtime  norepinephrine Infusion 0.05 MICROgram(s)/kG/Min (7.65 mL/Hr) IV Continuous <Continuous>  pantoprazole  Injectable 40 milliGRAM(s) IV Push daily  polyethylene glycol 3350 17 Gram(s) Oral daily  senna 2 Tablet(s) Oral at bedtime  sodium chloride 0.9%. 1000 milliLiter(s) (70 mL/Hr) IV Continuous <Continuous>  tamsulosin 0.4 milliGRAM(s) Oral at bedtime  tiotropium 2.5 MICROgram(s) Inhaler 2 Puff(s) Inhalation daily  vancomycin  IVPB 1250 milliGRAM(s) IV Intermittent every 12 hours    MEDICATIONS  (PRN):  acetaminophen     Tablet .. 650 milliGRAM(s) Oral every 6 hours PRN Temp greater or equal to 38C (100.4F), Mild Pain (1 - 3)  bisacodyl 5 milliGRAM(s) Oral daily PRN Constipation  dextrose Oral Gel 15 Gram(s) Oral once PRN Blood Glucose LESS THAN 70 milliGRAM(s)/deciliter  oxyCODONE    IR 5 milliGRAM(s) Oral every 4 hours PRN Moderate Pain (4 - 6)      EXAMINATION:  General: No acute distress  HEENT: Anicteric sclerae  Cardiac: [id number]  Lungs: Clear  Abdomen: Soft, non-tender, +BS  Extremities: No c/c/e  Skin/Incision Site: Clean, dry and intact  Neurologic: AOx2, Left eye cataracts, Left facial (baseline from Left eye glaucoma surgery) dysarthric, FC, Lt side 5/5, Rt side 4+/5, 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  SUMMARY:  89M, PMH PE not currently on AC/AP, T2DM, macular degeneration, presenting with multiple falls over the plast week and dysarthria. 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     HOSPITAL COURSE:  8/28: admitted to NSCU. S/p Left crani for SDH evac.  8/29: s/p Left MMAE    ADMISSION SCORES:   GCS: 15.    REVIEW OF SYSTEMS: None other than stated in HPI.    ALLERGIES: Allergies    Tolerated ceftriaxone 5/2024 (Other)  penicillins (Rash)    Intolerances                                     10.0   4.91  )-----------( 173      ( 28 Aug 2024 21:42 )             30.7       08-28    136  |  106  |  24<H>  ----------------------------<  113<H>  4.2   |  20<L>  |  1.63<H>    Ca    8.5      28 Aug 2024 21:42  Phos  2.8     08-28  Mg     1.9     08-28    TPro  6.7  /  Alb  3.2<L>  /  TBili  0.2  /  DBili  x   /  AST  13  /  ALT  18  /  AlkPhos  100  08-28    I&O's Summary    28 Aug 2024 07:01  -  29 Aug 2024 07:00  --------------------------------------------------------  IN: 1616.6 mL / OUT: 415 mL / NET: 1201.6 mL      MEDICATIONS  (STANDING):  atorvastatin 40 milliGRAM(s) Oral at bedtime  brivaracetam  IVPB 50 milliGRAM(s) IV Intermittent two times a day  chlorhexidine 0.12% Liquid 15 milliLiter(s) Oral Mucosa every 12 hours  chlorhexidine 4% Liquid 1 Application(s) Topical daily  dexMEDEtomidine Infusion 0.2 MICROgram(s)/kG/Hr (4.08 mL/Hr) IV Continuous <Continuous>  dextrose 5%. 1000 milliLiter(s) (50 mL/Hr) IV Continuous <Continuous>  dextrose 50% Injectable 25 Gram(s) IV Push once  finasteride 5 milliGRAM(s) Oral daily  glucagon  Injectable 1 milliGRAM(s) IntraMuscular once  insulin lispro (ADMELOG) corrective regimen sliding scale   SubCutaneous every 6 hours  insulin regular Infusion 3 Unit(s)/Hr (3 mL/Hr) IV Continuous <Continuous>  latanoprost 0.005% Ophthalmic Solution 1 Drop(s) Both EYES at bedtime  norepinephrine Infusion 0.05 MICROgram(s)/kG/Min (7.65 mL/Hr) IV Continuous <Continuous>  pantoprazole  Injectable 40 milliGRAM(s) IV Push daily  polyethylene glycol 3350 17 Gram(s) Oral daily  senna 2 Tablet(s) Oral at bedtime  sodium chloride 0.9%. 1000 milliLiter(s) (70 mL/Hr) IV Continuous <Continuous>  tamsulosin 0.4 milliGRAM(s) Oral at bedtime  tiotropium 2.5 MICROgram(s) Inhaler 2 Puff(s) Inhalation daily  vancomycin  IVPB 1250 milliGRAM(s) IV Intermittent every 12 hours    MEDICATIONS  (PRN):  acetaminophen     Tablet .. 650 milliGRAM(s) Oral every 6 hours PRN Temp greater or equal to 38C (100.4F), Mild Pain (1 - 3)  bisacodyl 5 milliGRAM(s) Oral daily PRN Constipation  dextrose Oral Gel 15 Gram(s) Oral once PRN Blood Glucose LESS THAN 70 milliGRAM(s)/deciliter  oxyCODONE    IR 5 milliGRAM(s) Oral every 4 hours PRN Moderate Pain (4 - 6)      EXAMINATION:  General: No acute distress  HEENT: Anicteric sclerae  Cardiac: [id number]  Lungs: Clear  Abdomen: Soft, non-tender, +BS  Extremities: No c/c/e  Skin/Incision Site: Clean, dry and intact  Neurologic: AOx2, Left eye cataracts, Left facial (baseline from Left eye glaucoma surgery) dysarthric, FC, Lt side 5/5, Rt side 4+/5, 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  SUMMARY:  89M, PMH PE not currently on AC/AP, T2DM, macular degeneration, presenting with multiple falls over the plast week and dysarthria. 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     HOSPITAL COURSE:  8/28: admitted to NSCU. S/p Left crani for SDH evac.  8/29: s/p Left MMAE    ADMISSION SCORES:   GCS: 15.    REVIEW OF SYSTEMS: None other than stated in HPI.    ALLERGIES: Allergies    Tolerated ceftriaxone 5/2024 (Other)  penicillins (Rash)    Intolerances                                     10.0   4.91  )-----------( 173      ( 28 Aug 2024 21:42 )             30.7       08-28    136  |  106  |  24<H>  ----------------------------<  113<H>  4.2   |  20<L>  |  1.63<H>    Ca    8.5      28 Aug 2024 21:42  Phos  2.8     08-28  Mg     1.9     08-28    TPro  6.7  /  Alb  3.2<L>  /  TBili  0.2  /  DBili  x   /  AST  13  /  ALT  18  /  AlkPhos  100  08-28    I&O's Summary    28 Aug 2024 07:01  -  29 Aug 2024 07:00  --------------------------------------------------------  IN: 1616.6 mL / OUT: 415 mL / NET: 1201.6 mL      MEDICATIONS  (STANDING):  atorvastatin 40 milliGRAM(s) Oral at bedtime  brivaracetam  IVPB 50 milliGRAM(s) IV Intermittent two times a day  chlorhexidine 0.12% Liquid 15 milliLiter(s) Oral Mucosa every 12 hours  chlorhexidine 4% Liquid 1 Application(s) Topical daily  dexMEDEtomidine Infusion 0.2 MICROgram(s)/kG/Hr (4.08 mL/Hr) IV Continuous <Continuous>  dextrose 5%. 1000 milliLiter(s) (50 mL/Hr) IV Continuous <Continuous>  dextrose 50% Injectable 25 Gram(s) IV Push once  finasteride 5 milliGRAM(s) Oral daily  glucagon  Injectable 1 milliGRAM(s) IntraMuscular once  insulin lispro (ADMELOG) corrective regimen sliding scale   SubCutaneous every 6 hours  insulin regular Infusion 3 Unit(s)/Hr (3 mL/Hr) IV Continuous <Continuous>  latanoprost 0.005% Ophthalmic Solution 1 Drop(s) Both EYES at bedtime  norepinephrine Infusion 0.05 MICROgram(s)/kG/Min (7.65 mL/Hr) IV Continuous <Continuous>  pantoprazole  Injectable 40 milliGRAM(s) IV Push daily  polyethylene glycol 3350 17 Gram(s) Oral daily  senna 2 Tablet(s) Oral at bedtime  sodium chloride 0.9%. 1000 milliLiter(s) (70 mL/Hr) IV Continuous <Continuous>  tamsulosin 0.4 milliGRAM(s) Oral at bedtime  tiotropium 2.5 MICROgram(s) Inhaler 2 Puff(s) Inhalation daily  vancomycin  IVPB 1250 milliGRAM(s) IV Intermittent every 12 hours    MEDICATIONS  (PRN):  acetaminophen     Tablet .. 650 milliGRAM(s) Oral every 6 hours PRN Temp greater or equal to 38C (100.4F), Mild Pain (1 - 3)  bisacodyl 5 milliGRAM(s) Oral daily PRN Constipation  dextrose Oral Gel 15 Gram(s) Oral once PRN Blood Glucose LESS THAN 70 milliGRAM(s)/deciliter  oxyCODONE    IR 5 milliGRAM(s) Oral every 4 hours PRN Moderate Pain (4 - 6)      EXAMINATION:  General: No acute distress  HEENT: Anicteric sclerae  Cardiac: [id number]  Lungs: Clear  Abdomen: Soft, non-tender, +BS  Extremities: No c/c/e  Skin/Incision Site: Clean, dry and intact  Neurologic: got paralyzed for MMAE. Reversed with Sugammadex but still minimal movement and minimal cough/gag. However, starting to overbreathe ventilator. Pupils 3R.

## 2024-08-29 NOTE — SWALLOW BEDSIDE ASSESSMENT ADULT - SWALLOW EVAL: DIAGNOSIS
89m hx PE not on AC, T2DM, macular degeneration, multiple falls over last week, admit for dysarthria found to have subacute on chronic SDH 89m hx PE not on AC, T2DM, macular degeneration, multiple falls over last week, admit for dysarthria found to have subacute on chronic SDH. As per chart review and NSCU round, pt not cleared for speech-language evaluation at this time. This service will continue to follow and assess as clinically indicated 89m hx PE not on AC, T2DM, macular degeneration, multiple falls over last week, admit for dysarthria found to have subacute on chronic SDH. As per chart review and NSCU round, pt not cleared for bedside swallow evaluation at this time. This service will continue to follow and assess as clinically indicated

## 2024-08-29 NOTE — DIETITIAN INITIAL EVALUATION ADULT - ADD RECOMMEND
1) Will continue to monitor weight, labs, skin, GI status and diet  1) Will continue to monitor weight, labs, skin, GI status and diet 2) add Multivitamin when able to aid in wound healing 3) RD remains available to adjust nutritional needs if extubated

## 2024-08-29 NOTE — SPEECH LANGUAGE PATHOLOGY EVALUATION - SLP DIAGNOSIS
As per chart review and NSCU round, pt not cleared for speech-language evaluation at this time. This service will continue to follow and assess as clinically indicated.

## 2024-08-29 NOTE — DIETITIAN INITIAL EVALUATION ADULT - REASON INDICATOR FOR ASSESSMENT
length of stay. information obtained from team during interdisciplinary rounds, electronic medical record

## 2024-08-30 LAB
GLUCOSE BLDC GLUCOMTR-MCNC: 108 MG/DL — HIGH (ref 70–99)
GLUCOSE BLDC GLUCOMTR-MCNC: 114 MG/DL — HIGH (ref 70–99)
GLUCOSE BLDC GLUCOMTR-MCNC: 115 MG/DL — HIGH (ref 70–99)
GLUCOSE BLDC GLUCOMTR-MCNC: 115 MG/DL — HIGH (ref 70–99)
GLUCOSE BLDC GLUCOMTR-MCNC: 123 MG/DL — HIGH (ref 70–99)
GLUCOSE BLDC GLUCOMTR-MCNC: 128 MG/DL — HIGH (ref 70–99)
GLUCOSE BLDC GLUCOMTR-MCNC: 131 MG/DL — HIGH (ref 70–99)
GLUCOSE BLDC GLUCOMTR-MCNC: 131 MG/DL — HIGH (ref 70–99)
GLUCOSE BLDC GLUCOMTR-MCNC: 135 MG/DL — HIGH (ref 70–99)
GLUCOSE BLDC GLUCOMTR-MCNC: 142 MG/DL — HIGH (ref 70–99)
GLUCOSE BLDC GLUCOMTR-MCNC: 147 MG/DL — HIGH (ref 70–99)
GLUCOSE BLDC GLUCOMTR-MCNC: 156 MG/DL — HIGH (ref 70–99)
GLUCOSE BLDC GLUCOMTR-MCNC: 162 MG/DL — HIGH (ref 70–99)
GLUCOSE BLDC GLUCOMTR-MCNC: 181 MG/DL — HIGH (ref 70–99)
GLUCOSE BLDC GLUCOMTR-MCNC: 181 MG/DL — HIGH (ref 70–99)
GLUCOSE BLDC GLUCOMTR-MCNC: 186 MG/DL — HIGH (ref 70–99)

## 2024-08-30 PROCEDURE — 43752 NASAL/OROGASTRIC W/TUBE PLMT: CPT

## 2024-08-30 PROCEDURE — 99291 CRITICAL CARE FIRST HOUR: CPT

## 2024-08-30 PROCEDURE — 70450 CT HEAD/BRAIN W/O DYE: CPT | Mod: 26

## 2024-08-30 PROCEDURE — 71045 X-RAY EXAM CHEST 1 VIEW: CPT | Mod: 26

## 2024-08-30 PROCEDURE — 93010 ELECTROCARDIOGRAM REPORT: CPT

## 2024-08-30 RX ORDER — ACETAMINOPHEN 325 MG/1
650 TABLET ORAL EVERY 6 HOURS
Refills: 0 | Status: DISCONTINUED | OUTPATIENT
Start: 2024-08-30 | End: 2024-09-03

## 2024-08-30 RX ORDER — AZTREONAM 500 MG
VIAL (EA) INJECTION
Refills: 0 | Status: DISCONTINUED | OUTPATIENT
Start: 2024-08-30 | End: 2024-08-30

## 2024-08-30 RX ORDER — TAMSULOSIN HYDROCHLORIDE 0.4 MG/1
0.4 CAPSULE ORAL AT BEDTIME
Refills: 0 | Status: DISCONTINUED | OUTPATIENT
Start: 2024-08-30 | End: 2024-08-30

## 2024-08-30 RX ORDER — AZTREONAM 500 MG
1000 VIAL (EA) INJECTION EVERY 12 HOURS
Refills: 0 | Status: DISCONTINUED | OUTPATIENT
Start: 2024-08-30 | End: 2024-08-30

## 2024-08-30 RX ORDER — DEXMEDETOMIDINE HYDROCHLORIDE IN 0.9% SODIUM CHLORIDE 4 UG/ML
0.2 INJECTION INTRAVENOUS
Qty: 200 | Refills: 0 | Status: DISCONTINUED | OUTPATIENT
Start: 2024-08-30 | End: 2024-09-03

## 2024-08-30 RX ORDER — VANCOMYCIN/0.9 % SOD CHLORIDE 1.75G/25
1000 PLASTIC BAG, INJECTION (ML) INTRAVENOUS EVERY 24 HOURS
Refills: 0 | Status: DISCONTINUED | OUTPATIENT
Start: 2024-08-30 | End: 2024-08-30

## 2024-08-30 RX ORDER — INSULIN NPH HUM/REG INSULIN HM 70-30/ML
8 CARTRIDGE (ML) SUBCUTANEOUS EVERY 6 HOURS
Refills: 0 | Status: DISCONTINUED | OUTPATIENT
Start: 2024-08-30 | End: 2024-08-30

## 2024-08-30 RX ORDER — POLYETHYLENE GLYCOL 3350 17 G/17G
17 POWDER, FOR SOLUTION ORAL DAILY
Refills: 0 | Status: DISCONTINUED | OUTPATIENT
Start: 2024-08-30 | End: 2024-09-03

## 2024-08-30 RX ORDER — DOXAZOSIN MESYLATE 1 MG
2 TABLET ORAL AT BEDTIME
Refills: 0 | Status: DISCONTINUED | OUTPATIENT
Start: 2024-08-30 | End: 2024-09-03

## 2024-08-30 RX ORDER — OXYCODONE HYDROCHLORIDE 5 MG/1
5 TABLET ORAL EVERY 4 HOURS
Refills: 0 | Status: DISCONTINUED | OUTPATIENT
Start: 2024-08-30 | End: 2024-08-31

## 2024-08-30 RX ORDER — HUMAN INSULIN 100 [IU]/ML
8 INJECTION, SUSPENSION SUBCUTANEOUS EVERY 6 HOURS
Refills: 0 | Status: DISCONTINUED | OUTPATIENT
Start: 2024-08-30 | End: 2024-08-30

## 2024-08-30 RX ORDER — HUMAN INSULIN 100 [IU]/ML
4 INJECTION, SUSPENSION SUBCUTANEOUS EVERY 6 HOURS
Refills: 0 | Status: DISCONTINUED | OUTPATIENT
Start: 2024-08-30 | End: 2024-08-31

## 2024-08-30 RX ORDER — SENNA 187 MG
2 TABLET ORAL AT BEDTIME
Refills: 0 | Status: DISCONTINUED | OUTPATIENT
Start: 2024-08-30 | End: 2024-09-03

## 2024-08-30 RX ORDER — AZTREONAM 500 MG
1000 VIAL (EA) INJECTION ONCE
Refills: 0 | Status: COMPLETED | OUTPATIENT
Start: 2024-08-30 | End: 2024-08-30

## 2024-08-30 RX ORDER — FINASTERIDE 1 MG/1
5 TABLET, FILM COATED ORAL DAILY
Refills: 0 | Status: DISCONTINUED | OUTPATIENT
Start: 2024-08-30 | End: 2024-09-03

## 2024-08-30 RX ADMIN — OXYCODONE HYDROCHLORIDE 5 MILLIGRAM(S): 5 TABLET ORAL at 09:01

## 2024-08-30 RX ADMIN — TIOTROPIUM BROMIDE INHALATION SPRAY 2 PUFF(S): 3.12 SPRAY, METERED RESPIRATORY (INHALATION) at 15:03

## 2024-08-30 RX ADMIN — HUMAN INSULIN 4 UNIT(S): 100 INJECTION, SUSPENSION SUBCUTANEOUS at 11:37

## 2024-08-30 RX ADMIN — Medication 250 MILLIGRAM(S): at 02:36

## 2024-08-30 RX ADMIN — DEXMEDETOMIDINE HYDROCHLORIDE IN 0.9% SODIUM CHLORIDE 4.08 MICROGRAM(S)/KG/HR: 4 INJECTION INTRAVENOUS at 21:55

## 2024-08-30 RX ADMIN — DEXMEDETOMIDINE HYDROCHLORIDE IN 0.9% SODIUM CHLORIDE 4.08 MICROGRAM(S)/KG/HR: 4 INJECTION INTRAVENOUS at 23:52

## 2024-08-30 RX ADMIN — HUMAN INSULIN 4 UNIT(S): 100 INJECTION, SUSPENSION SUBCUTANEOUS at 17:38

## 2024-08-30 RX ADMIN — Medication 25 MILLIGRAM(S): at 11:27

## 2024-08-30 RX ADMIN — HUMAN INSULIN 4 UNIT(S): 100 INJECTION, SUSPENSION SUBCUTANEOUS at 23:48

## 2024-08-30 RX ADMIN — POLYETHYLENE GLYCOL 3350 17 GRAM(S): 17 POWDER, FOR SOLUTION ORAL at 11:27

## 2024-08-30 RX ADMIN — FINASTERIDE 5 MILLIGRAM(S): 1 TABLET, FILM COATED ORAL at 11:26

## 2024-08-30 RX ADMIN — Medication 25 MILLIGRAM(S): at 21:57

## 2024-08-30 RX ADMIN — CHLORHEXIDINE GLUCONATE 1 APPLICATION(S): 40 SOLUTION TOPICAL at 12:08

## 2024-08-30 RX ADMIN — Medication 2: at 23:51

## 2024-08-30 RX ADMIN — LATANOPROST 1 DROP(S): 50 SOLUTION OPHTHALMIC at 21:57

## 2024-08-30 RX ADMIN — OXYCODONE HYDROCHLORIDE 5 MILLIGRAM(S): 5 TABLET ORAL at 21:44

## 2024-08-30 RX ADMIN — Medication 1 APPLICATION(S): at 05:07

## 2024-08-30 RX ADMIN — Medication 50 MILLIGRAM(S): at 02:36

## 2024-08-30 RX ADMIN — OXYCODONE HYDROCHLORIDE 5 MILLIGRAM(S): 5 TABLET ORAL at 22:15

## 2024-08-30 RX ADMIN — Medication 1 APPLICATION(S): at 17:38

## 2024-08-30 RX ADMIN — OXYCODONE HYDROCHLORIDE 5 MILLIGRAM(S): 5 TABLET ORAL at 17:12

## 2024-08-30 RX ADMIN — BRIVARACETAM 220 MILLIGRAM(S): 100 TABLET, FILM COATED ORAL at 05:07

## 2024-08-30 RX ADMIN — Medication 2 TABLET(S): at 21:56

## 2024-08-30 RX ADMIN — OXYCODONE HYDROCHLORIDE 5 MILLIGRAM(S): 5 TABLET ORAL at 09:31

## 2024-08-30 RX ADMIN — Medication 2 MILLIGRAM(S): at 21:56

## 2024-08-30 RX ADMIN — BRIVARACETAM 220 MILLIGRAM(S): 100 TABLET, FILM COATED ORAL at 17:43

## 2024-08-30 RX ADMIN — Medication 40 MILLIGRAM(S): at 21:57

## 2024-08-30 RX ADMIN — OXYCODONE HYDROCHLORIDE 5 MILLIGRAM(S): 5 TABLET ORAL at 16:42

## 2024-08-30 NOTE — CONSULT NOTE ADULT - SUBJECTIVE AND OBJECTIVE BOX
Neurology Consult    Reason for Consult: Patient is a 89y old  Male who presents with a chief complaint of cSDH (30 Aug 2024 07:27)      HPI:  89M, no recent AC/AP, but hasn't for the past week, w DM cb neuropathy, macular degeneration, PE (off Eliquis for 6 months), BPH, pw slurred speech and difficulty swallowing breakfast since 8AM. Per daughter has been increasingly confused, w gait instability, and 6 falls over the past week. Falls have been unwitnessed, unknown head strikes. Tx from [place]. Exam there per ED: Dysarthric but AOx3, FC, CALHOUN AG. 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  (28 Aug 2024 21:28)       PAST MEDICAL & SURGICAL HISTORY:  DM (diabetes mellitus)      Diabetic neuropathy      BPH (benign prostatic hyperplasia)      Macular degeneration      Pulmonary embolism      H/O laminectomy  in his 60s          Allergies: Allergies    Tolerated ceftriaxone 5/2024 (Other)  penicillins (Rash)    Intolerances        Social History: Denies toxic habits including tobacco, ETOH or illicit drugs.    Family History: FAMILY HISTORY:  FH: type 2 diabetes    . No family history of strokes    Medications: MEDICATIONS  (STANDING):  atorvastatin 40 milliGRAM(s) Oral at bedtime  brivaracetam  IVPB 50 milliGRAM(s) IV Intermittent two times a day  chlorhexidine 4% Liquid 1 Application(s) Topical daily  dexMEDEtomidine Infusion 0.2 MICROgram(s)/kG/Hr (4.08 mL/Hr) IV Continuous <Continuous>  dextrose 5%. 1000 milliLiter(s) (50 mL/Hr) IV Continuous <Continuous>  dextrose 50% Injectable 25 Gram(s) IV Push once  finasteride 5 milliGRAM(s) Oral daily  glucagon  Injectable 1 milliGRAM(s) IntraMuscular once  insulin NPH human recombinant 4 Unit(s) SubCutaneous every 6 hours  latanoprost 0.005% Ophthalmic Solution 1 Drop(s) Both EYES at bedtime  mupirocin 2% Nasal 1 Application(s) Both Nostrils two times a day  polyethylene glycol 3350 17 Gram(s) Oral daily  QUEtiapine 25 milliGRAM(s) Oral every 8 hours  senna 2 Tablet(s) Oral at bedtime  tiotropium 2.5 MICROgram(s) Inhaler 2 Puff(s) Inhalation daily    MEDICATIONS  (PRN):  acetaminophen     Tablet .. 650 milliGRAM(s) Oral every 6 hours PRN Temp greater or equal to 38C (100.4F), Mild Pain (1 - 3)  dextrose Oral Gel 15 Gram(s) Oral once PRN Blood Glucose LESS THAN 70 milliGRAM(s)/deciliter  oxyCODONE    IR 5 milliGRAM(s) Oral every 4 hours PRN Moderate Pain (4 - 6)      Review of Systems:  unable given mental status     Vitals:  Vital Signs Last 24 Hrs  T(C): 37 (30 Aug 2024 11:00), Max: 43 (30 Aug 2024 03:00)  T(F): 98.6 (30 Aug 2024 11:00), Max: 109.4 (30 Aug 2024 03:00)  HR: 57 (30 Aug 2024 13:00) (47 - 112)  BP: 134/65 (30 Aug 2024 13:00) (85/50 - 163/71)  BP(mean): 94 (30 Aug 2024 13:00) (63 - 102)  RR: 16 (30 Aug 2024 13:00) (12 - 29)  SpO2: 95% (30 Aug 2024 13:00) (92% - 100%)    Parameters below as of 30 Aug 2024 06:00  Patient On (Oxygen Delivery Method): room air        General Exam:   General Appearance: Appropriately dressed and in no acute distress       Head: Normocephalic, atraumatic and no dysmorphic features  Ear, Nose, and Throat: Moist mucous membranes +NGT   CVS: S1S2+  Resp: No SOB, no wheeze or rhonchi  GI: soft NT/ND  Extremities: No edema or cyanosis  Skin: No bruises or rashes     Neurological Exam: (on precedex)   Mental Status:  when sedation lowered, follows commands, now not consistantly on precedex   Cranial Nerves: PERRL, EOMI, ? decrease blink on R , sensation V1-V3 intact,  no obvious facial asymmetry, equal elevation of palate, scm/trap 5/5, tongue is midline on protrusion. no obvious papilledema on fundoscopic exam. hearing is grossly intact.   Motor: CALHOUN now in resraints. L>R?     Sensation:  intact x 4   Coordination: unable   Gait: unable      Data/Labs/Imaging which I personally reviewed.     Labs:     CBC Full  -  ( 29 Aug 2024 22:27 )  WBC Count : 10.62 K/uL  RBC Count : 3.26 M/uL  Hemoglobin : 9.4 g/dL  Hematocrit : 28.9 %  Platelet Count - Automated : 179 K/uL  Mean Cell Volume : 88.7 fl  Mean Cell Hemoglobin : 28.8 pg  Mean Cell Hemoglobin Concentration : 32.5 gm/dL  Auto Neutrophil # : 8.53 K/uL  Auto Lymphocyte # : 0.90 K/uL  Auto Monocyte # : 1.11 K/uL  Auto Eosinophil # : 0.00 K/uL  Auto Basophil # : 0.01 K/uL  Auto Neutrophil % : 80.2 %  Auto Lymphocyte % : 8.5 %  Auto Monocyte % : 10.5 %  Auto Eosinophil % : 0.0 %  Auto Basophil % : 0.1 %    08-29    135  |  108  |  25<H>  ----------------------------<  100<H>  4.2   |  15<L>  |  1.90<H>    Ca    8.0<L>      29 Aug 2024 22:27  Phos  3.8     08-29  Mg     2.0     08-29    TPro  6.7  /  Alb  3.2<L>  /  TBili  0.2  /  DBili  x   /  AST  13  /  ALT  18  /  AlkPhos  100  08-28    LIVER FUNCTIONS - ( 28 Aug 2024 21:42 )  Alb: 3.2 g/dL / Pro: 6.7 g/dL / ALK PHOS: 100 U/L / ALT: 18 U/L / AST: 13 U/L / GGT: x             Urinalysis Basic - ( 29 Aug 2024 22:27 )    Color: x / Appearance: x / SG: x / pH: x  Gluc: 100 mg/dL / Ketone: x  / Bili: x / Urobili: x   Blood: x / Protein: x / Nitrite: x   Leuk Esterase: x / RBC: x / WBC x   Sq Epi: x / Non Sq Epi: x / Bacteria: x        < from: CT Angio Neck Stroke Protocol w/ IV Cont (08.28.24 @ 09:35) >    ACC: [id number] EXAM:  CT ANGIO BRAIN STROKE PROTC IC   ORDERED BY: GEETA YARBROUGH     ACC: [id number] EXAM:  CT ANGIO NECK STROKE PROTCL IC   ORDERED BY: GEETA YARBROUGH     ACC: [id number] EXAM:  CT BRAIN STROKE PROTOCOL   ORDERED BY: GEETA YARBROUGH     ACC:[id number] EXAM:  CT BRAIN PERFUSION MAPS STROKE   ORDERED BY: GEETA YARBROUGH     PROCEDURE DATE:  08/28/2024          INTERPRETATION:  CLINICAL INDICATIONS:Stroke Code    TECHNIQUE: CT head, CTA brain and neck, and CT perfusion was obtained.   140 cc'sof Omnipaque 350 Intravenous contrast was administered. 10 cc's   discarded. 3D volume rendered images, coronal and sagittal reformats were   obtained.    COMPARISON: CT head 5/28/2022 and CTA brain and neck 11/5/2021    FINDINGS:    CT HEAD: There is a large left holohemispheric subdural hematoma, mostly   chronic, with a small amount of acute hemorrhagic blood products. This   measures up to 2.9 cm in greatest width. There is significant mass effect   on the underlying parenchyma. There is minimal left to right midline   shift of approximately 1 to 2 mm. Ventricles and sulci are prominent,   compatible with age-related generalized cerebral volume loss.   Periventricular hypoattenuation, suggestive of advanced chronic   microvascular ischemic changes. Chronic lacunar infarcts in the right   basal ganglia are present.    Paranasal sinuses and mastoid air cells are clear.      CTA BRAIN:  The [place] and vertebrobasilar system are normal without   evidence of stenosis, occlusion or saccular aneurysm dilation. No   evidence for arterial venous malformation.    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 Aortic calcifications are   noted. Calcifications at the origin of the great vessels are present.   Calcifications ofthe bilateral carotid bulbs and proximal internal   carotid arteries without hemodynamically significant stenosis is   identified.    Bilateral cervical vertebral arteries are codominant and appear patent.    CT PERFUSION:    Scattered areas of delayed flow in the bilateral cerebri, may be   artifactual given the bilaterality. Abnormal perfusion data within the   subdural hematoma is noted from artifact.        IMPRESSION:    CT head: No acute infarct is identified. Large left holohemispheric   subdural hematoma, mostly chronic, with a small amount of acute   hemorrhagic blood products. This measures up to 2.9 cm in greatest width.   There is significant mass effect on the underlying parenchyma. There is   minimal left to right midline shift of approximately 1 to 2 mm. Rest of   the chronic findings as above.      Critical value:  I discussed the finding of this report with Dr. Yarbrough   at 9:45 AM on 8/28/2024.  Critical value policy of the hospital was   followed.  Read back and confirmation of receipt of this communication   was performed.  This verbal communication supplements the text report of   this document.    CTA brain: No hemodynamically significant stenosis    CTA carotid/vertebral artery circulation: No hemodynamically significant   stenosis    CT Perfusion: Perfusion data appears likely artifactual as above.    --- End of Report ---            DONA MONACO MD  This document has been electronically signed. Aug 28 2024  9:52AM    < end of copied text >

## 2024-08-30 NOTE — PROGRESS NOTE ADULT - SUBJECTIVE AND OBJECTIVE BOX
SUMMARY:  89M, PMH PE not currently on AC/AP, T2DM, macular degeneration, presenting with multiple falls over the plast week and dysarthria. 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     HOSPITAL COURSE:  8/28: admitted to NSCU. S/p Left crani for SDH evac.  8/29: s/p Left MMAE    ADMISSION SCORES:   GCS: 15.    REVIEW OF SYSTEMS: None other than stated in HPI.    ALLERGIES: Allergies    Tolerated ceftriaxone 5/2024 (Other)  penicillins (Rash)    Intolerances                                                9.4    10.62 )-----------( 179      ( 29 Aug 2024 22:27 )             28.9     08-29    135  |  108  |  25<H>  ----------------------------<  100<H>  4.2   |  15<L>  |  1.90<H>    Ca    8.0<L>      29 Aug 2024 22:27  Phos  3.8     08-29  Mg     2.0     08-29    TPro  6.7  /  Alb  3.2<L>  /  TBili  0.2  /  DBili  x   /  AST  13  /  ALT  18  /  AlkPhos  100  08-28      I&O's Summary    29 Aug 2024 07:01  -  30 Aug 2024 07:00  --------------------------------------------------------  IN: 3369.4 mL / OUT: 1055 mL / NET: 2314.4 mL        MEDICATIONS  (STANDING):  atorvastatin 40 milliGRAM(s) Oral at bedtime  aztreonam  IVPB      aztreonam  IVPB 1000 milliGRAM(s) IV Intermittent every 12 hours  brivaracetam  IVPB 50 milliGRAM(s) IV Intermittent two times a day  chlorhexidine 4% Liquid 1 Application(s) Topical daily  dextrose 5%. 1000 milliLiter(s) (50 mL/Hr) IV Continuous <Continuous>  dextrose 50% Injectable 25 Gram(s) IV Push once  finasteride 5 milliGRAM(s) Oral daily  glucagon  Injectable 1 milliGRAM(s) IntraMuscular once  insulin regular Infusion 3 Unit(s)/Hr (3 mL/Hr) IV Continuous <Continuous>  latanoprost 0.005% Ophthalmic Solution 1 Drop(s) Both EYES at bedtime  mupirocin 2% Nasal 1 Application(s) Both Nostrils two times a day  polyethylene glycol 3350 17 Gram(s) Oral daily  senna 2 Tablet(s) Oral at bedtime  sodium chloride 0.9%. 1000 milliLiter(s) (70 mL/Hr) IV Continuous <Continuous>  tiotropium 2.5 MICROgram(s) Inhaler 2 Puff(s) Inhalation daily  vancomycin  IVPB 1000 milliGRAM(s) IV Intermittent every 24 hours    MEDICATIONS  (PRN):  acetaminophen     Tablet .. 650 milliGRAM(s) Oral every 6 hours PRN Temp greater or equal to 38C (100.4F), Mild Pain (1 - 3)  dextrose Oral Gel 15 Gram(s) Oral once PRN Blood Glucose LESS THAN 70 milliGRAM(s)/deciliter  oxyCODONE    IR 5 milliGRAM(s) Oral every 4 hours PRN Moderate Pain (4 - 6)        EXAMINATION:  General: No acute distress  HEENT: Anicteric sclerae  Cardiac: [id number]  Lungs: Clear  Abdomen: Soft, non-tender, +BS  Extremities: No c/c/e  Skin/Incision Site: Clean, dry and intact  Neurologic: got paralyzed for MMAE. Reversed with Sugammadex but still minimal movement and minimal cough/gag. However, starting to overbreathe ventilator. Pupils 3R. SUMMARY:  89M, PMH PE not currently on AC/AP, T2DM, macular degeneration, presenting with multiple falls over the plast week and dysarthria. 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     HOSPITAL COURSE:  8/28: admitted to NSCU. S/p Left crani for SDH evac.  8/29: s/p Left MMAE    ADMISSION SCORES:   GCS: 15.    REVIEW OF SYSTEMS: None other than stated in HPI.    ALLERGIES: Allergies    Tolerated ceftriaxone 5/2024 (Other)  penicillins (Rash)    Intolerances                                                9.4    10.62 )-----------( 179      ( 29 Aug 2024 22:27 )             28.9     08-29    135  |  108  |  25<H>  ----------------------------<  100<H>  4.2   |  15<L>  |  1.90<H>    Ca    8.0<L>      29 Aug 2024 22:27  Phos  3.8     08-29  Mg     2.0     08-29    TPro  6.7  /  Alb  3.2<L>  /  TBili  0.2  /  DBili  x   /  AST  13  /  ALT  18  /  AlkPhos  100  08-28      I&O's Summary    29 Aug 2024 07:01  -  30 Aug 2024 07:00  --------------------------------------------------------  IN: 3369.4 mL / OUT: 1055 mL / NET: 2314.4 mL        MEDICATIONS  (STANDING):  atorvastatin 40 milliGRAM(s) Oral at bedtime  aztreonam  IVPB      aztreonam  IVPB 1000 milliGRAM(s) IV Intermittent every 12 hours  brivaracetam  IVPB 50 milliGRAM(s) IV Intermittent two times a day  chlorhexidine 4% Liquid 1 Application(s) Topical daily  dextrose 5%. 1000 milliLiter(s) (50 mL/Hr) IV Continuous <Continuous>  dextrose 50% Injectable 25 Gram(s) IV Push once  finasteride 5 milliGRAM(s) Oral daily  glucagon  Injectable 1 milliGRAM(s) IntraMuscular once  insulin regular Infusion 3 Unit(s)/Hr (3 mL/Hr) IV Continuous <Continuous>  latanoprost 0.005% Ophthalmic Solution 1 Drop(s) Both EYES at bedtime  mupirocin 2% Nasal 1 Application(s) Both Nostrils two times a day  polyethylene glycol 3350 17 Gram(s) Oral daily  senna 2 Tablet(s) Oral at bedtime  sodium chloride 0.9%. 1000 milliLiter(s) (70 mL/Hr) IV Continuous <Continuous>  tiotropium 2.5 MICROgram(s) Inhaler 2 Puff(s) Inhalation daily  vancomycin  IVPB 1000 milliGRAM(s) IV Intermittent every 24 hours    MEDICATIONS  (PRN):  acetaminophen     Tablet .. 650 milliGRAM(s) Oral every 6 hours PRN Temp greater or equal to 38C (100.4F), Mild Pain (1 - 3)  dextrose Oral Gel 15 Gram(s) Oral once PRN Blood Glucose LESS THAN 70 milliGRAM(s)/deciliter  oxyCODONE    IR 5 milliGRAM(s) Oral every 4 hours PRN Moderate Pain (4 - 6)        EXAMINATION:  General: No acute distress  HEENT: Anicteric sclerae  Cardiac: [id number]  Lungs: Clear  Abdomen: Soft, non-tender, +BS  Extremities: No c/c/e  Skin/Incision Site: Clean, dry and intact  Neurologic: EOV, left eye cataracts, FC intermittently ( shows 2 fingers, wiggles toes ), CALHOUN AG spont.

## 2024-08-30 NOTE — CHART NOTE - NSCHARTNOTEFT_GEN_A_CORE
89M, no recent AC/AP, but hasn't for the past week, w DM cb neuropathy, macular degeneration, PE (off Eliquis for 6 months), BPH, pw slurred speech and difficulty swallowing breakfast since 8AM. Per daughter has been increasingly confused, w gait instability, and 6 falls over the past week. Falls have been unwitnessed, unknown head strikes. Tx from [place]. Exam there per ED: Dysarthric but AOx3, FC, CALHOUN AG. 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    Interim events: 8/28 pt s/p 2 blank holes on the left side; SG drain placed; possible embolization of middle meningeal artery. 8/29 Pt to neuro-IR  for catheter cerebral angiography w/ MMA embolization. Patient extubated 8/30/24.     Speech-Swallow hx: Pt not cleared for speech-language and bedside swallow evaluation on 8/29/24. Bedside swallow completed at [facility] (11/2021) and [facility] (5/2022) w/ functional brenna-pharyngeal swallow w/o overt s/s of laryngeal penetration/aspiration. Rx for regular/easy to chew solids/thin liquids diet.    Today: Chart reviewed. Patient seen for speech-language and bedside swallow evaluation s/p extubation and clearance with NSCU team. Patient semi-supine in bed, on room air, A&Ox0, +NGT, +SG drain, b/l restraints, +ICU monitoring, confused and agitated. As per discussion with son at the bedside, pt has been agitated all morning attempting to get out of bed. Patient unable to maintain eye-contact, presenting with slurred speech marked by intermittent intelligible utterances ("I want to get up") and jargon. Patient unable to follow simple commands or respond to yes/no questions requiring frequent verbal/tactile redirection. Patient with minimal participation during oral motor evaluation, refusing clinician attempts to provide oral care and unable to elicit volitional swallow. Subjective PO trials contraindicated at this time given severity of agitation, waxing/waning mentation, and wet upper breath/respiratory sounds. Patient left NAD, purposeful proactive rounding completed; 5P's addressed.     Impressions: Patient presenting with evidence of an brenna-pharyngeal dysphagia superimposed upon by agitation and altered mental status. Case discussion with JUHI Herr, patient and family at the bedside.    Recommendations:  1) NPO, with non-oral nutrition/hydration/medications.   2) Strict oral hygiene  3) Aspiration precautions  4) Service will continue to follow for speech-language tx and repeat bedside swallow evaluation pending improvement in mentation and participation.     Jeanne Cardoza MA CCC-SLP  Speech-Language Pathologist  Contact via Teams of [phone]

## 2024-08-30 NOTE — PHYSICAL THERAPY INITIAL EVALUATION ADULT - PERTINENT HX OF CURRENT PROBLEM, REHAB EVAL
Pt is a 89 y.o. male with PMH PE not currently on AC/AP, T2DM, macular degeneration, presenting with multiple falls over the plast week and dysarthria. 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 Pt s/p L blank hole for subdural hematoma, placement of subgaleal drain on 8/28/24 and s/p L MMAE [place] on 8/29/24. CXR 8/28/24: Bibasilar linear opacities, likely representing atelectasis. (-) VA Duplex BLE Vein Scan 8/29/24. CTH 8/29/24: Postop changes with residual acute subdural hematoma.

## 2024-08-30 NOTE — PROGRESS NOTE ADULT - ASSESSMENT
ASSESSMENT/PLAN:  89M, admitted for traumatic subacute on chronic SDH.    NEURO:  -POD1 Left crani for SDH evac.  -S/p Left MMAE w/ Flex  -Briviact 50BID.  -Q1H neurochecks.  -Briviact 50BID.  Activity: [X] mobilize as tolerated [] Bedrest [X] PT [X] OT [] PMNR    PULM:  -SpO2 goal >92%.  -Wean to extubate, CPAP as tolerated.    CV:  SBP goal 100-160.    RENAL:  -Fluids: IV NS at 70cc/hr.  -Tamsulosin for BPH.    GI:  Diet: NPO for extubation  GI prophylaxis [X] not indicated [] PPI [] other:  Bowel regimen [] colace [X] senna [] other:    ENDO:   Goal euglycemia (-180), on SSI.    HEME/ONC:  VTE prophylaxis: SCDs    ID:  Monitor for fevers.    MISC:    SOCIAL/FAMILY:  [] awaiting [X] updated at bedside [] family meeting    CODE STATUS:  [X] Full Code [] DNR [] DNI [] Palliative/Comfort Care    DISPOSITION:  [X] ICU [] Stroke Unit [] Floor [] EMU [] RCU [] PCU   ASSESSMENT/PLAN:  89M, admitted for traumatic subacute on chronic SDH.    NEURO:  -POD2 Left crani for SDH evac.  -POD1 Left MMAE w/ Flex  -CTH this AM.  -Q1H neurochecks.  -Briviact 50BID.  -Precedex, Seroquel PRN.  Activity: [X] mobilize as tolerated [] Bedrest [X] PT [X] OT [] PMNR    PULM:  -SpO2 goal >92%.  -Wean to extubate, CPAP as tolerated.    CV:  SBP goal 100-160.    RENAL:  -Monitor I&Os.  -Tamsulosin and finasteride for BPH.    GI:  Diet: NGT tube feeds with Glucerna  GI prophylaxis [X] not indicated [] PPI [] other:  Bowel regimen [] colace [X] senna [] other:    ENDO:   Goal euglycemia (-180), on NPH    HEME/ONC:  VTE prophylaxis: SCDs    ID:  Monitor for fevers.    MISC:    SOCIAL/FAMILY:  [] awaiting [X] updated at bedside [] family meeting    CODE STATUS:  [X] Full Code [] DNR [] DNI [] Palliative/Comfort Care    DISPOSITION:  [X] ICU [] Stroke Unit [] Floor [] EMU [] RCU [] PCU

## 2024-08-30 NOTE — PROGRESS NOTE ADULT - ASSESSMENT
ASSESSMENT/PLAN:  89M, admitted for traumatic subacute on chronic SDH.    NEURO:  -POD2 Left crani for SDH evac.  -POD1 Left MMAE w/ Flex  -CTH this AM.  -Q1H neurochecks.  -Briviact 50BID.  -Precedex, Seroquel PRN.  Activity: [X] mobilize as tolerated [] Bedrest [X] PT [X] OT [] PMNR    PULM:  -SpO2 goal >92%.  -Wean to extubate, CPAP as tolerated.    CV:  SBP goal 100-160.    RENAL:  -Monitor I&Os.  -Tamsulosin and finasteride for BPH.    GI:  Diet: NGT tube feeds with Glucerna  GI prophylaxis [X] not indicated [] PPI [] other:  Bowel regimen [] colace [X] senna [] other:    ENDO:   Goal euglycemia (-180), on NPH    HEME/ONC:  VTE prophylaxis: SCDs    ID:  Monitor for fevers.    MISC:    SOCIAL/FAMILY:  [] awaiting [X] updated at bedside [] family meeting    CODE STATUS:  [X] Full Code [] DNR [] DNI [] Palliative/Comfort Care    DISPOSITION:  [X] ICU [] Stroke Unit [] Floor [] EMU [] RCU [] PCU   ASSESSMENT/PLAN:  89M, admitted for traumatic subacute on chronic SDH.    NEURO:  -POD2 Left crani for SDH evac.  -POD1 Left MMAE w/ Flex  -CTH: stable small residual left frontal parietal subdural collection with air.  -Q4H neurochecks.  -Briviact 50BID.  -Precedex, Seroquel PRN.  Activity: [X] mobilize as tolerated [] Bedrest [X] PT [X] OT [] PMNR    PULM:  -SpO2 goal >92%.  -Wean to extubate, CPAP as tolerated.    CV:  SBP goal 100-160.    RENAL:  -Monitor I&Os.  -Tamsulosin and finasteride for BPH.    GI:  Diet: NGT tube feeds with Glucerna  GI prophylaxis [X] not indicated [] PPI [] other:  Bowel regimen [] colace [X] senna [] other:    ENDO:   Goal euglycemia (-180), on NPH 4 Q6    HEME/ONC:  VTE prophylaxis: SCDs    ID:  Monitor for fevers.    MISC:    SOCIAL/FAMILY:  [] awaiting [X] updated at bedside [] family meeting    CODE STATUS:  [X] Full Code [] DNR [] DNI [] Palliative/Comfort Care    DISPOSITION:  [X] ICU [] Stroke Unit [] Floor [] EMU [] RCU [] PCU

## 2024-08-30 NOTE — PROGRESS NOTE ADULT - SUBJECTIVE AND OBJECTIVE BOX
Patient seen and examined at bedside.    --Anticoagulation--    T(C): 36.5 (08-29-24 @ 23:00), Max: 97.1 (08-29-24 @ 01:00)  HR: 56 (08-29-24 @ 23:15) (48 - 130)  BP: 138/71 (08-29-24 @ 23:15) (85/50 - 163/71)  RR: 13 (08-29-24 @ 23:15) (12 - 24)  SpO2: 100% (08-29-24 @ 23:15) (98% - 100%)  Wt(kg): --    Exam: EOV, Lt eye cataracts, nods appropriately, shows 2 fingers, CALHOUN AG spontaneous

## 2024-08-30 NOTE — PHYSICAL THERAPY INITIAL EVALUATION ADULT - DID THE PATIENT HAVE SURGERY?
L blank hole for subdural hematoma, placement of subgaleal drain on 8/28/24. L MMAE [place] on 8/29/24./yes

## 2024-08-30 NOTE — PROGRESS NOTE ADULT - SUBJECTIVE AND OBJECTIVE BOX
SUMMARY:  89M, PMH PE not currently on AC/AP, T2DM, macular degeneration, presenting with multiple falls over the plast week and dysarthria. 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     HOSPITAL COURSE:  8/28: admitted to [facility]. S/p Left crani for SDH evac.  8/29: s/p Left MMAE    ADMISSION SCORES:   GCS: 15.    REVIEW OF SYSTEMS: None other than stated in HPI.    ALLERGIES: Allergies    Tolerated ceftriaxone 5/2024 (Other)  penicillins (Rash)    Intolerances                                                9.4    10.62 )-----------( 179      ( 29 Aug 2024 22:27 )             28.9     08-29    135  |  108  |  25<H>  ----------------------------<  100<H>  4.2   |  15<L>  |  1.90<H>    Ca    8.0<L>      29 Aug 2024 22:27  Phos  3.8     08-29  Mg     2.0     08-29    TPro  6.7  /  Alb  3.2<L>  /  TBili  0.2  /  DBili  x   /  AST  13  /  ALT  18  /  AlkPhos  100  08-28      I&O's Summary    29 Aug 2024 07:01  -  30 Aug 2024 07:00  --------------------------------------------------------  IN: 3369.4 mL / OUT: 1055 mL / NET: 2314.4 mL        MEDICATIONS  (STANDING):  atorvastatin 40 milliGRAM(s) Oral at bedtime  aztreonam  IVPB      aztreonam  IVPB 1000 milliGRAM(s) IV Intermittent every 12 hours  brivaracetam  IVPB 50 milliGRAM(s) IV Intermittent two times a day  chlorhexidine 4% Liquid 1 Application(s) Topical daily  dextrose 5%. 1000 milliLiter(s) (50 mL/Hr) IV Continuous <Continuous>  dextrose 50% Injectable 25 Gram(s) IV Push once  finasteride 5 milliGRAM(s) Oral daily  glucagon  Injectable 1 milliGRAM(s) IntraMuscular once  insulin regular Infusion 3 Unit(s)/Hr (3 mL/Hr) IV Continuous <Continuous>  latanoprost 0.005% Ophthalmic Solution 1 Drop(s) Both EYES at bedtime  mupirocin 2% Nasal 1 Application(s) Both Nostrils two times a day  polyethylene glycol 3350 17 Gram(s) Oral daily  senna 2 Tablet(s) Oral at bedtime  sodium chloride 0.9%. 1000 milliLiter(s) (70 mL/Hr) IV Continuous <Continuous>  tiotropium 2.5 MICROgram(s) Inhaler 2 Puff(s) Inhalation daily  vancomycin  IVPB 1000 milliGRAM(s) IV Intermittent every 24 hours    MEDICATIONS  (PRN):  acetaminophen     Tablet .. 650 milliGRAM(s) Oral every 6 hours PRN Temp greater or equal to 38C (100.4F), Mild Pain (1 - 3)  dextrose Oral Gel 15 Gram(s) Oral once PRN Blood Glucose LESS THAN 70 milliGRAM(s)/deciliter  oxyCODONE    IR 5 milliGRAM(s) Oral every 4 hours PRN Moderate Pain (4 - 6)        EXAMINATION:  General: No acute distress  HEENT: Anicteric sclerae  Cardiac: [id number]  Lungs: Clear  Abdomen: Soft, non-tender, +BS  Extremities: No c/c/e  Skin/Incision Site: Clean, dry and intact  Neurologic: got paralyzed for MMAE. Reversed with Sugammadex but still minimal movement and minimal cough/gag. However, starting to overbreathe ventilator. Pupils 3R.

## 2024-08-30 NOTE — CONSULT NOTE ADULT - ASSESSMENT
89M h/o DM c/b DM neuropathy, BPH, MD, h/o PE, h/o laminectomy,  pw slurred speech and difficulty swallowing breakfast since 8AM. Per daughter has been increasingly confused, w gait instability, and 6 falls over the past week. Falls have been unwitnessed, unknown head strikes. Tx from [place]. Exam there per ED: Dysarthric but AOx3, FC, CALHOUN AG.   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  CTA H/N neg   8/28 L crani for SDH evac   8/29 L MMAE   extubated 8/30 early AM   8/30 CTH: no change since 8/29. post op changes. L MMAE material. left frontal and pairetal purr hole.s     Imprssion:   1) SDH likely traumatic from fall  2) falls     - now precedex for agitation  - Briviact for sz ppx on 50mg BID  - wean sedation as tolerated  - drain per nsx   - SBP < 160  - PT/OT  - eventually swallow eval  - telemetry  - check FS, glucose control <180  - GI/DVT ppx  - Thank you for allowing me to participate in the care of this patient. Call with questions.   spoke with family and NSCU team  Albert Watson MD  Vascular Neurology  Office: [phone]

## 2024-08-31 ENCOUNTER — RESULT REVIEW (OUTPATIENT)
Age: 89
End: 2024-08-31

## 2024-08-31 LAB
ADD ON TEST-SPECIMEN IN LAB: SIGNIFICANT CHANGE UP
ANION GAP SERPL CALC-SCNC: 10 MMOL/L — SIGNIFICANT CHANGE UP (ref 5–17)
BUN SERPL-MCNC: 30 MG/DL — HIGH (ref 7–23)
CALCIUM SERPL-MCNC: 8 MG/DL — LOW (ref 8.4–10.5)
CHLORIDE SERPL-SCNC: 113 MMOL/L — HIGH (ref 96–108)
CO2 SERPL-SCNC: 15 MMOL/L — LOW (ref 22–31)
CREAT SERPL-MCNC: 1.92 MG/DL — HIGH (ref 0.5–1.3)
EGFR: 32 ML/MIN/1.73M2 — LOW
GAS PNL BLDA: SIGNIFICANT CHANGE UP
GAS PNL BLDA: SIGNIFICANT CHANGE UP
GLUCOSE BLDC GLUCOMTR-MCNC: 149 MG/DL — HIGH (ref 70–99)
GLUCOSE BLDC GLUCOMTR-MCNC: 157 MG/DL — HIGH (ref 70–99)
GLUCOSE BLDC GLUCOMTR-MCNC: 158 MG/DL — HIGH (ref 70–99)
GLUCOSE SERPL-MCNC: 147 MG/DL — HIGH (ref 70–99)
HCT VFR BLD CALC: 29.4 % — LOW (ref 39–50)
HGB BLD-MCNC: 9.2 G/DL — LOW (ref 13–17)
MAGNESIUM SERPL-MCNC: 2 MG/DL — SIGNIFICANT CHANGE UP (ref 1.6–2.6)
MCHC RBC-ENTMCNC: 29.1 PG — SIGNIFICANT CHANGE UP (ref 27–34)
MCHC RBC-ENTMCNC: 31.3 GM/DL — LOW (ref 32–36)
MCV RBC AUTO: 93 FL — SIGNIFICANT CHANGE UP (ref 80–100)
NRBC # BLD: 0 /100 WBCS — SIGNIFICANT CHANGE UP (ref 0–0)
NT-PROBNP SERPL-SCNC: 7773 PG/ML — HIGH (ref 0–300)
PHOSPHATE SERPL-MCNC: 3.2 MG/DL — SIGNIFICANT CHANGE UP (ref 2.5–4.5)
PLATELET # BLD AUTO: 146 K/UL — LOW (ref 150–400)
POTASSIUM SERPL-MCNC: 4.1 MMOL/L — SIGNIFICANT CHANGE UP (ref 3.5–5.3)
POTASSIUM SERPL-SCNC: 4.1 MMOL/L — SIGNIFICANT CHANGE UP (ref 3.5–5.3)
PROCALCITONIN SERPL-MCNC: 0.2 NG/ML — HIGH (ref 0.02–0.1)
RBC # BLD: 3.16 M/UL — LOW (ref 4.2–5.8)
RBC # FLD: 13.6 % — SIGNIFICANT CHANGE UP (ref 10.3–14.5)
SODIUM SERPL-SCNC: 138 MMOL/L — SIGNIFICANT CHANGE UP (ref 135–145)
WBC # BLD: 4.59 K/UL — SIGNIFICANT CHANGE UP (ref 3.8–10.5)
WBC # FLD AUTO: 4.59 K/UL — SIGNIFICANT CHANGE UP (ref 3.8–10.5)

## 2024-08-31 PROCEDURE — 70450 CT HEAD/BRAIN W/O DYE: CPT | Mod: 26

## 2024-08-31 PROCEDURE — 99291 CRITICAL CARE FIRST HOUR: CPT

## 2024-08-31 PROCEDURE — 71045 X-RAY EXAM CHEST 1 VIEW: CPT | Mod: 26,76

## 2024-08-31 PROCEDURE — 93306 TTE W/DOPPLER COMPLETE: CPT | Mod: 26

## 2024-08-31 RX ORDER — CEFEPIME 2 G/1
1000 INJECTION, POWDER, FOR SOLUTION INTRAVENOUS DAILY
Refills: 0 | Status: DISCONTINUED | OUTPATIENT
Start: 2024-08-31 | End: 2024-09-01

## 2024-08-31 RX ORDER — VANCOMYCIN/0.9 % SOD CHLORIDE 1.75G/25
1000 PLASTIC BAG, INJECTION (ML) INTRAVENOUS EVERY 24 HOURS
Refills: 0 | Status: DISCONTINUED | OUTPATIENT
Start: 2024-08-31 | End: 2024-09-01

## 2024-08-31 RX ORDER — METRONIDAZOLE 250 MG
TABLET ORAL
Refills: 0 | Status: DISCONTINUED | OUTPATIENT
Start: 2024-09-01 | End: 2024-09-01

## 2024-08-31 RX ORDER — LORAZEPAM 4 MG/ML
2 INJECTION INTRAMUSCULAR; INTRAVENOUS ONCE
Refills: 0 | Status: DISCONTINUED | OUTPATIENT
Start: 2024-08-31 | End: 2024-08-31

## 2024-08-31 RX ORDER — TRAMADOL HYDROCHLORIDE 200 MG/1
50 TABLET, EXTENDED RELEASE ORAL ONCE
Refills: 0 | Status: DISCONTINUED | OUTPATIENT
Start: 2024-08-31 | End: 2024-08-31

## 2024-08-31 RX ORDER — IPRATROPIUM BROMIDE AND ALBUTEROL SULFATE .5; 3 MG/3ML; MG/3ML
3 SOLUTION RESPIRATORY (INHALATION) EVERY 6 HOURS
Refills: 0 | Status: DISCONTINUED | OUTPATIENT
Start: 2024-08-31 | End: 2024-09-03

## 2024-08-31 RX ORDER — ACETAMINOPHEN 325 MG/1
1000 TABLET ORAL ONCE
Refills: 0 | Status: COMPLETED | OUTPATIENT
Start: 2024-08-31 | End: 2024-08-31

## 2024-08-31 RX ORDER — TRAMADOL HYDROCHLORIDE 200 MG/1
50 TABLET, EXTENDED RELEASE ORAL EVERY 6 HOURS
Refills: 0 | Status: DISCONTINUED | OUTPATIENT
Start: 2024-08-31 | End: 2024-09-01

## 2024-08-31 RX ORDER — METRONIDAZOLE 250 MG
500 TABLET ORAL EVERY 8 HOURS
Refills: 0 | Status: DISCONTINUED | OUTPATIENT
Start: 2024-09-01 | End: 2024-09-01

## 2024-08-31 RX ORDER — TRAMADOL HYDROCHLORIDE 200 MG/1
25 TABLET, EXTENDED RELEASE ORAL EVERY 6 HOURS
Refills: 0 | Status: DISCONTINUED | OUTPATIENT
Start: 2024-08-31 | End: 2024-09-01

## 2024-08-31 RX ORDER — METRONIDAZOLE 250 MG
500 TABLET ORAL ONCE
Refills: 0 | Status: COMPLETED | OUTPATIENT
Start: 2024-08-31 | End: 2024-09-01

## 2024-08-31 RX ORDER — FENTANYL CITRATE 50 UG/ML
50 INJECTION INTRAMUSCULAR; INTRAVENOUS ONCE
Refills: 0 | Status: DISCONTINUED | OUTPATIENT
Start: 2024-08-31 | End: 2024-08-31

## 2024-08-31 RX ORDER — HUMAN INSULIN 100 [IU]/ML
4 INJECTION, SUSPENSION SUBCUTANEOUS EVERY 6 HOURS
Refills: 0 | Status: DISCONTINUED | OUTPATIENT
Start: 2024-08-31 | End: 2024-09-01

## 2024-08-31 RX ORDER — FENTANYL CITRATE 50 UG/ML
25 INJECTION INTRAMUSCULAR; INTRAVENOUS ONCE
Refills: 0 | Status: DISCONTINUED | OUTPATIENT
Start: 2024-08-31 | End: 2024-08-31

## 2024-08-31 RX ORDER — OXYCODONE HYDROCHLORIDE 5 MG/1
5 TABLET ORAL ONCE
Refills: 0 | Status: DISCONTINUED | OUTPATIENT
Start: 2024-08-31 | End: 2024-08-31

## 2024-08-31 RX ORDER — SODIUM CHLORIDE 9 MG/ML
4 INJECTION INTRAMUSCULAR; INTRAVENOUS; SUBCUTANEOUS EVERY 12 HOURS
Refills: 0 | Status: DISCONTINUED | OUTPATIENT
Start: 2024-08-31 | End: 2024-09-03

## 2024-08-31 RX ORDER — FUROSEMIDE 40 MG
20 TABLET ORAL ONCE
Refills: 0 | Status: COMPLETED | OUTPATIENT
Start: 2024-08-31 | End: 2024-08-31

## 2024-08-31 RX ADMIN — CEFEPIME 100 MILLIGRAM(S): 2 INJECTION, POWDER, FOR SOLUTION INTRAVENOUS at 12:31

## 2024-08-31 RX ADMIN — DEXMEDETOMIDINE HYDROCHLORIDE IN 0.9% SODIUM CHLORIDE 4.08 MICROGRAM(S)/KG/HR: 4 INJECTION INTRAVENOUS at 08:53

## 2024-08-31 RX ADMIN — Medication 25 MILLIGRAM(S): at 21:01

## 2024-08-31 RX ADMIN — OXYCODONE HYDROCHLORIDE 5 MILLIGRAM(S): 5 TABLET ORAL at 02:37

## 2024-08-31 RX ADMIN — ACETAMINOPHEN 650 MILLIGRAM(S): 325 TABLET ORAL at 07:00

## 2024-08-31 RX ADMIN — Medication 20 MILLIGRAM(S): at 07:58

## 2024-08-31 RX ADMIN — CHLORHEXIDINE GLUCONATE 1 APPLICATION(S): 40 SOLUTION TOPICAL at 12:31

## 2024-08-31 RX ADMIN — FENTANYL CITRATE 25 MICROGRAM(S): 50 INJECTION INTRAMUSCULAR; INTRAVENOUS at 09:05

## 2024-08-31 RX ADMIN — TRAMADOL HYDROCHLORIDE 50 MILLIGRAM(S): 200 TABLET, EXTENDED RELEASE ORAL at 20:15

## 2024-08-31 RX ADMIN — IPRATROPIUM BROMIDE AND ALBUTEROL SULFATE 3 MILLILITER(S): .5; 3 SOLUTION RESPIRATORY (INHALATION) at 03:49

## 2024-08-31 RX ADMIN — Medication 250 MILLIGRAM(S): at 08:50

## 2024-08-31 RX ADMIN — Medication 40 MILLIGRAM(S): at 21:01

## 2024-08-31 RX ADMIN — Medication 2 TABLET(S): at 21:01

## 2024-08-31 RX ADMIN — Medication 25 MILLIGRAM(S): at 13:33

## 2024-08-31 RX ADMIN — OXYCODONE HYDROCHLORIDE 5 MILLIGRAM(S): 5 TABLET ORAL at 03:15

## 2024-08-31 RX ADMIN — DEXMEDETOMIDINE HYDROCHLORIDE IN 0.9% SODIUM CHLORIDE 4.08 MICROGRAM(S)/KG/HR: 4 INJECTION INTRAVENOUS at 05:20

## 2024-08-31 RX ADMIN — Medication 25 MILLIGRAM(S): at 03:17

## 2024-08-31 RX ADMIN — TRAMADOL HYDROCHLORIDE 50 MILLIGRAM(S): 200 TABLET, EXTENDED RELEASE ORAL at 15:53

## 2024-08-31 RX ADMIN — Medication 25 MILLIGRAM(S): at 22:51

## 2024-08-31 RX ADMIN — FINASTERIDE 5 MILLIGRAM(S): 1 TABLET, FILM COATED ORAL at 12:31

## 2024-08-31 RX ADMIN — ACETAMINOPHEN 650 MILLIGRAM(S): 325 TABLET ORAL at 06:35

## 2024-08-31 RX ADMIN — OXYCODONE HYDROCHLORIDE 5 MILLIGRAM(S): 5 TABLET ORAL at 07:00

## 2024-08-31 RX ADMIN — OXYCODONE HYDROCHLORIDE 5 MILLIGRAM(S): 5 TABLET ORAL at 22:50

## 2024-08-31 RX ADMIN — TRAMADOL HYDROCHLORIDE 50 MILLIGRAM(S): 200 TABLET, EXTENDED RELEASE ORAL at 17:15

## 2024-08-31 RX ADMIN — IPRATROPIUM BROMIDE AND ALBUTEROL SULFATE 3 MILLILITER(S): .5; 3 SOLUTION RESPIRATORY (INHALATION) at 17:16

## 2024-08-31 RX ADMIN — Medication 25 MILLIGRAM(S): at 08:49

## 2024-08-31 RX ADMIN — ACETAMINOPHEN 1000 MILLIGRAM(S): 325 TABLET ORAL at 21:15

## 2024-08-31 RX ADMIN — Medication 1 APPLICATION(S): at 05:20

## 2024-08-31 RX ADMIN — ACETAMINOPHEN 400 MILLIGRAM(S): 325 TABLET ORAL at 21:01

## 2024-08-31 RX ADMIN — LATANOPROST 1 DROP(S): 50 SOLUTION OPHTHALMIC at 21:01

## 2024-08-31 RX ADMIN — DEXMEDETOMIDINE HYDROCHLORIDE IN 0.9% SODIUM CHLORIDE 4.08 MICROGRAM(S)/KG/HR: 4 INJECTION INTRAVENOUS at 03:32

## 2024-08-31 RX ADMIN — Medication 2: at 17:07

## 2024-08-31 RX ADMIN — BRIVARACETAM 220 MILLIGRAM(S): 100 TABLET, FILM COATED ORAL at 17:07

## 2024-08-31 RX ADMIN — POLYETHYLENE GLYCOL 3350 17 GRAM(S): 17 POWDER, FOR SOLUTION ORAL at 17:07

## 2024-08-31 RX ADMIN — SODIUM CHLORIDE 4 MILLILITER(S): 9 INJECTION INTRAMUSCULAR; INTRAVENOUS; SUBCUTANEOUS at 17:16

## 2024-08-31 RX ADMIN — FENTANYL CITRATE 25 MICROGRAM(S): 50 INJECTION INTRAMUSCULAR; INTRAVENOUS at 08:49

## 2024-08-31 RX ADMIN — Medication 2: at 12:32

## 2024-08-31 RX ADMIN — TRAMADOL HYDROCHLORIDE 50 MILLIGRAM(S): 200 TABLET, EXTENDED RELEASE ORAL at 19:45

## 2024-08-31 RX ADMIN — DEXMEDETOMIDINE HYDROCHLORIDE IN 0.9% SODIUM CHLORIDE 4.08 MICROGRAM(S)/KG/HR: 4 INJECTION INTRAVENOUS at 19:24

## 2024-08-31 RX ADMIN — BRIVARACETAM 220 MILLIGRAM(S): 100 TABLET, FILM COATED ORAL at 05:19

## 2024-08-31 RX ADMIN — HUMAN INSULIN 4 UNIT(S): 100 INJECTION, SUSPENSION SUBCUTANEOUS at 05:20

## 2024-08-31 RX ADMIN — Medication 1 APPLICATION(S): at 17:08

## 2024-08-31 RX ADMIN — IPRATROPIUM BROMIDE AND ALBUTEROL SULFATE 3 MILLILITER(S): .5; 3 SOLUTION RESPIRATORY (INHALATION) at 11:20

## 2024-08-31 RX ADMIN — DEXMEDETOMIDINE HYDROCHLORIDE IN 0.9% SODIUM CHLORIDE 4.08 MICROGRAM(S)/KG/HR: 4 INJECTION INTRAVENOUS at 04:50

## 2024-08-31 RX ADMIN — OXYCODONE HYDROCHLORIDE 5 MILLIGRAM(S): 5 TABLET ORAL at 06:35

## 2024-08-31 RX ADMIN — Medication 2 MILLIGRAM(S): at 21:01

## 2024-08-31 NOTE — PROGRESS NOTE ADULT - ASSESSMENT
89M h/o DM c/b DM neuropathy, BPH, MD, h/o PE, h/o laminectomy,  pw slurred speech and difficulty swallowing breakfast since 8AM. Per daughter has been increasingly confused, w gait instability, and 6 falls over the past week. Falls have been unwitnessed, unknown head strikes. Tx from [place]. Exam there per ED: Dysarthric but AOx3, FC, CALHOUN AG.   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  CTA H/N neg   8/28 L crani for SDH evac   8/29 L MMAE   extubated 8/30 early AM   8/30 CTH: no change since 8/29. post op changes. L MMAE material. left frontal and pairetal purr hole.s   CTH 8/31 no change in L SDH collection. L parietal blank hole and drain. L MMAE     Imprssion:   1) SDH likely traumatic from fall  2) falls     - now precedex for agitation  - seroquel   - vanco /cefepime; possible aspiration PNA   - Briviact for sz ppx on 50mg BID  - wean sedation as tolerated  - drain per nsx   - SBP < 160  - PT/OT  - eventually swallow eval  - telemetry  - check FS, glucose control <180  - GI/DVT ppx  - Thank you for allowing me to participate in the care of this patient. Call with questions.   spoke with family and NSCU team  Albert Watson MD  Vascular Neurology  Office: [phone]

## 2024-08-31 NOTE — PROGRESS NOTE ADULT - SUBJECTIVE AND OBJECTIVE BOX
SUMMARY:  89M, PMH PE not currently on AC/AP, T2DM, macular degeneration, presenting with multiple falls over the plast week and dysarthria. 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     HOSPITAL COURSE:  8/28: admitted to [facility]U. S/p Left crani for SDH evac.  8/29: s/p Left MMAE  8/30: postop CTH stable    ADMISSION SCORES:   GCS: 15.    REVIEW OF SYSTEMS: None other than stated in HPI.    ALLERGIES: Allergies    Tolerated ceftriaxone 5/2024 (Other)  penicillins (Rash)    Intolerances                          9.2    4.59  )-----------( 146      ( 31 Aug 2024 00:49 )             29.4     08-31    138  |  113<H>  |  30<H>  ----------------------------<  147<H>  4.1   |  15<L>  |  1.92<H>    Ca    8.0<L>      31 Aug 2024 00:49  Phos  3.2     08-31  Mg     2.0     08-31      I&O's Summary    30 Aug 2024 07:01  -  31 Aug 2024 07:00  --------------------------------------------------------  IN: 2174.9 mL / OUT: 1005 mL / NET: 1169.9 mL    31 Aug 2024 07:01  -  31 Aug 2024 10:29  --------------------------------------------------------  IN: 264.3 mL / OUT: 0 mL / NET: 264.3 mL        MEDICATIONS  (STANDING):  atorvastatin 40 milliGRAM(s) Oral at bedtime  aztreonam  IVPB      aztreonam  IVPB 1000 milliGRAM(s) IV Intermittent every 12 hours  brivaracetam  IVPB 50 milliGRAM(s) IV Intermittent two times a day  chlorhexidine 4% Liquid 1 Application(s) Topical daily  dextrose 5%. 1000 milliLiter(s) (50 mL/Hr) IV Continuous <Continuous>  dextrose 50% Injectable 25 Gram(s) IV Push once  finasteride 5 milliGRAM(s) Oral daily  glucagon  Injectable 1 milliGRAM(s) IntraMuscular once  insulin regular Infusion 3 Unit(s)/Hr (3 mL/Hr) IV Continuous <Continuous>  latanoprost 0.005% Ophthalmic Solution 1 Drop(s) Both EYES at bedtime  mupirocin 2% Nasal 1 Application(s) Both Nostrils two times a day  polyethylene glycol 3350 17 Gram(s) Oral daily  senna 2 Tablet(s) Oral at bedtime  sodium chloride 0.9%. 1000 milliLiter(s) (70 mL/Hr) IV Continuous <Continuous>  tiotropium 2.5 MICROgram(s) Inhaler 2 Puff(s) Inhalation daily  vancomycin  IVPB 1000 milliGRAM(s) IV Intermittent every 24 hours    MEDICATIONS  (PRN):  acetaminophen     Tablet .. 650 milliGRAM(s) Oral every 6 hours PRN Temp greater or equal to 38C (100.4F), Mild Pain (1 - 3)  dextrose Oral Gel 15 Gram(s) Oral once PRN Blood Glucose LESS THAN 70 milliGRAM(s)/deciliter  oxyCODONE    IR 5 milliGRAM(s) Oral every 4 hours PRN Moderate Pain (4 - 6)        EXAMINATION:  General: No acute distress  HEENT: Anicteric sclerae  Cardiac: [id number]  Lungs: Clear  Abdomen: Soft, non-tender, +BS  Extremities: No c/c/e  Skin/Incision Site: Clean, dry and intact  Neurologic: AOx0, groaning, no consistent FC, BUE AG strong spontaneously, BLE 2/5 spontaneously.

## 2024-08-31 NOTE — PROGRESS NOTE ADULT - ASSESSMENT
ASSESSMENT/PLAN:  89M, admitted for traumatic subacute on chronic SDH.    NEURO:  -POD3 Left crani for SDH evac.  -POD2 Left MMAE w/ Flex  -CTH this AM stable  -Q1H neurochecks.  -SG OFELIA drain per NSGY.  -Briviact 50BID.  -Precedex, Seroquel PRN.  Activity: [X] mobilize as tolerated [] Bedrest [X] PT [X] OT [] PMNR    PULM:  -SpO2 goal >92%.  -Face tent on 40%  -3% Na for secretions  -S/p IV Lasix 20, CXR concerning for pulmonary congestion and PNA.    CV:  SBP goal 100-160.    RENAL:  -Monitor I&Os.  -Tamsulosin and finasteride for BPH.    GI:  Diet: NPO  GI prophylaxis [X] not indicated [] PPI [] other:  Bowel regimen [] colace [X] senna [] other:    ENDO:   Goal euglycemia (-180), off NPH    HEME/ONC:  VTE prophylaxis: SCDs    ID:  -Monitor for fevers.  -Started Cefepime and Vanc for concern for possible PNA, procal was slightly elevated 0.20    MISC:  Need GOC regarding family wishes if patient were to deteriorate to point of requiring re-tubation.    SOCIAL/FAMILY:  [] awaiting [X] updated at bedside [] family meeting    CODE STATUS:  [X] Full Code [] DNR [] DNI [] Palliative/Comfort Care    DISPOSITION:  [X] ICU [] Stroke Unit [] Floor [] EMU [] RCU [] PCU

## 2024-08-31 NOTE — PROGRESS NOTE ADULT - SUBJECTIVE AND OBJECTIVE BOX
Neurology   S: patient seen. agitated at times.       Medications: MEDICATIONS  (STANDING):  albuterol/ipratropium for Nebulization 3 milliLiter(s) Nebulizer every 6 hours  atorvastatin 40 milliGRAM(s) Oral at bedtime  brivaracetam  IVPB 50 milliGRAM(s) IV Intermittent two times a day  cefepime   IVPB 1000 milliGRAM(s) IV Intermittent daily  chlorhexidine 4% Liquid 1 Application(s) Topical daily  dexMEDEtomidine Infusion 0.2 MICROgram(s)/kG/Hr (4.08 mL/Hr) IV Continuous <Continuous>  doxazosin 2 milliGRAM(s) Oral at bedtime  finasteride 5 milliGRAM(s) Oral daily  insulin lispro (ADMELOG) corrective regimen sliding scale   SubCutaneous every 6 hours  latanoprost 0.005% Ophthalmic Solution 1 Drop(s) Both EYES at bedtime  mupirocin 2% Nasal 1 Application(s) Both Nostrils two times a day  polyethylene glycol 3350 17 Gram(s) Oral daily  QUEtiapine 25 milliGRAM(s) Oral every 8 hours  senna 2 Tablet(s) Oral at bedtime  sodium chloride 3%  Inhalation 4 milliLiter(s) Inhalation every 12 hours  tiotropium 2.5 MICROgram(s) Inhaler 2 Puff(s) Inhalation daily  vancomycin  IVPB 1000 milliGRAM(s) IV Intermittent every 24 hours    MEDICATIONS  (PRN):  acetaminophen     Tablet .. 650 milliGRAM(s) Oral every 6 hours PRN Temp greater or equal to 38C (100.4F), Mild Pain (1 - 3)  traMADol 25 milliGRAM(s) Oral every 6 hours PRN Moderate Pain (4 - 6)  traMADol 50 milliGRAM(s) Oral every 6 hours PRN Severe Pain (7 - 10)       Vitals:  Vital Signs Last 24 Hrs  T(C): 37.1 (31 Aug 2024 19:00), Max: 37.2 (31 Aug 2024 15:00)  T(F): 98.8 (31 Aug 2024 19:00), Max: 99 (31 Aug 2024 15:00)  HR: 86 (31 Aug 2024 19:00) (54 - 122)  BP: 117/63 (31 Aug 2024 19:00) (94/57 - 158/91)  BP(mean): 83 (31 Aug 2024 19:00) (71 - 117)  RR: 21 (31 Aug 2024 19:00) (16 - 28)  SpO2: 100% (31 Aug 2024 19:00) (92% - 100%)    Parameters below as of 31 Aug 2024 19:00  Patient On (Oxygen Delivery Method): face tent    O2 Concentration (%): 40         General Exam:   General Appearance: Appropriately dressed and in no acute distress       Head: Normocephalic, atraumatic and no dysmorphic features  Ear, Nose, and Throat: Moist mucous membranes +NGT   CVS: S1S2+  Resp: No SOB, no wheeze or rhonchi  GI: soft NT/ND  Extremities: No edema or cyanosis  Skin: No bruises or rashes     Neurological Exam: (on precedex)   Mental Status:  when sedation lowered, follows commands, now not consistantly on precedex   Cranial Nerves: PERRL, EOMI, ? decrease blink on R , sensation V1-V3 intact,  no obvious facial asymmetry, equal elevation of palate, scm/trap 5/5, tongue is midline on protrusion. no obvious papilledema on fundoscopic exam. hearing is grossly intact.   Motor: CALHOUN now in resraints. L>R?     Sensation:  intact x 4   Coordination: unable   Gait: unable      Data/Labs/Imaging which I personally reviewed.     Labs:       LABS:                          9.2    4.59  )-----------( 146      ( 31 Aug 2024 00:49 )             29.4     08-31    138  |  113<H>  |  30<H>  ----------------------------<  147<H>  4.1   |  15<L>  |  1.92<H>    Ca    8.0<L>      31 Aug 2024 00:49  Phos  3.2     08-31  Mg     2.0     08-31          Urinalysis Basic - ( 31 Aug 2024 00:49 )    Color: x / Appearance: x / SG: x / pH: x  Gluc: 147 mg/dL / Ketone: x  / Bili: x / Urobili: x   Blood: x / Protein: x / Nitrite: x   Leuk Esterase: x / RBC: x / WBC x   Sq Epi: x / Non Sq Epi: x / Bacteria: x            < from: CT Angio Neck Stroke Protocol w/ IV Cont (08.28.24 @ 09:35) >    ACC: [id number] EXAM:  CT ANGIO BRAIN STROKE PROTC IC   ORDERED BY: GEETA YARBROUGH     ACC: [id number] EXAM:  CT ANGIO NECK STROKE PROTCL IC   ORDERED BY: GEETA YARBROUGH     ACC: [id number] EXAM:  CT BRAIN STROKE PROTOCOL   ORDERED BY: GEETA YARBROUGH     ACC:[id number] EXAM:  CT BRAIN PERFUSION MAPS STROKE   ORDERED BY: GEETA YARBROUGH     PROCEDURE DATE:  08/28/2024          INTERPRETATION:  CLINICAL INDICATIONS:Stroke Code    TECHNIQUE: CT head, CTA brain and neck, and CT perfusion was obtained.   140 cc'sof Omnipaque 350 Intravenous contrast was administered. 10 cc's   discarded. 3D volume rendered images, coronal and sagittal reformats were   obtained.    COMPARISON: CT head 5/28/2022 and CTA brain and neck 11/5/2021    FINDINGS:    CT HEAD: There is a large left holohemispheric subdural hematoma, mostly   chronic, with a small amount of acute hemorrhagic blood products. This   measures up to 2.9 cm in greatest width. There is significant mass effect   on the underlying parenchyma. There is minimal left to right midline   shift of approximately 1 to 2 mm. Ventricles and sulci are prominent,   compatible with age-related generalized cerebral volume loss.   Periventricular hypoattenuation, suggestive of advanced chronic   microvascular ischemic changes. Chronic lacunar infarcts in the right   basal ganglia are present.    Paranasal sinuses and mastoid air cells are clear.      CTA BRAIN:  The [place] and vertebrobasilar system are normal without   evidence of stenosis, occlusion or saccular aneurysm dilation. No   evidence for arterial venous malformation.    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 Aortic calcifications are   noted. Calcifications at the origin of the great vessels are present.   Calcifications ofthe bilateral carotid bulbs and proximal internal   carotid arteries without hemodynamically significant stenosis is   identified.    Bilateral cervical vertebral arteries are codominant and appear patent.    CT PERFUSION:    Scattered areas of delayed flow in the bilateral cerebri, may be   artifactual given the bilaterality. Abnormal perfusion data within the   subdural hematoma is noted from artifact.        IMPRESSION:    CT head: No acute infarct is identified. Large left holohemispheric   subdural hematoma, mostly chronic, with a small amount of acute   hemorrhagic blood products. This measures up to 2.9 cm in greatest width.   There is significant mass effect on the underlying parenchyma. There is   minimal left to right midline shift of approximately 1 to 2 mm. Rest of   the chronic findings as above.      Critical value:  I discussed the finding of this report with Dr. Yarbrough   at 9:45 AM on 8/28/2024.  Critical value policy of the hospital was   followed.  Read back and confirmation of receipt of this communication   was performed.  This verbal communication supplements the text report of   this document.    CTA brain: No hemodynamically significant stenosis    CTA carotid/vertebral artery circulation: No hemodynamically significant   stenosis    CT Perfusion: Perfusion data appears likely artifactual as above.    --- End of Report ---            DONA MONACO MD  This document has been electronically signed. Aug 28 2024  9:52AM    < end of copied text >

## 2024-08-31 NOTE — PROGRESS NOTE ADULT - SUBJECTIVE AND OBJECTIVE BOX
Patient seen and examined at bedside.    --Anticoagulation--    T(C): 36 (08-30-24 @ 23:00), Max: 43 (08-30-24 @ 03:00)  HR: 54 (08-30-24 @ 23:00) (47 - 89)  BP: 156/76 (08-30-24 @ 23:00) (105/57 - 156/76)  RR: 16 (08-30-24 @ 23:00) (12 - 29)  SpO2: 97% (08-30-24 @ 23:00) (92% - 100%)  Wt(kg): --    Exam: EOV, Lt eye cataracts, nods appropriately, FC (2 fingers, wiggles toes) CALHUON AG spontaneous

## 2024-09-01 DIAGNOSIS — Z51.5 ENCOUNTER FOR PALLIATIVE CARE: ICD-10-CM

## 2024-09-01 DIAGNOSIS — R53.81 OTHER MALAISE: ICD-10-CM

## 2024-09-01 DIAGNOSIS — J69.0 PNEUMONITIS DUE TO INHALATION OF FOOD AND VOMIT: ICD-10-CM

## 2024-09-01 DIAGNOSIS — Z98.890 OTHER SPECIFIED POSTPROCEDURAL STATES: ICD-10-CM

## 2024-09-01 DIAGNOSIS — Z71.89 OTHER SPECIFIED COUNSELING: ICD-10-CM

## 2024-09-01 LAB
ANION GAP SERPL CALC-SCNC: 13 MMOL/L — SIGNIFICANT CHANGE UP (ref 5–17)
ANION GAP SERPL CALC-SCNC: 15 MMOL/L — SIGNIFICANT CHANGE UP (ref 5–17)
APPEARANCE UR: ABNORMAL
BACTERIA # UR AUTO: NEGATIVE /HPF — SIGNIFICANT CHANGE UP
BILIRUB UR-MCNC: NEGATIVE — SIGNIFICANT CHANGE UP
BUN SERPL-MCNC: 34 MG/DL — HIGH (ref 7–23)
BUN SERPL-MCNC: 34 MG/DL — HIGH (ref 7–23)
CALCIUM SERPL-MCNC: 8.2 MG/DL — LOW (ref 8.4–10.5)
CALCIUM SERPL-MCNC: 8.2 MG/DL — LOW (ref 8.4–10.5)
CAST: 7 /LPF — HIGH (ref 0–4)
CHLORIDE SERPL-SCNC: 110 MMOL/L — HIGH (ref 96–108)
CHLORIDE SERPL-SCNC: 111 MMOL/L — HIGH (ref 96–108)
CO2 SERPL-SCNC: 16 MMOL/L — LOW (ref 22–31)
CO2 SERPL-SCNC: 16 MMOL/L — LOW (ref 22–31)
COLOR SPEC: YELLOW — SIGNIFICANT CHANGE UP
CREAT SERPL-MCNC: 2.04 MG/DL — HIGH (ref 0.5–1.3)
CREAT SERPL-MCNC: 2.09 MG/DL — HIGH (ref 0.5–1.3)
DIFF PNL FLD: ABNORMAL
EGFR: 29 ML/MIN/1.73M2 — LOW
EGFR: 30 ML/MIN/1.73M2 — LOW
GLUCOSE BLDC GLUCOMTR-MCNC: 170 MG/DL — HIGH (ref 70–99)
GLUCOSE BLDC GLUCOMTR-MCNC: 188 MG/DL — HIGH (ref 70–99)
GLUCOSE BLDC GLUCOMTR-MCNC: 194 MG/DL — HIGH (ref 70–99)
GLUCOSE BLDC GLUCOMTR-MCNC: 198 MG/DL — HIGH (ref 70–99)
GLUCOSE BLDC GLUCOMTR-MCNC: 219 MG/DL — HIGH (ref 70–99)
GLUCOSE SERPL-MCNC: 175 MG/DL — HIGH (ref 70–99)
GLUCOSE SERPL-MCNC: 180 MG/DL — HIGH (ref 70–99)
GLUCOSE UR QL: NEGATIVE MG/DL — SIGNIFICANT CHANGE UP
GRAM STN FLD: ABNORMAL
HCT VFR BLD CALC: 29.4 % — LOW (ref 39–50)
HGB BLD-MCNC: 9.3 G/DL — LOW (ref 13–17)
KETONES UR-MCNC: NEGATIVE MG/DL — SIGNIFICANT CHANGE UP
LACTATE SERPL-SCNC: 1.6 MMOL/L — SIGNIFICANT CHANGE UP (ref 0.5–2)
LACTATE SERPL-SCNC: 2.9 MMOL/L — HIGH (ref 0.5–2)
LEUKOCYTE ESTERASE UR-ACNC: ABNORMAL
MAGNESIUM SERPL-MCNC: 1.8 MG/DL — SIGNIFICANT CHANGE UP (ref 1.6–2.6)
MCHC RBC-ENTMCNC: 28.7 PG — SIGNIFICANT CHANGE UP (ref 27–34)
MCHC RBC-ENTMCNC: 31.6 GM/DL — LOW (ref 32–36)
MCV RBC AUTO: 90.7 FL — SIGNIFICANT CHANGE UP (ref 80–100)
NITRITE UR-MCNC: NEGATIVE — SIGNIFICANT CHANGE UP
NRBC # BLD: 0 /100 WBCS — SIGNIFICANT CHANGE UP (ref 0–0)
PH UR: 5 — SIGNIFICANT CHANGE UP (ref 5–8)
PHOSPHATE SERPL-MCNC: 3.6 MG/DL — SIGNIFICANT CHANGE UP (ref 2.5–4.5)
PLATELET # BLD AUTO: 145 K/UL — LOW (ref 150–400)
POTASSIUM SERPL-MCNC: 4.4 MMOL/L — SIGNIFICANT CHANGE UP (ref 3.5–5.3)
POTASSIUM SERPL-MCNC: 4.4 MMOL/L — SIGNIFICANT CHANGE UP (ref 3.5–5.3)
POTASSIUM SERPL-SCNC: 4.4 MMOL/L — SIGNIFICANT CHANGE UP (ref 3.5–5.3)
POTASSIUM SERPL-SCNC: 4.4 MMOL/L — SIGNIFICANT CHANGE UP (ref 3.5–5.3)
PROT UR-MCNC: 30 MG/DL
RBC # BLD: 3.24 M/UL — LOW (ref 4.2–5.8)
RBC # FLD: 13.9 % — SIGNIFICANT CHANGE UP (ref 10.3–14.5)
RBC CASTS # UR COMP ASSIST: 5 /HPF — HIGH (ref 0–4)
REVIEW: SIGNIFICANT CHANGE UP
SODIUM SERPL-SCNC: 139 MMOL/L — SIGNIFICANT CHANGE UP (ref 135–145)
SODIUM SERPL-SCNC: 142 MMOL/L — SIGNIFICANT CHANGE UP (ref 135–145)
SP GR SPEC: 1.02 — SIGNIFICANT CHANGE UP (ref 1–1.03)
SPECIMEN SOURCE: SIGNIFICANT CHANGE UP
SQUAMOUS # UR AUTO: 3 /HPF — SIGNIFICANT CHANGE UP (ref 0–5)
UROBILINOGEN FLD QL: 0.2 MG/DL — SIGNIFICANT CHANGE UP (ref 0.2–1)
VANCOMYCIN TROUGH SERPL-MCNC: 19 UG/ML — SIGNIFICANT CHANGE UP (ref 10–20)
WBC # BLD: 5.2 K/UL — SIGNIFICANT CHANGE UP (ref 3.8–10.5)
WBC # FLD AUTO: 5.2 K/UL — SIGNIFICANT CHANGE UP (ref 3.8–10.5)
WBC UR QL: 8 /HPF — HIGH (ref 0–5)

## 2024-09-01 PROCEDURE — 71045 X-RAY EXAM CHEST 1 VIEW: CPT | Mod: 26

## 2024-09-01 PROCEDURE — 99291 CRITICAL CARE FIRST HOUR: CPT

## 2024-09-01 PROCEDURE — 99223 1ST HOSP IP/OBS HIGH 75: CPT

## 2024-09-01 PROCEDURE — 99497 ADVNCD CARE PLAN 30 MIN: CPT | Mod: 25

## 2024-09-01 RX ORDER — HUMAN INSULIN 100 [IU]/ML
8 INJECTION, SUSPENSION SUBCUTANEOUS EVERY 6 HOURS
Refills: 0 | Status: DISCONTINUED | OUTPATIENT
Start: 2024-09-01 | End: 2024-09-02

## 2024-09-01 RX ORDER — ROPIVACAINE IN 0.9% SOD CHL/PF 0.1 %
0.05 PLASTIC BAG, INJECTION (ML) EPIDURAL
Qty: 8 | Refills: 0 | Status: DISCONTINUED | OUTPATIENT
Start: 2024-09-01 | End: 2024-09-03

## 2024-09-01 RX ORDER — OXYCODONE HYDROCHLORIDE 5 MG/1
10 TABLET ORAL EVERY 4 HOURS
Refills: 0 | Status: DISCONTINUED | OUTPATIENT
Start: 2024-09-01 | End: 2024-09-02

## 2024-09-01 RX ORDER — CEFEPIME 2 G/1
1000 INJECTION, POWDER, FOR SOLUTION INTRAVENOUS DAILY
Refills: 0 | Status: DISCONTINUED | OUTPATIENT
Start: 2024-09-01 | End: 2024-09-03

## 2024-09-01 RX ORDER — ACETAMINOPHEN 325 MG/1
1000 TABLET ORAL ONCE
Refills: 0 | Status: COMPLETED | OUTPATIENT
Start: 2024-09-01 | End: 2024-09-01

## 2024-09-01 RX ORDER — SODIUM CHLORIDE 9 MG/ML
500 INJECTION INTRAMUSCULAR; INTRAVENOUS; SUBCUTANEOUS ONCE
Refills: 0 | Status: COMPLETED | OUTPATIENT
Start: 2024-09-01 | End: 2024-09-01

## 2024-09-01 RX ORDER — CALCIUM GLUCONATE 61(648) MG
1 TABLET ORAL ONCE
Refills: 0 | Status: COMPLETED | OUTPATIENT
Start: 2024-09-01 | End: 2024-09-01

## 2024-09-01 RX ORDER — HEPARIN SODIUM,BOVINE 1000/ML
5000 VIAL (ML) INJECTION EVERY 8 HOURS
Refills: 0 | Status: DISCONTINUED | OUTPATIENT
Start: 2024-09-01 | End: 2024-09-02

## 2024-09-01 RX ORDER — OXYCODONE HYDROCHLORIDE 5 MG/1
15 TABLET ORAL EVERY 4 HOURS
Refills: 0 | Status: DISCONTINUED | OUTPATIENT
Start: 2024-09-01 | End: 2024-09-02

## 2024-09-01 RX ORDER — OXYCODONE HYDROCHLORIDE 5 MG/1
10 TABLET ORAL EVERY 4 HOURS
Refills: 0 | Status: DISCONTINUED | OUTPATIENT
Start: 2024-09-01 | End: 2024-09-01

## 2024-09-01 RX ORDER — OXYCODONE HYDROCHLORIDE 5 MG/1
15 TABLET ORAL EVERY 4 HOURS
Refills: 0 | Status: DISCONTINUED | OUTPATIENT
Start: 2024-09-01 | End: 2024-09-01

## 2024-09-01 RX ORDER — HYDROMORPHONE HYDROCHLORIDE 2 MG/1
0.5 TABLET ORAL ONCE
Refills: 0 | Status: DISCONTINUED | OUTPATIENT
Start: 2024-09-01 | End: 2024-09-01

## 2024-09-01 RX ADMIN — LATANOPROST 1 DROP(S): 50 SOLUTION OPHTHALMIC at 21:07

## 2024-09-01 RX ADMIN — OXYCODONE HYDROCHLORIDE 15 MILLIGRAM(S): 5 TABLET ORAL at 23:30

## 2024-09-01 RX ADMIN — ACETAMINOPHEN 1000 MILLIGRAM(S): 325 TABLET ORAL at 15:00

## 2024-09-01 RX ADMIN — SODIUM CHLORIDE 250 MILLILITER(S): 9 INJECTION INTRAMUSCULAR; INTRAVENOUS; SUBCUTANEOUS at 00:53

## 2024-09-01 RX ADMIN — CEFEPIME 100 MILLIGRAM(S): 2 INJECTION, POWDER, FOR SOLUTION INTRAVENOUS at 13:00

## 2024-09-01 RX ADMIN — TRAMADOL HYDROCHLORIDE 50 MILLIGRAM(S): 200 TABLET, EXTENDED RELEASE ORAL at 10:54

## 2024-09-01 RX ADMIN — IPRATROPIUM BROMIDE AND ALBUTEROL SULFATE 3 MILLILITER(S): .5; 3 SOLUTION RESPIRATORY (INHALATION) at 00:06

## 2024-09-01 RX ADMIN — SODIUM CHLORIDE 250 MILLILITER(S): 9 INJECTION INTRAMUSCULAR; INTRAVENOUS; SUBCUTANEOUS at 10:49

## 2024-09-01 RX ADMIN — IPRATROPIUM BROMIDE AND ALBUTEROL SULFATE 3 MILLILITER(S): .5; 3 SOLUTION RESPIRATORY (INHALATION) at 11:09

## 2024-09-01 RX ADMIN — Medication 5000 UNIT(S): at 22:19

## 2024-09-01 RX ADMIN — Medication 2 MILLIGRAM(S): at 21:07

## 2024-09-01 RX ADMIN — HUMAN INSULIN 8 UNIT(S): 100 INJECTION, SUSPENSION SUBCUTANEOUS at 23:16

## 2024-09-01 RX ADMIN — OXYCODONE HYDROCHLORIDE 10 MILLIGRAM(S): 5 TABLET ORAL at 18:45

## 2024-09-01 RX ADMIN — FINASTERIDE 5 MILLIGRAM(S): 1 TABLET, FILM COATED ORAL at 12:54

## 2024-09-01 RX ADMIN — Medication 7.65 MICROGRAM(S)/KG/MIN: at 01:18

## 2024-09-01 RX ADMIN — SODIUM CHLORIDE 4 MILLILITER(S): 9 INJECTION INTRAMUSCULAR; INTRAVENOUS; SUBCUTANEOUS at 06:04

## 2024-09-01 RX ADMIN — DEXMEDETOMIDINE HYDROCHLORIDE IN 0.9% SODIUM CHLORIDE 4.08 MICROGRAM(S)/KG/HR: 4 INJECTION INTRAVENOUS at 07:15

## 2024-09-01 RX ADMIN — HUMAN INSULIN 4 UNIT(S): 100 INJECTION, SUSPENSION SUBCUTANEOUS at 17:40

## 2024-09-01 RX ADMIN — Medication 100 MILLIGRAM(S): at 13:11

## 2024-09-01 RX ADMIN — OXYCODONE HYDROCHLORIDE 10 MILLIGRAM(S): 5 TABLET ORAL at 18:00

## 2024-09-01 RX ADMIN — Medication 2: at 00:02

## 2024-09-01 RX ADMIN — Medication 4: at 13:00

## 2024-09-01 RX ADMIN — ACETAMINOPHEN 400 MILLIGRAM(S): 325 TABLET ORAL at 14:30

## 2024-09-01 RX ADMIN — TRAMADOL HYDROCHLORIDE 50 MILLIGRAM(S): 200 TABLET, EXTENDED RELEASE ORAL at 03:00

## 2024-09-01 RX ADMIN — Medication 50 MILLIGRAM(S): at 21:07

## 2024-09-01 RX ADMIN — BRIVARACETAM 220 MILLIGRAM(S): 100 TABLET, FILM COATED ORAL at 17:34

## 2024-09-01 RX ADMIN — IPRATROPIUM BROMIDE AND ALBUTEROL SULFATE 3 MILLILITER(S): .5; 3 SOLUTION RESPIRATORY (INHALATION) at 23:11

## 2024-09-01 RX ADMIN — Medication 7.65 MICROGRAM(S)/KG/MIN: at 19:10

## 2024-09-01 RX ADMIN — Medication 250 MILLIGRAM(S): at 08:04

## 2024-09-01 RX ADMIN — Medication 296 MILLILITER(S): at 02:42

## 2024-09-01 RX ADMIN — Medication 2 TABLET(S): at 21:07

## 2024-09-01 RX ADMIN — TRAMADOL HYDROCHLORIDE 50 MILLIGRAM(S): 200 TABLET, EXTENDED RELEASE ORAL at 03:30

## 2024-09-01 RX ADMIN — Medication 50 MILLIGRAM(S): at 13:11

## 2024-09-01 RX ADMIN — Medication 100 MILLIGRAM(S): at 00:03

## 2024-09-01 RX ADMIN — Medication 2: at 05:37

## 2024-09-01 RX ADMIN — CHLORHEXIDINE GLUCONATE 1 APPLICATION(S): 40 SOLUTION TOPICAL at 21:14

## 2024-09-01 RX ADMIN — HYDROMORPHONE HYDROCHLORIDE 0.5 MILLIGRAM(S): 2 TABLET ORAL at 19:45

## 2024-09-01 RX ADMIN — SODIUM CHLORIDE 4 MILLILITER(S): 9 INJECTION INTRAMUSCULAR; INTRAVENOUS; SUBCUTANEOUS at 17:20

## 2024-09-01 RX ADMIN — HUMAN INSULIN 4 UNIT(S): 100 INJECTION, SUSPENSION SUBCUTANEOUS at 12:59

## 2024-09-01 RX ADMIN — ACETAMINOPHEN 400 MILLIGRAM(S): 325 TABLET ORAL at 08:10

## 2024-09-01 RX ADMIN — Medication 2: at 23:15

## 2024-09-01 RX ADMIN — Medication 1 APPLICATION(S): at 05:39

## 2024-09-01 RX ADMIN — IPRATROPIUM BROMIDE AND ALBUTEROL SULFATE 3 MILLILITER(S): .5; 3 SOLUTION RESPIRATORY (INHALATION) at 06:04

## 2024-09-01 RX ADMIN — Medication 100 GRAM(S): at 09:25

## 2024-09-01 RX ADMIN — Medication 100 GRAM(S): at 02:43

## 2024-09-01 RX ADMIN — POLYETHYLENE GLYCOL 3350 17 GRAM(S): 17 POWDER, FOR SOLUTION ORAL at 12:54

## 2024-09-01 RX ADMIN — Medication 50 MILLIGRAM(S): at 05:38

## 2024-09-01 RX ADMIN — TRAMADOL HYDROCHLORIDE 50 MILLIGRAM(S): 200 TABLET, EXTENDED RELEASE ORAL at 11:30

## 2024-09-01 RX ADMIN — Medication 100 MILLIGRAM(S): at 05:39

## 2024-09-01 RX ADMIN — OXYCODONE HYDROCHLORIDE 15 MILLIGRAM(S): 5 TABLET ORAL at 23:00

## 2024-09-01 RX ADMIN — BRIVARACETAM 220 MILLIGRAM(S): 100 TABLET, FILM COATED ORAL at 05:34

## 2024-09-01 RX ADMIN — Medication 1 APPLICATION(S): at 17:36

## 2024-09-01 RX ADMIN — Medication 40 MILLIGRAM(S): at 21:06

## 2024-09-01 RX ADMIN — Medication 7.65 MICROGRAM(S)/KG/MIN: at 07:15

## 2024-09-01 RX ADMIN — DEXMEDETOMIDINE HYDROCHLORIDE IN 0.9% SODIUM CHLORIDE 4.08 MICROGRAM(S)/KG/HR: 4 INJECTION INTRAVENOUS at 19:11

## 2024-09-01 RX ADMIN — ACETAMINOPHEN 1000 MILLIGRAM(S): 325 TABLET ORAL at 08:40

## 2024-09-01 RX ADMIN — HUMAN INSULIN 4 UNIT(S): 100 INJECTION, SUSPENSION SUBCUTANEOUS at 05:38

## 2024-09-01 RX ADMIN — IPRATROPIUM BROMIDE AND ALBUTEROL SULFATE 3 MILLILITER(S): .5; 3 SOLUTION RESPIRATORY (INHALATION) at 17:20

## 2024-09-01 RX ADMIN — HYDROMORPHONE HYDROCHLORIDE 0.5 MILLIGRAM(S): 2 TABLET ORAL at 19:30

## 2024-09-01 RX ADMIN — Medication 2: at 17:41

## 2024-09-01 NOTE — CONSULT NOTE ADULT - ASSESSMENT
89M, w DM cb neuropathy, macular degeneration, PE (off Eliquis for 6 months), BPH, pw slurred speech and difficulty swallowing. Per daughter has been increasingly confused, w gait instability, and 6 falls over the past week PTA. Falls have been unwitnessed, unknown head strikes. Tx from [place]. Exam there per ED: Dysarthric but AOx3, FC, CALHOUN AG. 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  ID Consulted with concerns for asp PNA.    RECOMMENDATIONS  Aspiration PNA story is compelling with opac on exam and imaging, consistent clinical picture  rec continued cefepime  can dc flagyl at this point with no history of aspiration of GI contents  neg MRSA so fine off Vanco  prognosis is guarded so all interventions in keeping with GOC.    Thank you for consulting us and involving us in the management of this most interesting and challenging case.  We will follow along in the care of this patient. Please call us at [phone] or text me directly on my cell# at [phone] with any concerns.    Starting Tuesday Dr. John Silva will be covering for our group. If you have any questions, concerns or new micro data please reach out to them at [phone]

## 2024-09-01 NOTE — CONSULT NOTE ADULT - ASSESSMENT
89M, PMH PE not currently on AC/AP, T2DM, macular degeneration, presenting with multiple falls over the plast week and dysarthria. 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 Coruse c/b septic shock with aspiration pneumonia for which he's on low dose pressor and IV abx. Palliative consulted for assistance with goals of care and symptoms management. Family at this time understand grave prognosis and are leaning towards transitioning to comfort measures in the next 24 hours, pending clinical picture and allowing for family to visit from out of state.  89M, PMH PE not currently on AC/AP, T2DM, macular degeneration, presenting with multiple falls over the plast week and dysarthria. Tx from [place]. Exam there per ED: Dysarthric but AOx3, FC, CALHOUN AG. 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 Coruse c/b septic shock with aspiration pneumonia for which he's on low dose pressor and IV abx. Palliative consulted for assistance with goals of care and symptoms management. Family at this time understand grave prognosis and are leaning towards transitioning to comfort measures in the next 24 hours, pending clinical picture and allowing for family to visit from out of state.

## 2024-09-01 NOTE — CONSULT NOTE ADULT - PROBLEM SELECTOR RECOMMENDATION 5
Thank you for allowing us to participate in your patient's care. We will continue to follow with you. Please page [phone] for any q's or c's. The Geriatric and Palliative Medicine service has coverage 24 hours a day/ 7 days a week to provide medical recommendations regarding symptom management needs via telephone.    Jeremy Hurtado MD  Palliative Medicine

## 2024-09-01 NOTE — PROGRESS NOTE ADULT - ASSESSMENT
ASSESSMENT/PLAN:  89M, admitted for traumatic subacute on chronic SDH.    NEURO:  -POD3 Left crani for SDH evac.  -POD2 Left MMAE w/ Flex  -CTH this AM stable  -Q1H neurochecks.  -SG OFELIA drain per NSGY.  -Briviact 50BID.  -Precedex, Seroquel PRN.  Activity: [X] mobilize as tolerated [] Bedrest [X] PT [X] OT [] PMNR    PULM:  -SpO2 goal >92%.  -Face tent on 40%  -3% Na for secretions  -S/p IV Lasix 20, CXR concerning for pulmonary congestion and PNA.    CV:  SBP goal 100-160.    RENAL:  -Monitor I&Os.  -Tamsulosin and finasteride for BPH.    GI:  Diet: NPO  GI prophylaxis [X] not indicated [] PPI [] other:  Bowel regimen [] colace [X] senna [] other:    ENDO:   Goal euglycemia (-180), off NPH    HEME/ONC:  VTE prophylaxis: SCDs    ID:  -Monitor for fevers.  -Started Cefepime and Vanc for concern for possible PNA, procal was slightly elevated 0.20    MISC:  Need GOC regarding family wishes if patient were to deteriorate to point of requiring re-tubation.    SOCIAL/FAMILY:  [] awaiting [X] updated at bedside [] family meeting    CODE STATUS:  [X] Full Code [] DNR [] DNI [] Palliative/Comfort Care    DISPOSITION:  [X] ICU [] Stroke Unit [] Floor [] EMU [] RCU [] PCU   ASSESSMENT/PLAN:  89M, admitted for traumatic subacute on chronic SDH.    NEURO:  -POD3 Left crani for SDH evac.  -POD2 Left MMAE w/ Flex  -CTH this AM stable  -Q4H neurochecks.  -SG OFELIA drain per NSGY.  -Briviact 50BID.  -Precedex, Seroquel PRN.  Activity: [X] mobilize as tolerated [] Bedrest [X] PT [X] OT [] PMNR    PULM:  -SpO2 goal >92%.  -Face tent on 40%  -3% Na for secretions  -S/p IV Lasix 20, CXR concerning for pulmonary congestion and PNA.  - CXR: worsening consolidations     CV:  SBP goal 100-160.  Daily weights, strict Is and Os    RENAL:  -Monitor I&Os.  -Tamsulosin and finasteride for BPH.  - Diez in place for Is and Os    GI:  Diet: NG tube Glucerna at goal   GI prophylaxis [X] not indicated [] PPI [] other:  Bowel regimen [] colace [X] senna [] other:  LBM: 08/29    ENDO:   Goal euglycemia (-180), off NPH    HEME/ONC:  VTE prophylaxis: SCDs    ID:  -Monitor for fevers.  -Cefepime + Flagyl for asp pneumonia   - f/u Sputum cx + BCx  - Levo for septic shock, Lactic Acidosis resolve after volume resuscitation    MISC:  DNR/DNI  Palliative care consult     SOCIAL/FAMILY:  [] awaiting [X] updated at bedside [] family meeting    CODE STATUS:  [X] Full Code [] DNR [] DNI [] Palliative/Comfort Care    DISPOSITION:  [X] ICU [] Stroke Unit [] Floor [] EMU [] RCU [] PCU

## 2024-09-01 NOTE — PROGRESS NOTE ADULT - SUBJECTIVE AND OBJECTIVE BOX
Patient seen and examined at bedside.    --Anticoagulation--    T(C): 37.4 (09-01-24 @ 03:00), Max: 37.4 (09-01-24 @ 03:00)  HR: 75 (09-01-24 @ 05:15) (59 - 122)  BP: 114/59 (09-01-24 @ 05:15) (73/44 - 158/91)  RR: 15 (09-01-24 @ 05:15) (15 - 28)  SpO2: 100% (09-01-24 @ 05:15) (93% - 100%)  Wt(kg): --    Exam: EOV, Lt eye cataracts, nods appropriately, intermittent FC (2 fingers, wiggles toes) CALHOUN AG spontaneous    .  LABS:                         9.3    5.20  )-----------( 145      ( 01 Sep 2024 00:11 )             29.4     09-01    139  |  110<H>  |  34<H>  ----------------------------<  180<H>  4.4   |  16<L>  |  2.09<H>    Ca    8.2<L>      01 Sep 2024 04:44  Phos  3.6     09-01  Mg     1.8     09-01        Urinalysis Basic - ( 01 Sep 2024 04:44 )    Color: x / Appearance: x / SG: x / pH: x  Gluc: 180 mg/dL / Ketone: x  / Bili: x / Urobili: x   Blood: x / Protein: x / Nitrite: x   Leuk Esterase: x / RBC: x / WBC x   Sq Epi: x / Non Sq Epi: x / Bacteria: x        Lactate, Blood: 1.6 mmol/L (09-01 @ 04:44)  Lactate, Blood: 2.9 mmol/L (09-01 @ 00:09)      RADIOLOGY, EKG & ADDITIONAL TESTS: Reviewed.

## 2024-09-01 NOTE — CONSULT NOTE ADULT - PROBLEM SELECTOR RECOMMENDATION 4
GOC as above: in summary, family wishes to see how pt does in the next 24 hours but leaning towards transitioning to comfort measures.  Code status: DNR/DNI. ELENA in chart reviewed;   Decision maker: Daughters Nimo Lozada and Terri Simst are report HCPs; GOC as above: in summary, family wishes to see how pt does in the next 24 hours but leaning towards transitioning to comfort measures.  Code status: DNR/DNI. ELENA in chart reviewed;   Decision maker: Daughters Nimo Lozada and Regina Sinclairndt are report HCPs;

## 2024-09-01 NOTE — CONSULT NOTE ADULT - TIME BILLING
Discussion of risks and benefits of surgery
Time spent for extensive review of the physical chart, electronic medical record, and documentation to obtain collateral information including but not limited to:  [x ] Inpatient records (ED, H&P, primary team, and consultants if applicable, care coordination)  [x] Inpatient values/results (biomarkers, immunoassays, imaging, and microbiology results)  [x] Current or proposed treatment plans  [x] Discussion with the primary team  [x] Discussion with the patient, surrogate decision maker, or family    Time spent: >75 mins (Separate 20 mins on ACP)

## 2024-09-01 NOTE — PROGRESS NOTE ADULT - SUBJECTIVE AND OBJECTIVE BOX
SUMMARY:  89M, PMH PE not currently on AC/AP, T2DM, macular degeneration, presenting with multiple falls over the plast week and dysarthria. 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     HOSPITAL COURSE:  8/28: admitted to [facility]. S/p Left crani for SDH evac.  8/29: s/p Left MMAE  8/30: postop CTH stable    ADMISSION SCORES:   GCS: 15.    REVIEW OF SYSTEMS: None other than stated in HPI.    ALLERGIES: Allergies    Tolerated ceftriaxone 5/2024 (Other)  penicillins (Rash)    Intolerances                          9.2    4.59  )-----------( 146      ( 31 Aug 2024 00:49 )             29.4     08-31    138  |  113<H>  |  30<H>  ----------------------------<  147<H>  4.1   |  15<L>  |  1.92<H>    Ca    8.0<L>      31 Aug 2024 00:49  Phos  3.2     08-31  Mg     2.0     08-31      I&O's Summary    30 Aug 2024 07:01  -  31 Aug 2024 07:00  --------------------------------------------------------  IN: 2174.9 mL / OUT: 1005 mL / NET: 1169.9 mL    31 Aug 2024 07:01  -  31 Aug 2024 10:29  --------------------------------------------------------  IN: 264.3 mL / OUT: 0 mL / NET: 264.3 mL        MEDICATIONS  (STANDING):  atorvastatin 40 milliGRAM(s) Oral at bedtime  aztreonam  IVPB      aztreonam  IVPB 1000 milliGRAM(s) IV Intermittent every 12 hours  brivaracetam  IVPB 50 milliGRAM(s) IV Intermittent two times a day  chlorhexidine 4% Liquid 1 Application(s) Topical daily  dextrose 5%. 1000 milliLiter(s) (50 mL/Hr) IV Continuous <Continuous>  dextrose 50% Injectable 25 Gram(s) IV Push once  finasteride 5 milliGRAM(s) Oral daily  glucagon  Injectable 1 milliGRAM(s) IntraMuscular once  insulin regular Infusion 3 Unit(s)/Hr (3 mL/Hr) IV Continuous <Continuous>  latanoprost 0.005% Ophthalmic Solution 1 Drop(s) Both EYES at bedtime  mupirocin 2% Nasal 1 Application(s) Both Nostrils two times a day  polyethylene glycol 3350 17 Gram(s) Oral daily  senna 2 Tablet(s) Oral at bedtime  sodium chloride 0.9%. 1000 milliLiter(s) (70 mL/Hr) IV Continuous <Continuous>  tiotropium 2.5 MICROgram(s) Inhaler 2 Puff(s) Inhalation daily  vancomycin  IVPB 1000 milliGRAM(s) IV Intermittent every 24 hours    MEDICATIONS  (PRN):  acetaminophen     Tablet .. 650 milliGRAM(s) Oral every 6 hours PRN Temp greater or equal to 38C (100.4F), Mild Pain (1 - 3)  dextrose Oral Gel 15 Gram(s) Oral once PRN Blood Glucose LESS THAN 70 milliGRAM(s)/deciliter  oxyCODONE    IR 5 milliGRAM(s) Oral every 4 hours PRN Moderate Pain (4 - 6)        EXAMINATION:  General: No acute distress  HEENT: Anicteric sclerae  Cardiac: [id number]  Lungs: Clear  Abdomen: Soft, non-tender, +BS  Extremities: No c/c/e  Skin/Incision Site: Clean, dry and intact  Neurologic: AOx0, groaning, no consistent FC, BUE AG strong spontaneously, BLE 2/5 spontaneously.

## 2024-09-01 NOTE — CONSULT NOTE ADULT - CONVERSATION/DISCUSSION
Prognosis/MOLST Discussed/Treatment Options/Palliative Care Referral Diagnosis/Prognosis/MOLST Discussed/Treatment Options/Palliative Care Referral

## 2024-09-01 NOTE — CONSULT NOTE ADULT - SUBJECTIVE AND OBJECTIVE BOX
[facility] DIV[facility] of INFECTIOUS DISEASE  Callum Jonas MD PhD, Maria Louise MD, Yelena Mcclure MD, John Silva MD, Sunny Downey MD  and providing coverage with Mahnaz Nettles MD  Providing Infectious Disease Consultations at [facility], [facility], [place], [facility], [facility]'s    Office# [phone] to schedule follow up appointments  Answering Service for urgent calls or New Consults [phone]  Cell# to text for urgent issues Callum Jonas [phone]     HPI:  89M, w DM cb neuropathy, macular degeneration, PE (off Eliquis for 6 months), BPH, pw slurred speech and difficulty swallowing. Per daughter has been increasingly confused, w gait instability, and 6 falls over the past week PTA. Falls have been unwitnessed, unknown head strikes. Tx from [place]. Exam there per ED: Dysarthric but AOx3, FC, CALHOUN AG. 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  ID Consulted with concerns for asp PNA.      PAST MEDICAL & SURGICAL HISTORY:  DM (diabetes mellitus)  Diabetic neuropathy  BPH (benign prostatic hyperplasia)  Macular degeneration  Pulmonary embolism    Advanced care planning/counseling discussion      H/O laminectomy  in his 60s          Antimicrobials  cefepime   IVPB 1000 milliGRAM(s) IV Intermittent daily  metroNIDAZOLE  IVPB      metroNIDAZOLE  IVPB 500 milliGRAM(s) IV Intermittent every 8 hours      Immunological      Other  acetaminophen     Tablet .. 650 milliGRAM(s) Oral every 6 hours PRN  albuterol/ipratropium for Nebulization 3 milliLiter(s) Nebulizer every 6 hours  atorvastatin 40 milliGRAM(s) Oral at bedtime  brivaracetam  IVPB 50 milliGRAM(s) IV Intermittent two times a day  chlorhexidine 4% Liquid 1 Application(s) Topical daily  dexMEDEtomidine Infusion 0.2 MICROgram(s)/kG/Hr IV Continuous <Continuous>  doxazosin 2 milliGRAM(s) Oral at bedtime  finasteride 5 milliGRAM(s) Oral daily  insulin lispro (ADMELOG) corrective regimen sliding scale   SubCutaneous every 6 hours  insulin NPH human recombinant 4 Unit(s) SubCutaneous every 6 hours  latanoprost 0.005% Ophthalmic Solution 1 Drop(s) Both EYES at bedtime  mupirocin 2% Nasal 1 Application(s) Both Nostrils two times a day  norepinephrine Infusion 0.05 MICROgram(s)/kG/Min IV Continuous <Continuous>  oxyCODONE    IR 10 milliGRAM(s) Oral every 4 hours PRN  oxyCODONE    IR 15 milliGRAM(s) Oral every 4 hours PRN  polyethylene glycol 3350 17 Gram(s) Oral daily  QUEtiapine 50 milliGRAM(s) Oral every 8 hours  senna 2 Tablet(s) Oral at bedtime  sodium chloride 3%  Inhalation 4 milliLiter(s) Inhalation every 12 hours  tiotropium 2.5 MICROgram(s) Inhaler 2 Puff(s) Inhalation daily      Allergies    Tolerated ceftriaxone 5/2024 (Other)  penicillins (Rash)    Intolerances        SOCIAL HISTORY:  no toxic habits reported      FAMILY HISTORY:  FH: type 2 diabetes        ROS:  limited due to cognitive status    Vital Signs Last 24 Hrs  T(C): 36.8 (01 Sep 2024 15:00), Max: 37.4 (01 Sep 2024 03:00)  T(F): 98.2 (01 Sep 2024 15:00), Max: 99.3 (01 Sep 2024 03:00)  HR: 79 (01 Sep 2024 17:15) (57 - 107)  BP: 137/66 (01 Sep 2024 17:15) (73/44 - 159/71)  BP(mean): 92 (01 Sep 2024 17:15) (54 - 102)  RR: 15 (01 Sep 2024 17:15) (13 - 26)  SpO2: 97% (01 Sep 2024 17:15) (94% - 100%)    Parameters below as of 01 Sep 2024 11:20  Patient On (Oxygen Delivery Method): face tent        PE:  In no distress  HEENT:  NC, PERRL, sclerae anicteric, conjunctivae clear, EOMI.  Sinuses nontender, no nasal exudate.  No buccal or pharyngeal lesions, erythema or exudate  Neck:  Supple, no adenopathy  Lungs:  No accessory muscle use, noted asymmetry, no ronchi, no crackles  Cor:  distant  Abd:  Symmetric, normoactive BS.  Soft, nontender, no masses, guarding or rebound.  Liver and spleen not enlarged  Extrem:  No cyanosis or edema  Skin:  No rashes.  Neuro: responding to pain        LABS:                        9.3    5.20  )-----------( 145      ( 01 Sep 2024 00:11 )             29.4       WBC Count: 5.20 K/uL (09-01-24 @ 00:11)  WBC Count: 4.59 K/uL (08-31-24 @ 00:49)  WBC Count: 10.62 K/uL (08-29-24 @ 22:27)  WBC Count: 4.91 K/uL (08-28-24 @ 21:42)  WBC Count: 6.05 K/uL (08-28-24 @ 11:41)  WBC Count: 5.14 K/uL (08-28-24 @ 09:45)      09-01    139  |  110<H>  |  34<H>  ----------------------------<  180<H>  4.4   |  16<L>  |  2.09<H>    Ca    8.2<L>      01 Sep 2024 04:44  Phos  3.6     09-01  Mg     1.8     09-01        Creatinine: 2.09 mg/dL (09-01-24 @ 04:44)  Creatinine: 2.04 mg/dL (09-01-24 @ 00:11)  Creatinine: 1.92 mg/dL (08-31-24 @ 00:49)  Creatinine: 1.90 mg/dL (08-29-24 @ 22:27)  Creatinine: 1.63 mg/dL (08-28-24 @ 21:42)  Creatinine: 1.95 mg/dL (08-28-24 @ 11:41)  Creatinine: 1.97 mg/dL (08-28-24 @ 09:45)      Urinalysis Basic - ( 01 Sep 2024 04:44 )    Color: x / Appearance: x / SG: x / pH: x  Gluc: 180 mg/dL / Ketone: x  / Bili: x / Urobili: x   Blood: x / Protein: x / Nitrite: x   Leuk Esterase: x / RBC: x / WBC x   Sq Epi: x / Non Sq Epi: x / Bacteria: x        Vancomycin Level, Trough: 19.0 ug/mL (09-01-24 @ 04:44)        MICROBIOLOGY:      RADIOLOGY & ADDITIONAL STUDIES:    --< from: Xray Chest 1 View- PORTABLE-Routine (Xray Chest 1 View- PORTABLE-Routine in AM.) (09.01.24 @ 04:30) >    ACC: [id number] EXAM:  XR CHEST PORTABLE ROUTINE 1V   ORDERED BY: HILDA QUILES     ACC: [id number] EXAM:  XR CHEST PORTABLE URGENT 1V   ORDERED BY: HILDA QUILES     ACC: [id number] EXAM:  XR CHEST PORTABLE URGENT 1V   ORDERED BY: KANWAL AGUILAR     PROCEDURE DATE:  08/31/2024          INTERPRETATION:  EXAMINATION: XR CHEST URGENT, XR CHEST URGENT, XR CHEST    CLINICAL INDICATION: Shortness of breath, follow-up on pulmonary edema    TECHNIQUE: Serial frontal chest radiographs done on 8/31/2024 at 2:53 PM   and 7:03 PM and on 9/1/2024 at 2:57 AM    COMPARISON: Chest x-ray 8/31/2024 6:51 AM    FINDINGS:    Frontal chest radiograph 8/31/2024 2:53 PM:  Left chest loop recorder, unchanged. Enteric tube courses below the left   hemidiaphragm out of the view of the current examination. Neurostimulator   leads overlying the lower thoracic spine are unchanged.  The heart is unchanged in size  Slight increase of pulmonary congestion with questionable developing left   mid lung infiltrate.  Trace right and small left pleural effusions.  No acute osseous abnormalities.    Frontal chest radiograph 8/31/2024 7:03 PM:  Increased bilateral interstitial vascular markings of the right upper   lobe. Left infiltrate is slightly decreased.    Frontal chest radiograph at 9/1/2024 2:57 AM:  Progression of right upper lobe infiltrate with partial clearing of the   mid left infiltrate. Bilateral pleural effusions are unchanged.    IMPRESSION:  Pulmonary infiltrates as noted. Bilateral pleural effusions, left greater   than right.

## 2024-09-01 NOTE — CHART NOTE - NSCHARTNOTEFT_GEN_A_CORE
EREN [id number]      Drain type: []SD [X]SG [] OFELIA [] HMV [] Lumbar drain [] EVD [] ICP [place] [] Abd drain     Patient's position while drain removed: Supine and flat    X[] Patient tolerated well [X] No complications [] complications:     Exit Site secured with: [X] _1 staples [] __ suture (please specify how many of each)     Additional Info:

## 2024-09-01 NOTE — CONSULT NOTE ADULT - SUBJECTIVE AND OBJECTIVE BOX
Date of [id number] @ 17:24  HPI:  89M, no recent AC/AP, but hasn't for the past week, w DM cb neuropathy, macular degeneration, PE (off Eliquis for 6 months), BPH, pw slurred speech and difficulty swallowing breakfast since 8AM. Per daughter has been increasingly confused, w gait instability, and 6 falls over the past week. Falls have been unwitnessed, unknown head strikes. Tx from [place]. Exam there per ED: Dysarthric but AOx3, FC, CALHOUN AG. 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  (28 Aug 2024 21:28)      PERTINENT PM/SXH:   HTN (hypertension)    DM (diabetes mellitus)    Diabetic neuropathy    BPH (benign prostatic hyperplasia)    Macular degeneration    CVA (cerebrovascular accident)    Pulmonary embolism    HTN (hypertension)      H/O laminectomy      FAMILY HISTORY:  FH: type 2 diabetes      Family Hx substance abuse [ ]yes [ ]no    ITEMS NOT CHECKED ARE NOT PRESENT    SOCIAL HISTORY:   Significant other/partner[ ]  Children[ ]  Yazidi/Spirituality:  Substance hx:  [ ]   Tobacco hx:  [ ]   Alcohol hx: [ ]   Home Opioid hx:  [ ] I-Stop Reference No:  Living Situation: [ ]Home  [ ]Long term care  [ ]Rehab [ ]Other    ADVANCE DIRECTIVES:    DNR/MOLST  [ ]  Living Will  [ ]   DECISION MAKER(s):  [ ] Health Care Proxy(s)  [ ] Surrogate(s)  [ ] Guardian           Name(s): Phone Number(s):    BASELINE (I)ADL(s) (prior to admission):  [place]: [ ]Total  [ ] Moderate [ ]Dependent    Allergies    Tolerated ceftriaxone 5/2024 (Other)  penicillins (Rash)    Intolerances    MEDICATIONS  (STANDING):  albuterol/ipratropium for Nebulization 3 milliLiter(s) Nebulizer every 6 hours  atorvastatin 40 milliGRAM(s) Oral at bedtime  brivaracetam  IVPB 50 milliGRAM(s) IV Intermittent two times a day  cefepime   IVPB 1000 milliGRAM(s) IV Intermittent daily  chlorhexidine 4% Liquid 1 Application(s) Topical daily  dexMEDEtomidine Infusion 0.2 MICROgram(s)/kG/Hr (4.08 mL/Hr) IV Continuous <Continuous>  doxazosin 2 milliGRAM(s) Oral at bedtime  finasteride 5 milliGRAM(s) Oral daily  insulin lispro (ADMELOG) corrective regimen sliding scale   SubCutaneous every 6 hours  insulin NPH human recombinant 4 Unit(s) SubCutaneous every 6 hours  latanoprost 0.005% Ophthalmic Solution 1 Drop(s) Both EYES at bedtime  metroNIDAZOLE  IVPB      metroNIDAZOLE  IVPB 500 milliGRAM(s) IV Intermittent every 8 hours  mupirocin 2% Nasal 1 Application(s) Both Nostrils two times a day  norepinephrine Infusion 0.05 MICROgram(s)/kG/Min (7.65 mL/Hr) IV Continuous <Continuous>  polyethylene glycol 3350 17 Gram(s) Oral daily  QUEtiapine 50 milliGRAM(s) Oral every 8 hours  senna 2 Tablet(s) Oral at bedtime  sodium chloride 3%  Inhalation 4 milliLiter(s) Inhalation every 12 hours  tiotropium 2.5 MICROgram(s) Inhaler 2 Puff(s) Inhalation daily    MEDICATIONS  (PRN):  acetaminophen     Tablet .. 650 milliGRAM(s) Oral every 6 hours PRN Temp greater or equal to 38C (100.4F), Mild Pain (1 - 3)  oxyCODONE    IR 15 milliGRAM(s) Oral every 4 hours PRN Severe Pain (7 - 10)  oxyCODONE    IR 10 milliGRAM(s) Oral every 4 hours PRN Moderate Pain (4 - 6)    PRESENT SYMPTOMS: [ ]Unable to self-report  [ ] CPOT [ ] PAINADs [ ] RDOS  Source if other than patient:  [ ]Family   [ ]Team     Pain: [ ]yes [ ]no  QOL impact -   Location -                    Aggravating factors -  Quality -  Radiation -  Timing-  Severity (0-10 scale):  Minimal acceptable level/pain goal (0-10 scale):     CPOT:    https://www.sccm.org/getattachment/ygk93f91-9m2l-8h0r-7v1u-7687l4903r2t/Critical-Care-Pain-Observation-Tool-(CPOT)    PAIN AD Score:   http://geriatrictoolkit.[place].[facility]/cog/painad.pdf (press ctrl +  left click to view)    Dyspnea:                           [ ]Mild [ ]Moderate [ ]Severe    RDOS:  0 to 2  minimal or no respiratory distress   3  mild distress  4 to 6 moderate distress  >7 severe distress  https://[email or web address]/handouts/hen/Respiratory_Distress_Observation_Scale.pdf (Ctrl +  left click to view)     Anxiety:                             [ ]Mild [ ]Moderate [ ]Severe  Fatigue:                             [ ]Mild [ ]Moderate [ ]Severe  Nausea:                             [ ]Mild [ ]Moderate [ ]Severe  Loss of appetite:              [ ]Mild [ ]Moderate [ ]Severe  Constipation:                    [ ]Mild [ ]Moderate [ ]Severe    PCSSQ[Palliative Care Spiritual Screening Question]   Severity (0-10): deferred  Score of 4 or > indicate consideration of Chaplaincy referral.  Chaplaincy Referral: [ ] yes [ ] refused [ ] following [ ] Deferred     Caregiver [place]? : [ ] yes [ ] no [ ] Deferred [ ] Declined             Social work referral [ ] Patient & Family Centered Care Referral [ ]     Anticipatory Grief present?:  [ ] yes [ ] no  [ ] Deferred                  Social work referral [ ] Chaplaincy Referral[ ]    Other Symptoms:  [ x]All other review of systems negative   [ ] Unable to obtain ROS due to poor mental status     Palliative Performance Status Version 2:         %    http://npcrc.org/files/news/palliative_performance_scale_ppsv2.pdf    PHYSICAL EXAM:  Vital Signs Last 24 Hrs  T(C): 36.8 (01 Sep 2024 15:00), Max: 37.4 (01 Sep 2024 03:00)  T(F): 98.2 (01 Sep 2024 15:00), Max: 99.3 (01 Sep 2024 03:00)  HR: 79 (01 Sep 2024 17:15) (57 - 107)  BP: 137/66 (01 Sep 2024 17:15) (73/44 - 159/71)  BP(mean): 92 (01 Sep 2024 17:15) (54 - 102)  RR: 15 (01 Sep 2024 17:15) (13 - 26)  SpO2: 97% (01 Sep 2024 17:15) (94% - 100%)    Parameters below as of 01 Sep 2024 11:20  Patient On (Oxygen Delivery Method): face tent     I&O's Summary    31 Aug 2024 07:01  -  01 Sep 2024 07:00  --------------------------------------------------------  IN: 3013.9 mL / OUT: 1706 mL / NET: 1307.9 mL    01 Sep 2024 07:01  -  01 Sep 2024 17:24  --------------------------------------------------------  IN: 2119.5 mL / OUT: 505 mL / NET: 1614.5 mL      GENERAL: [ ]Cachexia    [ x]Alert  [ ]Oriented x   [ ]Lethargic  [ ]Unarousable  [x ]Verbal  [ ]Non-Verbal  Behavioral:   [ ] Anxiety  [ ] Delirium [ ] Agitation [ ] Other  HEENT:  [ ]Normal   [x ]Dry mouth   [ ]ET Tube/Trach  [ ]Oral lesions  PULMONARY:   [ x]Clear [ ]Tachypnea  [ ]Audible excessive secretions   [ ]Rhonchi        [ ]Right [ ]Left [ ]Bilateral  [ ]Crackles        [ ]Right [ ]Left [ ]Bilateral  [ ]Wheezing     [ ]Right [ ]Left [ ]Bilateral  [ ]Diminished breath sounds [ ]right [ ]left [ ]bilateral  CARDIOVASCULAR:    [x ]Regular [ ]Irregular [ ]Tachy  [ ]Goyo [ ]Murmur [ ]Other  GASTROINTESTINAL:  [x ]Soft  [ ]Distended   [ ]+BS  [x ]Non tender [ ]Tender  [ ]Other [ ]PEG [ ]OGT/ NGT  Last BM:  GENITOURINARY:  [ x]Normal [ ] Incontinent   [ ]Oliguria/Anuria   [ ]Diez  MUSCULOSKELETAL:   [ ]Normal   [ x]Weakness  [ ]Bed/Wheelchair bound [ ]Edema  NEUROLOGIC:   [ ]No focal deficits  [ x]Cognitive impairment  [ ]Dysphagia [ ]Dysarthria [ ]Paresis [ ]Other   SKIN:   [ ]Normal  [ ]Rash  [ ]Other  [ ]Pressure ulcer(s)       Present on admission [ ]y [ ]n      CRITICAL CARE:  [ ] Shock Present  [ ]Septic [ ]Cardiogenic [ ]Neurologic [ ]Hypovolemic  [ ]  Vasopressors [ ]  Inotropes   [ ]Respiratory failure present [ ]Mechanical ventilation [ ]Non-invasive ventilatory support [ ]High flow    [ ]Acute  [ ]Chronic [ ]Hypoxic  [ ]Hypercarbic [ ]Other  [ ]Other organ failure     LABS:                        9.3    5.20  )-----------( 145      ( 01 Sep 2024 00:11 )             29.4   09-01    139  |  110<H>  |  34<H>  ----------------------------<  180<H>  4.4   |  16<L>  |  2.09<H>    Ca    8.2<L>      01 Sep 2024 04:44  Phos  3.6     09-01  Mg     1.8     09-01        Urinalysis Basic - ( 01 Sep 2024 04:44 )    Color: x / Appearance: x / SG: x / pH: x  Gluc: 180 mg/dL / Ketone: x  / Bili: x / Urobili: x   Blood: x / Protein: x / Nitrite: x   Leuk Esterase: x / RBC: x / WBC x   Sq Epi: x / Non Sq Epi: x / Bacteria: x      RADIOLOGY & ADDITIONAL STUDIES:    PROTEIN CALORIE MALNUTRITION PRESENT: [ ]mild [ ]moderate [ ]severe [ ]underweight [ ]morbid obesity  https://www.andeal.org/vault/2440/web/files/ONC/Table_Clinical%20Characteristics%20to%20Document%20Malnutrition-White%20JV%20et%20al%202012.pdf    Height (cm): 175.3 (08-29-24 @ 08:53), 177.8 (08-28-24 @ 09:24), 175.3 (05-11-24 @ 14:33)  Weight (kg): 81.6 (08-29-24 @ 08:53), 79.4 (08-28-24 @ 09:24), 81.6 (05-11-24 @ 14:33)  BMI (kg/m2): 26.6 (08-29-24 @ 08:53), 25.1 (08-28-24 @ 09:24), 26.6 (05-11-24 @ 14:33)    [ ]PPSV2 < or = to 30% [ ]significant weight loss  [ ]poor nutritional intake  [ ]anasarca[ ]Artificial Nutrition      Other REFERRALS:  [ ]Hospice  [ ]Child Life  [ ]Social Work  [ ]Case management [ ]Holistic Therapy     Goals of Care Document: PRABHU Streeter (04-26-22 @ 14:05)  Goals of Care Conversation:   Participants:  · Participants  Patient; Family  · Child(marco)  Zita (HCP)    Advance Directives:  · Does patient have Advance Directive  Yes  · Indicate Type  Health Care Proxy (HCP)  · Agent's Name  zita (daughter)  · Caregiver:  yes  · Name  Zita Watson  · Phone Number  [phone]    Conversation Discussion:  · Conversation  Diagnosis; Prognosis; MOLST Discussed; Treatment Options  · Conversation Details  discussed about current medical condition.   discussed about goals of care  discussed about DNR/DNI.   Daughter stated patient might be DNR but patient who has capacity wants to be full code.    What Matters Most To Patient and Family:  · What matters most to patient and family  Longevity    Personal Advance Directives Treatment Guidelines:   Treatment Guidelines:  · Decision Maker  Patient  · Future Hospitalization/Transfer  Send to hospital, if necessary, based on MOLST orders    MOLST:  · Completed  26-Apr-2022    Location of Discussion:   Time Spent on Advance Care Planning:  I spent 16 (in minutes) on advance care planning services with the patient.  This time is separate and distinct from any other care management services provided on this date.    Location of Discussion:  · Location of discussion  Face to face       Electronic Signatures:  Zay Streeter)  (Signed 26-Apr-2022 14:08)  	Authored: Goals of Care Conversation, Personal Advance Directives Treatment Guidelines, Location of Discussion      Last Updated: 26-Apr-2022 14:08 by Zay Streeter)     Date of [id number] @ 17:24  HPI:  89M, no recent AC/AP, but hasn't for the past week, w DM cb neuropathy, macular degeneration, PE (off Eliquis for 6 months), BPH, pw slurred speech and difficulty swallowing breakfast since 8AM. Per daughter has been increasingly confused, w gait instability, and 6 falls over the past week. Falls have been unwitnessed, unknown head strikes. Tx from [place]. Exam there per ED: Dysarthric but AOx3, FC, CALHOUN AG. 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  (28 Aug 2024 21:28)      PERTINENT PM/SXH:   HTN (hypertension)    DM (diabetes mellitus)    Diabetic neuropathy    BPH (benign prostatic hyperplasia)    Macular degeneration    CVA (cerebrovascular accident)    Pulmonary embolism    HTN (hypertension)      H/O laminectomy      FAMILY HISTORY:  FH: type 2 diabetes      Family Hx substance abuse [ ]yes [ ]no    ITEMS NOT CHECKED ARE NOT PRESENT    SOCIAL HISTORY:   Significant other/partner[ ]  Children[ ]  Caodaism/Spirituality:  Substance hx:  [ ]   Tobacco hx:  [ ]   Alcohol hx: [ ]   Home Opioid hx:  [ ] I-Stop Reference No:  Living Situation: [ ]Home  [ ]Long term care  [ ]Rehab [ ]Other    ADVANCE DIRECTIVES:    DNR/MOLST  [ ]  Living Will  [ ]   DECISION MAKER(s):  [x ] Health Care Proxy(s)  [ ] Surrogate(s)  [ ] Guardian           Name(s): Phone Number(s):    BASELINE (I)ADL(s) (prior to admission):  [place]: [ ]Total  [ x] Moderate [ ]Dependent    Allergies    Tolerated ceftriaxone 5/2024 (Other)  penicillins (Rash)    Intolerances    MEDICATIONS  (STANDING):  albuterol/ipratropium for Nebulization 3 milliLiter(s) Nebulizer every 6 hours  atorvastatin 40 milliGRAM(s) Oral at bedtime  brivaracetam  IVPB 50 milliGRAM(s) IV Intermittent two times a day  cefepime   IVPB 1000 milliGRAM(s) IV Intermittent daily  chlorhexidine 4% Liquid 1 Application(s) Topical daily  dexMEDEtomidine Infusion 0.2 MICROgram(s)/kG/Hr (4.08 mL/Hr) IV Continuous <Continuous>  doxazosin 2 milliGRAM(s) Oral at bedtime  finasteride 5 milliGRAM(s) Oral daily  insulin lispro (ADMELOG) corrective regimen sliding scale   SubCutaneous every 6 hours  insulin NPH human recombinant 4 Unit(s) SubCutaneous every 6 hours  latanoprost 0.005% Ophthalmic Solution 1 Drop(s) Both EYES at bedtime  metroNIDAZOLE  IVPB      metroNIDAZOLE  IVPB 500 milliGRAM(s) IV Intermittent every 8 hours  mupirocin 2% Nasal 1 Application(s) Both Nostrils two times a day  norepinephrine Infusion 0.05 MICROgram(s)/kG/Min (7.65 mL/Hr) IV Continuous <Continuous>  polyethylene glycol 3350 17 Gram(s) Oral daily  QUEtiapine 50 milliGRAM(s) Oral every 8 hours  senna 2 Tablet(s) Oral at bedtime  sodium chloride 3%  Inhalation 4 milliLiter(s) Inhalation every 12 hours  tiotropium 2.5 MICROgram(s) Inhaler 2 Puff(s) Inhalation daily    MEDICATIONS  (PRN):  acetaminophen     Tablet .. 650 milliGRAM(s) Oral every 6 hours PRN Temp greater or equal to 38C (100.4F), Mild Pain (1 - 3)  oxyCODONE    IR 15 milliGRAM(s) Oral every 4 hours PRN Severe Pain (7 - 10)  oxyCODONE    IR 10 milliGRAM(s) Oral every 4 hours PRN Moderate Pain (4 - 6)    PRESENT SYMPTOMS: [x ]Unable to self-report  [ ] CPOT [ x] PAINADs [ x] RDOS  Source if other than patient:  [ ]Family   [ ]Team     Pain: [ ]yes [ ]no  QOL impact -   Location -                    Aggravating factors -  Quality -  Radiation -  Timing-  Severity (0-10 scale):  Minimal acceptable level/pain goal (0-10 scale):     CPOT:    https://www.sccm.org/getattachment/igm59e66-9m1s-[email or web address]-0902p1315a4a/Critical-Care-Pain-Observation-Tool-(CPOT)    PAIN AD Score:   http://geriatrictoolkit.[place].[facility]/cog/painad.pdf (press ctrl +  left click to view)    Dyspnea:                           [ ]Mild [ ]Moderate [ ]Severe    RDOS:  0 to 2  minimal or no respiratory distress   3  mild distress  4 to 6 moderate distress  >7 severe distress  https://[email or web address]/handouts/hen/Respiratory_Distress_Observation_Scale.pdf (Ctrl +  left click to view)     Anxiety:                             [ ]Mild [ ]Moderate [ ]Severe  Fatigue:                             [ ]Mild [ ]Moderate [ ]Severe  Nausea:                             [ ]Mild [ ]Moderate [ ]Severe  Loss of appetite:              [ ]Mild [ ]Moderate [ ]Severe  Constipation:                    [ ]Mild [ ]Moderate [ ]Severe    PCSSQ[Palliative Care Spiritual Screening Question]   Severity (0-10): deferred  Score of 4 or > indicate consideration of Chaplaincy referral.  Chaplaincy Referral: [ ] yes [ ] refused [ ] following [ ] Deferred     Caregiver [place]? : [ ] yes [ ] no [ ] Deferred [ ] Declined             Social work referral [ ] Patient & Family Centered Care Referral [ ]     Anticipatory Grief present?:  [ ] yes [ ] no  [ ] Deferred                  Social work referral [ ] Chaplaincy Referral[ ]    Other Symptoms:    [x ] Unable to obtain ROS due to poor mental status     Palliative Performance Status Version 2:         %    http://npcrc.org/files/news/palliative_performance_scale_ppsv2.pdf    PHYSICAL EXAM:  Vital Signs Last 24 Hrs  T(C): 36.8 (01 Sep 2024 15:00), Max: 37.4 (01 Sep 2024 03:00)  T(F): 98.2 (01 Sep 2024 15:00), Max: 99.3 (01 Sep 2024 03:00)  HR: 79 (01 Sep 2024 17:15) (57 - 107)  BP: 137/66 (01 Sep 2024 17:15) (73/44 - 159/71)  BP(mean): 92 (01 Sep 2024 17:15) (54 - 102)  RR: 15 (01 Sep 2024 17:15) (13 - 26)  SpO2: 97% (01 Sep 2024 17:15) (94% - 100%)    Parameters below as of 01 Sep 2024 11:20  Patient On (Oxygen Delivery Method): face tent     I&O's Summary    31 Aug 2024 07:01  -  01 Sep 2024 07:00  --------------------------------------------------------  IN: 3013.9 mL / OUT: 1706 mL / NET: 1307.9 mL    01 Sep 2024 07:01  -  01 Sep 2024 17:24  --------------------------------------------------------  IN: 2119.5 mL / OUT: 505 mL / NET: 1614.5 mL      GENERAL: [ ]Cachexia    [ ]Alert  [ ]Oriented x   [ x]Lethargic  [ ]Unarousable  [ ]Verbal  [ x]Non-Verbal  Behavioral:   [ ] Anxiety  [ ] Delirium [ ] Agitation [ ] Other  HEENT:  [ ]Normal   [x ]Dry mouth   [ ]ET Tube/Trach  [ ]Oral lesions  PULMONARY: on face tent; labored resp  [ x]Clear [ ]Tachypnea  [ ]Audible excessive secretions   [ ]Rhonchi        [ ]Right [ ]Left [ ]Bilateral  [ ]Crackles        [ ]Right [ ]Left [ ]Bilateral  [ ]Wheezing     [ ]Right [ ]Left [ ]Bilateral  [ ]Diminished breath sounds [ ]right [ ]left [ ]bilateral  CARDIOVASCULAR:    [x ]Regular [ ]Irregular [ ]Tachy  [ ]Goyo [ ]Murmur [ ]Other  GASTROINTESTINAL:  [x ]Soft  [ ]Distended   [ ]+BS  [x ]Non tender [ ]Tender  [ ]Other [ ]PEG [ x]OGT/ NGT  Last BM:  GENITOURINARY:  [ x]Normal [ ] Incontinent   [ ]Oliguria/Anuria   [ ]Diez  MUSCULOSKELETAL:   [ ]Normal   [ x]Weakness  [ ]Bed/Wheelchair bound [ ]Edema  NEUROLOGIC:   [ ]No focal deficits  [ x]Cognitive impairment  [ ]Dysphagia [ ]Dysarthria [ ]Paresis [ ]Other   SKIN:   [ ]Normal  [ ]Rash  [ ]Other  [ ]Pressure ulcer(s)       Present on admission [ ]y [ ]n      CRITICAL CARE:  [ ] Shock Present  [ ]Septic [ ]Cardiogenic [ ]Neurologic [ ]Hypovolemic  [ ]  Vasopressors [ ]  Inotropes   [x ]Respiratory failure present [ ]Mechanical ventilation [x ]Non-invasive ventilatory support [ ]High flow    [ x]Acute  [ ]Chronic [ ]Hypoxic  [ ]Hypercarbic [ ]Other  [ ]Other organ failure     LABS:                        9.3    5.20  )-----------( 145      ( 01 Sep 2024 00:11 )             29.4   09-01    139  |  110<H>  |  34<H>  ----------------------------<  180<H>  4.4   |  16<L>  |  2.09<H>    Ca    8.2<L>      01 Sep 2024 04:44  Phos  3.6     09-01  Mg     1.8     09-01        Urinalysis Basic - ( 01 Sep 2024 04:44 )    Color: x / Appearance: x / SG: x / pH: x  Gluc: 180 mg/dL / Ketone: x  / Bili: x / Urobili: x   Blood: x / Protein: x / Nitrite: x   Leuk Esterase: x / RBC: x / WBC x   Sq Epi: x / Non Sq Epi: x / Bacteria: x      RADIOLOGY & ADDITIONAL STUDIES:  < from: Xray Chest 1 View- PORTABLE-Routine (Xray Chest 1 View- PORTABLE-Routine in AM.) (09.01.24 @ 04:30) >    IMPRESSION:  Pulmonary infiltrates as noted. Bilateral pleural effusions, left greater   than right.    < end of copied text >  < from: CT Head No Cont (08.31.24 @ 09:32) >    IMPRESSION: Motion limited. No change in small left frontal temporal   occipital subdural collection without significant mass effect compared   with 8/30/2024. Left parietal blank hole and subdural drain. Left middle   meningeal artery embolic material      < end of copied text >  < from: CT Head No Cont (08.28.24 @ 14:03) >  IMPRESSION: Heterogeneous increased density to the left frontal parietal   subacute subdural hematoma since 9:33 AM likely is related to contrast   enhancement rather than hemorrhage.    < end of copied text >  < from: CT Brain Stroke Protocol (08.28.24 @ 09:34) >    IMPRESSION:    CT head: No acute infarct is identified. Large left holohemispheric   subdural hematoma, mostly chronic, with a small amount of acute   hemorrhagic blood products. This measures up to 2.9 cm in greatest width.   There is significant mass effect on the underlying parenchyma. There is   minimal left to right midline shift of approximately 1 to 2 mm. Rest of   the chronic findings as above.      Critical value:  I discussed the finding of this report with Dr. Yarbrough   at 9:45 AM on 8/28/2024.  Critical value policy of the hospital was   followed.  Read back and confirmation of receipt of this communication   was performed.  This verbal communication supplements the text report of   this document.    CTA brain: No hemodynamically significant stenosis    CTA carotid/vertebral artery circulation: No hemodynamically significant   stenosis    CT Perfusion: Perfusion data appears likely artifactual as above.    --- End of Report ---    < end of copied text >    PROTEIN CALORIE MALNUTRITION PRESENT: [ ]mild [ ]moderate [ ]severe [ ]underweight [ ]morbid obesity  https://www.andeal.org/vault/2440/web/files/ONC/Table_Clinical%20Characteristics%20to%20Document%20Malnutrition-White%20JV%20et%20al%202012.pdf    Height (cm): 175.3 (08-29-24 @ 08:53), 177.8 (08-28-24 @ 09:24), 175.3 (05-11-24 @ 14:33)  Weight (kg): 81.6 (08-29-24 @ 08:53), 79.4 (08-28-24 @ 09:24), 81.6 (05-11-24 @ 14:33)  BMI (kg/m2): 26.6 (08-29-24 @ 08:53), 25.1 (08-28-24 @ 09:24), 26.6 (05-11-24 @ 14:33)    [ ]PPSV2 < or = to 30% [ ]significant weight loss  [ ]poor nutritional intake  [ ]anasarca[ ]Artificial Nutrition      Other REFERRALS:  [ ]Hospice  [ ]Child Life  [ ]Social Work  [ ]Case management [ ]Holistic Therapy     Goals of Care Document: PRABHU Streeter (04-26-22 @ 14:05)  Goals of Care Conversation:   Participants:  · Participants  Patient; Family  · Child(marco)  Zita (HCP)    Advance Directives:  · Does patient have Advance Directive  Yes  · Indicate Type  Health Care Proxy (HCP)  · Agent's Name  zita (daughter)  · Caregiver:  yes  · Name  Zita Simst  · Phone Number  [phone]    Conversation Discussion:  · Conversation  Diagnosis; Prognosis; MOLST Discussed; Treatment Options  · Conversation Details  discussed about current medical condition.   discussed about goals of care  discussed about DNR/DNI.   Daughter stated patient might be DNR but patient who has capacity wants to be full code.    What Matters Most To Patient and Family:  · What matters most to patient and family  Longevity    Personal Advance Directives Treatment Guidelines:   Treatment Guidelines:  · Decision Maker  Patient  · Future Hospitalization/Transfer  Send to hospital, if necessary, based on MOLST orders    MOLST:  · Completed  26-Apr-2022    Location of Discussion:   Time Spent on Advance Care Planning:  I spent 16 (in minutes) on advance care planning services with the patient.  This time is separate and distinct from any other care management services provided on this date.    Location of Discussion:  · Location of discussion  Face to face       Electronic Signatures:  Zay Streeter)  (Signed 26-Apr-2022 14:08)  	Authored: Goals of Care Conversation, Personal Advance Directives Treatment Guidelines, Location of Discussion      Last Updated: 26-Apr-2022 14:08 by Zay Streeter)     Date of [id number] @ 17:24  HPI:  89M, no recent AC/AP, but hasn't for the past week, w DM cb neuropathy, macular degeneration, PE (off Eliquis for 6 months), BPH, pw slurred speech and difficulty swallowing breakfast since 8AM. Per daughter has been increasingly confused, w gait instability, and 6 falls over the past week. Falls have been unwitnessed, unknown head strikes. Tx from [place]. Exam there per ED: Dysarthric but AOx3, FC, CALHOUN AG. 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  (28 Aug 2024 21:28)    Palliative consulted for complex medical decision making and symptom management in the setting of serious illness.      PERTINENT PM/SXH:   HTN (hypertension)    DM (diabetes mellitus)    Diabetic neuropathy    BPH (benign prostatic hyperplasia)    Macular degeneration    CVA (cerebrovascular accident)    Pulmonary embolism    HTN (hypertension)      H/O laminectomy      FAMILY HISTORY:  FH: type 2 diabetes      Family Hx substance abuse [ ]yes [ ]no    ITEMS NOT CHECKED ARE NOT PRESENT    SOCIAL HISTORY:   Significant other/partner[ ]  Children[x ]  Congregational/Spirituality:  Substance hx:  [ ]   Tobacco hx:  [ ]   Alcohol hx: [ ]   Home Opioid hx:  [ ] I-Stop Reference No:  Living Situation: [x ]Home  [ ]Long term care  [ ]Rehab [ ]Other    ADVANCE DIRECTIVES:    DNR/MOLST  [ ]  Living Will  [ ]   DECISION MAKER(s):  [x ] Health Care Proxy(s)  [ ] Surrogate(s)  [ ] Guardian           Name(s): Phone Number(s):    BASELINE (I)ADL(s) (prior to admission):  [place]: [ ]Total  [ x] Moderate [ ]Dependent    Allergies    Tolerated ceftriaxone 5/2024 (Other)  penicillins (Rash)    Intolerances    MEDICATIONS  (STANDING):  albuterol/ipratropium for Nebulization 3 milliLiter(s) Nebulizer every 6 hours  atorvastatin 40 milliGRAM(s) Oral at bedtime  brivaracetam  IVPB 50 milliGRAM(s) IV Intermittent two times a day  cefepime   IVPB 1000 milliGRAM(s) IV Intermittent daily  chlorhexidine 4% Liquid 1 Application(s) Topical daily  dexMEDEtomidine Infusion 0.2 MICROgram(s)/kG/Hr (4.08 mL/Hr) IV Continuous <Continuous>  doxazosin 2 milliGRAM(s) Oral at bedtime  finasteride 5 milliGRAM(s) Oral daily  insulin lispro (ADMELOG) corrective regimen sliding scale   SubCutaneous every 6 hours  insulin NPH human recombinant 4 Unit(s) SubCutaneous every 6 hours  latanoprost 0.005% Ophthalmic Solution 1 Drop(s) Both EYES at bedtime  metroNIDAZOLE  IVPB      metroNIDAZOLE  IVPB 500 milliGRAM(s) IV Intermittent every 8 hours  mupirocin 2% Nasal 1 Application(s) Both Nostrils two times a day  norepinephrine Infusion 0.05 MICROgram(s)/kG/Min (7.65 mL/Hr) IV Continuous <Continuous>  polyethylene glycol 3350 17 Gram(s) Oral daily  QUEtiapine 50 milliGRAM(s) Oral every 8 hours  senna 2 Tablet(s) Oral at bedtime  sodium chloride 3%  Inhalation 4 milliLiter(s) Inhalation every 12 hours  tiotropium 2.5 MICROgram(s) Inhaler 2 Puff(s) Inhalation daily    MEDICATIONS  (PRN):  acetaminophen     Tablet .. 650 milliGRAM(s) Oral every 6 hours PRN Temp greater or equal to 38C (100.4F), Mild Pain (1 - 3)  oxyCODONE    IR 15 milliGRAM(s) Oral every 4 hours PRN Severe Pain (7 - 10)  oxyCODONE    IR 10 milliGRAM(s) Oral every 4 hours PRN Moderate Pain (4 - 6)    PRESENT SYMPTOMS: [x ]Unable to self-report  [ ] CPOT [ x] PAINADs [ x] RDOS  Source if other than patient:  [ ]Family   [ ]Team     Pain: [ ]yes [ ]no  QOL impact -   Location -                    Aggravating factors -  Quality -  Radiation -  Timing-  Severity (0-10 scale):  Minimal acceptable level/pain goal (0-10 scale):     CPOT:    https://www.sccm.org/getattachment/[email or web address]/Critical-Care-Pain-Observation-Tool-(CPOT)    PAIN AD Score:   http://geriatrictoolkit.[email or web address]/cog/painad.pdf (press ctrl +  left click to view)    Dyspnea:                           [ ]Mild [ ]Moderate [ ]Severe    RDOS:  0 to 2  minimal or no respiratory distress   3  mild distress  4 to 6 moderate distress  >7 severe distress  https://homecareinformation.net/handouts/hen/Respiratory_Distress_Observation_Scale.pdf (Ctrl +  left click to view)     Anxiety:                             [ ]Mild [ ]Moderate [ ]Severe  Fatigue:                             [ ]Mild [ ]Moderate [ ]Severe  Nausea:                             [ ]Mild [ ]Moderate [ ]Severe  Loss of appetite:              [ ]Mild [ ]Moderate [ ]Severe  Constipation:                    [ ]Mild [ ]Moderate [ ]Severe    PCSSQ[Palliative Care Spiritual Screening Question]   Severity (0-10): deferred  Score of 4 or > indicate consideration of Chaplaincy referral.  Chaplaincy Referral: [ ] yes [ ] refused [ ] following [ ] Deferred     Caregiver [place]? : [ ] yes [ ] no [ ] Deferred [ ] Declined             Social work referral [ ] Patient & Family Centered Care Referral [ ]     Anticipatory Grief present?:  [ ] yes [ ] no  [ ] Deferred                  Social work referral [ ] Chaplaincy Referral[ ]    Other Symptoms:    [x ] Unable to obtain ROS due to poor mental status     Palliative Performance Status Version 2:         %    http://npcrc.org/files/news/palliative_performance_scale_ppsv2.pdf    PHYSICAL EXAM:  Vital Signs Last 24 Hrs  T(C): 36.8 (01 Sep 2024 15:00), Max: 37.4 (01 Sep 2024 03:00)  T(F): 98.2 (01 Sep 2024 15:00), Max: 99.3 (01 Sep 2024 03:00)  HR: 79 (01 Sep 2024 17:15) (57 - 107)  BP: 137/66 (01 Sep 2024 17:15) (73/44 - 159/71)  BP(mean): 92 (01 Sep 2024 17:15) (54 - 102)  RR: 15 (01 Sep 2024 17:15) (13 - 26)  SpO2: 97% (01 Sep 2024 17:15) (94% - 100%)    Parameters below as of 01 Sep 2024 11:20  Patient On (Oxygen Delivery Method): face tent     I&O's Summary    31 Aug 2024 07:01  -  01 Sep 2024 07:00  --------------------------------------------------------  IN: 3013.9 mL / OUT: 1706 mL / NET: 1307.9 mL    01 Sep 2024 07:01  -  01 Sep 2024 17:24  --------------------------------------------------------  IN: 2119.5 mL / OUT: 505 mL / NET: 1614.5 mL      GENERAL: [ ]Cachexia    [ ]Alert  [ ]Oriented x   [ x]Lethargic  [ ]Unarousable  [ ]Verbal  [ x]Non-Verbal  Behavioral:   [ ] Anxiety  [ ] Delirium [ ] Agitation [ ] Other  HEENT:  [ ]Normal   [x ]Dry mouth   [ ]ET Tube/Trach  [ ]Oral lesions  PULMONARY: on face tent; labored resp  [ x]Clear [ ]Tachypnea  [ ]Audible excessive secretions   [ ]Rhonchi        [ ]Right [ ]Left [ ]Bilateral  [ ]Crackles        [ ]Right [ ]Left [ ]Bilateral  [ ]Wheezing     [ ]Right [ ]Left [ ]Bilateral  [ ]Diminished breath sounds [ ]right [ ]left [ ]bilateral  CARDIOVASCULAR:    [x ]Regular [ ]Irregular [ ]Tachy  [ ]Goyo [ ]Murmur [ ]Other  GASTROINTESTINAL:  [x ]Soft  [ ]Distended   [ ]+BS  [x ]Non tender [ ]Tender  [ ]Other [ ]PEG [ x]OGT/ NGT  Last BM:  GENITOURINARY:  [ x]Normal [ ] Incontinent   [ ]Oliguria/Anuria   [ ]Diez  MUSCULOSKELETAL:   [ ]Normal   [ x]Weakness  [ ]Bed/Wheelchair bound [ ]Edema  NEUROLOGIC:   [ ]No focal deficits  [ x]Cognitive impairment  [ ]Dysphagia [ ]Dysarthria [ ]Paresis [ ]Other   SKIN:   [ ]Normal  [ ]Rash  [ ]Other  [ ]Pressure ulcer(s)       Present on admission [ ]y [ ]n      CRITICAL CARE:  [ ] Shock Present  [ ]Septic [ ]Cardiogenic [ ]Neurologic [ ]Hypovolemic  [ ]  Vasopressors [ ]  Inotropes   [x ]Respiratory failure present [ ]Mechanical ventilation [x ]Non-invasive ventilatory support [ ]High flow    [ x]Acute  [ ]Chronic [ ]Hypoxic  [ ]Hypercarbic [ ]Other  [ ]Other organ failure     LABS:                        9.3    5.20  )-----------( 145      ( 01 Sep 2024 00:11 )             29.4   09-01    139  |  110<H>  |  34<H>  ----------------------------<  180<H>  4.4   |  16<L>  |  2.09<H>    Ca    8.2<L>      01 Sep 2024 04:44  Phos  3.6     09-01  Mg     1.8     09-01        Urinalysis Basic - ( 01 Sep 2024 04:44 )    Color: x / Appearance: x / SG: x / pH: x  Gluc: 180 mg/dL / Ketone: x  / Bili: x / Urobili: x   Blood: x / Protein: x / Nitrite: x   Leuk Esterase: x / RBC: x / WBC x   Sq Epi: x / Non Sq Epi: x / Bacteria: x      RADIOLOGY & ADDITIONAL STUDIES:  < from: Xray Chest 1 View- PORTABLE-Routine (Xray Chest 1 View- PORTABLE-Routine in AM.) (09.01.24 @ 04:30) >    IMPRESSION:  Pulmonary infiltrates as noted. Bilateral pleural effusions, left greater   than right.    < end of copied text >  < from: CT Head No Cont (08.31.24 @ 09:32) >    IMPRESSION: Motion limited. No change in small left frontal temporal   occipital subdural collection without significant mass effect compared   with 8/30/2024. Left parietal blank hole and subdural drain. Left middle   meningeal artery embolic material      < end of copied text >  < from: CT Head No Cont (08.28.24 @ 14:03) >  IMPRESSION: Heterogeneous increased density to the left frontal parietal   subacute subdural hematoma since 9:33 AM likely is related to contrast   enhancement rather than hemorrhage.    < end of copied text >  < from: CT Brain Stroke Protocol (08.28.24 @ 09:34) >    IMPRESSION:    CT head: No acute infarct is identified. Large left holohemispheric   subdural hematoma, mostly chronic, with a small amount of acute   hemorrhagic blood products. This measures up to 2.9 cm in greatest width.   There is significant mass effect on the underlying parenchyma. There is   minimal left to right midline shift of approximately 1 to 2 mm. Rest of   the chronic findings as above.      Critical value:  I discussed the finding of this report with Dr. Yarbrough   at 9:45 AM on 8/28/2024.  Critical value policy of the hospital was   followed.  Read back and confirmation of receipt of this communication   was performed.  This verbal communication supplements the text report of   this document.    CTA brain: No hemodynamically significant stenosis    CTA carotid/vertebral artery circulation: No hemodynamically significant   stenosis    CT Perfusion: Perfusion data appears likely artifactual as above.    --- End of Report ---    < end of copied text >    PROTEIN CALORIE MALNUTRITION PRESENT: [ ]mild [ ]moderate [ ]severe [ ]underweight [ ]morbid obesity  https://www.andeal.org/vault/2440/web/files/ONC/Table_Clinical%20Characteristics%20to%20Document%20Malnutrition-White%20JV%20et%20al%202012.pdf    Height (cm): 175.3 (08-29-24 @ 08:53), 177.8 (08-28-24 @ 09:24), 175.3 (05-11-24 @ 14:33)  Weight (kg): 81.6 (08-29-24 @ 08:53), 79.4 (08-28-24 @ 09:24), 81.6 (05-11-24 @ 14:33)  BMI (kg/m2): 26.6 (08-29-24 @ 08:53), 25.1 (08-28-24 @ 09:24), 26.6 (05-11-24 @ 14:33)    [ ]PPSV2 < or = to 30% [ ]significant weight loss  [ ]poor nutritional intake  [ ]anasarca[ ]Artificial Nutrition      Other REFERRALS:  [ ]Hospice  [ ]Child Life  [ ]Social Work  [ ]Case management [ ]Holistic Therapy     Goals of Care Document: PRABHU Streeter (04-26-22 @ 14:05)  Goals of Care Conversation:   Participants:  · Participants  Patient; Family  · Child(marco)  Zita (HCP)    Advance Directives:  · Does patient have Advance Directive  Yes  · Indicate Type  Health Care Proxy (HCP)  · Agent's Name  zita (daughter)  · Caregiver:  yes  · Name  Zita Watson  · Phone Number  [phone]    Conversation Discussion:  · Conversation  Diagnosis; Prognosis; MOLST Discussed; Treatment Options  · Conversation Details  discussed about current medical condition.   discussed about goals of care  discussed about DNR/DNI.   Daughter stated patient might be DNR but patient who has capacity wants to be full code.    What Matters Most To Patient and Family:  · What matters most to patient and family  Longevity    Personal Advance Directives Treatment Guidelines:   Treatment Guidelines:  · Decision Maker  Patient  · Future Hospitalization/Transfer  Send to hospital, if necessary, based on MOLST orders    MOLST:  · Completed  26-Apr-2022    Location of Discussion:   Time Spent on Advance Care Planning:  I spent 16 (in minutes) on advance care planning services with the patient.  This time is separate and distinct from any other care management services provided on this date.    Location of Discussion:  · Location of discussion  Face to face       Electronic Signatures:  Zay Streeter)  (Signed 26-Apr-2022 14:08)  	Authored: Goals of Care Conversation, Personal Advance Directives Treatment Guidelines, Location of Discussion      Last Updated: 26-Apr-2022 14:08 by Zay Streeter)     Date of [id number] @ 17:24  HPI:  89M, no recent AC/AP, but hasn't for the past week, w DM cb neuropathy, macular degeneration, PE (off Eliquis for 6 months), BPH, pw slurred speech and difficulty swallowing breakfast since 8AM. Per daughter has been increasingly confused, w gait instability, and 6 falls over the past week. Falls have been unwitnessed, unknown head strikes. Tx from [place]. Exam there per ED: Dysarthric but AOx3, FC, [place] AG. 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  (28 Aug 2024 21:28)    Palliative consulted for complex medical decision making and symptom management in the setting of serious illness.      PERTINENT PM/SXH:   HTN (hypertension)    DM (diabetes mellitus)    Diabetic neuropathy    BPH (benign prostatic hyperplasia)    Macular degeneration    CVA (cerebrovascular accident)    Pulmonary embolism    HTN (hypertension)      H/O laminectomy      FAMILY HISTORY:  FH: type 2 diabetes      Family Hx substance abuse [ ]yes [ ]no    ITEMS NOT CHECKED ARE NOT PRESENT    SOCIAL HISTORY:   Significant other/partner[ ]  Children[x ]  Adventism/Spirituality:  Substance hx:  [ ]   Tobacco hx:  [ ]   Alcohol hx: [ ]   Home Opioid hx:  [x ] I-Stop Reference No: [id number]  Living Situation: [x ]Home  [ ]Long term care  [ ]Rehab [ ]Other    ADVANCE DIRECTIVES:    DNR/MOLST  [ ]  Living Will  [ ]   DECISION MAKER(s):  [x ] Health Care Proxy(s)  [ ] Surrogate(s)  [ ] Guardian           Name(s): Phone Number(s):    BASELINE (I)ADL(s) (prior to admission):  [place]: [ ]Total  [ x] Moderate [ ]Dependent    Allergies    Tolerated ceftriaxone 5/2024 (Other)  penicillins (Rash)    Intolerances    MEDICATIONS  (STANDING):  albuterol/ipratropium for Nebulization 3 milliLiter(s) Nebulizer every 6 hours  atorvastatin 40 milliGRAM(s) Oral at bedtime  brivaracetam  IVPB 50 milliGRAM(s) IV Intermittent two times a day  cefepime   IVPB 1000 milliGRAM(s) IV Intermittent daily  chlorhexidine 4% Liquid 1 Application(s) Topical daily  dexMEDEtomidine Infusion 0.2 MICROgram(s)/kG/Hr (4.08 mL/Hr) IV Continuous <Continuous>  doxazosin 2 milliGRAM(s) Oral at bedtime  finasteride 5 milliGRAM(s) Oral daily  insulin lispro (ADMELOG) corrective regimen sliding scale   SubCutaneous every 6 hours  insulin NPH human recombinant 4 Unit(s) SubCutaneous every 6 hours  latanoprost 0.005% Ophthalmic Solution 1 Drop(s) Both EYES at bedtime  metroNIDAZOLE  IVPB      metroNIDAZOLE  IVPB 500 milliGRAM(s) IV Intermittent every 8 hours  mupirocin 2% Nasal 1 Application(s) Both Nostrils two times a day  norepinephrine Infusion 0.05 MICROgram(s)/kG/Min (7.65 mL/Hr) IV Continuous <Continuous>  polyethylene glycol 3350 17 Gram(s) Oral daily  QUEtiapine 50 milliGRAM(s) Oral every 8 hours  senna 2 Tablet(s) Oral at bedtime  sodium chloride 3%  Inhalation 4 milliLiter(s) Inhalation every 12 hours  tiotropium 2.5 MICROgram(s) Inhaler 2 Puff(s) Inhalation daily    MEDICATIONS  (PRN):  acetaminophen     Tablet .. 650 milliGRAM(s) Oral every 6 hours PRN Temp greater or equal to 38C (100.4F), Mild Pain (1 - 3)  oxyCODONE    IR 15 milliGRAM(s) Oral every 4 hours PRN Severe Pain (7 - 10)  oxyCODONE    IR 10 milliGRAM(s) Oral every 4 hours PRN Moderate Pain (4 - 6)    PRESENT SYMPTOMS: [x ]Unable to self-report  [ ] CPOT [ x] PAINADs [ x] RDOS  Source if other than patient:  [ ]Family   [ ]Team     Pain: [ ]yes [ ]no  QOL impact -   Location -                    Aggravating factors -  Quality -  Radiation -  Timing-  Severity (0-10 scale):  Minimal acceptable level/pain goal (0-10 scale):     CPOT:    https://www.sccm.org/getattachment/[email or web address]-8604g4748k0i/Critical-Care-Pain-Observation-Tool-(CPOT)    PAIN AD Score:   http://geriatrictoolkit.[email or web address]/painad.pdf (press ctrl +  left click to view)    Dyspnea:                           [ ]Mild [ ]Moderate [ ]Severe    RDOS:  0 to 2  minimal or no respiratory distress   3  mild distress  4 to 6 moderate distress  >7 severe distress  https://[email or web address].net/handouts/hen/Respiratory_Distress_Observation_Scale.pdf (Ctrl +  left click to view)     Anxiety:                             [ ]Mild [ ]Moderate [ ]Severe  Fatigue:                             [ ]Mild [ ]Moderate [ ]Severe  Nausea:                             [ ]Mild [ ]Moderate [ ]Severe  Loss of appetite:              [ ]Mild [ ]Moderate [ ]Severe  Constipation:                    [ ]Mild [ ]Moderate [ ]Severe    PCSSQ[Palliative Care Spiritual Screening Question]   Severity (0-10): deferred  Score of 4 or > indicate consideration of Chaplaincy referral.  Chaplaincy Referral: [ ] yes [ ] refused [ ] following [ ] Deferred     Caregiver [place]? : [ ] yes [ ] no [ ] Deferred [ ] Declined             Social work referral [ ] Patient & Family Centered Care Referral [ ]     Anticipatory Grief present?:  [ ] yes [ ] no  [ ] Deferred                  Social work referral [ ] Chaplaincy Referral[ ]    Other Symptoms:    [x ] Unable to obtain ROS due to poor mental status     Palliative Performance Status Version 2:         %    http://npcrc.org/files/news/palliative_performance_scale_ppsv2.pdf    PHYSICAL EXAM:  Vital Signs Last 24 Hrs  T(C): 36.8 (01 Sep 2024 15:00), Max: 37.4 (01 Sep 2024 03:00)  T(F): 98.2 (01 Sep 2024 15:00), Max: 99.3 (01 Sep 2024 03:00)  HR: 79 (01 Sep 2024 17:15) (57 - 107)  BP: 137/66 (01 Sep 2024 17:15) (73/44 - 159/71)  BP(mean): 92 (01 Sep 2024 17:15) (54 - 102)  RR: 15 (01 Sep 2024 17:15) (13 - 26)  SpO2: 97% (01 Sep 2024 17:15) (94% - 100%)    Parameters below as of 01 Sep 2024 11:20  Patient On (Oxygen Delivery Method): face tent     I&O's Summary    31 Aug 2024 07:01  -  01 Sep 2024 07:00  --------------------------------------------------------  IN: 3013.9 mL / OUT: 1706 mL / NET: 1307.9 mL    01 Sep 2024 07:01  -  01 Sep 2024 17:24  --------------------------------------------------------  IN: 2119.5 mL / OUT: 505 mL / NET: 1614.5 mL      GENERAL: [ ]Cachexia    [ ]Alert  [ ]Oriented x   [ x]Lethargic  [ ]Unarousable  [ ]Verbal  [ x]Non-Verbal  Behavioral:   [ ] Anxiety  [ ] Delirium [ ] Agitation [ ] Other  HEENT:  [ ]Normal   [x ]Dry mouth   [ ]ET Tube/Trach  [ ]Oral lesions  PULMONARY: on face tent; labored resp  [ x]Clear [ ]Tachypnea  [ ]Audible excessive secretions   [ ]Rhonchi        [ ]Right [ ]Left [ ]Bilateral  [ ]Crackles        [ ]Right [ ]Left [ ]Bilateral  [ ]Wheezing     [ ]Right [ ]Left [ ]Bilateral  [ ]Diminished breath sounds [ ]right [ ]left [ ]bilateral  CARDIOVASCULAR:    [x ]Regular [ ]Irregular [ ]Tachy  [ ]Goyo [ ]Murmur [ ]Other  GASTROINTESTINAL:  [x ]Soft  [ ]Distended   [ ]+BS  [x ]Non tender [ ]Tender  [ ]Other [ ]PEG [ x]OGT/ NGT  Last BM:  GENITOURINARY:  [ x]Normal [ ] Incontinent   [ ]Oliguria/Anuria   [ ]Diez  MUSCULOSKELETAL:   [ ]Normal   [ x]Weakness  [ ]Bed/Wheelchair bound [ ]Edema  NEUROLOGIC:   [ ]No focal deficits  [ x]Cognitive impairment  [ ]Dysphagia [ ]Dysarthria [ ]Paresis [ ]Other   SKIN:   [ ]Normal  [ ]Rash  [ ]Other  [ ]Pressure ulcer(s)       Present on admission [ ]y [ ]n      CRITICAL CARE:  [ ] Shock Present  [ ]Septic [ ]Cardiogenic [ ]Neurologic [ ]Hypovolemic  [ ]  Vasopressors [ ]  Inotropes   [x ]Respiratory failure present [ ]Mechanical ventilation [x ]Non-invasive ventilatory support [ ]High flow    [ x]Acute  [ ]Chronic [ ]Hypoxic  [ ]Hypercarbic [ ]Other  [ ]Other organ failure     LABS:                        9.3    5.20  )-----------( 145      ( 01 Sep 2024 00:11 )             29.4   09-01    139  |  110<H>  |  34<H>  ----------------------------<  180<H>  4.4   |  16<L>  |  2.09<H>    Ca    8.2<L>      01 Sep 2024 04:44  Phos  3.6     09-01  Mg     1.8     09-01        Urinalysis Basic - ( 01 Sep 2024 04:44 )    Color: x / Appearance: x / SG: x / pH: x  Gluc: 180 mg/dL / Ketone: x  / Bili: x / Urobili: x   Blood: x / Protein: x / Nitrite: x   Leuk Esterase: x / RBC: x / WBC x   Sq Epi: x / Non Sq Epi: x / Bacteria: x      RADIOLOGY & ADDITIONAL STUDIES:  < from: Xray Chest 1 View- PORTABLE-Routine (Xray Chest 1 View- PORTABLE-Routine in AM.) (09.01.24 @ 04:30) >    IMPRESSION:  Pulmonary infiltrates as noted. Bilateral pleural effusions, left greater   than right.    < end of copied text >  < from: CT Head No Cont (08.31.24 @ 09:32) >    IMPRESSION: Motion limited. No change in small left frontal temporal   occipital subdural collection without significant mass effect compared   with 8/30/2024. Left parietal blank hole and subdural drain. Left middle   meningeal artery embolic material      < end of copied text >  < from: CT Head No Cont (08.28.24 @ 14:03) >  IMPRESSION: Heterogeneous increased density to the left frontal parietal   subacute subdural hematoma since 9:33 AM likely is related to contrast   enhancement rather than hemorrhage.    < end of copied text >  < from: CT Brain Stroke Protocol (08.28.24 @ 09:34) >    IMPRESSION:    CT head: No acute infarct is identified. Large left holohemispheric   subdural hematoma, mostly chronic, with a small amount of acute   hemorrhagic blood products. This measures up to 2.9 cm in greatest width.   There is significant mass effect on the underlying parenchyma. There is   minimal left to right midline shift of approximately 1 to 2 mm. Rest of   the chronic findings as above.      Critical value:  I discussed the finding of this report with Dr. Yarbrough   at 9:45 AM on 8/28/2024.  Critical value policy of the hospital was   followed.  Read back and confirmation of receipt of this communication   was performed.  This verbal communication supplements the text report of   this document.    CTA brain: No hemodynamically significant stenosis    CTA carotid/vertebral artery circulation: No hemodynamically significant   stenosis    CT Perfusion: Perfusion data appears likely artifactual as above.    --- End of Report ---    < end of copied text >    PROTEIN CALORIE MALNUTRITION PRESENT: [ ]mild [ ]moderate [ ]severe [ ]underweight [ ]morbid obesity  https://www.andeal.org/vault/2440/web/files/ONC/Table_Clinical%20Characteristics%20to%20Document%20Malnutrition-White%20JV%20et%20al%715171.pdf    Height (cm): 175.3 (08-29-24 @ 08:53), 177.8 (08-28-24 @ 09:24), 175.3 (05-11-24 @ 14:33)  Weight (kg): 81.6 (08-29-24 @ 08:53), 79.4 (08-28-24 @ 09:24), 81.6 (05-11-24 @ 14:33)  BMI (kg/m2): 26.6 (08-29-24 @ 08:53), 25.1 (08-28-24 @ 09:24), 26.6 (05-11-24 @ 14:33)    [ ]PPSV2 < or = to 30% [ ]significant weight loss  [ ]poor nutritional intake  [ ]anasarca[ ]Artificial Nutrition      Other REFERRALS:  [ ]Hospice  [ ]Child Life  [ ]Social Work  [ ]Case management [ ]Holistic Therapy     Goals of Care Document: PRABHU Streeter (04-26-22 @ 14:05)  Goals of Care Conversation:   Participants:  · Participants  Patient; Family  · Child(marco)  Zita (HCP)    Advance Directives:  · Does patient have Advance Directive  Yes  · Indicate Type  Health Care Proxy (HCP)  · Agent's Name  zita (daughter)  · Caregiver:  yes  · Name  Zita Watson  · Phone Number  [phone]    Conversation Discussion:  · Conversation  Diagnosis; Prognosis; MOLST Discussed; Treatment Options  · Conversation Details  discussed about current medical condition.   discussed about goals of care  discussed about DNR/DNI.   Daughter stated patient might be DNR but patient who has capacity wants to be full code.    What Matters Most To Patient and Family:  · What matters most to patient and family  Longevity    Personal Advance Directives Treatment Guidelines:   Treatment Guidelines:  · Decision Maker  Patient  · Future Hospitalization/Transfer  Send to hospital, if necessary, based on MOLST orders    MOLST:  · Completed  26-Apr-2022    Location of Discussion:   Time Spent on Advance Care Planning:  I spent 16 (in minutes) on advance care planning services with the patient.  This time is separate and distinct from any other care management services provided on this date.    Location of Discussion:  · Location of discussion  Face to face       Electronic Signatures:  Zay Streeter)  (Signed 26-Apr-2022 14:08)  	Authored: Goals of Care Conversation, Personal Advance Directives Treatment Guidelines, Location of Discussion      Last Updated: 26-Apr-2022 14:08 by Zay Streeter)

## 2024-09-01 NOTE — PROGRESS NOTE ADULT - SUBJECTIVE AND OBJECTIVE BOX
NSCU ATTENDING -- ADDITIONAL PROGRESS NOTE    Nighttime rounds were performed -- please refer to earlier Progress Note for HPI details.    T(C): 36.5 (09-01-24 @ 19:00), Max: 37.4 (09-01-24 @ 03:00)  HR: 60 (09-01-24 @ 19:30) (57 - 107)  BP: 130/58 (09-01-24 @ 19:30) (73/44 - 159/71)  RR: 15 (09-01-24 @ 19:30) (13 - 26)  SpO2: 94% (09-01-24 @ 19:30) (94% - 100%)  Wt(kg): --    Relevant labwork and imaging reviewed.    CHIEF COMPLAINT:    [A/P] POD 4 burrhole for SDH Evacuation, POD 3 MMAE  DNR/DNI  c/f aspiration pneumonia, on broad spectrum antibiotics     ATTENDING STATEMENT:    Patient is critically ill, requiring critical care services.     Attending: I have personally and independently provided 30 minutes of critical care services.  This excludes any time spent on separate procedures or teaching.   NSCU ATTENDING -- ADDITIONAL PROGRESS NOTE    Nighttime rounds were performed -- please refer to earlier Progress Note for HPI details.    T(C): 36.5 (09-01-24 @ 19:00), Max: 37.4 (09-01-24 @ 03:00)  HR: 60 (09-01-24 @ 19:30) (57 - 107)  BP: 130/58 (09-01-24 @ 19:30) (73/44 - 159/71)  RR: 15 (09-01-24 @ 19:30) (13 - 26)  SpO2: 94% (09-01-24 @ 19:30) (94% - 100%)  Wt(kg): --    Relevant labwork and imaging reviewed.    MEDICATIONS  (STANDING):  albuterol/ipratropium for Nebulization 3 milliLiter(s) Nebulizer every 6 hours  atorvastatin 40 milliGRAM(s) Oral at bedtime  brivaracetam  IVPB 50 milliGRAM(s) IV Intermittent two times a day  cefepime   IVPB 1000 milliGRAM(s) IV Intermittent daily  chlorhexidine 4% Liquid 1 Application(s) Topical daily  dexMEDEtomidine Infusion 0.2 MICROgram(s)/kG/Hr (4.08 mL/Hr) IV Continuous <Continuous>  doxazosin 2 milliGRAM(s) Oral at bedtime  finasteride 5 milliGRAM(s) Oral daily  insulin lispro (ADMELOG) corrective regimen sliding scale   SubCutaneous every 6 hours  insulin NPH human recombinant 8 Unit(s) SubCutaneous every 6 hours  latanoprost 0.005% Ophthalmic Solution 1 Drop(s) Both EYES at bedtime  mupirocin 2% Nasal 1 Application(s) Both Nostrils two times a day  norepinephrine Infusion 0.05 MICROgram(s)/kG/Min (7.65 mL/Hr) IV Continuous <Continuous>  polyethylene glycol 3350 17 Gram(s) Oral daily  QUEtiapine 50 milliGRAM(s) Oral every 8 hours  senna 2 Tablet(s) Oral at bedtime  sodium chloride 3%  Inhalation 4 milliLiter(s) Inhalation every 12 hours  tiotropium 2.5 MICROgram(s) Inhaler 2 Puff(s) Inhalation daily    MEDICATIONS  (PRN):  acetaminophen     Tablet .. 650 milliGRAM(s) Oral every 6 hours PRN Temp greater or equal to 38C (100.4F), Mild Pain (1 - 3)  oxyCODONE    IR 10 milliGRAM(s) Oral every 4 hours PRN Moderate Pain (4 - 6)  oxyCODONE    IR 15 milliGRAM(s) Oral every 4 hours PRN Severe Pain (7 - 10)      [A/P] POD 4 burrhole for SDH Evacuation, POD 3 MMAE  DNR/DNI  c/f aspiration pneumonia, on broad spectrum antibiotics, ID following   face tent 40%  0.02 levo; MAP>65  1 precedex  seroquel 50mg Q8 increased overnight  GOC   SQH Q8 ppx start now   8/29 BLE dopplers negative   bm BMP Mg Phos     ATTENDING STATEMENT:    Patient is critically ill, requiring critical care services.     Attending: I have personally and independently provided 30 minutes of critical care services.  This excludes any time spent on separate procedures or teaching.

## 2024-09-01 NOTE — CONSULT NOTE ADULT - PROBLEM SELECTOR RECOMMENDATION 9
Pt with fall, found to have SDH s/p OR   Currently awake but encephalopathic, unable to follow command   For pain, recommend Oxycodone 10 and 15 mg q4h prn  For anxiety, currently on precedex; unable to start benzos given mental status monitoring  Management per NSICU

## 2024-09-01 NOTE — CONSULT NOTE ADULT - CONVERSATION DETAILS
Palliative consulted for complex medical decision making and symptom management in the setting of SDH s/p surgery. Pt with encephalopathy unable to participate in goc conversation.     Spoke with pt's family including daughter Nimo who identifies as a HCP (also names Sister Terri Watson as other proxy) to introduce role and scope of palliative care team as supportive in the setting of complex comorbidities/serious illnesses, to assist with complex medical decision making and any associated pain or symptom management. We reviewed baseline function, comorbidities, current hospital course and plan of care.    Family shared that they understand he has many active issues including the bleeding and now with septic shock in the setting of aspiration pneumonia for which he is requring norepinephrine and IV abx. Their goal is to see how he does in the next 24 hours with hope for improvement but recognizing that might not happen and he likely will continue to decline. They have other family members also planning to visit from out of state. Pt has 7 children total. They also would like to prioritize symptoms management for pain and restlessness currently.     Discussed that when family is ready, we transition the focus of care to comfort measures to allow for alleviation of suffering and natural passing with dignity. Discussed removal of TFs, lab checks, non-essential medications, and to order meds on an as needed basis to help target any pain, dyspnea, etc that he may experience during this process. Family expressed understanding. Educated family on the philosophy of hospice care and explained the various settings and criteria in which hospice can be delivered (home vs. nursing home vs. inpatient hospice). Discussed the services provided under hospice services emphasizing the goal of elevating patient's quality of life and optimizing symptom management and avoiding unnecessary future hospitalizations. In addition to symptom management expertise, hospice provides supportive counseling services, nutritional support and chaplaincy services.     Discussed transitioning to the PCU if family opts for comfort measures. They appreciated information shared.

## 2024-09-02 LAB
ANION GAP SERPL CALC-SCNC: 10 MMOL/L — SIGNIFICANT CHANGE UP (ref 5–17)
BUN SERPL-MCNC: 36 MG/DL — HIGH (ref 7–23)
CALCIUM SERPL-MCNC: 8.1 MG/DL — LOW (ref 8.4–10.5)
CHLORIDE SERPL-SCNC: 111 MMOL/L — HIGH (ref 96–108)
CO2 SERPL-SCNC: 17 MMOL/L — LOW (ref 22–31)
CREAT SERPL-MCNC: 1.82 MG/DL — HIGH (ref 0.5–1.3)
CULTURE RESULTS: ABNORMAL
EGFR: 35 ML/MIN/1.73M2 — LOW
GLUCOSE BLDC GLUCOMTR-MCNC: 147 MG/DL — HIGH (ref 70–99)
GLUCOSE BLDC GLUCOMTR-MCNC: 155 MG/DL — HIGH (ref 70–99)
GLUCOSE BLDC GLUCOMTR-MCNC: 200 MG/DL — HIGH (ref 70–99)
GLUCOSE SERPL-MCNC: 150 MG/DL — HIGH (ref 70–99)
MAGNESIUM SERPL-MCNC: 2.1 MG/DL — SIGNIFICANT CHANGE UP (ref 1.6–2.6)
PHOSPHATE SERPL-MCNC: 2.6 MG/DL — SIGNIFICANT CHANGE UP (ref 2.5–4.5)
POTASSIUM SERPL-MCNC: 4.1 MMOL/L — SIGNIFICANT CHANGE UP (ref 3.5–5.3)
POTASSIUM SERPL-SCNC: 4.1 MMOL/L — SIGNIFICANT CHANGE UP (ref 3.5–5.3)
SODIUM SERPL-SCNC: 138 MMOL/L — SIGNIFICANT CHANGE UP (ref 135–145)
SPECIMEN SOURCE: SIGNIFICANT CHANGE UP

## 2024-09-02 PROCEDURE — 99233 SBSQ HOSP IP/OBS HIGH 50: CPT

## 2024-09-02 PROCEDURE — 71045 X-RAY EXAM CHEST 1 VIEW: CPT | Mod: 26

## 2024-09-02 PROCEDURE — 99291 CRITICAL CARE FIRST HOUR: CPT

## 2024-09-02 PROCEDURE — 99497 ADVNCD CARE PLAN 30 MIN: CPT | Mod: 25

## 2024-09-02 PROCEDURE — 99418 PROLNG IP/OBS E/M EA 15 MIN: CPT

## 2024-09-02 RX ORDER — HYDROMORPHONE HYDROCHLORIDE 2 MG/1
1 TABLET ORAL
Refills: 0 | Status: DISCONTINUED | OUTPATIENT
Start: 2024-09-02 | End: 2024-09-03

## 2024-09-02 RX ORDER — OXYCODONE HYDROCHLORIDE 5 MG/1
20 TABLET ORAL EVERY 4 HOURS
Refills: 0 | Status: DISCONTINUED | OUTPATIENT
Start: 2024-09-02 | End: 2024-09-02

## 2024-09-02 RX ORDER — HYDROMORPHONE HYDROCHLORIDE 2 MG/1
1 TABLET ORAL ONCE
Refills: 0 | Status: DISCONTINUED | OUTPATIENT
Start: 2024-09-02 | End: 2024-09-02

## 2024-09-02 RX ORDER — LORAZEPAM 4 MG/ML
1 INJECTION INTRAMUSCULAR; INTRAVENOUS ONCE
Refills: 0 | Status: DISCONTINUED | OUTPATIENT
Start: 2024-09-02 | End: 2024-09-03

## 2024-09-02 RX ORDER — LORAZEPAM 4 MG/ML
0.5 INJECTION INTRAMUSCULAR; INTRAVENOUS EVERY 4 HOURS
Refills: 0 | Status: DISCONTINUED | OUTPATIENT
Start: 2024-09-02 | End: 2024-09-03

## 2024-09-02 RX ORDER — OXYCODONE HYDROCHLORIDE 5 MG/1
20 TABLET ORAL EVERY 4 HOURS
Refills: 0 | Status: DISCONTINUED | OUTPATIENT
Start: 2024-09-02 | End: 2024-09-03

## 2024-09-02 RX ORDER — OXYCODONE HYDROCHLORIDE 5 MG/1
15 TABLET ORAL EVERY 4 HOURS
Refills: 0 | Status: DISCONTINUED | OUTPATIENT
Start: 2024-09-02 | End: 2024-09-03

## 2024-09-02 RX ORDER — HYDROMORPHONE HYDROCHLORIDE 2 MG/1
0.5 TABLET ORAL ONCE
Refills: 0 | Status: DISCONTINUED | OUTPATIENT
Start: 2024-09-02 | End: 2024-09-02

## 2024-09-02 RX ADMIN — OXYCODONE HYDROCHLORIDE 15 MILLIGRAM(S): 5 TABLET ORAL at 14:20

## 2024-09-02 RX ADMIN — Medication 2: at 13:10

## 2024-09-02 RX ADMIN — SODIUM CHLORIDE 4 MILLILITER(S): 9 INJECTION INTRAMUSCULAR; INTRAVENOUS; SUBCUTANEOUS at 17:29

## 2024-09-02 RX ADMIN — OXYCODONE HYDROCHLORIDE 15 MILLIGRAM(S): 5 TABLET ORAL at 05:33

## 2024-09-02 RX ADMIN — OXYCODONE HYDROCHLORIDE 15 MILLIGRAM(S): 5 TABLET ORAL at 10:20

## 2024-09-02 RX ADMIN — IPRATROPIUM BROMIDE AND ALBUTEROL SULFATE 3 MILLILITER(S): .5; 3 SOLUTION RESPIRATORY (INHALATION) at 11:21

## 2024-09-02 RX ADMIN — Medication 5000 UNIT(S): at 05:04

## 2024-09-02 RX ADMIN — OXYCODONE HYDROCHLORIDE 15 MILLIGRAM(S): 5 TABLET ORAL at 09:48

## 2024-09-02 RX ADMIN — HYDROMORPHONE HYDROCHLORIDE 0.5 MILLIGRAM(S): 2 TABLET ORAL at 03:00

## 2024-09-02 RX ADMIN — IPRATROPIUM BROMIDE AND ALBUTEROL SULFATE 3 MILLILITER(S): .5; 3 SOLUTION RESPIRATORY (INHALATION) at 05:18

## 2024-09-02 RX ADMIN — LATANOPROST 1 DROP(S): 50 SOLUTION OPHTHALMIC at 22:22

## 2024-09-02 RX ADMIN — HYDROMORPHONE HYDROCHLORIDE 1 MILLIGRAM(S): 2 TABLET ORAL at 18:25

## 2024-09-02 RX ADMIN — Medication 50 MILLIGRAM(S): at 13:00

## 2024-09-02 RX ADMIN — IPRATROPIUM BROMIDE AND ALBUTEROL SULFATE 3 MILLILITER(S): .5; 3 SOLUTION RESPIRATORY (INHALATION) at 23:05

## 2024-09-02 RX ADMIN — HUMAN INSULIN 8 UNIT(S): 100 INJECTION, SUSPENSION SUBCUTANEOUS at 13:10

## 2024-09-02 RX ADMIN — HYDROMORPHONE HYDROCHLORIDE 1 MILLIGRAM(S): 2 TABLET ORAL at 22:58

## 2024-09-02 RX ADMIN — Medication 5000 UNIT(S): at 13:00

## 2024-09-02 RX ADMIN — SODIUM CHLORIDE 4 MILLILITER(S): 9 INJECTION INTRAMUSCULAR; INTRAVENOUS; SUBCUTANEOUS at 05:21

## 2024-09-02 RX ADMIN — OXYCODONE HYDROCHLORIDE 15 MILLIGRAM(S): 5 TABLET ORAL at 05:03

## 2024-09-02 RX ADMIN — Medication 1 APPLICATION(S): at 18:11

## 2024-09-02 RX ADMIN — Medication 40 MILLIGRAM(S): at 22:22

## 2024-09-02 RX ADMIN — OXYCODONE HYDROCHLORIDE 15 MILLIGRAM(S): 5 TABLET ORAL at 13:48

## 2024-09-02 RX ADMIN — BRIVARACETAM 220 MILLIGRAM(S): 100 TABLET, FILM COATED ORAL at 05:35

## 2024-09-02 RX ADMIN — Medication 1 APPLICATION(S): at 05:04

## 2024-09-02 RX ADMIN — HYDROMORPHONE HYDROCHLORIDE 1 MILLIGRAM(S): 2 TABLET ORAL at 23:28

## 2024-09-02 RX ADMIN — CEFEPIME 100 MILLIGRAM(S): 2 INJECTION, POWDER, FOR SOLUTION INTRAVENOUS at 14:01

## 2024-09-02 RX ADMIN — Medication 50 MILLIGRAM(S): at 22:22

## 2024-09-02 RX ADMIN — IPRATROPIUM BROMIDE AND ALBUTEROL SULFATE 3 MILLILITER(S): .5; 3 SOLUTION RESPIRATORY (INHALATION) at 17:29

## 2024-09-02 RX ADMIN — POLYETHYLENE GLYCOL 3350 17 GRAM(S): 17 POWDER, FOR SOLUTION ORAL at 12:56

## 2024-09-02 RX ADMIN — DEXMEDETOMIDINE HYDROCHLORIDE IN 0.9% SODIUM CHLORIDE 4.08 MICROGRAM(S)/KG/HR: 4 INJECTION INTRAVENOUS at 19:35

## 2024-09-02 RX ADMIN — FINASTERIDE 5 MILLIGRAM(S): 1 TABLET, FILM COATED ORAL at 12:56

## 2024-09-02 RX ADMIN — Medication 50 MILLIGRAM(S): at 05:03

## 2024-09-02 RX ADMIN — HUMAN INSULIN 8 UNIT(S): 100 INJECTION, SUSPENSION SUBCUTANEOUS at 05:02

## 2024-09-02 RX ADMIN — OXYCODONE HYDROCHLORIDE 15 MILLIGRAM(S): 5 TABLET ORAL at 23:56

## 2024-09-02 RX ADMIN — Medication 2 MILLIGRAM(S): at 22:22

## 2024-09-02 RX ADMIN — Medication 2: at 18:15

## 2024-09-02 RX ADMIN — HUMAN INSULIN 8 UNIT(S): 100 INJECTION, SUSPENSION SUBCUTANEOUS at 18:15

## 2024-09-02 RX ADMIN — HYDROMORPHONE HYDROCHLORIDE 0.5 MILLIGRAM(S): 2 TABLET ORAL at 03:15

## 2024-09-02 RX ADMIN — BRIVARACETAM 220 MILLIGRAM(S): 100 TABLET, FILM COATED ORAL at 18:19

## 2024-09-02 RX ADMIN — Medication 296 MILLILITER(S): at 03:00

## 2024-09-02 RX ADMIN — CHLORHEXIDINE GLUCONATE 1 APPLICATION(S): 40 SOLUTION TOPICAL at 22:22

## 2024-09-02 RX ADMIN — HYDROMORPHONE HYDROCHLORIDE 1 MILLIGRAM(S): 2 TABLET ORAL at 18:40

## 2024-09-02 NOTE — PROGRESS NOTE ADULT - SUBJECTIVE AND OBJECTIVE BOX
Patient seen and examined at bedside.    --Anticoagulation--  heparin   Injectable 5000 Unit(s) SubCutaneous every 8 hours    T(C): 36.5 (09-01-24 @ 23:00), Max: 37.4 (09-01-24 @ 03:00)  HR: 55 (09-02-24 @ 02:00) (53 - 103)  BP: 131/67 (09-02-24 @ 02:00) (97/53 - 159/71)  RR: 15 (09-02-24 @ 02:00) (13 - 24)  SpO2: 96% (09-02-24 @ 02:00) (94% - 100%)  Wt(kg): --    Exam: S/P Dilaudid/Oxy, on Precedex. Lt eye cataract, Rt pupil 2mm reactive. Deferred full exam off sedation per family's wishes, daughter refused sedation pause given family electing for palliative route and want pt comfortable at this time

## 2024-09-02 NOTE — PROGRESS NOTE ADULT - SUBJECTIVE AND OBJECTIVE BOX
[facility] Geriatrics and Palliative Care  Jeremy Hurtado MD, Palliative Care Attending  Contact Info: Page [phone] (including Nights/Weekends), message on Microsoft Teams (Jeremy Hurtado MD), or leave VM at Palliative Office [phone] (non-urgent)   Date of [id number] @ 20:51    SUBJECTIVE AND OBJECTIVE:    Pt Unable to participate in conversation due to poor mental status  Pt seen at bedside with primary nurse, patient encephalopathic, in labored breathing, agitated and frequently moving around, pulling at mittens    Indication for Geriatrics and Palliative Care Services/INTERVAL HPI: goc/symptoms    OVERNIGHT EVENTS:  Oxy IR 15 mg x 4   Seroquel q8h    DNR on chart:DNI  DNI      Allergies    Tolerated ceftriaxone 5/2024 (Other)  penicillins (Rash)    Intolerances    MEDICATIONS  (STANDING):  albuterol/ipratropium for Nebulization 3 milliLiter(s) Nebulizer every 6 hours  atorvastatin 40 milliGRAM(s) Oral at bedtime  brivaracetam  IVPB 50 milliGRAM(s) IV Intermittent two times a day  cefepime   IVPB 1000 milliGRAM(s) IV Intermittent daily  chlorhexidine 4% Liquid 1 Application(s) Topical daily  dexMEDEtomidine Infusion 0.2 MICROgram(s)/kG/Hr (4.08 mL/Hr) IV Continuous <Continuous>  doxazosin 2 milliGRAM(s) Oral at bedtime  finasteride 5 milliGRAM(s) Oral daily  heparin   Injectable 5000 Unit(s) SubCutaneous every 8 hours  insulin lispro (ADMELOG) corrective regimen sliding scale   SubCutaneous every 6 hours  insulin NPH human recombinant 8 Unit(s) SubCutaneous every 6 hours  latanoprost 0.005% Ophthalmic Solution 1 Drop(s) Both EYES at bedtime  LORazepam   Injectable 1 milliGRAM(s) IV Push once  mupirocin 2% Nasal 1 Application(s) Both Nostrils two times a day  norepinephrine Infusion 0.05 MICROgram(s)/kG/Min (7.65 mL/Hr) IV Continuous <Continuous>  polyethylene glycol 3350 17 Gram(s) Oral daily  QUEtiapine 50 milliGRAM(s) Oral every 8 hours  senna 2 Tablet(s) Oral at bedtime  sodium chloride 3%  Inhalation 4 milliLiter(s) Inhalation every 12 hours  tiotropium 2.5 MICROgram(s) Inhaler 2 Puff(s) Inhalation daily    MEDICATIONS  (PRN):  acetaminophen     Tablet .. 650 milliGRAM(s) Oral every 6 hours PRN Temp greater or equal to 38C (100.4F), Mild Pain (1 - 3)  oxyCODONE    IR 10 milliGRAM(s) Oral every 4 hours PRN Moderate Pain (4 - 6)  oxyCODONE    IR 15 milliGRAM(s) Oral every 4 hours PRN Severe Pain (7 - 10)      ITEMS UNCHECKED ARE NOT PRESENT    PRESENT SYMPTOMS: [ ]Unable to self-report - see [ ] CPOT [ ] PAINADS [ ] RDOS  Source if other than patient:  [ ]Family   [ ]Team     Pain:  [ ]yes [ ]no  QOL impact -   Location -                    Aggravating factors -  Quality -  Radiation -  Timing-  Severity (0-10 scale):  Minimal acceptable level/ pain goal (0-10 scale):     CPOT:    https://www.[facility].org/getattachment/[email or web address]/Critical-Care-Pain-Observation-Tool-(CPOT)    Dyspnea:                           [ ]Mild [ ]Moderate [ ]Severe  Anxiety:                             [ ]Mild [ ]Moderate [ ]Severe  Fatigue:                             [ ]Mild [ ]Moderate [ ]Severe  Nausea:                             [ ]Mild [ ]Moderate [ ]Severe  Loss of appetite:              [ ]Mild [ ]Moderate [ ]Severe  Constipation:                    [ ]Mild [ ]Moderate [ ]Severe  Other Symptoms:  [ ]All other review of systems negative     PCSSQ[Palliative Care Spiritual Screening Question]   Severity (0-10):  Score of 4 or > indicate consideration of Chaplaincy referral.  Chaplaincy Referral: [ ] yes [ ] refused [ ] following [ ] deferred    Caregiver Rosine? : [ ] yes [ ] no [ ] Deferred [ ] Declined             Social work referral [ ] Patient & Family Centered Care Referral [ ]  Anticipatory Grief present?:  [ ] yes [ ] no  [ ] Deferred                  Social work referral [ ] Patient & Family Centered Care Referral [ ]      PHYSICAL EXAM:  Vital Signs Last 24 Hrs  T(C): 37.1 (02 Sep 2024 19:00), Max: 37.1 (02 Sep 2024 07:00)  T(F): 98.8 (02 Sep 2024 19:00), Max: 98.8 (02 Sep 2024 07:00)  HR: 94 (02 Sep 2024 19:00) (54 - 133)  BP: 91/53 (02 Sep 2024 19:00) (88/51 - 176/73)  BP(mean): 69 (02 Sep 2024 19:00) (66 - 109)  RR: 20 (02 Sep 2024 19:00) (11 - 26)  SpO2: 97% (02 Sep 2024 19:00) (92% - 100%)    Parameters below as of 02 Sep 2024 19:00  Patient On (Oxygen Delivery Method): face tent    O2 Concentration (%): 70 I&O's Summary    01 Sep 2024 07:01  -  02 Sep 2024 07:00  --------------------------------------------------------  IN: 3433.3 mL / OUT: 1148 mL / NET: 2285.3 mL    02 Sep 2024 07:01  -  02 Sep 2024 20:51  --------------------------------------------------------  IN: 833.6 mL / OUT: 435 mL / NET: 398.6 mL         General: well nourished, alert, NAD  HENT: normocephalic, face relaxed  Eyes: no icterus, conjunctiva clear  Chest: symmetric excursion, no accessory muscle use  Heart: peripheral pulse 2+ and regular  Lungs: no dyspnea with speech  Abdomen: soft, NT, ND  Ext: no cyanosis, clubbing, visible veins, ulcers, wounds  Neuro: alert, coherent speech, grossly non-focal  Psych: calm, rational, linear thought process  Skin: ]Normal  [ ]Rash  [ ]Other  [ ]Pressure ulcer(s) [ ]y [ ]n present on admission    CRITICAL CARE:  [ ]Shock Present  [ ]Septic [ ]Cardiogenic [ ]Neurologic [ ]Hypovolemic  [ ]Vasopressors [ ]Inotropes  [ ]Respiratory failure present [ ]Mechanical Ventilation [ ]Non-invasive ventilatory support [ ]High-Flow   [ ]Acute  [ ]Chronic [ ]Hypoxic  [ ]Hypercarbic [ ]Other  [ ]Other organ failure     LABS:                        9.3    5.20  )-----------( 145      ( 01 Sep 2024 00:11 )             29.4   09-02    138  |  111<H>  |  36<H>  ----------------------------<  150<H>  4.1   |  17<L>  |  1.82<H>    Ca    8.1<L>      02 Sep 2024 04:12  Phos  2.6     09-02  Mg     2.1     09-02        Urinalysis Basic - ( 02 Sep 2024 04:12 )    Color: x / Appearance: x / SG: x / pH: x  Gluc: 150 mg/dL / Ketone: x  / Bili: x / Urobili: x   Blood: x / Protein: x / Nitrite: x   Leuk Esterase: x / RBC: x / WBC x   Sq Epi: x / Non Sq Epi: x / Bacteria: x      RADIOLOGY & ADDITIONAL STUDIES:    Protein Calorie Malnutrition Present: [ ]mild [ ]moderate [ ]severe [ ]underweight [ ]morbid obesity  https://www.andeal.org/vault/2440/web/files/ONC/Table_Clinical%20Characteristics%20to%20Document%20Malnutrition-White%20JV%20et%20al%202012.pdf    Height (cm): 175.3 (08-29-24 @ 08:53), 177.8 (08-28-24 @ 09:24), 175.3 (05-11-24 @ 14:33)  Weight (kg): 81.6 (08-29-24 @ 08:53), 79.4 (08-28-24 @ 09:24), 81.6 (05-11-24 @ 14:33)  BMI (kg/m2): 26.6 (08-29-24 @ 08:53), 25.1 (08-28-24 @ 09:24), 26.6 (05-11-24 @ 14:33)    [ ]PPSV2 < or = 30%  [ ]significant weight loss [ ]poor nutritional intake [ ]anasarca[ ]Artificial Nutrition    Other REFERRALS:  [ ]Hospice  [ ]Child Life  [ ]Social Work  [ ]Case management [ ]Holistic Therapy     Goals of Care Document:PRABHU Streeter (04-26-22 @ 14:05)  Goals of Care Conversation:   Participants:  · Participants  Patient; Family  · Child(marco)  Zita (HCP)    Advance Directives:  · Does patient have Advance Directive  Yes  · Indicate Type  Health Care Proxy (HCP)  · Agent's Name  zita (daughter)  · Caregiver:  yes  · Name  Zita Watson  · Phone Number  [phone]    Conversation Discussion:  · Conversation  Diagnosis; Prognosis; MOLST Discussed; Treatment Options  · Conversation Details  discussed about current medical condition.   discussed about goals of care  discussed about DNR/DNI.   Daughter stated patient might be DNR but patient who has capacity wants to be full code.    What Matters Most To Patient and Family:  · What matters most to patient and family  Longevity    Personal Advance Directives Treatment Guidelines:   Treatment Guidelines:  · Decision Maker  Patient  · Future Hospitalization/Transfer  Send to hospital, if necessary, based on MOLST orders    MOLST:  · Completed  26-Apr-2022    Location of Discussion:   Time Spent on Advance Care Planning:  I spent 16 (in minutes) on advance care planning services with the patient.  This time is separate and distinct from any other care management services provided on this date.    Location of Discussion:  · Location of discussion  Face to face       Electronic Signatures:  Zay Streeter)  (Signed 26-Apr-2022 14:08)  	Authored: Goals of Care Conversation, Personal Advance Directives Treatment Guidelines, Location of Discussion      Last Updated: 26-Apr-2022 14:08 by Zay Streeter)     [facility] Geriatrics and Palliative Care  Jeremy Hurtado MD, Palliative Care Attending  Contact Info: Page [phone] (including Nights/Weekends), message on Microsoft Teams (Jeremy Hurtado MD), or leave VM at Palliative Office [phone] (non-urgent)   Date of [id number] @ 20:51    SUBJECTIVE AND OBJECTIVE:    Pt Unable to participate in conversation due to poor mental status  Pt seen at bedside with primary nurse, patient encephalopathic, in labored breathing, agitated and frequently moving around, pulling at mittens    Indication for Geriatrics and Palliative Care Services/INTERVAL HPI: goc/symptoms    OVERNIGHT EVENTS:  Oxy IR 15 mg x 4   Seroquel q8h    DNR on chart:DNI  DNI      Allergies    Tolerated ceftriaxone 5/2024 (Other)  penicillins (Rash)    Intolerances    MEDICATIONS  (STANDING):  albuterol/ipratropium for Nebulization 3 milliLiter(s) Nebulizer every 6 hours  atorvastatin 40 milliGRAM(s) Oral at bedtime  brivaracetam  IVPB 50 milliGRAM(s) IV Intermittent two times a day  cefepime   IVPB 1000 milliGRAM(s) IV Intermittent daily  chlorhexidine 4% Liquid 1 Application(s) Topical daily  dexMEDEtomidine Infusion 0.2 MICROgram(s)/kG/Hr (4.08 mL/Hr) IV Continuous <Continuous>  doxazosin 2 milliGRAM(s) Oral at bedtime  finasteride 5 milliGRAM(s) Oral daily  heparin   Injectable 5000 Unit(s) SubCutaneous every 8 hours  insulin lispro (ADMELOG) corrective regimen sliding scale   SubCutaneous every 6 hours  insulin NPH human recombinant 8 Unit(s) SubCutaneous every 6 hours  latanoprost 0.005% Ophthalmic Solution 1 Drop(s) Both EYES at bedtime  LORazepam   Injectable 1 milliGRAM(s) IV Push once  mupirocin 2% Nasal 1 Application(s) Both Nostrils two times a day  norepinephrine Infusion 0.05 MICROgram(s)/kG/Min (7.65 mL/Hr) IV Continuous <Continuous>  polyethylene glycol 3350 17 Gram(s) Oral daily  QUEtiapine 50 milliGRAM(s) Oral every 8 hours  senna 2 Tablet(s) Oral at bedtime  sodium chloride 3%  Inhalation 4 milliLiter(s) Inhalation every 12 hours  tiotropium 2.5 MICROgram(s) Inhaler 2 Puff(s) Inhalation daily    MEDICATIONS  (PRN):  acetaminophen     Tablet .. 650 milliGRAM(s) Oral every 6 hours PRN Temp greater or equal to 38C (100.4F), Mild Pain (1 - 3)  oxyCODONE    IR 10 milliGRAM(s) Oral every 4 hours PRN Moderate Pain (4 - 6)  oxyCODONE    IR 15 milliGRAM(s) Oral every 4 hours PRN Severe Pain (7 - 10)      ITEMS UNCHECKED ARE NOT PRESENT    PRESENT SYMPTOMS: [ ]Unable to self-report - see [ ] CPOT [ ] PAINADS [ ] RDOS  Source if other than patient:  [ ]Family   [ ]Team     Pain:  [ ]yes [ ]no  QOL impact -   Location -                    Aggravating factors -  Quality -  Radiation -  Timing-  Severity (0-10 scale):  Minimal acceptable level/ pain goal (0-10 scale):     CPOT:    https://www.[facility].org/getattachment/[email or web address]/Critical-Care-Pain-Observation-Tool-(CPOT)    Dyspnea:                           [ ]Mild [ ]Moderate [ ]Severe  Anxiety:                             [ ]Mild [ ]Moderate [ ]Severe  Fatigue:                             [ ]Mild [ ]Moderate [ ]Severe  Nausea:                             [ ]Mild [ ]Moderate [ ]Severe  Loss of appetite:              [ ]Mild [ ]Moderate [ ]Severe  Constipation:                    [ ]Mild [ ]Moderate [ ]Severe  Other Symptoms:  [x ] Unable to obtain due to poor mental status     PCSSQ[Palliative Care Spiritual Screening Question]   Severity (0-10):  Score of 4 or > indicate consideration of Chaplaincy referral.  Chaplaincy Referral: [ ] yes [ ] refused [ ] following [ ] deferred    Caregiver Kuna? : [ ] yes [ ] no [ ] Deferred [ ] Declined             Social work referral [ ] Patient & Family Centered Care Referral [ ]  Anticipatory Grief present?:  [ ] yes [ ] no  [ ] Deferred                  Social work referral [ ] Patient & Family Centered Care Referral [ ]      PHYSICAL EXAM:  Vital Signs Last 24 Hrs  T(C): 37.1 (02 Sep 2024 19:00), Max: 37.1 (02 Sep 2024 07:00)  T(F): 98.8 (02 Sep 2024 19:00), Max: 98.8 (02 Sep 2024 07:00)  HR: 94 (02 Sep 2024 19:00) (54 - 133)  BP: 91/53 (02 Sep 2024 19:00) (88/51 - 176/73)  BP(mean): 69 (02 Sep 2024 19:00) (66 - 109)  RR: 20 (02 Sep 2024 19:00) (11 - 26)  SpO2: 97% (02 Sep 2024 19:00) (92% - 100%)    Parameters below as of 02 Sep 2024 19:00  Patient On (Oxygen Delivery Method): face tent    O2 Concentration (%): 70 I&O's Summary    01 Sep 2024 07:01  -  02 Sep 2024 07:00  --------------------------------------------------------  IN: 3433.3 mL / OUT: 1148 mL / NET: 2285.3 mL    02 Sep 2024 07:01  -  02 Sep 2024 20:51  --------------------------------------------------------  IN: 833.6 mL / OUT: 435 mL / NET: 398.6 mL         General: encephalopathic, in significant distress  HENT: facial grimacing   Eyes: no icterus, conjunctiva clear  Chest: symmetric excursion, no accessory muscle use  Heart: peripheral pulse 2+ and regular  Lungs: labored respiration; face tent   Abdomen: soft, NT, ND  Ext: no edema  Neuro: encephalopathic, moving upper extremities; unable to following command  Psych: agitated/restless  Skin: ]Normal  [ ]Rash  [ ]Other  [ ]Pressure ulcer(s) [ ]y [ ]n present on admission    CRITICAL CARE:  [x ]Shock Present  [x ]Septic [ ]Cardiogenic [ ]Neurologic [ ]Hypovolemic  [ ]Vasopressors [ ]Inotropes  [ x]Respiratory failure present [ ]Mechanical Ventilation [ ]Non-invasive ventilatory support [ ]High-Flow   [x ]Acute  [ ]Chronic [ ]Hypoxic  [ ]Hypercarbic [ ]Other  [ ]Other organ failure     LABS:                        9.3    5.20  )-----------( 145      ( 01 Sep 2024 00:11 )             29.4   09-02    138  |  111<H>  |  36<H>  ----------------------------<  150<H>  4.1   |  17<L>  |  1.82<H>    Ca    8.1<L>      02 Sep 2024 04:12  Phos  2.6     09-02  Mg     2.1     09-02        Urinalysis Basic - ( 02 Sep 2024 04:12 )    Color: x / Appearance: x / SG: x / pH: x  Gluc: 150 mg/dL / Ketone: x  / Bili: x / Urobili: x   Blood: x / Protein: x / Nitrite: x   Leuk Esterase: x / RBC: x / WBC x   Sq Epi: x / Non Sq Epi: x / Bacteria: x      RADIOLOGY & ADDITIONAL STUDIES:    Protein Calorie Malnutrition Present: [ ]mild [ ]moderate [ ]severe [ ]underweight [ ]morbid obesity  https://www.andeal.org/vault/2440/web/files/ONC/Table_Clinical%20Characteristics%20to%20Document%20Malnutrition-White%20JV%20et%20al%202012.pdf    Height (cm): 175.3 (08-29-24 @ 08:53), 177.8 (08-28-24 @ 09:24), 175.3 (05-11-24 @ 14:33)  Weight (kg): 81.6 (08-29-24 @ 08:53), 79.4 (08-28-24 @ 09:24), 81.6 (05-11-24 @ 14:33)  BMI (kg/m2): 26.6 (08-29-24 @ 08:53), 25.1 (08-28-24 @ 09:24), 26.6 (05-11-24 @ 14:33)    [ ]PPSV2 < or = 30%  [ ]significant weight loss [ ]poor nutritional intake [ ]anasarca[ ]Artificial Nutrition    Other REFERRALS:  [ ]Hospice  [ ]Child Life  [ ]Social Work  [ ]Case management [ ]Holistic Therapy     Goals of Care Document:PRABHU Streeter (04-26-22 @ 14:05)  Goals of Care Conversation:   Participants:  · Participants  Patient; Family  · Child(marco)  Zita (HCP)    Advance Directives:  · Does patient have Advance Directive  Yes  · Indicate Type  Health Care Proxy (HCP)  · Agent's Name  zita (daughter)  · Caregiver:  yes  · Name  Zita Watson  · Phone Number  [phone]    Conversation Discussion:  · Conversation  Diagnosis; Prognosis; MOLST Discussed; Treatment Options  · Conversation Details  discussed about current medical condition.   discussed about goals of care  discussed about DNR/DNI.   Daughter stated patient might be DNR but patient who has capacity wants to be full code.    What Matters Most To Patient and Family:  · What matters most to patient and family  Longevity    Personal Advance Directives Treatment Guidelines:   Treatment Guidelines:  · Decision Maker  Patient  · Future Hospitalization/Transfer  Send to hospital, if necessary, based on MOLST orders    MOLST:  · Completed  26-Apr-2022    Location of Discussion:   Time Spent on Advance Care Planning:  I spent 16 (in minutes) on advance care planning services with the patient.  This time is separate and distinct from any other care management services provided on this date.    Location of Discussion:  · Location of discussion  Face to face       Electronic Signatures:  Zay Streeter)  (Signed 26-Apr-2022 14:08)  	Authored: Goals of Care Conversation, Personal Advance Directives Treatment Guidelines, Location of Discussion      Last Updated: 26-Apr-2022 14:08 by Zay Streeter)

## 2024-09-02 NOTE — PROVIDER CONTACT NOTE (CHANGE IN STATUS NOTIFICATION) - SITUATION
pt does not respond to painful stimulation on the upper and lower extremities bilaterally. Right pupil is 5 round and sluggish. Left pupil is 3 round and sluggish

## 2024-09-02 NOTE — PROGRESS NOTE ADULT - CONVERSATION DETAILS
Palliative consulted for complex medical decision making and symptom management in the setting of SDH c/b respiratory failure and septic shock.     Follow up conversation with patient's children including reported HCP Regina Watson and sons Harpreet and Gurvinder. They shared that patient has declined further overnight from his symptoms with increased work of breathing/respiratory distress. They shared that even though he's off pressors today, he looks worse. They shared that at this time they would like to prioritize patient's QOL and comfort because he's been in distress and they do not want him to suffer. They would want to optimize meds for symptoms. They shared that their 2 brothers are flying in from [place] in the next two days and that if possible to continue limited medical interventions such as capped pressors as determined by team to possibly prolong life but their first priority is his symptoms relief and that takes precedence. They would like to continue IV abx for now and NGT can remain in place for meds and TF as appropriate but are amenable to removing the NGT as needed. We discussed possible transfer to PCU for continued symptoms relief which they're open to but for now wish to continue nsicu care. We also discussed hospice services to be determined based on clinical course.

## 2024-09-02 NOTE — PROGRESS NOTE ADULT - SUBJECTIVE AND OBJECTIVE BOX
SUMMARY:  89M, PMH PE not currently on AC/AP, T2DM, macular degeneration, presenting with multiple falls over the plast week and dysarthria. 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     HOSPITAL COURSE:  8/28: admitted to [facility]. S/p Left crani for SDH evac.  8/29: s/p Left MMAE  8/30: postop CTH stable    ADMISSION SCORES:   GCS: 15.    REVIEW OF SYSTEMS: None other than stated in HPI.    ALLERGIES: Allergies    Tolerated ceftriaxone 5/2024 (Other)  penicillins (Rash)    Intolerances                                  9.3    5.20  )-----------( 145      ( 01 Sep 2024 00:11 )             29.4       09-02    138  |  111<H>  |  36<H>  ----------------------------<  150<H>  4.1   |  17<L>  |  1.82<H>    Ca    8.1<L>      02 Sep 2024 04:12  Phos  2.6     09-02  Mg     2.1     09-02      I&O's Summary    01 Sep 2024 07:01  -  02 Sep 2024 07:00  --------------------------------------------------------  IN: 3433.3 mL / OUT: 1108 mL / NET: 2325.3 mL      MEDICATIONS  (STANDING):  albuterol/ipratropium for Nebulization 3 milliLiter(s) Nebulizer every 6 hours  atorvastatin 40 milliGRAM(s) Oral at bedtime  brivaracetam  IVPB 50 milliGRAM(s) IV Intermittent two times a day  cefepime   IVPB 1000 milliGRAM(s) IV Intermittent daily  chlorhexidine 4% Liquid 1 Application(s) Topical daily  dexMEDEtomidine Infusion 0.2 MICROgram(s)/kG/Hr (4.08 mL/Hr) IV Continuous <Continuous>  doxazosin 2 milliGRAM(s) Oral at bedtime  finasteride 5 milliGRAM(s) Oral daily  heparin   Injectable 5000 Unit(s) SubCutaneous every 8 hours  insulin lispro (ADMELOG) corrective regimen sliding scale   SubCutaneous every 6 hours  insulin NPH human recombinant 8 Unit(s) SubCutaneous every 6 hours  latanoprost 0.005% Ophthalmic Solution 1 Drop(s) Both EYES at bedtime  mupirocin 2% Nasal 1 Application(s) Both Nostrils two times a day  norepinephrine Infusion 0.05 MICROgram(s)/kG/Min (7.65 mL/Hr) IV Continuous <Continuous>  polyethylene glycol 3350 17 Gram(s) Oral daily  QUEtiapine 50 milliGRAM(s) Oral every 8 hours  senna 2 Tablet(s) Oral at bedtime  sodium chloride 3%  Inhalation 4 milliLiter(s) Inhalation every 12 hours  tiotropium 2.5 MICROgram(s) Inhaler 2 Puff(s) Inhalation daily    MEDICATIONS  (PRN):  acetaminophen     Tablet .. 650 milliGRAM(s) Oral every 6 hours PRN Temp greater or equal to 38C (100.4F), Mild Pain (1 - 3)  oxyCODONE    IR 10 milliGRAM(s) Oral every 4 hours PRN Moderate Pain (4 - 6)  oxyCODONE    IR 15 milliGRAM(s) Oral every 4 hours PRN Severe Pain (7 - 10)      EXAMINATION:  General: No acute distress  HEENT: Anicteric sclerae  Cardiac: [id number]  Lungs: Clear  Abdomen: Soft, non-tender, +BS  Extremities: No c/c/e  Skin/Incision Site: Clean, dry and intact  Neurologic: AOx0, groaning, no consistent FC, BUE AG strong spontaneously, BLE 2/5 spontaneously. SUMMARY:  89M, PMH PE not currently on AC/AP, T2DM, macular degeneration, presenting with multiple falls over the plast week and dysarthria. 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     HOSPITAL COURSE:  8/28: admitted to [facility]. S/p Left crani for SDH evac.  8/29: s/p Left MMAE  8/30: postop CTH stable  8/31: CXR c/f for PNA    ADMISSION SCORES:   GCS: 15.    REVIEW OF SYSTEMS: None other than stated in HPI.    ALLERGIES: Allergies    Tolerated ceftriaxone 5/2024 (Other)  penicillins (Rash)    Intolerances                                  9.3    5.20  )-----------( 145      ( 01 Sep 2024 00:11 )             29.4       09-02    138  |  111<H>  |  36<H>  ----------------------------<  150<H>  4.1   |  17<L>  |  1.82<H>    Ca    8.1<L>      02 Sep 2024 04:12  Phos  2.6     09-02  Mg     2.1     09-02      I&O's Summary    01 Sep 2024 07:01  -  02 Sep 2024 07:00  --------------------------------------------------------  IN: 3433.3 mL / OUT: 1108 mL / NET: 2325.3 mL      MEDICATIONS  (STANDING):  albuterol/ipratropium for Nebulization 3 milliLiter(s) Nebulizer every 6 hours  atorvastatin 40 milliGRAM(s) Oral at bedtime  brivaracetam  IVPB 50 milliGRAM(s) IV Intermittent two times a day  cefepime   IVPB 1000 milliGRAM(s) IV Intermittent daily  chlorhexidine 4% Liquid 1 Application(s) Topical daily  dexMEDEtomidine Infusion 0.2 MICROgram(s)/kG/Hr (4.08 mL/Hr) IV Continuous <Continuous>  doxazosin 2 milliGRAM(s) Oral at bedtime  finasteride 5 milliGRAM(s) Oral daily  heparin   Injectable 5000 Unit(s) SubCutaneous every 8 hours  insulin lispro (ADMELOG) corrective regimen sliding scale   SubCutaneous every 6 hours  insulin NPH human recombinant 8 Unit(s) SubCutaneous every 6 hours  latanoprost 0.005% Ophthalmic Solution 1 Drop(s) Both EYES at bedtime  mupirocin 2% Nasal 1 Application(s) Both Nostrils two times a day  norepinephrine Infusion 0.05 MICROgram(s)/kG/Min (7.65 mL/Hr) IV Continuous <Continuous>  polyethylene glycol 3350 17 Gram(s) Oral daily  QUEtiapine 50 milliGRAM(s) Oral every 8 hours  senna 2 Tablet(s) Oral at bedtime  sodium chloride 3%  Inhalation 4 milliLiter(s) Inhalation every 12 hours  tiotropium 2.5 MICROgram(s) Inhaler 2 Puff(s) Inhalation daily    MEDICATIONS  (PRN):  acetaminophen     Tablet .. 650 milliGRAM(s) Oral every 6 hours PRN Temp greater or equal to 38C (100.4F), Mild Pain (1 - 3)  oxyCODONE    IR 10 milliGRAM(s) Oral every 4 hours PRN Moderate Pain (4 - 6)  oxyCODONE    IR 15 milliGRAM(s) Oral every 4 hours PRN Severe Pain (7 - 10)      EXAMINATION:  General: No acute distress  HEENT: Anicteric sclerae  Cardiac: [id number]  Lungs: Clear  Abdomen: Soft, non-tender, +BS  Extremities: No c/c/e  Skin/Incision Site: Clean, dry and intact  Neurologic: AOx0, groaning, no consistent FC, BUE AG strong spontaneously, BLE 2/5 spontaneously. SUMMARY:  89M, PMH PE not currently on AC/AP, T2DM, macular degeneration, presenting with multiple falls over the plast week and dysarthria. 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     HOSPITAL COURSE:  8/28: admitted to [facility]. S/p Left crani for SDH evac.  8/29: s/p Left MMAE  8/30: postop CTH stable  8/31: CXR c/f for PNA, on Cefepime  9/1: on Cefepime, GOC initiated, DNR/DNI    ADMISSION SCORES:   GCS: 15.    REVIEW OF SYSTEMS: None other than stated in HPI.    ALLERGIES: Allergies    Tolerated ceftriaxone 5/2024 (Other)  penicillins (Rash)    Intolerances                                  9.3    5.20  )-----------( 145      ( 01 Sep 2024 00:11 )             29.4       09-02    138  |  111<H>  |  36<H>  ----------------------------<  150<H>  4.1   |  17<L>  |  1.82<H>    Ca    8.1<L>      02 Sep 2024 04:12  Phos  2.6     09-02  Mg     2.1     09-02      I&O's Summary    01 Sep 2024 07:01  -  02 Sep 2024 07:00  --------------------------------------------------------  IN: 3433.3 mL / OUT: 1108 mL / NET: 2325.3 mL      MEDICATIONS  (STANDING):  albuterol/ipratropium for Nebulization 3 milliLiter(s) Nebulizer every 6 hours  atorvastatin 40 milliGRAM(s) Oral at bedtime  brivaracetam  IVPB 50 milliGRAM(s) IV Intermittent two times a day  cefepime   IVPB 1000 milliGRAM(s) IV Intermittent daily  chlorhexidine 4% Liquid 1 Application(s) Topical daily  dexMEDEtomidine Infusion 0.2 MICROgram(s)/kG/Hr (4.08 mL/Hr) IV Continuous <Continuous>  doxazosin 2 milliGRAM(s) Oral at bedtime  finasteride 5 milliGRAM(s) Oral daily  heparin   Injectable 5000 Unit(s) SubCutaneous every 8 hours  insulin lispro (ADMELOG) corrective regimen sliding scale   SubCutaneous every 6 hours  insulin NPH human recombinant 8 Unit(s) SubCutaneous every 6 hours  latanoprost 0.005% Ophthalmic Solution 1 Drop(s) Both EYES at bedtime  mupirocin 2% Nasal 1 Application(s) Both Nostrils two times a day  norepinephrine Infusion 0.05 MICROgram(s)/kG/Min (7.65 mL/Hr) IV Continuous <Continuous>  polyethylene glycol 3350 17 Gram(s) Oral daily  QUEtiapine 50 milliGRAM(s) Oral every 8 hours  senna 2 Tablet(s) Oral at bedtime  sodium chloride 3%  Inhalation 4 milliLiter(s) Inhalation every 12 hours  tiotropium 2.5 MICROgram(s) Inhaler 2 Puff(s) Inhalation daily    MEDICATIONS  (PRN):  acetaminophen     Tablet .. 650 milliGRAM(s) Oral every 6 hours PRN Temp greater or equal to 38C (100.4F), Mild Pain (1 - 3)  oxyCODONE    IR 10 milliGRAM(s) Oral every 4 hours PRN Moderate Pain (4 - 6)  oxyCODONE    IR 15 milliGRAM(s) Oral every 4 hours PRN Severe Pain (7 - 10)      EXAMINATION:  General: No acute distress  HEENT: Anicteric sclerae  Cardiac: [id number]  Lungs: Clear  Abdomen: Soft, non-tender, +BS  Extremities: No c/c/e  Skin/Incision Site: Clean, dry and intact  Neurologic: AOx0, groaning, no consistent FC, CALHOUN AG strong spontaneously

## 2024-09-02 NOTE — PROVIDER CONTACT NOTE (CHANGE IN STATUS NOTIFICATION) - ACTION/TREATMENT ORDERED:
CHESTER Canales at bedside assessing pt. CHESTER Canales contacted and notified MD Lopez. no interventions at this time.

## 2024-09-02 NOTE — PROGRESS NOTE ADULT - ASSESSMENT
89M, PMH PE not currently on AC/AP, T2DM, macular degeneration, presenting with multiple falls over the plast week and dysarthria. Tx from [place]. Exam there per ED: Dysarthric but AOx3, FC, CALHOUN AG. 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 Coruse c/b septic shock with aspiration pneumonia for which he's on low dose pressor and IV abx. Palliative consulted for assistance with goals of care and symptoms management. Family at this time understand grave prognosis and are leaning towards transitioning to comfort measures in the next 24 hours, pending clinical picture and allowing for family to visit from out of state.

## 2024-09-02 NOTE — PROGRESS NOTE ADULT - ASSESSMENT
ASSESSMENT/PLAN:  89M, admitted for traumatic subacute on chronic SDH.    NEURO:  -POD3 Left crani for SDH evac.  -POD2 Left MMAE w/ Flex  -CTH this AM stable  -Q4H neurochecks.  -SG OFELIA drain per NSGY.  -Briviact 50BID.  -Precedex, Seroquel PRN.  Activity: [X] mobilize as tolerated [] Bedrest [X] PT [X] OT [] PMNR    PULM:  -SpO2 goal >92%.  -Face tent on 40%  -3% Na for secretions  -S/p IV Lasix 20, CXR concerning for pulmonary congestion and PNA.  - CXR: worsening consolidations     CV:  SBP goal 100-160.  Daily weights, strict Is and Os    RENAL:  -Monitor I&Os.  -Tamsulosin and finasteride for BPH.  - Diez in place for Is and Os    GI:  Diet: NG tube Glucerna at goal   GI prophylaxis [X] not indicated [] PPI [] other:  Bowel regimen [] colace [X] senna [] other:  LBM: 08/29    ENDO:   Goal euglycemia (-180), off NPH    HEME/ONC:  VTE prophylaxis: SCDs    ID:  -Monitor for fevers.  -Cefepime + Flagyl for asp pneumonia   - f/u Sputum cx + BCx  - Levo for septic shock, Lactic Acidosis resolve after volume resuscitation    MISC:  DNR/DNI  Palliative care consult     SOCIAL/FAMILY:  [] awaiting [X] updated at bedside [] family meeting    CODE STATUS:  [X] Full Code [] DNR [] DNI [] Palliative/Comfort Care    DISPOSITION:  [X] ICU [] Stroke Unit [] Floor [] EMU [] RCU [] PCU   ASSESSMENT/PLAN:  89M, admitted for traumatic subacute on chronic SDH.    NEURO:  -POD5 Left crani for SDH evac.  -POD4 Left MMAE w/ Flex  -Q4H neurochecks.  -Briviact 50BID.  -Precedex, Seroquel PRN.  Activity: [X] mobilize as tolerated [] Bedrest [X] PT [X] OT [] PMNR    PULM:  -SpO2 goal >92%.  -Face tent on 40%  -Cefepime for PNA     CV:  SBP goal 100-160.  Daily weights, strict Is and Os    RENAL:  -Monitor I&Os.  -inasteride for BPH.  - Diez in place for Is and Os    GI:  Diet: NG tube Glucerna at goal   GI prophylaxis [X] not indicated [] PPI [] other:  Bowel regimen [] colace [X] senna [] other:  LBM: 08/29    ENDO:   Goal euglycemia (-180), on NPH 8U Q6H    HEME/ONC:  VTE prophylaxis: SCDs, SQH    ID:  -Monitor for fevers.  -Cefepime for asp pneumonia   - Sputum cx 9/1 few GPR  - BCx NGTD  - Lactate 1.6 from 2.9    MISC:  DNR/DNI  Palliative care following    SOCIAL/FAMILY:  [] awaiting [X] updated at bedside [] family meeting    CODE STATUS:  [X] Full Code [] DNR [] DNI [] Palliative/Comfort Care    DISPOSITION:  [X] ICU [] Stroke Unit [] Floor [] EMU [] RCU [] PCU

## 2024-09-03 VITALS — RESPIRATION RATE: 5 BRPM

## 2024-09-03 DIAGNOSIS — R06.00 DYSPNEA, UNSPECIFIED: ICD-10-CM

## 2024-09-03 LAB
ANION GAP SERPL CALC-SCNC: 8 MMOL/L — SIGNIFICANT CHANGE UP (ref 5–17)
BUN SERPL-MCNC: 47 MG/DL — HIGH (ref 7–23)
CALCIUM SERPL-MCNC: 8.1 MG/DL — LOW (ref 8.4–10.5)
CHLORIDE SERPL-SCNC: 110 MMOL/L — HIGH (ref 96–108)
CO2 SERPL-SCNC: 19 MMOL/L — LOW (ref 22–31)
CREAT SERPL-MCNC: 2.66 MG/DL — HIGH (ref 0.5–1.3)
CULTURE RESULTS: SIGNIFICANT CHANGE UP
CULTURE RESULTS: SIGNIFICANT CHANGE UP
EGFR: 22 ML/MIN/1.73M2 — LOW
GLUCOSE SERPL-MCNC: 130 MG/DL — HIGH (ref 70–99)
HCT VFR BLD CALC: 27.5 % — LOW (ref 39–50)
HGB BLD-MCNC: 8.3 G/DL — LOW (ref 13–17)
MAGNESIUM SERPL-MCNC: 3 MG/DL — HIGH (ref 1.6–2.6)
MCHC RBC-ENTMCNC: 28.7 PG — SIGNIFICANT CHANGE UP (ref 27–34)
MCHC RBC-ENTMCNC: 30.2 GM/DL — LOW (ref 32–36)
MCV RBC AUTO: 95.2 FL — SIGNIFICANT CHANGE UP (ref 80–100)
NRBC # BLD: 0 /100 WBCS — SIGNIFICANT CHANGE UP (ref 0–0)
PHOSPHATE SERPL-MCNC: 4 MG/DL — SIGNIFICANT CHANGE UP (ref 2.5–4.5)
PLATELET # BLD AUTO: 153 K/UL — SIGNIFICANT CHANGE UP (ref 150–400)
POTASSIUM SERPL-MCNC: 5.4 MMOL/L — HIGH (ref 3.5–5.3)
POTASSIUM SERPL-SCNC: 5.4 MMOL/L — HIGH (ref 3.5–5.3)
RBC # BLD: 2.89 M/UL — LOW (ref 4.2–5.8)
RBC # FLD: 14.5 % — SIGNIFICANT CHANGE UP (ref 10.3–14.5)
SODIUM SERPL-SCNC: 137 MMOL/L — SIGNIFICANT CHANGE UP (ref 135–145)
SPECIMEN SOURCE: SIGNIFICANT CHANGE UP
SPECIMEN SOURCE: SIGNIFICANT CHANGE UP
WBC # BLD: 7.63 K/UL — SIGNIFICANT CHANGE UP (ref 3.8–10.5)
WBC # FLD AUTO: 7.63 K/UL — SIGNIFICANT CHANGE UP (ref 3.8–10.5)

## 2024-09-03 PROCEDURE — 84443 ASSAY THYROID STIM HORMONE: CPT

## 2024-09-03 PROCEDURE — 94640 AIRWAY INHALATION TREATMENT: CPT

## 2024-09-03 PROCEDURE — 94002 VENT MGMT INPAT INIT DAY: CPT

## 2024-09-03 PROCEDURE — 85610 PROTHROMBIN TIME: CPT

## 2024-09-03 PROCEDURE — 36224 PLACE CATH CAROTD ART: CPT

## 2024-09-03 PROCEDURE — 75894 X-RAYS TRANSCATH THERAPY: CPT

## 2024-09-03 PROCEDURE — 80061 LIPID PANEL: CPT

## 2024-09-03 PROCEDURE — 83605 ASSAY OF LACTIC ACID: CPT

## 2024-09-03 PROCEDURE — C1889: CPT

## 2024-09-03 PROCEDURE — C9399: CPT

## 2024-09-03 PROCEDURE — 84439 ASSAY OF FREE THYROXINE: CPT

## 2024-09-03 PROCEDURE — 92523 SPEECH SOUND LANG COMPREHEN: CPT

## 2024-09-03 PROCEDURE — 93970 EXTREMITY STUDY: CPT

## 2024-09-03 PROCEDURE — 84133 ASSAY OF URINE POTASSIUM: CPT

## 2024-09-03 PROCEDURE — 87070 CULTURE OTHR SPECIMN AEROBIC: CPT

## 2024-09-03 PROCEDURE — C1887: CPT

## 2024-09-03 PROCEDURE — 71045 X-RAY EXAM CHEST 1 VIEW: CPT

## 2024-09-03 PROCEDURE — 70450 CT HEAD/BRAIN W/O DYE: CPT | Mod: MC

## 2024-09-03 PROCEDURE — 93306 TTE W/DOPPLER COMPLETE: CPT

## 2024-09-03 PROCEDURE — 84156 ASSAY OF PROTEIN URINE: CPT

## 2024-09-03 PROCEDURE — 82570 ASSAY OF URINE CREATININE: CPT

## 2024-09-03 PROCEDURE — 85014 HEMATOCRIT: CPT

## 2024-09-03 PROCEDURE — C1769: CPT

## 2024-09-03 PROCEDURE — 87205 SMEAR GRAM STAIN: CPT

## 2024-09-03 PROCEDURE — 36223 PLACE CATH CAROTID/INOM ART: CPT | Mod: 59

## 2024-09-03 PROCEDURE — C1894: CPT

## 2024-09-03 PROCEDURE — 85027 COMPLETE CBC AUTOMATED: CPT

## 2024-09-03 PROCEDURE — 82330 ASSAY OF CALCIUM: CPT

## 2024-09-03 PROCEDURE — 87640 STAPH A DNA AMP PROBE: CPT

## 2024-09-03 PROCEDURE — 92610 EVALUATE SWALLOWING FUNCTION: CPT

## 2024-09-03 PROCEDURE — 99285 EMERGENCY DEPT VISIT HI MDM: CPT

## 2024-09-03 PROCEDURE — 87040 BLOOD CULTURE FOR BACTERIA: CPT

## 2024-09-03 PROCEDURE — 86850 RBC ANTIBODY SCREEN: CPT

## 2024-09-03 PROCEDURE — C1760: CPT

## 2024-09-03 PROCEDURE — 80048 BASIC METABOLIC PNL TOTAL CA: CPT

## 2024-09-03 PROCEDURE — 80202 ASSAY OF VANCOMYCIN: CPT

## 2024-09-03 PROCEDURE — 82803 BLOOD GASES ANY COMBINATION: CPT

## 2024-09-03 PROCEDURE — 36227 PLACE CATH XTRNL CAROTID: CPT | Mod: 59

## 2024-09-03 PROCEDURE — 83930 ASSAY OF BLOOD OSMOLALITY: CPT

## 2024-09-03 PROCEDURE — 84436 ASSAY OF TOTAL THYROXINE: CPT

## 2024-09-03 PROCEDURE — 99291 CRITICAL CARE FIRST HOUR: CPT

## 2024-09-03 PROCEDURE — 84480 ASSAY TRIIODOTHYRONINE (T3): CPT

## 2024-09-03 PROCEDURE — 94003 VENT MGMT INPAT SUBQ DAY: CPT

## 2024-09-03 PROCEDURE — 83880 ASSAY OF NATRIURETIC PEPTIDE: CPT

## 2024-09-03 PROCEDURE — 83036 HEMOGLOBIN GLYCOSYLATED A1C: CPT

## 2024-09-03 PROCEDURE — 83735 ASSAY OF MAGNESIUM: CPT

## 2024-09-03 PROCEDURE — 85730 THROMBOPLASTIN TIME PARTIAL: CPT

## 2024-09-03 PROCEDURE — 81001 URINALYSIS AUTO W/SCOPE: CPT

## 2024-09-03 PROCEDURE — 84132 ASSAY OF SERUM POTASSIUM: CPT

## 2024-09-03 PROCEDURE — 87641 MR-STAPH DNA AMP PROBE: CPT

## 2024-09-03 PROCEDURE — 36415 COLL VENOUS BLD VENIPUNCTURE: CPT

## 2024-09-03 PROCEDURE — 84300 ASSAY OF URINE SODIUM: CPT

## 2024-09-03 PROCEDURE — 93005 ELECTROCARDIOGRAM TRACING: CPT

## 2024-09-03 PROCEDURE — 84145 PROCALCITONIN (PCT): CPT

## 2024-09-03 PROCEDURE — 84100 ASSAY OF PHOSPHORUS: CPT

## 2024-09-03 PROCEDURE — 75898 FOLLOW-UP ANGIOGRAPHY: CPT | Mod: 59

## 2024-09-03 PROCEDURE — 85025 COMPLETE CBC W/AUTO DIFF WBC: CPT

## 2024-09-03 PROCEDURE — 85018 HEMOGLOBIN: CPT

## 2024-09-03 PROCEDURE — 84295 ASSAY OF SERUM SODIUM: CPT

## 2024-09-03 PROCEDURE — 84540 ASSAY OF URINE/UREA-N: CPT

## 2024-09-03 PROCEDURE — 86901 BLOOD TYPING SEROLOGIC RH(D): CPT

## 2024-09-03 PROCEDURE — 83935 ASSAY OF URINE OSMOLALITY: CPT

## 2024-09-03 PROCEDURE — C1713: CPT

## 2024-09-03 PROCEDURE — 61624 TCAT PERM OCCLS/EMBOLJ CNS: CPT

## 2024-09-03 PROCEDURE — 82435 ASSAY OF BLOOD CHLORIDE: CPT

## 2024-09-03 PROCEDURE — 86900 BLOOD TYPING SEROLOGIC ABO: CPT

## 2024-09-03 PROCEDURE — 99233 SBSQ HOSP IP/OBS HIGH 50: CPT

## 2024-09-03 PROCEDURE — 82962 GLUCOSE BLOOD TEST: CPT

## 2024-09-03 PROCEDURE — 82947 ASSAY GLUCOSE BLOOD QUANT: CPT

## 2024-09-03 PROCEDURE — 80053 COMPREHEN METABOLIC PANEL: CPT

## 2024-09-03 RX ORDER — PHENYLEPHRINE HYDROCHLORIDE 10 MG/ML
0.1 INJECTION INTRAVENOUS
Qty: 40 | Refills: 0 | Status: DISCONTINUED | OUTPATIENT
Start: 2024-09-03 | End: 2024-09-03

## 2024-09-03 RX ORDER — HYDROMORPHONE HYDROCHLORIDE 2 MG/1
1.5 TABLET ORAL
Refills: 0 | Status: DISCONTINUED | OUTPATIENT
Start: 2024-09-03 | End: 2024-09-03

## 2024-09-03 RX ORDER — LORAZEPAM 4 MG/ML
1 INJECTION INTRAMUSCULAR; INTRAVENOUS EVERY 4 HOURS
Refills: 0 | Status: DISCONTINUED | OUTPATIENT
Start: 2024-09-03 | End: 2024-09-03

## 2024-09-03 RX ORDER — HYDROMORPHONE HYDROCHLORIDE 2 MG/1
1 TABLET ORAL
Qty: 10 | Refills: 0 | Status: DISCONTINUED | OUTPATIENT
Start: 2024-09-03 | End: 2024-09-03

## 2024-09-03 RX ORDER — GLYCOPYRROLATE 0.2 MG/ML
0.4 INJECTION INTRAMUSCULAR; INTRAVENOUS
Refills: 0 | Status: DISCONTINUED | OUTPATIENT
Start: 2024-09-03 | End: 2024-09-03

## 2024-09-03 RX ORDER — MIDODRINE HYDROCHLORIDE 5 MG/1
5 TABLET ORAL ONCE
Refills: 0 | Status: COMPLETED | OUTPATIENT
Start: 2024-09-03 | End: 2024-09-03

## 2024-09-03 RX ORDER — OXYCODONE HYDROCHLORIDE 5 MG/1
20 TABLET ORAL EVERY 6 HOURS
Refills: 0 | Status: DISCONTINUED | OUTPATIENT
Start: 2024-09-03 | End: 2024-09-03

## 2024-09-03 RX ORDER — ACETAMINOPHEN 325 MG/1
1000 TABLET ORAL ONCE
Refills: 0 | Status: DISCONTINUED | OUTPATIENT
Start: 2024-09-03 | End: 2024-09-03

## 2024-09-03 RX ORDER — ROPIVACAINE IN 0.9% SOD CHL/PF 0.1 %
0.05 PLASTIC BAG, INJECTION (ML) EPIDURAL
Qty: 8 | Refills: 0 | Status: DISCONTINUED | OUTPATIENT
Start: 2024-09-03 | End: 2024-09-03

## 2024-09-03 RX ORDER — HYDROMORPHONE HYDROCHLORIDE 2 MG/1
1 TABLET ORAL
Qty: 100 | Refills: 0 | Status: DISCONTINUED | OUTPATIENT
Start: 2024-09-03 | End: 2024-09-03

## 2024-09-03 RX ORDER — ATROPINE SULFATE 1 %
1 DROPS OPHTHALMIC (EYE) EVERY 8 HOURS
Refills: 0 | Status: DISCONTINUED | OUTPATIENT
Start: 2024-09-03 | End: 2024-09-03

## 2024-09-03 RX ORDER — HYDROMORPHONE HYDROCHLORIDE 2 MG/1
2 TABLET ORAL ONCE
Refills: 0 | Status: DISCONTINUED | OUTPATIENT
Start: 2024-09-03 | End: 2024-09-03

## 2024-09-03 RX ORDER — LORAZEPAM 4 MG/ML
0.5 INJECTION INTRAMUSCULAR; INTRAVENOUS ONCE
Refills: 0 | Status: DISCONTINUED | OUTPATIENT
Start: 2024-09-03 | End: 2024-09-03

## 2024-09-03 RX ADMIN — HYDROMORPHONE HYDROCHLORIDE 2 MILLIGRAM(S): 2 TABLET ORAL at 00:59

## 2024-09-03 RX ADMIN — IPRATROPIUM BROMIDE AND ALBUTEROL SULFATE 3 MILLILITER(S): .5; 3 SOLUTION RESPIRATORY (INHALATION) at 11:43

## 2024-09-03 RX ADMIN — HYDROMORPHONE HYDROCHLORIDE 1 MG/HR: 2 TABLET ORAL at 02:48

## 2024-09-03 RX ADMIN — LORAZEPAM 0.5 MILLIGRAM(S): 4 INJECTION INTRAMUSCULAR; INTRAVENOUS at 00:21

## 2024-09-03 RX ADMIN — CHLORHEXIDINE GLUCONATE 1 APPLICATION(S): 40 SOLUTION TOPICAL at 11:48

## 2024-09-03 RX ADMIN — HYDROMORPHONE HYDROCHLORIDE 1.5 MILLIGRAM(S): 2 TABLET ORAL at 02:30

## 2024-09-03 RX ADMIN — OXYCODONE HYDROCHLORIDE 15 MILLIGRAM(S): 5 TABLET ORAL at 00:56

## 2024-09-03 RX ADMIN — HYDROMORPHONE HYDROCHLORIDE 1 MG/HR: 2 TABLET ORAL at 03:03

## 2024-09-03 RX ADMIN — FINASTERIDE 5 MILLIGRAM(S): 1 TABLET, FILM COATED ORAL at 12:13

## 2024-09-03 RX ADMIN — GLYCOPYRROLATE 0.4 MILLIGRAM(S): 0.2 INJECTION INTRAMUSCULAR; INTRAVENOUS at 02:19

## 2024-09-03 RX ADMIN — IPRATROPIUM BROMIDE AND ALBUTEROL SULFATE 3 MILLILITER(S): .5; 3 SOLUTION RESPIRATORY (INHALATION) at 05:13

## 2024-09-03 RX ADMIN — Medication 7.65 MICROGRAM(S)/KG/MIN: at 11:48

## 2024-09-03 RX ADMIN — HYDROMORPHONE HYDROCHLORIDE 1.5 MILLIGRAM(S): 2 TABLET ORAL at 02:20

## 2024-09-03 RX ADMIN — PHENYLEPHRINE HYDROCHLORIDE 3.06 MICROGRAM(S)/KG/MIN: 10 INJECTION INTRAVENOUS at 11:47

## 2024-09-03 RX ADMIN — GLYCOPYRROLATE 0.4 MILLIGRAM(S): 0.2 INJECTION INTRAMUSCULAR; INTRAVENOUS at 02:07

## 2024-09-03 RX ADMIN — HYDROMORPHONE HYDROCHLORIDE 1 MG/HR: 2 TABLET ORAL at 07:35

## 2024-09-03 RX ADMIN — BRIVARACETAM 220 MILLIGRAM(S): 100 TABLET, FILM COATED ORAL at 05:55

## 2024-09-03 RX ADMIN — Medication 1 APPLICATION(S): at 05:56

## 2024-09-03 RX ADMIN — SODIUM CHLORIDE 4 MILLILITER(S): 9 INJECTION INTRAMUSCULAR; INTRAVENOUS; SUBCUTANEOUS at 05:13

## 2024-09-03 RX ADMIN — MIDODRINE HYDROCHLORIDE 5 MILLIGRAM(S): 5 TABLET ORAL at 03:56

## 2024-09-03 RX ADMIN — HYDROMORPHONE HYDROCHLORIDE 2 MILLIGRAM(S): 2 TABLET ORAL at 01:59

## 2024-09-03 RX ADMIN — CEFEPIME 100 MILLIGRAM(S): 2 INJECTION, POWDER, FOR SOLUTION INTRAVENOUS at 13:04

## 2024-09-03 NOTE — DISCHARGE NOTE FOR THE EXPIRED PATIENT - TIME PATIENT WAS PRONOUNCED DEAD
Take charge of your health, recovery, and wellness.  Only you can make the healthy choices that will help you be successful in your recovery and your life. Don't let daily frustrations take control of your emotions. Take ownership of your life and your needs. Don't forget to let others help you when you need it, and don't wait until the last moment to ask for that help. See yourself as someone you should take care of.  Do those basic things to keep yourself moving forward and successful.   Be open to the help and support offered by others.  This is your life.  Treat it with the importance it deserves.   03-Sep-2024 17:12

## 2024-09-03 NOTE — PROVIDER CONTACT NOTE (EICU) - ACTION/TREATMENT ORDERED:
labs drawn, blood pressure q2hr. neuro q4hrs. dilaudid continued as per MD Awad. Tube feedings held as per MD Awad. Daughter Regina spoken to and fully aware of treatment of care labs drawn, blood pressure q2hr. neuro q4hrs. dilaudid continued as per MD Awad. Tube feedings held as per MD Awad. Daughter Regina spoken to and fully aware of treatment of care.  pt will remain DNR/DNI as per coy Tapia

## 2024-09-03 NOTE — PROGRESS NOTE ADULT - SUBJECTIVE AND OBJECTIVE BOX
SUBJECTIVE AND OBJECTIVE:Pt Unable to participate in conversation due to poor mental status. Pt's daughter Nimo and other family members present at bedside.    Indication for Geriatrics and Palliative Care Services/INTERVAL HPI: goc/symptoms    OVERNIGHT EVENTS: Pt required multiple PRNS in last 24 hours for symptom management (7am-7am).     DNR on chart:DNI    Allergies    Tolerated ceftriaxone 5/2024 (Other)  penicillins (Rash)    Intolerances    MEDICATIONS  (STANDING):  HYDROmorphone Infusion 1 mG/Hr (1 mL/Hr) IV Continuous <Continuous>    MEDICATIONS  (PRN):  glycopyrrolate Injectable 0.4 milliGRAM(s) IV Push four times a day PRN Secretions  HYDROmorphone  Injectable 1.5 milliGRAM(s) IV Push every 2 hours PRN Severe Pain (7 - 10)  LORazepam   Injectable 1 milliGRAM(s) IV Push every 4 hours PRN anxiety/agitation      ITEMS UNCHECKED ARE NOT PRESENT    PRESENT SYMPTOMS: [x ]Unable to self-report - see [x ] CPOT [ ] PAINADS [ ] RDOS  Source if other than patient:  [ ]Family   [ ]Team     Pain:  [ ]yes [ ]no  QOL impact -   Location -                    Aggravating factors -  Quality -  Radiation -  Timing-  Severity (0-10 scale):  Minimal acceptable level/ pain goal (0-10 scale):     CPOT:    https://www.sccm.org/getattachment/dgj77r91-6x7q-0z6c-6e6s-3739h5073l3l/Critical-Care-Pain-Observation-Tool-(CPOT)    Dyspnea:                           [ ]Mild [ ]Moderate [ ]Severe  Anxiety:                             [ ]Mild [ ]Moderate [ ]Severe  Fatigue:                             [ ]Mild [ ]Moderate [ ]Severe  Nausea:                             [ ]Mild [ ]Moderate [ ]Severe  Loss of appetite:              [ ]Mild [ ]Moderate [ ]Severe  Constipation:                    [ ]Mild [ ]Moderate [ ]Severe  Other Symptoms:      PCSSQ[Palliative Care Spiritual Screening Question]   Severity (0-10):  Score of 4 or > indicate consideration of Chaplaincy referral.  Chaplaincy Referral: [ ] yes [ ] refused [ ] following [x ] deferred    Caregiver [place]? : [ ] yes [x ] no [ ] Deferred [ ] Declined             Social work referral [ ] Patient & Family Centered Care Referral [ ]  Anticipatory Grief present?:  [ ] yes [ ] no  [ ] Deferred                  Social work referral [x ] Patient & Family Centered Care Referral [ ]      PHYSICAL EXAM:  Vital Signs Last 24 Hrs  T(C): 37.9 (03 Sep 2024 11:00), Max: 38 (03 Sep 2024 07:00)  T(F): 100.2 (03 Sep 2024 11:00), Max: 100.4 (03 Sep 2024 07:00)  HR: 113 (03 Sep 2024 14:45) (78 - 133)  BP: 86/74 (03 Sep 2024 14:45) (70/37 - 100/50)  BP(mean): 60 (03 Sep 2024 14:45) (49 - 71)  RR: 13 (03 Sep 2024 14:45) (12 - 20)  SpO2: 90% (03 Sep 2024 14:45) (89% - 100%)    Parameters below as of 03 Sep 2024 07:00  Patient On (Oxygen Delivery Method): mask, nonrebreather    O2 Concentration (%): 100                  GENERAL:  [ ]Alert  [ ]Oriented x 3  [ x]Lethargic  [ ]Cachexia  [ ]Unarousable  [ ]Verbal  [x]Non-Verbal  Behavioral:   [ ]Anxiety  [ ]Delirium [ ]Agitation [ ]Other  HEENT:  [ ]Normal   [ ]Dry mouth   [ ]ET Tube/Trach  [ ]Oral lesions  PULMONARY:   [x]Clear [ ]Tachypnea  [ ]Audible excessive secretions   [ ]Rhonchi        [ ]Right [ ]Left [ ]Bilateral  [ ]Crackles        [ ]Right [ ]Left [ ]Bilateral  [ ]Wheezing     [ ]Right [ ]Left [ ]Bilateral  [ ]Diminished BS [ ] Right [ ]Left [ ]Bilateral  CARDIOVASCULAR:    [x]Regular [ ]Irregular [ ]Tachy  [ ]Goyo [ ]Murmur [ ]Other  GASTROINTESTINAL:  [x]Soft  [ ]Distended   [x]+BS  [x]Non tender [ ]Tender  [ ]PEG [ ]OGT/ NGT   Last BM:    GENITOURINARY:  [ ]Normal [ ]Incontinent   [ ]Oliguria/Anuria   [ x]Diez  MUSCULOSKELETAL:   [ ]Normal   [ ]Weakness  [x ]Bed/Wheelchair bound [ ]Edema  NEUROLOGIC:   [ ]No focal deficits  [x ] Cognitive impairment  [ ] Dysphagia [ ]Dysarthria [ ] Paresis [ ]Other   SKIN:   [x]Normal  [ ]Rash   [ ]Pressure ulcer(s) [ ]y [ ]n present on admission      CRITICAL CARE:  [x ]Shock Present  [x ]Septic [ ]Cardiogenic [ ]Neurologic [ ]Hypovolemic  [x ]Vasopressors [ ]Inotropes  [ x]Respiratory failure present [ ]Mechanical Ventilation [ ]Non-invasive ventilatory support [ ]High-Flow   [x ]Acute  [ ]Chronic [x ]Hypoxic  [ ]Hypercarbic [ ]Other  [ ]Other organ failure     LABS:                        8.3    7.63  )-----------( 153      ( 03 Sep 2024 12:07 )             27.5   09-03    137  |  110<H>  |  47<H>  ----------------------------<  130<H>  5.4<H>   |  19<L>  |  2.66<H>    Ca    8.1<L>      03 Sep 2024 12:07  Phos  4.0     09-03  Mg     3.0     09-03        RADIOLOGY & ADDITIONAL STUDIES:  < from: Xray Chest 1 View- PORTABLE-Routine (Xray Chest 1 View- PORTABLE-Routine in AM.) (09.02.24 @ 04:01) >    ACC: [id number] EXAM:  XR CHEST PORTABLE ROUTINE 1V   ORDERED BY:  VALENTINA AVENDANO     PROCEDURE DATE:  09/02/2024          INTERPRETATION:  Clinical History / Reason for exam: f/u cxr after lasix    Comparison : Chest radiograph: 9/1/2024    Technique/Positioning: Frontal view of the chest    Findings:    Support devices: Stable positioning    Cardiac/mediastinum/hilum: No significant change    Skeleton/soft tissues: No significant change.    Lung parenchyma/Pleura: Stable bilateral opacities and effusions.    Impression:  Stable bilateral opacities and effusions.      --- End of Report ---    MAURO DELGADO MD; Attending Radiologist  This document has been electronically signed. Sep  2 2024 12:55PM    < end of copied text >      Protein Calorie Malnutrition Present: [ ]mild [ ]moderate [ ]severe [ ]underweight [ ]morbid obesity  https://www.andeal.org/vault/2440/web/files/ONC/Table_Clinical%20Characteristics%20to%20Document%20Malnutrition-White%20JV%20et%20al%202012.pdf    Height (cm): 175.3 (08-29-24 @ 08:53), 177.8 (08-28-24 @ 09:24), 175.3 (05-11-24 @ 14:33)  Weight (kg): 81.6 (08-29-24 @ 08:53), 79.4 (08-28-24 @ 09:24), 81.6 (05-11-24 @ 14:33)  BMI (kg/m2): 26.6 (08-29-24 @ 08:53), 25.1 (08-28-24 @ 09:24), 26.6 (05-11-24 @ 14:33)    [ ]PPSV2 < or = 30%  [ ]significant weight loss [ ]poor nutritional intake [ ]anasarca[ ]Artificial Nutrition    Other REFERRALS:  [ ]Hospice  [ ]Child Life  [ ]Social Work  [ ]Case management [ ]Holistic Therapy     Goals of Care Document

## 2024-09-03 NOTE — PROGRESS NOTE ADULT - PROBLEM SELECTOR PLAN 6
Case discussed with ICU team.  If family in agreement with transfer to de-escalation of care with capping of pressors please page palliative team for PCU evaluation.    For acute issues or uncontrolled symptoms please page palliative team.    Samara Robbins MD  Geriatrics and Palliative Medicine Attending  [facility] pager: [phone]

## 2024-09-03 NOTE — PROGRESS NOTE ADULT - PROVIDER SPECIALTY LIST ADULT
NSICU
Neurosurgery
Neurosurgery
Palliative Care
Infectious Disease
NSICU
Neurology
Neurosurgery
Neurosurgery
NSICU
Neurology
NSICU
Neurosurgery
NSICU
Palliative Care

## 2024-09-03 NOTE — PROGRESS NOTE ADULT - TREATMENT GUIDELINES
Comfort measures only/No artificial nutrition
DNR/Comfort measures only/No blood draws/No artificial nutrition/Antibiotic trial/No IV fluids/DNI

## 2024-09-03 NOTE — PROGRESS NOTE ADULT - SUBJECTIVE AND OBJECTIVE BOX
SUMMARY:  89M, PMH PE not currently on AC/AP, T2DM, macular degeneration, presenting with multiple falls over the plast week and dysarthria. 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     HOSPITAL COURSE:  8/28: admitted to [facility]. S/p Left crani for SDH evac.  8/29: s/p Left MMAE  8/30: postop CTH stable  8/31: CXR c/f for PNA, on Cefepime  9/1: on Cefepime, GOC initiated, DNR/DNI  9/2: family decided on comfor measures.    ADMISSION SCORES:   GCS: 15.    REVIEW OF SYSTEMS: None other than stated in HPI.    ALLERGIES: Allergies    Tolerated ceftriaxone 5/2024 (Other)  penicillins (Rash)    Intolerances                         09-02    138  |  111<H>  |  36<H>  ----------------------------<  150<H>  4.1   |  17<L>  |  1.82<H>    Ca    8.1<L>      02 Sep 2024 04:12  Phos  2.6     09-02  Mg     2.1     09-02      I&O's Summary    02 Sep 2024 07:01  -  03 Sep 2024 07:00  --------------------------------------------------------  IN: 1387.6 mL / OUT: 935 mL / NET: 452.6 mL        MEDICATIONS  (STANDING):  albuterol/ipratropium for Nebulization 3 milliLiter(s) Nebulizer every 6 hours  atorvastatin 40 milliGRAM(s) Oral at bedtime  brivaracetam  IVPB 50 milliGRAM(s) IV Intermittent two times a day  cefepime   IVPB 1000 milliGRAM(s) IV Intermittent daily  chlorhexidine 4% Liquid 1 Application(s) Topical daily  dexMEDEtomidine Infusion 0.2 MICROgram(s)/kG/Hr (4.08 mL/Hr) IV Continuous <Continuous>  doxazosin 2 milliGRAM(s) Oral at bedtime  finasteride 5 milliGRAM(s) Oral daily  heparin   Injectable 5000 Unit(s) SubCutaneous every 8 hours  insulin lispro (ADMELOG) corrective regimen sliding scale   SubCutaneous every 6 hours  insulin NPH human recombinant 8 Unit(s) SubCutaneous every 6 hours  latanoprost 0.005% Ophthalmic Solution 1 Drop(s) Both EYES at bedtime  mupirocin 2% Nasal 1 Application(s) Both Nostrils two times a day  norepinephrine Infusion 0.05 MICROgram(s)/kG/Min (7.65 mL/Hr) IV Continuous <Continuous>  polyethylene glycol 3350 17 Gram(s) Oral daily  QUEtiapine 50 milliGRAM(s) Oral every 8 hours  senna 2 Tablet(s) Oral at bedtime  sodium chloride 3%  Inhalation 4 milliLiter(s) Inhalation every 12 hours  tiotropium 2.5 MICROgram(s) Inhaler 2 Puff(s) Inhalation daily    MEDICATIONS  (PRN):  acetaminophen     Tablet .. 650 milliGRAM(s) Oral every 6 hours PRN Temp greater or equal to 38C (100.4F), Mild Pain (1 - 3)  oxyCODONE    IR 10 milliGRAM(s) Oral every 4 hours PRN Moderate Pain (4 - 6)  oxyCODONE    IR 15 milliGRAM(s) Oral every 4 hours PRN Severe Pain (7 - 10)      EXAMINATION:  General: No acute distress  HEENT: Anicteric sclerae  Cardiac: [id number]  Lungs: Clear  Abdomen: Soft, non-tender, +BS  Extremities: No c/c/e  Skin/Incision Site: Clean, dry and intact  Neurologic: AOx0, groaning, no consistent FC, CALHOUN AG strong spontaneously SUMMARY:  89M, PMH PE not currently on AC/AP, T2DM, macular degeneration, presenting with multiple falls over the plast week and dysarthria. 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     HOSPITAL COURSE:  8/28: admitted to [facility]. S/p Left crani for SDH evac.  8/29: s/p Left MMAE  8/30: postop CTH stable  8/31: CXR c/f for PNA, on Cefepime  9/1: on Cefepime, GOC initiated, DNR/DNI  9/2: family waiting for other family members from [place] to arrive before proceeding to comfort measures.    ADMISSION SCORES:   GCS: 15.    REVIEW OF SYSTEMS: None other than stated in HPI.    ALLERGIES: Allergies    Tolerated ceftriaxone 5/2024 (Other)  penicillins (Rash)    Intolerances                         09-02    138  |  111<H>  |  36<H>  ----------------------------<  150<H>  4.1   |  17<L>  |  1.82<H>    Ca    8.1<L>      02 Sep 2024 04:12  Phos  2.6     09-02  Mg     2.1     09-02      I&O's Summary    02 Sep 2024 07:01  -  03 Sep 2024 07:00  --------------------------------------------------------  IN: 1387.6 mL / OUT: 935 mL / NET: 452.6 mL        MEDICATIONS  (STANDING):  albuterol/ipratropium for Nebulization 3 milliLiter(s) Nebulizer every 6 hours  atorvastatin 40 milliGRAM(s) Oral at bedtime  brivaracetam  IVPB 50 milliGRAM(s) IV Intermittent two times a day  cefepime   IVPB 1000 milliGRAM(s) IV Intermittent daily  chlorhexidine 4% Liquid 1 Application(s) Topical daily  dexMEDEtomidine Infusion 0.2 MICROgram(s)/kG/Hr (4.08 mL/Hr) IV Continuous <Continuous>  doxazosin 2 milliGRAM(s) Oral at bedtime  finasteride 5 milliGRAM(s) Oral daily  heparin   Injectable 5000 Unit(s) SubCutaneous every 8 hours  insulin lispro (ADMELOG) corrective regimen sliding scale   SubCutaneous every 6 hours  insulin NPH human recombinant 8 Unit(s) SubCutaneous every 6 hours  latanoprost 0.005% Ophthalmic Solution 1 Drop(s) Both EYES at bedtime  mupirocin 2% Nasal 1 Application(s) Both Nostrils two times a day  norepinephrine Infusion 0.05 MICROgram(s)/kG/Min (7.65 mL/Hr) IV Continuous <Continuous>  polyethylene glycol 3350 17 Gram(s) Oral daily  QUEtiapine 50 milliGRAM(s) Oral every 8 hours  senna 2 Tablet(s) Oral at bedtime  sodium chloride 3%  Inhalation 4 milliLiter(s) Inhalation every 12 hours  tiotropium 2.5 MICROgram(s) Inhaler 2 Puff(s) Inhalation daily    MEDICATIONS  (PRN):  acetaminophen     Tablet .. 650 milliGRAM(s) Oral every 6 hours PRN Temp greater or equal to 38C (100.4F), Mild Pain (1 - 3)  oxyCODONE    IR 10 milliGRAM(s) Oral every 4 hours PRN Moderate Pain (4 - 6)  oxyCODONE    IR 15 milliGRAM(s) Oral every 4 hours PRN Severe Pain (7 - 10)      EXAMINATION:  General: No acute distress  HEENT: Anicteric sclerae  Cardiac: [id number]  Lungs: Clear  Abdomen: Soft, non-tender, +BS  Extremities: No c/c/e  Skin/Incision Site: Clean, dry and intact  Neurologic: AOx0, groaning, no consistent FC, CALHOUN AG strong spontaneously

## 2024-09-03 NOTE — PROGRESS NOTE ADULT - SUBJECTIVE AND OBJECTIVE BOX
[facility] DIVISION OF INFECTIOUS DISEASES  MAYO Starr Y. Patel, S. Shah, G. Casimir  [phone]  ([phone] - weekdays after 5pm and weekends)    Name: EREN BARRAGAN  Age/Gender: 89y Male  MRN: [id number]    Interval History:  Patient seen and examined this morning.   Unable to obtain ROS.   Notes reviewed.   Daughter at bedside.  Allergies: Tolerated ceftriaxone 5/2024 (Other)  penicillins (Rash)      Objective:  Vitals:   T(F): 100.4 (09-03-24 @ 07:00), Max: 100.4 (09-03-24 @ 07:00)  HR: 120 (09-03-24 @ 07:00) (55 - 133)  BP: 100/50 (09-03-24 @ 07:00) (85/48 - 111/54)  RR: 17 (09-03-24 @ 07:00) (11 - 22)  SpO2: 95% (09-03-24 @ 07:00) (89% - 100%)  Physical Examination:  General: no acute distress, NRBM  HEENT: normocephalic, atraumatic, anicteric, NGT  Respiratory: decreased breath sounds b/l   Cardiovascular: S1 and S2 present, tachycardia   Gastrointestinal: soft, ND, no grimacing on palpation  Extremities: +edema, no cyanosis  Skin: no visible rash    Laboratory Studies:  CBC:   WBC Trend:  5.20 09-01-24 @ 00:11  4.59 08-31-24 @ 00:49  10.62 08-29-24 @ 22:27  4.91 08-28-24 @ 21:42  6.05 08-28-24 @ 11:41  5.14 08-28-24 @ 09:45    CMP: 09-02    138  |  111<H>  |  36<H>  ----------------------------<  150<H>  4.1   |  17<L>  |  1.82<H>    Ca    8.1<L>      02 Sep 2024 04:12  Phos  2.6     09-02  Mg     2.1     09-02      Creatinine: 1.82 mg/dL (09-02-24 @ 04:12)  Creatinine: 2.09 mg/dL (09-01-24 @ 04:44)  Creatinine: 2.04 mg/dL (09-01-24 @ 00:11)  Creatinine: 1.92 mg/dL (08-31-24 @ 00:49)  Creatinine: 1.90 mg/dL (08-29-24 @ 22:27)  Creatinine: 1.63 mg/dL (08-28-24 @ 21:42)  Creatinine: 1.95 mg/dL (08-28-24 @ 11:41)  Creatinine: 1.97 mg/dL (08-28-24 @ 09:45)    Vancomycin Level, Trough: 19.0 ug/mL (09-01-24 @ 04:44)    Microbiology: reviewed   Culture - Sputum (collected 09-01-24 @ 07:23)  Source: .Sputum Sputum  Gram Stain (09-01-24 @ 12:57):    Moderate polymorphonuclear leukocytes seen per oil power field    No Squamous epithelial cells seen per oil power field    Few Gram Positive Rods seen per oil power field  Final Report (09-02-24 @ 16:27):    Normal Respiratory Nini present    Culture - Blood (collected 08-31-24 @ 23:45)  Source: .Blood Blood-Peripheral  Preliminary Report (09-03-24 @ 05:00):    No growth at 48 Hours    Culture - Blood (collected 08-31-24 @ 23:30)  Source: .Blood Blood-Peripheral  Preliminary Report (09-03-24 @ 05:00):    No growth at 48 Hours    Culture - Blood (collected 08-28-24 @ 23:59)  Source: .Blood Blood-Peripheral  Final Report (09-03-24 @ 03:00):    No growth at 5 days    Culture - Blood (collected 08-28-24 @ 23:59)  Source: .Blood Blood-Peripheral  Final Report (09-03-24 @ 03:00):    No growth at 5 days    Radiology: reviewed   < from: Xray Chest 1 View- PORTABLE-Routine (Xray Chest 1 View- PORTABLE-Routine in AM.) (09.02.24 @ 04:01) >  Impression:  Stable bilateral opacities and effusions.    < end of copied text >      Medications:  acetaminophen     Tablet .. 650 milliGRAM(s) Oral every 6 hours PRN  albuterol/ipratropium for Nebulization 3 milliLiter(s) Nebulizer every 6 hours  brivaracetam  IVPB 50 milliGRAM(s) IV Intermittent two times a day  cefepime   IVPB 1000 milliGRAM(s) IV Intermittent daily  chlorhexidine 4% Liquid 1 Application(s) Topical daily  doxazosin 2 milliGRAM(s) Oral at bedtime  finasteride 5 milliGRAM(s) Oral daily  glycopyrrolate Injectable 0.4 milliGRAM(s) IV Push four times a day PRN  HYDROmorphone  Injectable 1.5 milliGRAM(s) IV Push every 2 hours PRN  HYDROmorphone Infusion 1 mG/Hr IV Continuous <Continuous>  latanoprost 0.005% Ophthalmic Solution 1 Drop(s) Both EYES at bedtime  LORazepam   Injectable 1 milliGRAM(s) IV Push every 4 hours PRN  sodium chloride 3%  Inhalation 4 milliLiter(s) Inhalation every 12 hours    Current Antimicrobials:  cefepime   IVPB 1000 milliGRAM(s) IV Intermittent daily    Prior/Completed Antimicrobials:  aztreonam  IVPB  metroNIDAZOLE  IVPB  vancomycin  IVPB

## 2024-09-03 NOTE — PROGRESS NOTE ADULT - THIS PATIENT HAS THE FOLLOWING CONDITION(S)/DIAGNOSES ON THIS ADMISSION:
Encephalopathy/Brain Compression / Herniation
None
Encephalopathy/Brain Compression / Herniation
Encephalopathy/Brain Compression / Herniation
None
Encephalopathy/Brain Compression / Herniation
None
None
Encephalopathy/Brain Compression / Herniation
Encephalopathy/Brain Compression / Herniation
Encephalopathy/Functional Quadriplegia/Cerebral Edema/Brain Compression / Herniation

## 2024-09-03 NOTE — PROVIDER CONTACT NOTE (OTHER) - SITUATION
Patient admitted for SDH s/p multiple falls at home. Patient now made comfort care measures only by family but family is needing further clarification on plan of care.

## 2024-09-03 NOTE — PROGRESS NOTE ADULT - PROBLEM SELECTOR PLAN 1
Pt required multiple PRN of oxycodone and PRN IV dilaudid in last 24 hours.  Pt started on dilaudid infusion at 1mg/hr per NSICU  Continue PRN IV dilaudid 1.5mg q1h PRN dyspnea  bowerl regimen in setting of opioids
Pt with fall, found to have SDH s/p OR   Currently encephalopathic, unable to follow command   For pain: increase oxycodone IR to 15 and 20 mg q4h PRN for moderate and severe pain via NGT; start IV dilaudid 1 mg q  For anxiety, currently on precedex and seroquel TID via NGT  Family opting for comfort measures with limited medical interventions  Start ativan 0.5 mg IV q4h PRN for anxiety  Management per NSICU.

## 2024-09-03 NOTE — PROVIDER CONTACT NOTE (EICU) - ASSESSMENT
ELENA not updated, MD Awad at bedside. spoke with daughter Reinaldo on wishes for patient. as per Daughter Regina and MD Awda, patient will be treated until family from out of state arrives.

## 2024-09-03 NOTE — PROVIDER CONTACT NOTE (EICU) - SITUATION
ELENA not updated, MD Awad at bedside. spoke with daughter Reinaldo on wishes for patient. as per Daughter Regina and MD Awad, patient will be treated until family from out of state arrives.

## 2024-09-03 NOTE — PROVIDER CONTACT NOTE (EICU) - RECOMMENDATIONS
labs drawn, blood pressure q2hr. neuro q4hrs. labs drawn, blood pressure q2hr. neuro q4hrs. pt will remain DNR/DNI as per daughter Regina

## 2024-09-03 NOTE — PROVIDER CONTACT NOTE (OTHER) - RECOMMENDATIONS
I recommended the providers have a conversation with daughter to clarify her expectations and wishes for her father therefore we can honor them as we move forward.

## 2024-09-03 NOTE — PROGRESS NOTE ADULT - ASSESSMENT
89M, w DM cb neuropathy, macular degeneration, PE (off Eliquis for 6 months), BPH, pw slurred speech and difficulty swallowing. Per daughter has been increasingly confused, w gait instability, and 6 falls over the past week PTA. Falls have been unwitnessed, unknown head strikes. Tx from [place]. Exam there per ED: Dysarthric but AOx3, FC, CALHOUN AG. 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  ID Consulted with concerns for asp PNA.    RECOMMENDATIONS  Aspiration PNA story is compelling with opac on exam and imaging, consistent clinical picture  s/p flagyl- no history of aspiration of GI contents  neg MRSA screen, s/p vancomycin   rec continue cefepime  prognosis is guarded so all interventions in keeping with [facility]  Palliative care following       D/w daughter at bedside; ZULEIMA Silva M.D.  [facility], Division of Infectious Diseases  [phone]  After 5pm on weekdays and all day on weekends - please call [phone]  Available on [employer] TEAMS

## 2024-09-03 NOTE — CHART NOTE - NSCHARTNOTEFT_GEN_A_CORE
GaP consulted for uncontrolled pain.    89M, PMH PE not currently on AC/AP, T2DM, macular degeneration, presenting with multiple falls over the plast week and dysarthria. Tx from [place].  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 Coruse c/b septic shock with aspiration pneumonia for which he's on low dose pressor and IV abx. Palliative consulted for assistance with goals of care and symptoms management. Per bedside RN, patient appears to be in pain with increase moaning and groaning.  bedside RN reports IV dilaudid 1mg provides relief of symptoms for at least 1 hour.    chart review of prn medication from 9/1 7am  oxycodone 15mg q4h prn moderate pain x3   oxycodone 20mg q4h prn severe pain x 0  dilaudid IV 1 mg q3h prn severe pain unresponsive to oral medication x 1   ativan 0.5mg po x 1     recommendations:  -start oxycodone 20mg po q6h standing   -start IV dilaudid 1.5 mg q2h prn severe pain  -continue oxycodone 15mg q4h moderate pain  -increase ativan 0.5 to 1mg IV q4h prn GaP consulted for uncontrolled pain.    89M, PMH PE not currently on AC/AP, T2DM, macular degeneration, presenting with multiple falls over the plast week and dysarthria. Tx from [place].  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 Coruse c/b septic shock with aspiration pneumonia for which he's on low dose pressor and IV abx. Palliative consulted for assistance with goals of care and symptoms management. Per bedside RN, patient appears to be in pain with increase moaning and groaning.  bedside RN reports IV dilaudid 1mg provides relief of symptoms for at least 1 hour.    chart review of prn medication from 9/1 7am  oxycodone 15mg q4h prn moderate pain x3   oxycodone 20mg q4h prn severe pain x 0  dilaudid IV 1 mg q3h prn severe pain unresponsive to oral medication x 1   diluaid IV 2mg x 1   ativan 0.5mg po x 1     recommendations:  -start oxycodone 20mg po q6h standing   -start IV dilaudid 1.5 mg q2h prn severe pain  -continue oxycodone 15mg q4h moderate pain  -increase ativan 0.5 to 1mg IV q4h prn    please page palliative if symptoms are not controlled on current regimen x [phone]      Discussed with attending Dr MARCELLO Hurtado GaP consulted for uncontrolled pain.    89M, PMH PE not currently on AC/AP, T2DM, macular degeneration, presenting with multiple falls over the plast week and dysarthria. Tx from [place].  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 Coruse c/b septic shock with aspiration pneumonia for which he's on low dose pressor and IV abx. Palliative consulted for assistance with goals of care and symptoms management. Per bedside RN, patient appears to be in pain with increase moaning and groaning.  bedside RN reports IV dilaudid 1mg provides relief of symptoms for at least 1 hour.    chart review of prn medication from 9/1 7am  oxycodone 15mg q4h prn moderate pain x3   oxycodone 20mg q4h prn severe pain x 0  dilaudid IV 1 mg q3h prn severe pain unresponsive to oral medication x 1   diluaid IV 2mg x 1   ativan 0.5mg po x 1     recommendations:  -start oxycodone 20mg po q6h standing   -start IV dilaudid 1.5 mg q2h prn severe pain  -continue oxycodone 15mg q4h moderate pain  -increase ativan 0.5 to 1mg IV q4h prn    please page palliative if symptoms are not controlled on current regimen x [phone]      Discussed with attending Dr MARCELLO Hurtado.

## 2024-09-03 NOTE — DISCHARGE NOTE FOR THE EXPIRED PATIENT - HOSPITAL COURSE
89M, PMH PE not currently on AC/AP, T2DM, macular degeneration, presenting with multiple falls over the plast week and dysarthria. Tx from [place]. Exam there per ED: Dysarthric but AOx3, FC, CALHOUN AG. 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  Admitted for traumatic subacute on chronic Left SDH s/p Left crani for SDH evac on  with OFELIA drain. Course c/b septic shock with aspiration pneumonia for which he's on low dose pressor and IV abx. Cefepime, GOC initiated, DNR/DNI  Family waiting for other family members from [place] to arrive before proceeding to comfort measures.  As soon as patient son gets here, patient was made comfort measures only and patient was treminally extubated and he  @ 17:12

## 2024-09-03 NOTE — PROVIDER CONTACT NOTE (OTHER) - ASSESSMENT
Patient is comfort measures only therefore vitals are q12hrs. Patient's family member has stated she would feel at ease to have vitals be monitored more frequently than q12 hrs.

## 2024-09-03 NOTE — CHART NOTE - NSCHARTNOTEFT_GEN_A_CORE
NUTRITION FOLLOW UP NOTE    PATIENT SEEN FOR: follow up    SOURCE: [] Patient  [x] Current Medical Record  [] RN  [] Family/support person at bedside  [x] Patient unavailable/inappropriate  [x] Other: team during interdisciplinary rounds    CHART REVIEWED/EVENTS NOTED.  [] No changes to nutrition care plan to note  [x] Nutrition Status:  -extubated 8/30  -Chart Note-Speech-Swallow SP 8/30 recommendations: "NPO, with non-oral nutrition/hydration/medications."    DIET ORDER:   Diet, NPO:   Except Medications (09-03-24)    CURRENT DIET ORDER IS:  [] Appropriate:  [] Inadequate:  [x] Other: see below for recommendations.     NUTRITION INTAKE/PROVISION:  [] PO:  [x] Enteral Nutrition: NPO. asp pneumonia per team.   Provision: 773kcal/day per nursing flow sheets x past 5 days. Of note, NPO 8/29. (This is 37% EER).   [] Parenteral Nutrition:    ANTHROPOMETRICS:  Drug Dosing Weight  Height (cm): 175.3 (29 Aug 2024 08:53)  Weight (kg): 81.6 (29 Aug 2024 08:53)  BMI (kg/m2): 26.6 (29 Aug 2024 08:53)  BSA (m2): 1.98 (29 Aug 2024 08:53)    MEDICATIONS:  MEDICATIONS  (STANDING):  albuterol/ipratropium for Nebulization 3 milliLiter(s) Nebulizer every 6 hours  brivaracetam  IVPB 50 milliGRAM(s) IV Intermittent two times a day  cefepime   IVPB 1000 milliGRAM(s) IV Intermittent daily  chlorhexidine 4% Liquid 1 Application(s) Topical daily  doxazosin 2 milliGRAM(s) Oral at bedtime  finasteride 5 milliGRAM(s) Oral daily  HYDROmorphone Infusion 1 mG/Hr (1 mL/Hr) IV Continuous <Continuous>  latanoprost 0.005% Ophthalmic Solution 1 Drop(s) Both EYES at bedtime  norepinephrine Infusion 0.05 MICROgram(s)/kG/Min (7.65 mL/Hr) IV Continuous <Continuous>  phenylephrine    Infusion 0.1 MICROgram(s)/kG/Min (3.06 mL/Hr) IV Continuous <Continuous>  sodium chloride 3%  Inhalation 4 milliLiter(s) Inhalation every 12 hours    MEDICATIONS  (PRN):  acetaminophen     Tablet .. 650 milliGRAM(s) Oral every 6 hours PRN Temp greater or equal to 38C (100.4F), Mild Pain (1 - 3)  glycopyrrolate Injectable 0.4 milliGRAM(s) IV Push four times a day PRN Secretions  HYDROmorphone  Injectable 1.5 milliGRAM(s) IV Push every 2 hours PRN Severe Pain (7 - 10)  LORazepam   Injectable 1 milliGRAM(s) IV Push every 4 hours PRN anxiety/agitation    NUTRITIONALLY PERTINENT LABS:  09-02 Na138 mmol/L Glu 150 mg/dL<H> K+ 4.1 mmol/L Cr  1.82 mg/dL<H> BUN 36 mg/dL<H> 09-02 Phos 2.6 mg/dL 08-28 Alb 3.2 g/dL<L> 08-28 Chol 90 mg/dL LDL --    HDL 37 mg/dL<L> Trig 85 mg/dL08-28 ALT 18 U/L AST 13 U/L Alkaline Phosphatase 100 U/L  08-28-24 @ 21:42 a1c 7.2    A1C with Estimated Average Glucose Result: 7.2 % (08-28-24 @ 21:42)  A1C with Estimated Average Glucose Result: 6.7 % (05-12-24 @ 08:40)    Finger Sticks:  POCT Blood Glucose.: 155 mg/dL (09-02 @ 18:14)  POCT Blood Glucose.: 200 mg/dL (09-02 @ 13:05)    NUTRITIONALLY PERTINENT MEDICATIONS/LABS:  [x] Reviewed  [x] Relevant notes on medications/labs:  -levophed, phenylephrine ordered      EDEMA:  [x] Reviewed  [x] Relevant notes: 3+ erika hand erika foot per flow sheets. 2+ generalized per flow sheets     GI/ I&O:  [x] Reviewed  [] Relevant notes:  [] Other:    SKIN:   [] No pressure injuries documented, per nursing flowsheet  [x] Pressure injury previously noted: sacrum suspected deep tissue injury per flow sheets   [] Change in pressure injury documentation:  [x] Other: left head surgical incision     ESTIMATED NEEDS:  Based on: dosing weight 81.6kg with consideration for increased nutrient needs, overweight status  25-30kcal/kg 2040-2448kcal/day  1.2-1.4g/kg 97-114g protein/day  defer fluid needs to team    NUTRITION DIAGNOSIS:  [x] Prior Dx: Increased Nutrient Needs, Inadequate Protein Energy Intake   [x] New Dx: N/A - order for comfort measures 9/2    EDUCATION:  [] Yes:  [x] Not appropriate/warranted    NUTRITION CARE PLAN:  1. Diet: defer advancement to team. RD remains available for EN recommendations as needed.     [] Achieved - Continue current nutrition intervention(s)  [] Current medical condition precludes nutrition intervention at this time.    MONITORING AND EVALUATION:   RD remains available upon request and will follow up per protocol.    Yadira Arambula, MS, RD, CDN / Teams

## 2024-09-03 NOTE — PROGRESS NOTE ADULT - ASSESSMENT
ASSESSMENT/PLAN:  89M, admitted for traumatic subacute on chronic SDH.    NEURO:  -POD5 Left crani for SDH evac.  -POD4 Left MMAE w/ Flex  -Q4H neurochecks.  -Briviact 50BID.  -Precedex, Seroquel PRN.  Activity: [X] mobilize as tolerated [] Bedrest [X] PT [X] OT [] PMNR    PULM:  -SpO2 goal >92%.  -Face tent on 40%  -Cefepime for PNA     CV:  SBP goal 100-160.  Daily weights, strict Is and Os    RENAL:  -Monitor I&Os.  -inasteride for BPH.  - Diez in place for Is and Os    GI:  Diet: NG tube Glucerna at goal   GI prophylaxis [X] not indicated [] PPI [] other:  Bowel regimen [] colace [X] senna [] other:  LBM: 08/29    ENDO:   Goal euglycemia (-180), on NPH 8U Q6H    HEME/ONC:  VTE prophylaxis: SCDs, SQH    ID:  -Monitor for fevers.  -Cefepime for asp pneumonia   - Sputum cx 9/1 few GPR  - BCx NGTD  - Lactate 1.6 from 2.9    MISC:  DNR/DNI  Palliative care following    SOCIAL/FAMILY:  [] awaiting [X] updated at bedside [] family meeting    CODE STATUS:  [X] Full Code [] DNR [] DNI [] Palliative/Comfort Care    DISPOSITION:  [X] ICU [] Stroke Unit [] Floor [] EMU [] RCU [] PCU   ASSESSMENT/PLAN:  89M, admitted for traumatic subacute on chronic SDH.    NEURO:  -POD6 Left crani for SDH evac.  -POD5 Left MMAE w/ Flex  -Q4H neurochecks.  -Dilaudid drip for comfort.  Activity: [X] mobilize as tolerated [] Bedrest [X] PT [X] OT [] PMNR    PULM:  -SpO2 goal >92%.  -Cefepime for PNA     CV:  Vital sign checks Q2H.     RENAL:  -Monitor I&Os.  -Finasteride for BPH.  - Diez in place for Is and Os    GI:  Diet: NPO, hold tube feeds.  GI prophylaxis [X] not indicated [] PPI [] other:  Bowel regimen [] colace [X] senna [] other:  LBM: 08/29    ENDO:   Goal euglycemia (-180), on NPH 8U Q6H    HEME/ONC:  VTE prophylaxis: SCDs    ID:  -Monitor for fevers.  -Cefepime for asp pneumonia   - Sputum cx 9/1 few GPR  - BCx NGTD  - Lactate 1.6 from 2.9    MISC:  DNR/DNI  Palliative care following    SOCIAL/FAMILY:  [] awaiting [X] updated at bedside [] family meeting    CODE STATUS:  [] Full Code [X] DNR [X] DNI [] Palliative/Comfort Care    DISPOSITION:  [X] ICU [] Stroke Unit [] Floor [] EMU [] RCU [] PCU

## 2024-09-03 NOTE — PROGRESS NOTE ADULT - ASSESSMENT
89M, PMH PE not currently on AC/AP, T2DM, macular degeneration, presenting with multiple falls over the plast week and dysarthria. Tx from [place]. Exam there per ED: Dysarthric but AOx3, FC, CALHOUN AG. 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 Coruse c/b septic shock with aspiration pneumonia for which he's on low dose pressor and IV abx. Palliative consulted for assistance with goals of care and symptoms management.

## 2024-09-03 NOTE — PROGRESS NOTE ADULT - PROBLEM SELECTOR PLAN 5
Per discussion with Nimo  goals are to proioritize patient's comfort and alleviating his symptoms. They have 2 brothers who are flying in from [place] today and so they would like to continue limited medical interventions including IV pressors, if that can possibly prolong life. However they are clear that pt's QOL is main priority. They are open to PCU transfer once sons arrive from out of state. For now they wish want to continue care in ICU as pt is requiring pressors to maintain BP.    Decision maker: Daughters Nimo Copelandtodd and Regina Watson are report HCPs;
Thank you for allowing us to participate in your patient's care. We will continue to follow with you.  In the event of worsening symptoms, please contact the Palliative Medicine team via pager (if the patient is at [facility] #[phone] or if the patient is at [facility] #[phone]) The Geriatric and Palliative Medicine service has coverage 24 hours a day/ 7 days a week to provide medical recommendations regarding symptom management needs via telephone    Jeremy Hurtado MD  Palliative Medicine

## 2024-09-03 NOTE — PROGRESS NOTE ADULT - ATTENDING COMMENTS
89yo man with h/o PE not on AC and T2DM, admitted 8/28 with multiple falls, CTH with subacute on chronic L SDH s/p L crani for evacuation on 8/28. Post-op CTH with good evacuation. Post MMA embolization on 8/29.     Interval events:  With desats overnight, now requiring NC and face tent.   Afebrile. Is and Os pos 3L in 2 days.  CTH stable this AM.    Exam on precedex, lethargic and agitated, does not attend, does not follows commands, gaze midline, moves b/l arms AG, moves b/l LEs in plane of bed    GOC discussion with family, Precedex for sedation, on Seroquel for agitation, monitor respiratory status, s/p Lasix 20 mg IV, Is and Os goal net neg 500cc, NPO for respiratory status, started on cefepime and vanc for possible aspiration PNA.     Patient seen and examined by attending on 08/31/2024.    Patient is critically ill due to SDH s/p evacuation and MMA embolization and at high risk for neurological deterioration or death due to: with hypoxic respiratory failure at risk for requiring reintubation.
Agree with above.
89m hx PE not on AC, T2DM, macular degeneration, multiple falls over last week, admit for dysarthria found to have subacute on chronic SDH. Pt pending OR evacuation, placed on Briviact for seizure ppx,
89yo man with h/o PE not on AC and T2DM, admitted 8/28 with multiple falls, CTH with subacute on chronic L SDH s/p L crani for evacuation on 8/28. Post-op CTH with good evacuation. Post MMA embolization on 8/29.     Interval events:  Extubated overnight.     On exam briefly off precedex, lethargic and agitated, oriented to self only, follows commands, gaze midline, moves b/l arms AG, moves L leg AG and R leg in plane of bed    CTH this AM, on tube feeds, stop insulin drip, start NPH, monitor off abx    Patient seen and examined by attending on 08/30/2024.    Patient is critically ill due to SDH s/p evacuation and MMA embolization and at high risk for neurological deterioration or death due to: at risk of re-expansion of SDH requiring close neurologic monitoring.
Agree with above.
89yo man with h/o PE not on AC and T2DM, admitted 8/28 with multiple falls, CTH with subacute on chronic L SDH s/p L crani for evacuation on 8/28. Post-op CTH with good evacuation.     Interval events:  Post MMA embolization today.  Presents to NSCU s/p NMB, requiring reversal with Sugammadex.    On exam, intubated, sedated, PERRL, overbreathes the vent, no cough    wean sedation to get exam, monitor SD OFELIA output, wean to extubate as possible    Patient seen and examined by attending on 08/29/2024.    Patient is critically ill due to SDH s/p evacuation and at high risk for neurological deterioration or death due to: currently mechanically ventilated s/p MMA embolization.
Agree with above.

## 2024-09-03 NOTE — PROVIDER CONTACT NOTE (OTHER) - BACKGROUND
Patient admitted for SDH. Patient now made comfort care only by family. Family has stated she would like vitals to be taken more frequently and monitored.

## 2024-09-03 NOTE — CHART NOTE - NSCHARTNOTESELECT_GEN_ALL_CORE
Event Note
NGT placement/Event Note
Nutrition Services
Speech-Swallow
Neuro IR pre procedure
Neuro IR procedure note
SG drain removed/Event Note
VTE Risk Assessment/Event Note
trenton rojas

## 2024-09-03 NOTE — PROGRESS NOTE ADULT - TIME BILLING
Total Time Spent 50 minutes.    This includes chart review, patient assessment, discussion and collaboration with interdisciplinary team members.
Time spent for extensive review of the physical chart, electronic medical record, and documentation to obtain collateral information including but not limited to:  [x ] Inpatient records (ED, H&P, primary team, and consultants if applicable, care coordination)  [x] Inpatient values/results (biomarkers, immunoassays, imaging, and microbiology results)  [x] Current or proposed treatment plans  [x] Discussion with the primary team  [x] Discussion with the patient, surrogate decision maker, or family    Time spent: >65 mins (Separate 20 mins on ACP)

## 2024-09-03 NOTE — PROGRESS NOTE ADULT - PROBLEM SELECTOR PLAN 3
pt requiring pressors 2/2 suspected shock  family wishes to continue titration pressor support to allow family to arrive from out of town
PPSV 20%  Total care.

## 2024-09-03 NOTE — PROGRESS NOTE ADULT - PROBLEM SELECTOR PLAN 4
PPS 10 %.  Patient requires total support for all ADL's.
[facility] as above: in summary daughter Rgeina who is one of the HCPs along with son Harpreet and Gurvinder all share that their goals now is for patient's comfort and alleviating his symptoms. They also have 2 brothers who are flying in from [place] in the next two days and so if possible they would like to continue limited medical interventions if that can possibly prolong life. However they are clear that pt's QOL and reduction is priority one and goals may further change. They are open to PCU transfer but for now want to continue care at ICU.     Code status: DNR/DNI. MOLST in chart reviewed and now updated today for Comfort measures; no further labs; trial of abx; no long term feeding tube (for now NGT to continue for meds but agreeable to stopping tube feeds when appropriate and allow pleasure feeds)    Decision maker: Daughters Nimo Lozada and Regina Watson are report HCPs;

## 2024-09-03 NOTE — PROGRESS NOTE ADULT - PROBLEM SELECTOR PROBLEM 4
[FreeTextEntry1] : 89 year old female followup with neck pain after a mechanical fall. She was seen at [facility] and found to have an odontoid fracture.  She was placed in a hard cervical orthosis at all times. Since her last visit she has continued to wear the cervical collar.  She has episodes of neck pain.  She denies radicular pain.  She is neurologically intact.  Radiographs today show anterior displacement of odontoid fracture compared with CT cervical on 10/18/22. This suggests continued instability.  Recommend continue hard cervical collar at all times.  Patient is scheduled for CT cervical repeat and followup with Dr. Puckett.  She was given her radiographs on CD to bring to Dr. Puckett (images also available on orthopacs).  She will followup with me after her new CT scan and followup with Dr. Puckett. We discussed red flag symptoms that would require emergent evaluation. She knows to call with any questions or concerns or if her symptoms acutely worsen.
Functional quadriplegia
Advanced care planning/counseling discussion

## 2024-09-03 NOTE — PROGRESS NOTE ADULT - NS MD NEURO CONDITIONS_ENCEPHAL
Neurological

## 2024-09-03 NOTE — PROGRESS NOTE ADULT - PROBLEM SELECTOR PLAN 2
Currently on IV abx which family would like to continue for now  Off pressor today; possible limited capped pressors if needed per icu to allow for sons to visit from [place]; however family is clear is that goal is pt's comfort and symptoms mangement.   ID following.
Pt with fall, found to have SDH s/p OR   Currently encephalopathic, unable to follow command   management per NSCU

## 2024-09-03 NOTE — PROGRESS NOTE ADULT - ASSESSMENT
89M h/o DM c/b DM neuropathy, BPH, MD, h/o PE, h/o laminectomy,  pw slurred speech and difficulty swallowing breakfast since 8AM. Per daughter has been increasingly confused, w gait instability, and 6 falls over the past week. Falls have been unwitnessed, unknown head strikes. Tx from [place]. Exam there per ED: Dysarthric but AOx3, FC, CALHOUN AG.   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  CTA H/N neg   8/28 L crani for SDH evac   8/29 L MMAE   extubated 8/30 early AM   8/30 CTH: no change since 8/29. post op changes. L MMAE material. left frontal and pairetal purr hole.s   CTH 8/31 no change in L SDH collection. L parietal blank hole and drain. L MMAE   9/1 made DNR/I  waiting for family from [place]     Imprion:   1) SDH likely traumatic from fall  2) falls         -made DNR/I, comfort.  getting dilauded.  waiting for family from [place]   - getting cefepime   - Briviact for sz ppx on 50mg BID  - wean sedation as tolerated  - SBP < 160  - telemetry  - check FS, glucose control <180  - GI/DVT ppx  - Thank you for allowing me to participate in the care of this patient. Call with questions.   DNR/I  comfort care awaiting for family from [place]   Albert Watson MD  Vascular Neurology  Office: [phone]

## 2024-09-03 NOTE — PROGRESS NOTE ADULT - SUBJECTIVE AND OBJECTIVE BOX
Neurology   S: patient seen.  DNR/I, comfort         Medications: MEDICATIONS  (STANDING):  albuterol/ipratropium for Nebulization 3 milliLiter(s) Nebulizer every 6 hours  brivaracetam  IVPB 50 milliGRAM(s) IV Intermittent two times a day  cefepime   IVPB 1000 milliGRAM(s) IV Intermittent daily  chlorhexidine 4% Liquid 1 Application(s) Topical daily  doxazosin 2 milliGRAM(s) Oral at bedtime  finasteride 5 milliGRAM(s) Oral daily  HYDROmorphone Infusion 1 mG/Hr (1 mL/Hr) IV Continuous <Continuous>  latanoprost 0.005% Ophthalmic Solution 1 Drop(s) Both EYES at bedtime  sodium chloride 3%  Inhalation 4 milliLiter(s) Inhalation every 12 hours    MEDICATIONS  (PRN):  acetaminophen     Tablet .. 650 milliGRAM(s) Oral every 6 hours PRN Temp greater or equal to 38C (100.4F), Mild Pain (1 - 3)  glycopyrrolate Injectable 0.4 milliGRAM(s) IV Push four times a day PRN Secretions  HYDROmorphone  Injectable 1.5 milliGRAM(s) IV Push every 2 hours PRN Severe Pain (7 - 10)  LORazepam   Injectable 1 milliGRAM(s) IV Push every 4 hours PRN anxiety/agitation       Vitals:  Vital Signs Last 24 Hrs  T(C): 38 (03 Sep 2024 07:00), Max: 38 (03 Sep 2024 07:00)  T(F): 100.4 (03 Sep 2024 07:00), Max: 100.4 (03 Sep 2024 07:00)  HR: 120 (03 Sep 2024 07:00) (60 - 133)  BP: 100/50 (03 Sep 2024 07:00) (85/48 - 111/54)  BP(mean): 71 (03 Sep 2024 07:00) (61 - 78)  RR: 17 (03 Sep 2024 07:00) (11 - 22)  SpO2: 95% (03 Sep 2024 07:00) (89% - 100%)    Parameters below as of 03 Sep 2024 07:00  Patient On (Oxygen Delivery Method): mask, nonrebreather    O2 Concentration (%): 100    Exam:   General Appearance: Appropriately dressed and in no acute distress       Head: Normocephalic, atraumatic and no dysmorphic features  Ear, Nose, and Throat: Moist mucous membranes +NGT   CVS: S1S2+  Resp: No SOB, no wheeze or rhonchi  GI: soft NT/ND  Extremities: No edema or cyanosis  Skin: No bruises or rashes     Neurological Exam: (sedated )   Mental Status:  AAOx0, gorands   Cranial Nerves: PERRL, EOMI, ? decrease blink on R , sensation V1-V3 intact,  no obvious facial asymmetry,   Motor: was CALHOUN now in resraints. L>R?     Sensation:  withdraws x 4 weakly   Coordination: unable   Gait: unable      Data/Labs/Imaging which I personally reviewed.     Labs:         LABS:      09-02    138  |  111<H>  |  36<H>  ----------------------------<  150<H>  4.1   |  17<L>  |  1.82<H>    Ca    8.1<L>      02 Sep 2024 04:12  Phos  2.6     09-02  Mg     2.1     09-02          Urinalysis Basic - ( 02 Sep 2024 04:12 )    Color: x / Appearance: x / SG: x / pH: x  Gluc: 150 mg/dL / Ketone: x  / Bili: x / Urobili: x   Blood: x / Protein: x / Nitrite: x   Leuk Esterase: x / RBC: x / WBC x   Sq Epi: x / Non Sq Epi: x / Bacteria: x                < from: CT Angio Neck Stroke Protocol w/ IV Cont (08.28.24 @ 09:35) >    ACC: [id number] EXAM:  CT ANGIO BRAIN STROKE PROTC IC   ORDERED BY: GEETA YABRROUGH     ACC: [id number] EXAM:  CT ANGIO NECK STROKE PROTCL IC   ORDERED BY: GEETA YARBROUGH     ACC: [id number] EXAM:  CT BRAIN STROKE PROTOCOL   ORDERED BY: GEETA YARBROUGH     ACC:[id number] EXAM:  CT BRAIN PERFUSION MAPS STROKE   ORDERED BY: GEETA YARBROUGH     PROCEDURE DATE:  08/28/2024          INTERPRETATION:  CLINICAL INDICATIONS:Stroke Code    TECHNIQUE: CT head, CTA brain and neck, and CT perfusion was obtained.   140 cc'sof Omnipaque 350 Intravenous contrast was administered. 10 cc's   discarded. 3D volume rendered images, coronal and sagittal reformats were   obtained.    COMPARISON: CT head 5/28/2022 and CTA brain and neck 11/5/2021    FINDINGS:    CT HEAD: There is a large left holohemispheric subdural hematoma, mostly   chronic, with a small amount of acute hemorrhagic blood products. This   measures up to 2.9 cm in greatest width. There is significant mass effect   on the underlying parenchyma. There is minimal left to right midline   shift of approximately 1 to 2 mm. Ventricles and sulci are prominent,   compatible with age-related generalized cerebral volume loss.   Periventricular hypoattenuation, suggestive of advanced chronic   microvascular ischemic changes. Chronic lacunar infarcts in the right   basal ganglia are present.    Paranasal sinuses and mastoid air cells are clear.      CTA BRAIN:  The [place] of [place] and vertebrobasilar system are normal without   evidence of stenosis, occlusion or saccular aneurysm dilation. No   evidence for arterial venous malformation.    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 Aortic calcifications are   noted. Calcifications at the origin of the great vessels are present.   Calcifications ofthe bilateral carotid bulbs and proximal internal   carotid arteries without hemodynamically significant stenosis is   identified.    Bilateral cervical vertebral arteries are codominant and appear patent.    CT PERFUSION:    Scattered areas of delayed flow in the bilateral cerebri, may be   artifactual given the bilaterality. Abnormal perfusion data within the   subdural hematoma is noted from artifact.        IMPRESSION:    CT head: No acute infarct is identified. Large left holohemispheric   subdural hematoma, mostly chronic, with a small amount of acute   hemorrhagic blood products. This measures up to 2.9 cm in greatest width.   There is significant mass effect on the underlying parenchyma. There is   minimal left to right midline shift of approximately 1 to 2 mm. Rest of   the chronic findings as above.      Critical value:  I discussed the finding of this report with Dr. Yarbrough   at 9:45 AM on 8/28/2024.  Critical value policy of the hospital was   followed.  Read back and confirmation of receipt of this communication   was performed.  This verbal communication supplements the text report of   this document.    CTA brain: No hemodynamically significant stenosis    CTA carotid/vertebral artery circulation: No hemodynamically significant   stenosis    CT Perfusion: Perfusion data appears likely artifactual as above.    --- End of Report ---            DONA MONACO MD  This document has been electronically signed. Aug 28 2024  9:52AM    < end of copied text >

## 2024-09-03 NOTE — PROVIDER CONTACT NOTE (OTHER) - ACTION/TREATMENT ORDERED:
CHESTER Luo and CHESTER Salas at bedside. Agreement was made to give 5mg of midodrine PO and monitor vitals q4hrs moving forward to honor patient and families wishes.

## 2024-09-13 ENCOUNTER — APPOINTMENT (OUTPATIENT)
Dept: ELECTROPHYSIOLOGY | Facility: CLINIC | Age: 89
End: 2024-09-13

## 2024-09-19 ENCOUNTER — APPOINTMENT (OUTPATIENT)
Dept: FAMILY MEDICINE | Facility: CLINIC | Age: 89
End: 2024-09-19

## 2024-10-22 ENCOUNTER — RX RENEWAL (OUTPATIENT)
Age: 89
End: 2024-10-22

## 2024-10-30 NOTE — PATIENT PROFILE ADULT - FUNCTIONAL SCREEN CURRENT LEVEL: COMMUNICATION, MLM
Pk Kaur is a 58 y.o.  female and presents with    Chief Complaint   Patient presents with    Follow-up     HTN           Subjective:    Hypertension follow up:  Taking medications as prescribed: YES  Checking BP at home: NO  Symptoms: leg swelling - worse in ankle  Low sodium diet: YES  Exercise: NO    She has been doing Mounjaro 7.5mg, she had lost about 20 lbs, but she has been having significant constipation.  She has not been taking this and now she has gained weight.   Patient Active Problem List   Diagnosis    Leukocytes in urine    Flank pain    Essential hypertension      Past Medical History:   Diagnosis Date    Hypertension       Past Surgical History:   Procedure Laterality Date    GYN      ablation      No family history on file.  Social History     Socioeconomic History    Marital status:      Spouse name: Not on file    Number of children: Not on file    Years of education: Not on file    Highest education level: Not on file   Occupational History    Not on file   Tobacco Use    Smoking status: Former    Smokeless tobacco: Never   Substance and Sexual Activity    Alcohol use: No    Drug use: No    Sexual activity: Not on file   Other Topics Concern    Not on file   Social History Narrative    Not on file     Social Determinants of Health     Financial Resource Strain: Low Risk  (1/17/2024)    Overall Financial Resource Strain (CARDIA)     Difficulty of Paying Living Expenses: Not hard at all   Food Insecurity: No Food Insecurity (1/17/2024)    Hunger Vital Sign     Worried About Running Out of Food in the Last Year: Never true     Ran Out of Food in the Last Year: Never true   Transportation Needs: Unknown (1/17/2024)    PRAPARE - Transportation     Lack of Transportation (Medical): Not on file     Lack of Transportation (Non-Medical): No   Physical Activity: Not on file   Stress: Not on file   Social Connections: Not on file   Intimate Partner Violence: Not on file 
Pk Kaur is a 58 y.o. year old female who presents today for   Chief Complaint   Patient presents with    Follow-up     HTN        \"Have you been to the ER, urgent care clinic since your last visit?  Hospitalized since your last visit?\"   NO     “Have you seen or consulted any other health care providers outside our system since your last visit?”   NO             - NIURKA Stewart  [facility]  Phone: [phone]  Fax: [phone]  
0 = understands/communicates without difficulty

## 2024-11-03 NOTE — PATIENT PROFILE ADULT - FUNCTIONAL ASSESSMENT - DAILY ACTIVITY SECTION LABEL
Patient ID:   Pancho Brito is a 63 y.o. male with PMH remarkable for arteriosclerotic cardiovascular disease, JULIO C, vitamin D deficiency, BPH, Subclinical hypothyroidism and depression who presents to the office today fora follow up  Results (labs) and Flu Vaccine (Pt is declining flu vaccine at this time ).    HEALTH MAINTENANCE: Establish Care  Last Office Visit: 8/23//2024  Last Labs: 11/7/2024  PSA Screening (starting at age 55 till 69): 11/7/2024 -> 9.96 on Flomax  Colonoscopy (45-75 or age 40 with 1st degree relative dx colon ca): Cologuard 7/30/2024 WNL; repeat in 7/2027  Lung cancer screening (50-76 y/o + 20 pack year + smoking/quit in last 15 years):  Never a smoker  Cardiac Calcium Scoring (55+, q 10 years): 8/16/2024 score of 1744 with highest score in the RCA  DEXA (65+, q 2 years): NA    HPI  Subclinical hypothyroidism  Last TSH 11/7/2024 elevated at 4.08  T4 normal at 0.86      BPH with LUTs  Most recent PSA increased from 8.13 -> 9.96  hx of elevated PSA, and BPH w/ LUTs. Dr Rangel recommended a prostate biopsy. Patient refused biopsy without an MRI. MRI was ordered, but patient's insurance would not pay for it.   -> He is going to follow back up and let me know if he needs a new order to discuss this and try to get it approved with his insurance   Currently only taking  0.4 Flomax instead of 0.8 per Dr. Hughes ([facility]) because he states he feels too dizzy with two tablets   Patient wishes for conservative treatment.    Patient's father had prostate cancer   No previous prostate biopsies     Vitamin D deficiency  Last Vitamin D level from 11/2024 at 26  Currently takes vitamin d 50,000 units weekly      JULIO C  Currently not using CPAP because he moved and is remodeling   He completed his sleep study and had his CPAP numbers adjusted    Arteriosclerotic Cardiovascular Disease  CT Cardiac calcium scoring done in July 2024 which showed elevated calcium. Score distribution breakdown is as follows:  BIRGIT  "2.56,  .1,  LCx 329.96,  .27,  -> Total 1744    Abnormal lipid panel from 7/2024; repeated in November and improvement noted    -VLDL elevated at 52 -> 29  -Triglycerides elevated at 259 -> 146  He started fish oil 1200 mg once a day   -He did start taking the baby Aspirin 81 mg every day      Depression  Currently feeling well.     BLE Edema and skin discoloration  Hemosideran deposits started about a year ago. Ankle and Lower leg swelling. Denies pain. Varicose veins present.   -> bilateral venous duplex completed 8/16/2024 WNL    Social History     Tobacco Use    Smoking status: Never    Smokeless tobacco: Never   Vaping Use    Vaping status: Never Used   Substance Use Topics    Alcohol use: Never    Drug use: Never       Immunization History   Administered Date(s) Administered    Tdap vaccine, age 7 year and older (BOOSTRIX, ADACEL) 11/01/2018       Review of Systems   Constitutional: Negative.  Negative for fatigue.   Respiratory: Negative.  Negative for cough, shortness of breath and wheezing.    Cardiovascular:  Positive for leg swelling. Negative for chest pain and palpitations.   Gastrointestinal: Negative.    Endocrine: Negative.    Genitourinary:  Positive for frequency. Negative for difficulty urinating.   Musculoskeletal: Negative.    Skin:  Positive for color change.        Bilaterally on inner ankles    Neurological: Negative.  Negative for dizziness, light-headedness and headaches.   Psychiatric/Behavioral: Negative.  Negative for sleep disturbance. The patient is not nervous/anxious.        Allergies   Allergen Reactions    Bee Venom Protein (Honey Bee) Swelling    Pollen Extracts Other     Eye irritation and sinus issues        Visit Vitals  /77 (BP Location: Left arm)   Pulse 59   Ht 1.778 m (5' 10\")   Wt 88.9 kg (196 lb)   SpO2 96%   BMI 28.12 kg/m²   Smoking Status Never   BSA 2.1 m²       Physical Exam  Constitutional:       General: He is not in acute distress.     " Appearance: Normal appearance.   HENT:      Head: Normocephalic.   Cardiovascular:      Rate and Rhythm: Normal rate and regular rhythm.      Pulses: Normal pulses.      Heart sounds: Normal heart sounds. No murmur heard.     No friction rub. No gallop.   Pulmonary:      Effort: Pulmonary effort is normal.      Breath sounds: Normal breath sounds. No wheezing, rhonchi or rales.   Abdominal:      General: Bowel sounds are normal. There is no distension.      Palpations: Abdomen is soft.      Tenderness: There is no abdominal tenderness. There is no guarding.   Musculoskeletal:         General: Normal range of motion.      Cervical back: Normal range of motion.   Skin:     General: Skin is warm.   Neurological:      Mental Status: He is alert and oriented to person, place, and time.      Motor: No weakness.   Psychiatric:         Mood and Affect: Mood normal.         Behavior: Behavior normal.         Current Outpatient Medications   Medication Instructions    aspirin 81 mg, oral, Daily    omega 3-dha-epa-fish oil (Fish OiL) 1,000 (120-180) mg capsule Take by mouth.    tamsulosin (FLOMAX) 0.4 mg, oral, Nightly       Lab Results   Component Value Date    WBC 4.4 11/07/2024    HGB 15.6 11/07/2024    HCT 46.9 11/07/2024     11/07/2024    CHOL 155 11/07/2024    TRIG 146 11/07/2024    HDL 41.7 11/07/2024    ALT 7 (L) 11/07/2024    AST 17 11/07/2024     11/07/2024    K 4.2 11/07/2024     11/07/2024    CREATININE 0.91 11/07/2024    BUN 18 11/07/2024    CO2 29 11/07/2024    TSH 4.08 (H) 11/07/2024    HGBA1C 5.4 11/07/2024       ASSESSMENT AND PLAN:  Assessment/Plan   Problem List Items Addressed This Visit             ICD-10-CM    Benign prostatic hyperplasia with lower urinary tract symptoms N40.1     PSA came back higher than before. He is at 9.96 and still only taking one Flomax instead of two because it makes him dizzy and lowers his BP too much.   -He will follow up on the MRI that was previously  leeroy and with his Urologist, Dr. Hughes.          Relevant Medications    tamsulosin (Flomax) 0.4 mg 24 hr capsule    Vitamin D deficiency E55.9     Last vitamin D from 11/2024 was still low at 26.   -He is taking the supplement weekly  -Will continue to monitor          Relevant Orders    Vitamin D 25-Hydroxy,Total (for eval of Vitamin D levels)    JULIO C (obstructive sleep apnea) G47.33     -He did have his setting adjusted a few months ago,   -He reports he has not been using it like he should because he moved and is remodeling his house.   -I encouraged him to restart the CPAP due to risks associated with JULIO C,          Subclinical hypothyroidism E03.8     Last TSH 11/7/2024 elevated at 4.08  T4 normal at 0.86  -Will continue to monitor  -Will repeat labs prior to next visit          Other Visit Diagnoses         Codes    Screening PSA (prostate specific antigen)    -  Primary Z12.5    Relevant Orders    Prostate Specific Antigen, Screen    Healthcare maintenance     Z00.00    Relevant Medications    aspirin 81 mg EC tablet    Other Relevant Orders    CBC and Auto Differential    Comprehensive Metabolic Panel    TSH with reflex to Free T4 if abnormal    Hemoglobin A1C    Mixed hyperlipidemia     E78.2    Relevant Orders    Lipid Panel              Assessment/Plan   --------------------   Follow up in 6 months-repeat labs prior to follow up  Follow up with urology for PSA and MRI     .

## 2024-11-14 NOTE — ED ADULT TRIAGE NOTE - AS TEMP SITE
Wife 
oral

## 2024-11-14 NOTE — PATIENT PROFILE ADULT - FUNCTIONAL ASSESSMENT - BASIC MOBILITY SCORE.
Requested Prescriptions     Pending Prescriptions Disp Refills    ROSUVASTATIN 10 MG Oral Tab [Pharmacy Med Name: ROSUVASTATIN 10MG TABLETS] 90 tablet 3     Sig: TAKE 1 TABLET(10 MG) BY MOUTH DAILY     LOV 9/25/2024     Patient was asked to follow-up in: Follow-up not documented in note    Appointment scheduled: Visit date not found     Medication failed protocol due to:   ALT < 80    ALT resulted within past year    Lipid panel within past 12 months         Routed to Daniel Hugo NP, for review     6

## 2025-01-29 NOTE — ED ADULT NURSE NOTE - TEMPLATE LIST FOR HEAD TO TOE ASSESSMENT
Received report from PACU and patient brought over to phase-2.  Assessment complete and care team notified that patient is now in phase-2.   General

## 2025-03-24 NOTE — H&P ADULT - NSHPPOAPULMEMBOLUS_GEN_A_CORE
Patient notified of the below  
Vitamin D, 25-Hydroxy (ng/mL)   Date Value   03/21/2025 27.6 (L)     Ask pt to take vit d3 4000 IU daily   
no

## (undated) DEVICE — ELCTR GROUNDING PAD ADULT COVIDIEN

## (undated) DEVICE — MEDTRONIC AXIEM NAVIGATION POINTER

## (undated) DEVICE — SUT MONOSOF 3-0 18" C-14

## (undated) DEVICE — STAPLER SKIN VISI-STAT 35 WIDE

## (undated) DEVICE — MIDAS REX MR8 BALL FLUTED SM BORE 6MM X 10CM

## (undated) DEVICE — DRAPE 1/2 SHEET 40X57"

## (undated) DEVICE — MIDAS REX MR7 LUBRICANT DIFFUSER CARTRIDGE

## (undated) DEVICE — DRAPE 3/4 SHEET W REINFORCEMENT 56X77"

## (undated) DEVICE — DRSG CURITY GAUZE SPONGE 4 X 4" 12-PLY

## (undated) DEVICE — ELCTR BOVIE TIP BLADE INSULATED 2.75" EDGE

## (undated) DEVICE — MEDICATION LABELS W MARKER

## (undated) DEVICE — SOL IRR POUR H2O 250ML

## (undated) DEVICE — DRAPE TOWEL BLUE 17" X 24"

## (undated) DEVICE — SOL IRR POUR NS 0.9% 500ML

## (undated) DEVICE — SUT NUROLON 4-0 8-18" TF (POP-OFF)

## (undated) DEVICE — BIPOLAR FORCEP STRYKER STANDARD 7" X 0.5MM (YELLOW)

## (undated) DEVICE — ELCTR BIPOLAR CORD J&J 12FT DISP

## (undated) DEVICE — SNAP ON SPHERZ 5 PACK

## (undated) DEVICE — Device

## (undated) DEVICE — BLADE SCALPEL SAFETYLOCK #11

## (undated) DEVICE — GLV 7.5 PROTEXIS (WHITE)

## (undated) DEVICE — SUT VICRYL PLUS 2-0 18" CP-2 UNDYED (POP-OFF)

## (undated) DEVICE — PREP CHLORAPREP HI-LITE ORANGE 26ML

## (undated) DEVICE — WARMING BLANKET LOWER ADULT

## (undated) DEVICE — POSITIONER FOAM EGG CRATE ULNAR 2PCS (PINK)

## (undated) DEVICE — ELCTR BOVIE PENCIL SMOKE EVACUATION

## (undated) DEVICE — VENODYNE/SCD SLEEVE CALF MEDIUM

## (undated) DEVICE — DRSG TELFA 3 X 8

## (undated) DEVICE — SUT VICRYL PLUS 3-0 18" X-1

## (undated) DEVICE — BATTERY PACK VARISPEED SINGLE USE

## (undated) DEVICE — FOLEY TRAY 16FR LF URINE METER SURESTEP

## (undated) DEVICE — DRSG XEROFORM 1 X 8"